# Patient Record
Sex: FEMALE | Race: WHITE | Employment: OTHER | URBAN - METROPOLITAN AREA
[De-identification: names, ages, dates, MRNs, and addresses within clinical notes are randomized per-mention and may not be internally consistent; named-entity substitution may affect disease eponyms.]

---

## 2018-12-28 ENCOUNTER — APPOINTMENT (OUTPATIENT)
Dept: ULTRASOUND IMAGING | Age: 78
End: 2018-12-28
Attending: EMERGENCY MEDICINE
Payer: MEDICARE

## 2018-12-28 ENCOUNTER — APPOINTMENT (OUTPATIENT)
Dept: CT IMAGING | Age: 78
End: 2018-12-28
Attending: EMERGENCY MEDICINE
Payer: MEDICARE

## 2018-12-28 ENCOUNTER — HOSPITAL ENCOUNTER (EMERGENCY)
Age: 78
Discharge: HOME | End: 2018-12-28
Attending: EMERGENCY MEDICINE
Payer: MEDICARE

## 2018-12-28 VITALS
HEART RATE: 46 BPM | TEMPERATURE: 97.6 F | SYSTOLIC BLOOD PRESSURE: 121 MMHG | HEIGHT: 66 IN | DIASTOLIC BLOOD PRESSURE: 66 MMHG | WEIGHT: 220 LBS | OXYGEN SATURATION: 98 % | RESPIRATION RATE: 16 BRPM | BODY MASS INDEX: 35.36 KG/M2

## 2018-12-28 DIAGNOSIS — R10.11 RUQ PAIN: ICD-10-CM

## 2018-12-28 DIAGNOSIS — N39.0 ACUTE UTI: Primary | ICD-10-CM

## 2018-12-28 LAB
ALBUMIN SERPL-MCNC: 3.9 G/DL (ref 3.2–4.6)
ALBUMIN/GLOB SERPL: 1.1 {RATIO} (ref 1.2–3.5)
ALP SERPL-CCNC: 52 U/L (ref 50–136)
ALT SERPL-CCNC: 35 U/L (ref 12–65)
ANION GAP SERPL CALC-SCNC: 8 MMOL/L (ref 7–16)
AST SERPL-CCNC: 18 U/L (ref 15–37)
ATRIAL RATE: 45 BPM
BACTERIA URNS QL MICRO: NORMAL /HPF
BASOPHILS # BLD: 0.1 K/UL (ref 0–0.2)
BASOPHILS NFR BLD: 1 % (ref 0–2)
BILIRUB SERPL-MCNC: 0.8 MG/DL (ref 0.2–1.1)
BUN SERPL-MCNC: 8 MG/DL (ref 8–23)
CALCIUM SERPL-MCNC: 9.2 MG/DL (ref 8.3–10.4)
CALCULATED P AXIS, ECG09: 62 DEGREES
CALCULATED R AXIS, ECG10: 7 DEGREES
CALCULATED T AXIS, ECG11: 31 DEGREES
CASTS URNS QL MICRO: NORMAL /LPF
CHLORIDE SERPL-SCNC: 104 MMOL/L (ref 98–107)
CO2 SERPL-SCNC: 29 MMOL/L (ref 21–32)
CREAT SERPL-MCNC: 0.81 MG/DL (ref 0.6–1)
DIAGNOSIS, 93000: NORMAL
DIFFERENTIAL METHOD BLD: NORMAL
EOSINOPHIL # BLD: 0.2 K/UL (ref 0–0.8)
EOSINOPHIL NFR BLD: 3 % (ref 0.5–7.8)
EPI CELLS #/AREA URNS HPF: NORMAL /HPF
ERYTHROCYTE [DISTWIDTH] IN BLOOD BY AUTOMATED COUNT: 12.5 % (ref 11.9–14.6)
GLOBULIN SER CALC-MCNC: 3.7 G/DL (ref 2.3–3.5)
GLUCOSE SERPL-MCNC: 135 MG/DL (ref 65–100)
HCT VFR BLD AUTO: 44.3 % (ref 35.8–46.3)
HGB BLD-MCNC: 15.1 G/DL (ref 11.7–15.4)
IMM GRANULOCYTES # BLD: 0 K/UL (ref 0–0.5)
IMM GRANULOCYTES NFR BLD AUTO: 1 % (ref 0–5)
LIPASE SERPL-CCNC: 199 U/L (ref 73–393)
LYMPHOCYTES # BLD: 2.3 K/UL (ref 0.5–4.6)
LYMPHOCYTES NFR BLD: 40 % (ref 13–44)
MCH RBC QN AUTO: 29.5 PG (ref 26.1–32.9)
MCHC RBC AUTO-ENTMCNC: 34.1 G/DL (ref 31.4–35)
MCV RBC AUTO: 86.7 FL (ref 79.6–97.8)
MONOCYTES # BLD: 0.6 K/UL (ref 0.1–1.3)
MONOCYTES NFR BLD: 10 % (ref 4–12)
NEUTS SEG # BLD: 2.7 K/UL (ref 1.7–8.2)
NEUTS SEG NFR BLD: 47 % (ref 43–78)
NRBC # BLD: 0 K/UL (ref 0–0.2)
P-R INTERVAL, ECG05: 194 MS
PLATELET # BLD AUTO: 199 K/UL (ref 150–450)
PMV BLD AUTO: 10.6 FL (ref 9.4–12.3)
POTASSIUM SERPL-SCNC: 3.6 MMOL/L (ref 3.5–5.1)
PROT SERPL-MCNC: 7.6 G/DL (ref 6.3–8.2)
Q-T INTERVAL, ECG07: 496 MS
QRS DURATION, ECG06: 144 MS
QTC CALCULATION (BEZET), ECG08: 429 MS
RBC # BLD AUTO: 5.11 M/UL (ref 4.05–5.2)
RBC #/AREA URNS HPF: NORMAL /HPF
SODIUM SERPL-SCNC: 141 MMOL/L (ref 136–145)
TROPONIN I SERPL-MCNC: <0.02 NG/ML (ref 0.02–0.05)
VENTRICULAR RATE, ECG03: 45 BPM
WBC # BLD AUTO: 5.8 K/UL (ref 4.3–11.1)
WBC URNS QL MICRO: NORMAL /HPF

## 2018-12-28 PROCEDURE — 76705 ECHO EXAM OF ABDOMEN: CPT

## 2018-12-28 PROCEDURE — 74011000258 HC RX REV CODE- 258: Performed by: EMERGENCY MEDICINE

## 2018-12-28 PROCEDURE — 81015 MICROSCOPIC EXAM OF URINE: CPT

## 2018-12-28 PROCEDURE — 96365 THER/PROPH/DIAG IV INF INIT: CPT | Performed by: EMERGENCY MEDICINE

## 2018-12-28 PROCEDURE — 87086 URINE CULTURE/COLONY COUNT: CPT

## 2018-12-28 PROCEDURE — 84484 ASSAY OF TROPONIN QUANT: CPT

## 2018-12-28 PROCEDURE — 74177 CT ABD & PELVIS W/CONTRAST: CPT

## 2018-12-28 PROCEDURE — 80053 COMPREHEN METABOLIC PANEL: CPT

## 2018-12-28 PROCEDURE — 96375 TX/PRO/DX INJ NEW DRUG ADDON: CPT | Performed by: EMERGENCY MEDICINE

## 2018-12-28 PROCEDURE — 93005 ELECTROCARDIOGRAM TRACING: CPT | Performed by: EMERGENCY MEDICINE

## 2018-12-28 PROCEDURE — 83690 ASSAY OF LIPASE: CPT

## 2018-12-28 PROCEDURE — 74011250636 HC RX REV CODE- 250/636: Performed by: EMERGENCY MEDICINE

## 2018-12-28 PROCEDURE — 85025 COMPLETE CBC W/AUTO DIFF WBC: CPT

## 2018-12-28 PROCEDURE — 74011636320 HC RX REV CODE- 636/320: Performed by: EMERGENCY MEDICINE

## 2018-12-28 PROCEDURE — 81003 URINALYSIS AUTO W/O SCOPE: CPT | Performed by: EMERGENCY MEDICINE

## 2018-12-28 PROCEDURE — 99284 EMERGENCY DEPT VISIT MOD MDM: CPT | Performed by: EMERGENCY MEDICINE

## 2018-12-28 RX ORDER — CEPHALEXIN 500 MG/1
500 CAPSULE ORAL 4 TIMES DAILY
Qty: 28 CAP | Refills: 0 | Status: SHIPPED | OUTPATIENT
Start: 2018-12-28 | End: 2019-01-04

## 2018-12-28 RX ORDER — ONDANSETRON 2 MG/ML
4 INJECTION INTRAMUSCULAR; INTRAVENOUS
Status: COMPLETED | OUTPATIENT
Start: 2018-12-28 | End: 2018-12-28

## 2018-12-28 RX ORDER — SODIUM CHLORIDE 0.9 % (FLUSH) 0.9 %
10 SYRINGE (ML) INJECTION
Status: COMPLETED | OUTPATIENT
Start: 2018-12-28 | End: 2018-12-28

## 2018-12-28 RX ORDER — ONDANSETRON 4 MG/1
4 TABLET, ORALLY DISINTEGRATING ORAL
Qty: 8 TAB | Refills: 0 | Status: SHIPPED | OUTPATIENT
Start: 2018-12-28 | End: 2019-01-01

## 2018-12-28 RX ADMIN — SODIUM CHLORIDE 100 ML: 900 INJECTION, SOLUTION INTRAVENOUS at 12:39

## 2018-12-28 RX ADMIN — CEFTRIAXONE SODIUM 1 G: 1 INJECTION, POWDER, FOR SOLUTION INTRAMUSCULAR; INTRAVENOUS at 10:02

## 2018-12-28 RX ADMIN — ONDANSETRON 4 MG: 2 INJECTION INTRAMUSCULAR; INTRAVENOUS at 07:39

## 2018-12-28 RX ADMIN — IOPAMIDOL 100 ML: 755 INJECTION, SOLUTION INTRAVENOUS at 12:39

## 2018-12-28 RX ADMIN — Medication 10 ML: at 12:39

## 2018-12-28 NOTE — ED PROVIDER NOTES
31-year-old female presents with complaint of intermittent right upper quadrant/epigastric pain associated with eating over the course the past 10 days. States she has associated nausea with abdominal pain. Denies chest pain, shortness of breath, vomiting, fever, chills, urinary symptoms, diarrhea, constipation, melena, hematochezia, flank pain. Reports history of previous ALEJANDRO. Denies history of previous issues with gallstones. The history is provided by the patient. No  was used. Abdominal Pain This is a new problem. The problem occurs daily. The problem has not changed since onset. The pain is associated with eating. The pain is located in the RUQ. The quality of the pain is sharp and pressure-like. The pain is at a severity of 3/10. The pain is mild. Associated symptoms include nausea. Pertinent negatives include no anorexia, no fever, no belching, no diarrhea, no flatus, no hematochezia, no melena, no vomiting, no constipation, no dysuria, no frequency, no hematuria, no arthralgias, no myalgias, no trauma, no chest pain and no back pain. The pain is worsened by eating. The pain is relieved by nothing. No past medical history on file. No past surgical history on file. No family history on file. Social History Socioeconomic History  Marital status:  Spouse name: Not on file  Number of children: Not on file  Years of education: Not on file  Highest education level: Not on file Social Needs  Financial resource strain: Not on file  Food insecurity - worry: Not on file  Food insecurity - inability: Not on file  Transportation needs - medical: Not on file  Transportation needs - non-medical: Not on file Occupational History  Not on file Tobacco Use  Smoking status: Not on file Substance and Sexual Activity  Alcohol use: Not on file  Drug use: Not on file  Sexual activity: Not on file Other Topics Concern  Not on file Social History Narrative  Not on file ALLERGIES: Augmentin [amoxicillin-pot clavulanate] and Pcn [penicillins] Review of Systems Constitutional: Negative for chills, fatigue and fever. Respiratory: Negative for cough and shortness of breath. Cardiovascular: Negative for chest pain. Gastrointestinal: Positive for abdominal pain and nausea. Negative for anorexia, blood in stool, constipation, diarrhea, flatus, hematochezia, melena and vomiting. Genitourinary: Negative for dysuria, frequency and hematuria. Musculoskeletal: Negative for arthralgias, back pain and myalgias. Skin: Negative for rash. Neurological: Negative for dizziness and weakness. Vitals:  
 12/28/18 7956 BP: 155/81 Pulse: (!) 57 Resp: 18 Temp: 97.6 °F (36.4 °C) SpO2: 96% Weight: 99.8 kg (220 lb) Height: 5' 6\" (1.676 m) Physical Exam  
Constitutional: She is oriented to person, place, and time. She appears well-developed and well-nourished. Well appearing and in NAD. HENT:  
MMM. Eyes: Conjunctivae are normal.  
Neck: No JVD present. No tracheal deviation present. Cardiovascular: Regular rhythm and normal heart sounds. Bradycardic. Pulses 2+ and equal bilaterally. Pulmonary/Chest: Effort normal and breath sounds normal.  
CTAB. Abdominal: Soft. Bowel sounds are normal. There is tenderness in the right upper quadrant. Mild RUQ TTP. Soft, ND. No rebound or guarding. No CVAT. Negative Henley's sign. Musculoskeletal: Normal range of motion. Neurological: She is alert and oriented to person, place, and time. No cranial nerve deficit. Skin: Skin is warm and dry. No rash. Nursing note and vitals reviewed. MDM Number of Diagnoses or Management Options Acute UTI: new and requires workup RUQ pain: new and requires workup Diagnosis management comments: Patient with symptoms consistent with biliary colic. Will administer Zofran at this time. CBC, CMP, lipase, UA, troponin, EKG, right upper quadrant ultrasound pending. 
------------------------------------------------------------------------------------------------------- 
UA with evidence of UTI. Patient 1 g Rocephin IV. No allergic reaction. Urine culture obtained. RUQ ultrasound negative for stones or biliary tree obstruction. Patient requests that CT abdomen and pelvis. Obtained to rule out further etiologies of her right upper quadrant/epigastric pain. CT negative for any other acute abnormalities. Will discharge home with Keflex as patient does not have an allergy to Rocephin (or cephalosporins). Vital signs stable. Patient well-appearing. Patient tolerating po. Patient given strict return precautions. Amount and/or Complexity of Data Reviewed Clinical lab tests: ordered and reviewed Tests in the radiology section of CPT®: ordered and reviewed Tests in the medicine section of CPT®: ordered and reviewed Review and summarize past medical records: yes Independent visualization of images, tracings, or specimens: yes Risk of Complications, Morbidity, and/or Mortality Presenting problems: moderate Diagnostic procedures: moderate Management options: moderate Patient Progress Patient progress: stable ED Course as of Dec 28 1309 Fri Dec 28, 2018 [address]: Negative for gallstones or evidence of biliary tree obstruction. Increased liver echogenicity, probably moderate fatty infiltration. [DF] ED Course User Index 
[DF] Hi Abebe MD  
 
 
EKG Date/Time: 12/28/2018 7:47 AM 
Performed by: Hi Abebe MD 
Authorized by: Hi Abebe MD  
 
ECG reviewed by ED Physician in the absence of a cardiologist: yes Rate:  
  ECG rate:  45 ECG rate assessment: bradycardic Rhythm:  
  Rhythm: sinus bradycardia Ectopy:  
  Ectopy: none QRS:  
  QRS axis:  Normal 
 QRS intervals:  Normal 
Conduction:  
  Conduction: abnormal   
  Abnormal conduction: complete RBBB   
ST segments: ST segments:  Normal 
T waves:  
  T waves: normal   
 
 
Results Include: 
 
Recent Results (from the past 24 hour(s)) CBC WITH AUTOMATED DIFF Collection Time: 12/28/18  7:08 AM  
Result Value Ref Range WBC 5.8 4.3 - 11.1 K/uL  
 RBC 5.11 4.05 - 5.2 M/uL  
 HGB 15.1 11.7 - 15.4 g/dL HCT 44.3 35.8 - 46.3 % MCV 86.7 79.6 - 97.8 FL  
 MCH 29.5 26.1 - 32.9 PG  
 MCHC 34.1 31.4 - 35.0 g/dL  
 RDW 12.5 11.9 - 14.6 % PLATELET 169 278 - 565 K/uL MPV 10.6 9.4 - 12.3 FL ABSOLUTE NRBC 0.00 0.0 - 0.2 K/uL  
 DF AUTOMATED NEUTROPHILS 47 43 - 78 % LYMPHOCYTES 40 13 - 44 % MONOCYTES 10 4.0 - 12.0 % EOSINOPHILS 3 0.5 - 7.8 % BASOPHILS 1 0.0 - 2.0 % IMMATURE GRANULOCYTES 1 0.0 - 5.0 %  
 ABS. NEUTROPHILS 2.7 1.7 - 8.2 K/UL  
 ABS. LYMPHOCYTES 2.3 0.5 - 4.6 K/UL  
 ABS. MONOCYTES 0.6 0.1 - 1.3 K/UL  
 ABS. EOSINOPHILS 0.2 0.0 - 0.8 K/UL  
 ABS. BASOPHILS 0.1 0.0 - 0.2 K/UL  
 ABS. IMM. GRANS. 0.0 0.0 - 0.5 K/UL METABOLIC PANEL, COMPREHENSIVE Collection Time: 12/28/18  7:08 AM  
Result Value Ref Range Sodium 141 136 - 145 mmol/L Potassium 3.6 3.5 - 5.1 mmol/L Chloride 104 98 - 107 mmol/L  
 CO2 29 21 - 32 mmol/L Anion gap 8 7 - 16 mmol/L Glucose 135 (H) 65 - 100 mg/dL BUN 8 8 - 23 MG/DL Creatinine 0.81 0.6 - 1.0 MG/DL  
 GFR est AA >60 >60 ml/min/1.73m2 GFR est non-AA >60 >60 ml/min/1.73m2 Calcium 9.2 8.3 - 10.4 MG/DL Bilirubin, total 0.8 0.2 - 1.1 MG/DL  
 ALT (SGPT) 35 12 - 65 U/L  
 AST (SGOT) 18 15 - 37 U/L Alk. phosphatase 52 50 - 136 U/L Protein, total 7.6 6.3 - 8.2 g/dL Albumin 3.9 3.2 - 4.6 g/dL Globulin 3.7 (H) 2.3 - 3.5 g/dL A-G Ratio 1.1 (L) 1.2 - 3.5 LIPASE Collection Time: 12/28/18  7:08 AM  
Result Value Ref Range Lipase 199 73 - 393 U/L  
TROPONIN I  Collection Time: 12/28/18  7:08 AM  
 Result Value Ref Range Troponin-I, Qt. <0.02 (L) 0.02 - 0.05 NG/ML  
EKG, 12 LEAD, INITIAL Collection Time: 12/28/18  7:28 AM  
Result Value Ref Range Ventricular Rate 45 BPM  
 Atrial Rate 45 BPM  
 P-R Interval 194 ms QRS Duration 144 ms Q-T Interval 496 ms QTC Calculation (Bezet) 429 ms Calculated P Axis 62 degrees Calculated R Axis 7 degrees Calculated T Axis 31 degrees Diagnosis Sinus bradycardia Right bundle branch block Abnormal ECG No previous ECGs available Confirmed by ST ELIOT GARCIAS MD (), CARA WARNER ([id number]) on 12/28/2018 12:17:19 PM 
  
URINE MICROSCOPIC Collection Time: 12/28/18  7:50 AM  
Result Value Ref Range WBC 20-50 0 /hpf  
 RBC 0-3 0 /hpf Epithelial cells 0-3 0 /hpf Bacteria TRACE 0 /hpf Casts 0-3 0 /lpf Leo Young MD; 12/28/2018 @7:47 AM Voice dictation software was used during the making of this note. This software is not perfect and grammatical and other typographical errors may be present.   This note has not been proofread for errors. 
===================================================================

## 2018-12-28 NOTE — LETTER
[facility] EMERGENCY DEPT [address] [address] 
[phone] Work/School Note Date: 12/28/2018 To Whom It May concern: 
 
Haven Yeh was seen and treated today in the emergency room by the following provider(s): 
Attending Provider: Sarah Sharpe MD.   
 
Anh Douglas may return to work on 12/28/2018.  
 
Sincerely, 
 
 
 
 
Janny Damon MD

## 2018-12-28 NOTE — ED NOTES
I have reviewed discharge instructions with the patient. The patient verbalized understanding. Patient left ED via Discharge Method: ambulatory to Home with family. Opportunity for questions and clarification provided. Patient given 2 scripts. To continue your aftercare when you leave the hospital, you may receive an automated call from our care team to check in on how you are doing. This is a free service and part of our promise to provide the best care and service to meet your aftercare needs.  If you have questions, or wish to unsubscribe from this service please call [phone]. Thank you for Choosing our [facility] Emergency Department.

## 2018-12-28 NOTE — DISCHARGE INSTRUCTIONS
Take antibiotic as prescribed. Take Zofran as prescribed. Follow-up with primary care physician on Monday. Return to ED if symptoms worsen or progress in any way. Abdominal Pain: Care Instructions  Your Care Instructions    Abdominal pain has many possible causes. Some aren't serious and get better on their own in a few days. Others need more testing and treatment. If your pain continues or gets worse, you need to be rechecked and may need more tests to find out what is wrong. You may need surgery to correct the problem. Don't ignore new symptoms, such as fever, nausea and vomiting, urination problems, pain that gets worse, and dizziness. These may be signs of a more serious problem. Your doctor may have recommended a follow-up visit in the next 8 to 12 hours. If you are not getting better, you may need more tests or treatment. The doctor has checked you carefully, but problems can develop later. If you notice any problems or new symptoms, get medical treatment right away. Follow-up care is a key part of your treatment and safety. Be sure to make and go to all appointments, and call your doctor if you are having problems. It's also a good idea to know your test results and keep a list of the medicines you take. How can you care for yourself at home? · Rest until you feel better. · To prevent dehydration, drink plenty of fluids, enough so that your urine is light yellow or clear like water. Choose water and other caffeine-free clear liquids until you feel better. If you have kidney, heart, or liver disease and have to limit fluids, talk with your doctor before you increase the amount of fluids you drink. · If your stomach is upset, eat mild foods, such as rice, dry toast or crackers, bananas, and applesauce. Try eating several small meals instead of two or three large ones.   · Wait until 48 hours after all symptoms have gone away before you have spicy foods, alcohol, and drinks that contain caffeine. · Do not eat foods that are high in fat. · Avoid anti-inflammatory medicines such as aspirin, ibuprofen (Advil, Motrin), and naproxen (Aleve). These can cause stomach upset. Talk to your doctor if you take daily aspirin for another health problem. When should you call for help? Call 911 anytime you think you may need emergency care. For example, call if:    · You passed out (lost consciousness).     · You pass maroon or very bloody stools.     · You vomit blood or what looks like coffee grounds.     · You have new, severe belly pain.    Call your doctor now or seek immediate medical care if:    · Your pain gets worse, especially if it becomes focused in one area of your belly.     · You have a new or higher fever.     · Your stools are black and look like tar, or they have streaks of blood.     · You have unexpected vaginal bleeding.     · You have symptoms of a urinary tract infection. These may include:  ? Pain when you urinate. ? Urinating more often than usual.  ? Blood in your urine.     · You are dizzy or lightheaded, or you feel like you may faint.    Watch closely for changes in your health, and be sure to contact your doctor if:    · You are not getting better after 1 day (24 hours). Where can you learn more? Go to http://brian-leonor.info/. Enter K577 in the search box to learn more about \"Abdominal Pain: Care Instructions. \"  Current as of: November 20, 2017  Content Version: 11.8  © 0061-1742 [employer]. Care instructions adapted under license by [employer] (which disclaims liability or warranty for this information). If you have questions about a medical condition or this instruction, always ask your healthcare professional. [place] [address] any warranty or liability for your use of this information.            Urinary Tract Infection in Women: Care Instructions  Your Care Instructions    A urinary tract infection, or UTI, is a general term for an infection anywhere between the kidneys and the urethra (where urine comes out). Most UTIs are bladder infections. They often cause pain or burning when you urinate. UTIs are caused by bacteria and can be cured with antibiotics. Be sure to complete your treatment so that the infection goes away. Follow-up care is a key part of your treatment and safety. Be sure to make and go to all appointments, and call your doctor if you are having problems. It's also a good idea to know your test results and keep a list of the medicines you take. How can you care for yourself at home? · Take your antibiotics as directed. Do not stop taking them just because you feel better. You need to take the full course of antibiotics. · Drink extra water and other fluids for the next day or two. This may help wash out the bacteria that are causing the infection. (If you have kidney, heart, or liver disease and have to limit fluids, talk with your doctor before you increase your fluid intake.)  · Avoid drinks that are carbonated or have caffeine. They can irritate the bladder. · Urinate often. Try to empty your bladder each time. · To relieve pain, take a hot bath or lay a heating pad set on low over your lower belly or genital area. Never go to sleep with a heating pad in place. To prevent UTIs  · Drink plenty of water each day. This helps you urinate often, which clears bacteria from your system. (If you have kidney, heart, or liver disease and have to limit fluids, talk with your doctor before you increase your fluid intake.)  · Urinate when you need to. · Urinate right after you have sex. · Change sanitary pads often. · Avoid douches, bubble baths, feminine hygiene sprays, and other feminine hygiene products that have deodorants. · After going to the bathroom, wipe from front to back. When should you call for help?   Call your doctor now or seek immediate medical care if:    · Symptoms such as fever, chills, nausea, or vomiting get worse or appear for the first time.     · You have new pain in your back just below your rib cage. This is called flank pain.     · There is new blood or pus in your urine.     · You have any problems with your antibiotic medicine.    Watch closely for changes in your health, and be sure to contact your doctor if:    · You are not getting better after taking an antibiotic for 2 days.     · Your symptoms go away but then come back. Where can you learn more? Go to http://brian-leonor.info/. Enter Y126 in the search box to learn more about \"Urinary Tract Infection in Women: Care Instructions. \"  Current as of: March 21, 2018  Content Version: 11.8  © 3388-3222 [employer], [employer]. Care instructions adapted under license by [employer] (which disclaims liability or warranty for this information). If you have questions about a medical condition or this instruction, always ask your healthcare professional. [place] [address] any warranty or liability for your use of this information.

## 2018-12-30 LAB
BACTERIA SPEC CULT: NORMAL
BACTERIA SPEC CULT: NORMAL
SERVICE CMNT-IMP: NORMAL

## 2021-01-01 ENCOUNTER — ANESTHESIA (OUTPATIENT)
Dept: ENDOSCOPY | Age: 81
DRG: 329 | End: 2021-01-01
Payer: MEDICARE

## 2021-01-01 ENCOUNTER — APPOINTMENT (OUTPATIENT)
Dept: GENERAL RADIOLOGY | Age: 81
DRG: 329 | End: 2021-01-01
Attending: SURGERY
Payer: MEDICARE

## 2021-01-01 ENCOUNTER — HOSPITAL ENCOUNTER (OUTPATIENT)
Dept: CT IMAGING | Age: 81
Discharge: HOME | End: 2021-03-12
Attending: INTERNAL MEDICINE
Payer: MEDICARE

## 2021-01-01 ENCOUNTER — TRANSCRIBE ORDER (OUTPATIENT)
Dept: SCHEDULING | Age: 81
End: 2021-01-01

## 2021-01-01 ENCOUNTER — ANESTHESIA (OUTPATIENT)
Dept: SURGERY | Age: 81
DRG: 329 | End: 2021-01-01
Payer: MEDICARE

## 2021-01-01 ENCOUNTER — APPOINTMENT (OUTPATIENT)
Dept: CT IMAGING | Age: 81
DRG: 329 | End: 2021-01-01
Attending: NURSE PRACTITIONER
Payer: MEDICARE

## 2021-01-01 ENCOUNTER — ANESTHESIA EVENT (OUTPATIENT)
Dept: SURGERY | Age: 81
DRG: 329 | End: 2021-01-01
Payer: MEDICARE

## 2021-01-01 ENCOUNTER — APPOINTMENT (OUTPATIENT)
Dept: GENERAL RADIOLOGY | Age: 81
DRG: 329 | End: 2021-01-01
Attending: NURSE PRACTITIONER
Payer: MEDICARE

## 2021-01-01 ENCOUNTER — APPOINTMENT (OUTPATIENT)
Dept: CT IMAGING | Age: 81
DRG: 329 | End: 2021-01-01
Attending: SURGERY
Payer: MEDICARE

## 2021-01-01 ENCOUNTER — APPOINTMENT (OUTPATIENT)
Dept: INTERVENTIONAL RADIOLOGY/VASCULAR | Age: 81
DRG: 329 | End: 2021-01-01
Attending: SURGERY
Payer: MEDICARE

## 2021-01-01 ENCOUNTER — HOSPITAL ENCOUNTER (OUTPATIENT)
Dept: SURGERY | Age: 81
Discharge: HOME | End: 2021-03-24
Payer: MEDICARE

## 2021-01-01 ENCOUNTER — HOSPITAL ENCOUNTER (OUTPATIENT)
Dept: LAB | Age: 81
Discharge: HOME | End: 2021-03-17

## 2021-01-01 ENCOUNTER — APPOINTMENT (OUTPATIENT)
Dept: GENERAL RADIOLOGY | Age: 81
DRG: 329 | End: 2021-01-01
Attending: INTERNAL MEDICINE
Payer: MEDICARE

## 2021-01-01 ENCOUNTER — HOSPITAL ENCOUNTER (INPATIENT)
Age: 81
LOS: 32 days | DRG: 329 | End: 2021-05-01
Attending: SURGERY | Admitting: SURGERY
Payer: MEDICARE

## 2021-01-01 ENCOUNTER — ANESTHESIA EVENT (OUTPATIENT)
Dept: ENDOSCOPY | Age: 81
DRG: 329 | End: 2021-01-01
Payer: MEDICARE

## 2021-01-01 VITALS
BODY MASS INDEX: 33.8 KG/M2 | TEMPERATURE: 98.6 F | SYSTOLIC BLOOD PRESSURE: 51 MMHG | OXYGEN SATURATION: 95 % | WEIGHT: 210.32 LBS | HEIGHT: 66 IN | HEART RATE: 146 BPM | RESPIRATION RATE: 23 BRPM | DIASTOLIC BLOOD PRESSURE: 46 MMHG

## 2021-01-01 VITALS
SYSTOLIC BLOOD PRESSURE: 146 MMHG | TEMPERATURE: 97.5 F | HEART RATE: 92 BPM | RESPIRATION RATE: 16 BRPM | BODY MASS INDEX: 30.92 KG/M2 | OXYGEN SATURATION: 99 % | WEIGHT: 192.4 LBS | HEIGHT: 66 IN | DIASTOLIC BLOOD PRESSURE: 66 MMHG

## 2021-01-01 DIAGNOSIS — C18.9 MALIGNANT NEOPLASM OF COLON, UNSPECIFIED PART OF COLON (HCC): ICD-10-CM

## 2021-01-01 DIAGNOSIS — C18.2 MALIGNANT NEOPLASM OF ASCENDING COLON (HCC): ICD-10-CM

## 2021-01-01 DIAGNOSIS — R10.33 PERIUMBILICAL PAIN: Primary | ICD-10-CM

## 2021-01-01 DIAGNOSIS — J69.0 ASPIRATION PNEUMONIA OF RIGHT UPPER LOBE, UNSPECIFIED ASPIRATION PNEUMONIA TYPE (HCC): ICD-10-CM

## 2021-01-01 DIAGNOSIS — D69.6 THROMBOCYTOPENIA (HCC): ICD-10-CM

## 2021-01-01 DIAGNOSIS — R10.13 EPIGASTRIC PAIN: ICD-10-CM

## 2021-01-01 DIAGNOSIS — E86.1 HYPOVOLEMIA: ICD-10-CM

## 2021-01-01 DIAGNOSIS — Z51.5 ENCOUNTER FOR PALLIATIVE CARE: ICD-10-CM

## 2021-01-01 DIAGNOSIS — R63.4 LOSS OF WEIGHT: ICD-10-CM

## 2021-01-01 DIAGNOSIS — R10.33 PERIUMBILICAL PAIN: ICD-10-CM

## 2021-01-01 DIAGNOSIS — R41.82 ALTERED MENTAL STATUS, UNSPECIFIED ALTERED MENTAL STATUS TYPE: ICD-10-CM

## 2021-01-01 DIAGNOSIS — R00.0 TACHYCARDIA: ICD-10-CM

## 2021-01-01 DIAGNOSIS — J96.01 ACUTE HYPOXEMIC RESPIRATORY FAILURE (HCC): ICD-10-CM

## 2021-01-01 DIAGNOSIS — C17.0 ADENOCARCINOMA OF DUODENUM (HCC): ICD-10-CM

## 2021-01-01 DIAGNOSIS — C17.0 DUODENAL CANCER (HCC): ICD-10-CM

## 2021-01-01 DIAGNOSIS — E43 SEVERE PROTEIN-CALORIE MALNUTRITION (HCC): ICD-10-CM

## 2021-01-01 DIAGNOSIS — A41.9 SEVERE SEPSIS (HCC): ICD-10-CM

## 2021-01-01 DIAGNOSIS — D50.0 ANEMIA DUE TO BLOOD LOSS: ICD-10-CM

## 2021-01-01 DIAGNOSIS — Z99.11 ENCOUNTER FOR WEANING FROM VENTILATOR (HCC): ICD-10-CM

## 2021-01-01 DIAGNOSIS — E87.6 HYPOKALEMIA: ICD-10-CM

## 2021-01-01 DIAGNOSIS — R54 FRAILTY: ICD-10-CM

## 2021-01-01 DIAGNOSIS — R65.20 SEVERE SEPSIS (HCC): ICD-10-CM

## 2021-01-01 LAB
ABO + RH BLD: NORMAL
ABO + RH BLD: NORMAL
ALBUMIN SERPL-MCNC: 1.6 G/DL (ref 3.2–4.6)
ALBUMIN SERPL-MCNC: 1.7 G/DL (ref 3.2–4.6)
ALBUMIN SERPL-MCNC: 1.8 G/DL (ref 3.2–4.6)
ALBUMIN SERPL-MCNC: 1.9 G/DL (ref 3.2–4.6)
ALBUMIN SERPL-MCNC: 2.1 G/DL (ref 3.2–4.6)
ALBUMIN SERPL-MCNC: 2.3 G/DL (ref 3.2–4.6)
ALBUMIN SERPL-MCNC: 2.3 G/DL (ref 3.2–4.6)
ALBUMIN SERPL-MCNC: 2.4 G/DL (ref 3.2–4.6)
ALBUMIN SERPL-MCNC: 2.4 G/DL (ref 3.2–4.6)
ALBUMIN SERPL-MCNC: 2.7 G/DL (ref 3.2–4.6)
ALBUMIN SERPL-MCNC: 2.8 G/DL (ref 3.2–4.6)
ALBUMIN SERPL-MCNC: 3.2 G/DL (ref 3.2–4.6)
ALBUMIN/GLOB SERPL: 0.4 {RATIO} (ref 1.2–3.5)
ALBUMIN/GLOB SERPL: 0.5 {RATIO} (ref 1.2–3.5)
ALBUMIN/GLOB SERPL: 0.6 {RATIO} (ref 1.2–3.5)
ALBUMIN/GLOB SERPL: 0.7 {RATIO} (ref 1.2–3.5)
ALBUMIN/GLOB SERPL: 0.7 {RATIO} (ref 1.2–3.5)
ALBUMIN/GLOB SERPL: 0.8 {RATIO} (ref 1.2–3.5)
ALBUMIN/GLOB SERPL: 0.9 {RATIO} (ref 1.2–3.5)
ALP SERPL-CCNC: 130 U/L (ref 50–136)
ALP SERPL-CCNC: 137 U/L (ref 50–136)
ALP SERPL-CCNC: 163 U/L (ref 50–136)
ALP SERPL-CCNC: 195 U/L (ref 50–136)
ALP SERPL-CCNC: 50 U/L (ref 50–136)
ALP SERPL-CCNC: 53 U/L (ref 50–136)
ALP SERPL-CCNC: 55 U/L (ref 50–136)
ALP SERPL-CCNC: 61 U/L (ref 50–136)
ALP SERPL-CCNC: 64 U/L (ref 50–136)
ALP SERPL-CCNC: 70 U/L (ref 50–136)
ALP SERPL-CCNC: 83 U/L (ref 50–136)
ALP SERPL-CCNC: 94 U/L (ref 50–136)
ALT SERPL-CCNC: 112 U/L (ref 12–65)
ALT SERPL-CCNC: 38 U/L (ref 12–65)
ALT SERPL-CCNC: 39 U/L (ref 12–65)
ALT SERPL-CCNC: 40 U/L (ref 12–65)
ALT SERPL-CCNC: 41 U/L (ref 12–65)
ALT SERPL-CCNC: 49 U/L (ref 12–65)
ALT SERPL-CCNC: 51 U/L (ref 12–65)
ALT SERPL-CCNC: 52 U/L (ref 12–65)
ALT SERPL-CCNC: 55 U/L (ref 12–65)
ALT SERPL-CCNC: 64 U/L (ref 12–65)
ALT SERPL-CCNC: 71 U/L (ref 12–65)
ALT SERPL-CCNC: 81 U/L (ref 12–65)
AMYLASE SERPL-CCNC: 77 U/L (ref 25–115)
ANION GAP SERPL CALC-SCNC: 10 MMOL/L (ref 7–16)
ANION GAP SERPL CALC-SCNC: 11 MMOL/L (ref 7–16)
ANION GAP SERPL CALC-SCNC: 15 MMOL/L (ref 7–16)
ANION GAP SERPL CALC-SCNC: 2 MMOL/L (ref 7–16)
ANION GAP SERPL CALC-SCNC: 3 MMOL/L (ref 7–16)
ANION GAP SERPL CALC-SCNC: 3 MMOL/L (ref 7–16)
ANION GAP SERPL CALC-SCNC: 4 MMOL/L (ref 7–16)
ANION GAP SERPL CALC-SCNC: 5 MMOL/L (ref 7–16)
ANION GAP SERPL CALC-SCNC: 6 MMOL/L (ref 7–16)
ANION GAP SERPL CALC-SCNC: 6 MMOL/L (ref 7–16)
ANION GAP SERPL CALC-SCNC: 7 MMOL/L (ref 7–16)
ANION GAP SERPL CALC-SCNC: 8 MMOL/L (ref 7–16)
APTT PPP: 20.8 SEC (ref 24.1–35.1)
ARTERIAL PATENCY WRIST A: ABNORMAL
ARTERIAL PATENCY WRIST A: POSITIVE
AST SERPL-CCNC: 17 U/L (ref 15–37)
AST SERPL-CCNC: 18 U/L (ref 15–37)
AST SERPL-CCNC: 18 U/L (ref 15–37)
AST SERPL-CCNC: 19 U/L (ref 15–37)
AST SERPL-CCNC: 20 U/L (ref 15–37)
AST SERPL-CCNC: 23 U/L (ref 15–37)
AST SERPL-CCNC: 27 U/L (ref 15–37)
AST SERPL-CCNC: 46 U/L (ref 15–37)
AST SERPL-CCNC: 51 U/L (ref 15–37)
AST SERPL-CCNC: 79 U/L (ref 15–37)
ATRIAL RATE: 106 BPM
ATRIAL RATE: 117 BPM
ATRIAL RATE: 126 BPM
BACTERIA SPEC CULT: ABNORMAL
BACTERIA SPEC CULT: ABNORMAL
BACTERIA SPEC CULT: NORMAL
BASE DEFICIT BLD-SCNC: 0.6 MMOL/L
BASE DEFICIT BLD-SCNC: 3.5 MMOL/L
BASE EXCESS BLD CALC-SCNC: 0.5 MMOL/L
BASE EXCESS BLD CALC-SCNC: 1.2 MMOL/L
BASE EXCESS BLD CALC-SCNC: 8.9 MMOL/L
BASE EXCESS BLD CALC-SCNC: 9.9 MMOL/L
BASOPHILS # BLD: 0 K/UL (ref 0–0.2)
BASOPHILS # BLD: 0.1 K/UL (ref 0–0.2)
BASOPHILS NFR BLD: 0 % (ref 0–2)
BASOPHILS NFR BLD: 1 % (ref 0–2)
BDY SITE: ABNORMAL
BILIRUB DIRECT SERPL-MCNC: 0.2 MG/DL
BILIRUB DIRECT SERPL-MCNC: 0.3 MG/DL
BILIRUB SERPL-MCNC: 0.4 MG/DL (ref 0.2–1.1)
BILIRUB SERPL-MCNC: 0.5 MG/DL (ref 0.2–1.1)
BILIRUB SERPL-MCNC: 0.6 MG/DL (ref 0.2–1.1)
BILIRUB SERPL-MCNC: 0.6 MG/DL (ref 0.2–1.1)
BILIRUB SERPL-MCNC: 0.7 MG/DL (ref 0.2–1.1)
BILIRUB SERPL-MCNC: 0.7 MG/DL (ref 0.2–1.1)
BILIRUB SERPL-MCNC: 1.1 MG/DL (ref 0.2–1.1)
BILIRUB SERPL-MCNC: 1.1 MG/DL (ref 0.2–1.1)
BILIRUB SERPL-MCNC: 1.4 MG/DL (ref 0.2–1.1)
BILIRUB SERPL-MCNC: 1.5 MG/DL (ref 0.2–1.1)
BLD PROD TYP BPU: NORMAL
BLOOD BANK CMNT PATIENT-IMP: NORMAL
BLOOD GROUP ANTIBODIES SERPL: NORMAL
BLOOD GROUP ANTIBODIES SERPL: NORMAL
BPU ID: NORMAL
BUN SERPL-MCNC: 10 MG/DL (ref 8–23)
BUN SERPL-MCNC: 10 MG/DL (ref 8–23)
BUN SERPL-MCNC: 11 MG/DL (ref 8–23)
BUN SERPL-MCNC: 12 MG/DL (ref 8–23)
BUN SERPL-MCNC: 12 MG/DL (ref 8–23)
BUN SERPL-MCNC: 13 MG/DL (ref 8–23)
BUN SERPL-MCNC: 15 MG/DL (ref 8–23)
BUN SERPL-MCNC: 15 MG/DL (ref 8–23)
BUN SERPL-MCNC: 16 MG/DL (ref 8–23)
BUN SERPL-MCNC: 20 MG/DL (ref 8–23)
BUN SERPL-MCNC: 21 MG/DL (ref 8–23)
BUN SERPL-MCNC: 23 MG/DL (ref 8–23)
BUN SERPL-MCNC: 26 MG/DL (ref 8–23)
BUN SERPL-MCNC: 28 MG/DL (ref 8–23)
BUN SERPL-MCNC: 28 MG/DL (ref 8–23)
BUN SERPL-MCNC: 29 MG/DL (ref 8–23)
BUN SERPL-MCNC: 30 MG/DL (ref 8–23)
BUN SERPL-MCNC: 30 MG/DL (ref 8–23)
BUN SERPL-MCNC: 31 MG/DL (ref 8–23)
BUN SERPL-MCNC: 32 MG/DL (ref 8–23)
BUN SERPL-MCNC: 32 MG/DL (ref 8–23)
BUN SERPL-MCNC: 33 MG/DL (ref 8–23)
BUN SERPL-MCNC: 34 MG/DL (ref 8–23)
BUN SERPL-MCNC: 35 MG/DL (ref 8–23)
BUN SERPL-MCNC: 36 MG/DL (ref 8–23)
BUN SERPL-MCNC: 42 MG/DL (ref 8–23)
BUN SERPL-MCNC: 51 MG/DL (ref 8–23)
BUN SERPL-MCNC: 54 MG/DL (ref 8–23)
BUN SERPL-MCNC: 8 MG/DL (ref 8–23)
BUN SERPL-MCNC: 9 MG/DL (ref 8–23)
CA-I BLD-MCNC: 1.01 MMOL/L (ref 1.12–1.32)
CA-I BLD-MCNC: 1.07 MMOL/L (ref 1.12–1.32)
CA-I BLD-MCNC: 1.08 MMOL/L (ref 1.12–1.32)
CA-I BLD-MCNC: 1.16 MMOL/L (ref 1.12–1.32)
CALCIUM SERPL-MCNC: 10 MG/DL (ref 8.3–10.4)
CALCIUM SERPL-MCNC: 10.1 MG/DL (ref 8.3–10.4)
CALCIUM SERPL-MCNC: 7.6 MG/DL (ref 8.3–10.4)
CALCIUM SERPL-MCNC: 7.6 MG/DL (ref 8.3–10.4)
CALCIUM SERPL-MCNC: 8 MG/DL (ref 8.3–10.4)
CALCIUM SERPL-MCNC: 8.2 MG/DL (ref 8.3–10.4)
CALCIUM SERPL-MCNC: 8.3 MG/DL (ref 8.3–10.4)
CALCIUM SERPL-MCNC: 8.4 MG/DL (ref 8.3–10.4)
CALCIUM SERPL-MCNC: 8.4 MG/DL (ref 8.3–10.4)
CALCIUM SERPL-MCNC: 8.5 MG/DL (ref 8.3–10.4)
CALCIUM SERPL-MCNC: 8.6 MG/DL (ref 8.3–10.4)
CALCIUM SERPL-MCNC: 8.7 MG/DL (ref 8.3–10.4)
CALCIUM SERPL-MCNC: 8.8 MG/DL (ref 8.3–10.4)
CALCIUM SERPL-MCNC: 8.9 MG/DL (ref 8.3–10.4)
CALCIUM SERPL-MCNC: 8.9 MG/DL (ref 8.3–10.4)
CALCIUM SERPL-MCNC: 9 MG/DL (ref 8.3–10.4)
CALCIUM SERPL-MCNC: 9.2 MG/DL (ref 8.3–10.4)
CALCIUM SERPL-MCNC: 9.2 MG/DL (ref 8.3–10.4)
CALCIUM SERPL-MCNC: 9.4 MG/DL (ref 8.3–10.4)
CALCIUM SERPL-MCNC: 9.4 MG/DL (ref 8.3–10.4)
CALCIUM SERPL-MCNC: 9.6 MG/DL (ref 8.3–10.4)
CALCULATED P AXIS, ECG09: 31 DEGREES
CALCULATED P AXIS, ECG09: 53 DEGREES
CALCULATED R AXIS, ECG10: -18 DEGREES
CALCULATED R AXIS, ECG10: -25 DEGREES
CALCULATED R AXIS, ECG10: -8 DEGREES
CALCULATED T AXIS, ECG11: -5 DEGREES
CALCULATED T AXIS, ECG11: -7 DEGREES
CALCULATED T AXIS, ECG11: 18 DEGREES
CANCER AG19-9 SERPL-ACNC: 461.9 U/ML (ref 2–37)
CEA SERPL-MCNC: 0.9 NG/ML (ref 0–3)
CHLORIDE SERPL-SCNC: 100 MMOL/L (ref 98–107)
CHLORIDE SERPL-SCNC: 103 MMOL/L (ref 98–107)
CHLORIDE SERPL-SCNC: 104 MMOL/L (ref 98–107)
CHLORIDE SERPL-SCNC: 104 MMOL/L (ref 98–107)
CHLORIDE SERPL-SCNC: 106 MMOL/L (ref 98–107)
CHLORIDE SERPL-SCNC: 107 MMOL/L (ref 98–107)
CHLORIDE SERPL-SCNC: 107 MMOL/L (ref 98–107)
CHLORIDE SERPL-SCNC: 108 MMOL/L (ref 98–107)
CHLORIDE SERPL-SCNC: 109 MMOL/L (ref 98–107)
CHLORIDE SERPL-SCNC: 110 MMOL/L (ref 98–107)
CHLORIDE SERPL-SCNC: 112 MMOL/L (ref 98–107)
CHLORIDE SERPL-SCNC: 114 MMOL/L (ref 98–107)
CHLORIDE SERPL-SCNC: 114 MMOL/L (ref 98–107)
CHLORIDE SERPL-SCNC: 115 MMOL/L (ref 98–107)
CHLORIDE SERPL-SCNC: 115 MMOL/L (ref 98–107)
CHLORIDE SERPL-SCNC: 116 MMOL/L (ref 98–107)
CHLORIDE SERPL-SCNC: 117 MMOL/L (ref 98–107)
CHLORIDE SERPL-SCNC: 118 MMOL/L (ref 98–107)
CHLORIDE SERPL-SCNC: 91 MMOL/L (ref 98–107)
CHLORIDE SERPL-SCNC: 91 MMOL/L (ref 98–107)
CHLORIDE SERPL-SCNC: 93 MMOL/L (ref 98–107)
CHLORIDE SERPL-SCNC: 94 MMOL/L (ref 98–107)
CHLORIDE SERPL-SCNC: 94 MMOL/L (ref 98–107)
CHLORIDE SERPL-SCNC: 95 MMOL/L (ref 98–107)
CHLORIDE SERPL-SCNC: 99 MMOL/L (ref 98–107)
CO2 BLD-SCNC: 18 MMOL/L (ref 13–23)
CO2 BLD-SCNC: 25 MMOL/L (ref 13–23)
CO2 BLD-SCNC: 31 MMOL/L (ref 13–23)
CO2 BLD-SCNC: 32 MMOL/L (ref 13–23)
CO2 SERPL-SCNC: 19 MMOL/L (ref 21–32)
CO2 SERPL-SCNC: 20 MMOL/L (ref 21–32)
CO2 SERPL-SCNC: 20 MMOL/L (ref 21–32)
CO2 SERPL-SCNC: 21 MMOL/L (ref 21–32)
CO2 SERPL-SCNC: 22 MMOL/L (ref 21–32)
CO2 SERPL-SCNC: 22 MMOL/L (ref 21–32)
CO2 SERPL-SCNC: 23 MMOL/L (ref 21–32)
CO2 SERPL-SCNC: 24 MMOL/L (ref 21–32)
CO2 SERPL-SCNC: 25 MMOL/L (ref 21–32)
CO2 SERPL-SCNC: 26 MMOL/L (ref 21–32)
CO2 SERPL-SCNC: 27 MMOL/L (ref 21–32)
CO2 SERPL-SCNC: 28 MMOL/L (ref 21–32)
CO2 SERPL-SCNC: 28 MMOL/L (ref 21–32)
CO2 SERPL-SCNC: 29 MMOL/L (ref 21–32)
CO2 SERPL-SCNC: 30 MMOL/L (ref 21–32)
CO2 SERPL-SCNC: 30 MMOL/L (ref 21–32)
CO2 SERPL-SCNC: 39 MMOL/L (ref 21–32)
CO2 SERPL-SCNC: 41 MMOL/L (ref 21–32)
CO2 SERPL-SCNC: 43 MMOL/L (ref 21–32)
CREAT BLD-MCNC: 0.8 MG/DL (ref 0.8–1.5)
CREAT SERPL-MCNC: 0.49 MG/DL (ref 0.6–1)
CREAT SERPL-MCNC: 0.52 MG/DL (ref 0.6–1)
CREAT SERPL-MCNC: 0.53 MG/DL (ref 0.6–1)
CREAT SERPL-MCNC: 0.54 MG/DL (ref 0.6–1)
CREAT SERPL-MCNC: 0.55 MG/DL (ref 0.6–1)
CREAT SERPL-MCNC: 0.55 MG/DL (ref 0.6–1)
CREAT SERPL-MCNC: 0.57 MG/DL (ref 0.6–1)
CREAT SERPL-MCNC: 0.57 MG/DL (ref 0.6–1)
CREAT SERPL-MCNC: 0.59 MG/DL (ref 0.6–1)
CREAT SERPL-MCNC: 0.6 MG/DL (ref 0.6–1)
CREAT SERPL-MCNC: 0.6 MG/DL (ref 0.6–1)
CREAT SERPL-MCNC: 0.62 MG/DL (ref 0.6–1)
CREAT SERPL-MCNC: 0.65 MG/DL (ref 0.6–1)
CREAT SERPL-MCNC: 0.66 MG/DL (ref 0.6–1)
CREAT SERPL-MCNC: 0.66 MG/DL (ref 0.6–1)
CREAT SERPL-MCNC: 0.69 MG/DL (ref 0.6–1)
CREAT SERPL-MCNC: 0.71 MG/DL (ref 0.6–1)
CREAT SERPL-MCNC: 0.73 MG/DL (ref 0.6–1)
CREAT SERPL-MCNC: 0.75 MG/DL (ref 0.6–1)
CREAT SERPL-MCNC: 0.77 MG/DL (ref 0.6–1)
CREAT SERPL-MCNC: 0.78 MG/DL (ref 0.6–1)
CREAT SERPL-MCNC: 0.81 MG/DL (ref 0.6–1)
CREAT SERPL-MCNC: 0.84 MG/DL (ref 0.6–1)
CREAT SERPL-MCNC: 0.84 MG/DL (ref 0.6–1)
CREAT SERPL-MCNC: 0.85 MG/DL (ref 0.6–1)
CREAT SERPL-MCNC: 0.86 MG/DL (ref 0.6–1)
CREAT SERPL-MCNC: 0.9 MG/DL (ref 0.6–1)
CREAT SERPL-MCNC: 0.91 MG/DL (ref 0.6–1)
CREAT SERPL-MCNC: 0.99 MG/DL (ref 0.6–1)
CREAT SERPL-MCNC: 1.02 MG/DL (ref 0.6–1)
CREAT SERPL-MCNC: 1.04 MG/DL (ref 0.6–1)
CREAT SERPL-MCNC: 1.11 MG/DL (ref 0.6–1)
CREAT SERPL-MCNC: 1.21 MG/DL (ref 0.6–1)
CREAT SERPL-MCNC: 1.29 MG/DL (ref 0.6–1)
CROSSMATCH RESULT,%XM: NORMAL
DIAGNOSIS, 93000: NORMAL
DIFFERENTIAL METHOD BLD: ABNORMAL
EOSINOPHIL # BLD: 0 K/UL (ref 0–0.8)
EOSINOPHIL # BLD: 0.1 K/UL (ref 0–0.8)
EOSINOPHIL # BLD: 0.3 K/UL (ref 0–0.8)
EOSINOPHIL # BLD: 0.4 K/UL (ref 0–0.8)
EOSINOPHIL # BLD: 0.4 K/UL (ref 0–0.8)
EOSINOPHIL NFR BLD: 0 % (ref 0.5–7.8)
EOSINOPHIL NFR BLD: 1 % (ref 0.5–7.8)
EOSINOPHIL NFR BLD: 2 % (ref 0.5–7.8)
EOSINOPHIL NFR BLD: 3 % (ref 0.5–7.8)
EOSINOPHIL NFR BLD: 3 % (ref 0.5–7.8)
ERYTHROCYTE [DISTWIDTH] IN BLOOD BY AUTOMATED COUNT: 17.4 % (ref 11.9–14.6)
ERYTHROCYTE [DISTWIDTH] IN BLOOD BY AUTOMATED COUNT: 17.4 % (ref 11.9–14.6)
ERYTHROCYTE [DISTWIDTH] IN BLOOD BY AUTOMATED COUNT: 17.5 % (ref 11.9–14.6)
ERYTHROCYTE [DISTWIDTH] IN BLOOD BY AUTOMATED COUNT: 17.7 % (ref 11.9–14.6)
ERYTHROCYTE [DISTWIDTH] IN BLOOD BY AUTOMATED COUNT: 17.7 % (ref 11.9–14.6)
ERYTHROCYTE [DISTWIDTH] IN BLOOD BY AUTOMATED COUNT: 17.8 % (ref 11.9–14.6)
ERYTHROCYTE [DISTWIDTH] IN BLOOD BY AUTOMATED COUNT: 17.9 % (ref 11.9–14.6)
ERYTHROCYTE [DISTWIDTH] IN BLOOD BY AUTOMATED COUNT: 18.1 % (ref 11.9–14.6)
ERYTHROCYTE [DISTWIDTH] IN BLOOD BY AUTOMATED COUNT: 18.2 % (ref 11.9–14.6)
ERYTHROCYTE [DISTWIDTH] IN BLOOD BY AUTOMATED COUNT: 18.2 % (ref 11.9–14.6)
ERYTHROCYTE [DISTWIDTH] IN BLOOD BY AUTOMATED COUNT: 18.5 % (ref 11.9–14.6)
ERYTHROCYTE [DISTWIDTH] IN BLOOD BY AUTOMATED COUNT: 18.5 % (ref 11.9–14.6)
ERYTHROCYTE [DISTWIDTH] IN BLOOD BY AUTOMATED COUNT: 18.6 % (ref 11.9–14.6)
ERYTHROCYTE [DISTWIDTH] IN BLOOD BY AUTOMATED COUNT: 19.2 % (ref 11.9–14.6)
ERYTHROCYTE [DISTWIDTH] IN BLOOD BY AUTOMATED COUNT: 19.2 % (ref 11.9–14.6)
ERYTHROCYTE [DISTWIDTH] IN BLOOD BY AUTOMATED COUNT: 20.1 % (ref 11.9–14.6)
ERYTHROCYTE [DISTWIDTH] IN BLOOD BY AUTOMATED COUNT: 20.1 % (ref 11.9–14.6)
ERYTHROCYTE [DISTWIDTH] IN BLOOD BY AUTOMATED COUNT: 20.3 % (ref 11.9–14.6)
ERYTHROCYTE [DISTWIDTH] IN BLOOD BY AUTOMATED COUNT: 20.5 % (ref 11.9–14.6)
ERYTHROCYTE [DISTWIDTH] IN BLOOD BY AUTOMATED COUNT: 21.2 % (ref 11.9–14.6)
FIBRINOGEN PPP-MCNC: 609 MG/DL (ref 190–501)
FIO2 ON VENT: 100 %
GAS FLOW.O2 O2 DELIVERY SYS: ABNORMAL L/MIN
GLOBULIN SER CALC-MCNC: 2.7 G/DL (ref 2.3–3.5)
GLOBULIN SER CALC-MCNC: 2.9 G/DL (ref 2.3–3.5)
GLOBULIN SER CALC-MCNC: 2.9 G/DL (ref 2.3–3.5)
GLOBULIN SER CALC-MCNC: 3 G/DL (ref 2.3–3.5)
GLOBULIN SER CALC-MCNC: 3.4 G/DL (ref 2.3–3.5)
GLOBULIN SER CALC-MCNC: 3.5 G/DL (ref 2.3–3.5)
GLOBULIN SER CALC-MCNC: 3.6 G/DL (ref 2.3–3.5)
GLOBULIN SER CALC-MCNC: 3.6 G/DL (ref 2.3–3.5)
GLOBULIN SER CALC-MCNC: 3.7 G/DL (ref 2.3–3.5)
GLOBULIN SER CALC-MCNC: 3.7 G/DL (ref 2.3–3.5)
GLOBULIN SER CALC-MCNC: 3.8 G/DL (ref 2.3–3.5)
GLOBULIN SER CALC-MCNC: 4.2 G/DL (ref 2.3–3.5)
GLUCOSE BLD STRIP.AUTO-MCNC: 122 MG/DL (ref 65–100)
GLUCOSE BLD STRIP.AUTO-MCNC: 125 MG/DL (ref 65–100)
GLUCOSE BLD STRIP.AUTO-MCNC: 126 MG/DL (ref 65–100)
GLUCOSE BLD STRIP.AUTO-MCNC: 127 MG/DL (ref 65–100)
GLUCOSE BLD STRIP.AUTO-MCNC: 129 MG/DL (ref 65–100)
GLUCOSE BLD STRIP.AUTO-MCNC: 131 MG/DL (ref 65–100)
GLUCOSE BLD STRIP.AUTO-MCNC: 139 MG/DL (ref 65–100)
GLUCOSE BLD STRIP.AUTO-MCNC: 139 MG/DL (ref 65–100)
GLUCOSE BLD STRIP.AUTO-MCNC: 140 MG/DL (ref 65–100)
GLUCOSE BLD STRIP.AUTO-MCNC: 143 MG/DL (ref 65–100)
GLUCOSE BLD STRIP.AUTO-MCNC: 145 MG/DL (ref 65–100)
GLUCOSE BLD STRIP.AUTO-MCNC: 155 MG/DL (ref 65–100)
GLUCOSE BLD STRIP.AUTO-MCNC: 155 MG/DL (ref 65–100)
GLUCOSE BLD STRIP.AUTO-MCNC: 156 MG/DL (ref 65–100)
GLUCOSE BLD STRIP.AUTO-MCNC: 157 MG/DL (ref 65–100)
GLUCOSE BLD STRIP.AUTO-MCNC: 157 MG/DL (ref 65–100)
GLUCOSE BLD STRIP.AUTO-MCNC: 159 MG/DL (ref 65–100)
GLUCOSE BLD STRIP.AUTO-MCNC: 159 MG/DL (ref 65–100)
GLUCOSE BLD STRIP.AUTO-MCNC: 163 MG/DL (ref 65–100)
GLUCOSE BLD STRIP.AUTO-MCNC: 163 MG/DL (ref 65–100)
GLUCOSE BLD STRIP.AUTO-MCNC: 165 MG/DL (ref 65–100)
GLUCOSE BLD STRIP.AUTO-MCNC: 165 MG/DL (ref 65–100)
GLUCOSE BLD STRIP.AUTO-MCNC: 167 MG/DL (ref 65–100)
GLUCOSE BLD STRIP.AUTO-MCNC: 172 MG/DL (ref 65–100)
GLUCOSE BLD STRIP.AUTO-MCNC: 172 MG/DL (ref 65–100)
GLUCOSE BLD STRIP.AUTO-MCNC: 173 MG/DL (ref 65–100)
GLUCOSE BLD STRIP.AUTO-MCNC: 174 MG/DL (ref 65–100)
GLUCOSE BLD STRIP.AUTO-MCNC: 175 MG/DL (ref 65–100)
GLUCOSE BLD STRIP.AUTO-MCNC: 175 MG/DL (ref 65–100)
GLUCOSE BLD STRIP.AUTO-MCNC: 178 MG/DL (ref 65–100)
GLUCOSE BLD STRIP.AUTO-MCNC: 186 MG/DL (ref 65–100)
GLUCOSE BLD STRIP.AUTO-MCNC: 189 MG/DL (ref 65–100)
GLUCOSE BLD STRIP.AUTO-MCNC: 190 MG/DL (ref 65–100)
GLUCOSE BLD STRIP.AUTO-MCNC: 192 MG/DL (ref 65–100)
GLUCOSE BLD STRIP.AUTO-MCNC: 194 MG/DL (ref 65–100)
GLUCOSE BLD STRIP.AUTO-MCNC: 195 MG/DL (ref 65–100)
GLUCOSE BLD STRIP.AUTO-MCNC: 201 MG/DL (ref 65–100)
GLUCOSE BLD STRIP.AUTO-MCNC: 204 MG/DL (ref 65–100)
GLUCOSE BLD STRIP.AUTO-MCNC: 209 MG/DL (ref 65–100)
GLUCOSE BLD STRIP.AUTO-MCNC: 211 MG/DL (ref 65–100)
GLUCOSE BLD STRIP.AUTO-MCNC: 213 MG/DL (ref 65–100)
GLUCOSE BLD STRIP.AUTO-MCNC: 214 MG/DL (ref 65–100)
GLUCOSE BLD STRIP.AUTO-MCNC: 217 MG/DL (ref 65–100)
GLUCOSE BLD STRIP.AUTO-MCNC: 218 MG/DL (ref 65–100)
GLUCOSE BLD STRIP.AUTO-MCNC: 221 MG/DL (ref 65–100)
GLUCOSE BLD STRIP.AUTO-MCNC: 224 MG/DL (ref 65–100)
GLUCOSE BLD STRIP.AUTO-MCNC: 224 MG/DL (ref 65–100)
GLUCOSE BLD STRIP.AUTO-MCNC: 229 MG/DL (ref 65–100)
GLUCOSE BLD STRIP.AUTO-MCNC: 237 MG/DL (ref 65–100)
GLUCOSE BLD STRIP.AUTO-MCNC: 240 MG/DL (ref 65–100)
GLUCOSE BLD STRIP.AUTO-MCNC: 253 MG/DL (ref 65–100)
GLUCOSE BLD STRIP.AUTO-MCNC: 258 MG/DL (ref 65–100)
GLUCOSE BLD STRIP.AUTO-MCNC: 328 MG/DL (ref 65–100)
GLUCOSE BLD STRIP.AUTO-MCNC: 337 MG/DL (ref 65–100)
GLUCOSE SERPL-MCNC: 102 MG/DL (ref 65–100)
GLUCOSE SERPL-MCNC: 110 MG/DL (ref 65–100)
GLUCOSE SERPL-MCNC: 114 MG/DL (ref 65–100)
GLUCOSE SERPL-MCNC: 117 MG/DL (ref 65–100)
GLUCOSE SERPL-MCNC: 119 MG/DL (ref 65–100)
GLUCOSE SERPL-MCNC: 119 MG/DL (ref 65–100)
GLUCOSE SERPL-MCNC: 122 MG/DL (ref 65–100)
GLUCOSE SERPL-MCNC: 131 MG/DL (ref 65–100)
GLUCOSE SERPL-MCNC: 138 MG/DL (ref 65–100)
GLUCOSE SERPL-MCNC: 139 MG/DL (ref 65–100)
GLUCOSE SERPL-MCNC: 141 MG/DL (ref 65–100)
GLUCOSE SERPL-MCNC: 142 MG/DL (ref 65–100)
GLUCOSE SERPL-MCNC: 145 MG/DL (ref 65–100)
GLUCOSE SERPL-MCNC: 148 MG/DL (ref 65–100)
GLUCOSE SERPL-MCNC: 151 MG/DL (ref 65–100)
GLUCOSE SERPL-MCNC: 152 MG/DL (ref 65–100)
GLUCOSE SERPL-MCNC: 153 MG/DL (ref 65–100)
GLUCOSE SERPL-MCNC: 157 MG/DL (ref 65–100)
GLUCOSE SERPL-MCNC: 161 MG/DL (ref 65–100)
GLUCOSE SERPL-MCNC: 162 MG/DL (ref 65–100)
GLUCOSE SERPL-MCNC: 170 MG/DL (ref 65–100)
GLUCOSE SERPL-MCNC: 172 MG/DL (ref 65–100)
GLUCOSE SERPL-MCNC: 173 MG/DL (ref 65–100)
GLUCOSE SERPL-MCNC: 184 MG/DL (ref 65–100)
GLUCOSE SERPL-MCNC: 189 MG/DL (ref 65–100)
GLUCOSE SERPL-MCNC: 193 MG/DL (ref 65–100)
GLUCOSE SERPL-MCNC: 202 MG/DL (ref 65–100)
GLUCOSE SERPL-MCNC: 205 MG/DL (ref 65–100)
GLUCOSE SERPL-MCNC: 221 MG/DL (ref 65–100)
GLUCOSE SERPL-MCNC: 229 MG/DL (ref 65–100)
GLUCOSE SERPL-MCNC: 250 MG/DL (ref 65–100)
GLUCOSE SERPL-MCNC: 277 MG/DL (ref 65–100)
GLUCOSE SERPL-MCNC: 86 MG/DL (ref 65–100)
GLUCOSE SERPL-MCNC: 96 MG/DL (ref 65–100)
GRAM STN SPEC: ABNORMAL
HCO3 BLD-SCNC: 18 MMOL/L (ref 22–26)
HCO3 BLD-SCNC: 24.5 MMOL/L (ref 22–26)
HCO3 BLD-SCNC: 24.9 MMOL/L (ref 22–26)
HCO3 BLD-SCNC: 25.1 MMOL/L (ref 22–26)
HCO3 BLD-SCNC: 31.5 MMOL/L (ref 22–26)
HCO3 BLD-SCNC: 32.7 MMOL/L (ref 22–26)
HCT VFR BLD AUTO: 23.1 % (ref 35.8–46.3)
HCT VFR BLD AUTO: 26.6 % (ref 35.8–46.3)
HCT VFR BLD AUTO: 29 % (ref 35.8–46.3)
HCT VFR BLD AUTO: 30.3 % (ref 35.8–46.3)
HCT VFR BLD AUTO: 32.2 % (ref 35.8–46.3)
HCT VFR BLD AUTO: 32.4 % (ref 35.8–46.3)
HCT VFR BLD AUTO: 32.7 % (ref 35.8–46.3)
HCT VFR BLD AUTO: 33 % (ref 35.8–46.3)
HCT VFR BLD AUTO: 33.1 % (ref 35.8–46.3)
HCT VFR BLD AUTO: 33.3 % (ref 35.8–46.3)
HCT VFR BLD AUTO: 33.6 % (ref 35.8–46.3)
HCT VFR BLD AUTO: 34.1 % (ref 35.8–46.3)
HCT VFR BLD AUTO: 35.4 % (ref 35.8–46.3)
HCT VFR BLD AUTO: 35.5 % (ref 35.8–46.3)
HCT VFR BLD AUTO: 36.3 % (ref 35.8–46.3)
HCT VFR BLD AUTO: 36.5 % (ref 35.8–46.3)
HCT VFR BLD AUTO: 37.1 % (ref 35.8–46.3)
HCT VFR BLD AUTO: 37.2 % (ref 35.8–46.3)
HCT VFR BLD AUTO: 37.8 % (ref 35.8–46.3)
HCT VFR BLD AUTO: 37.9 % (ref 35.8–46.3)
HCT VFR BLD AUTO: 38.4 % (ref 35.8–46.3)
HCT VFR BLD AUTO: 39 % (ref 35.8–46.3)
HEPARIN INDUCED PLT,XHIPA: NEGATIVE
HGB BLD-MCNC: 10 G/DL (ref 11.7–15.4)
HGB BLD-MCNC: 10.1 G/DL (ref 11.7–15.4)
HGB BLD-MCNC: 10.5 G/DL (ref 11.7–15.4)
HGB BLD-MCNC: 10.6 G/DL (ref 11.7–15.4)
HGB BLD-MCNC: 10.7 G/DL (ref 11.7–15.4)
HGB BLD-MCNC: 10.8 G/DL (ref 11.7–15.4)
HGB BLD-MCNC: 10.8 G/DL (ref 11.7–15.4)
HGB BLD-MCNC: 10.9 G/DL (ref 11.7–15.4)
HGB BLD-MCNC: 10.9 G/DL (ref 11.7–15.4)
HGB BLD-MCNC: 11.1 G/DL (ref 11.7–15.4)
HGB BLD-MCNC: 11.3 G/DL (ref 11.7–15.4)
HGB BLD-MCNC: 11.4 G/DL (ref 11.7–15.4)
HGB BLD-MCNC: 11.4 G/DL (ref 11.7–15.4)
HGB BLD-MCNC: 11.6 G/DL (ref 11.7–15.4)
HGB BLD-MCNC: 11.7 G/DL (ref 11.7–15.4)
HGB BLD-MCNC: 11.8 G/DL (ref 11.7–15.4)
HGB BLD-MCNC: 11.8 G/DL (ref 11.7–15.4)
HGB BLD-MCNC: 12.1 G/DL (ref 11.7–15.4)
HGB BLD-MCNC: 12.3 G/DL (ref 11.7–15.4)
HGB BLD-MCNC: 7.3 G/DL (ref 11.7–15.4)
HGB BLD-MCNC: 8.6 G/DL (ref 11.7–15.4)
HGB BLD-MCNC: 9 G/DL (ref 11.7–15.4)
HISTORY CHECKED?,CKHIST: NORMAL
HISTORY CHECKED?,CKHIST: NORMAL
HIT INTERPRETATION,XINTPR: NEGATIVE
HIT PROFILE,XHITT: NORMAL
IMM GRANULOCYTES # BLD AUTO: 0.1 K/UL (ref 0–0.5)
IMM GRANULOCYTES # BLD AUTO: 0.3 K/UL (ref 0–0.5)
IMM GRANULOCYTES # BLD AUTO: 0.4 K/UL (ref 0–0.5)
IMM GRANULOCYTES # BLD AUTO: 0.5 K/UL (ref 0–0.5)
IMM GRANULOCYTES # BLD AUTO: 0.6 K/UL (ref 0–0.5)
IMM GRANULOCYTES # BLD AUTO: 0.8 K/UL (ref 0–0.5)
IMM GRANULOCYTES # BLD AUTO: 0.9 K/UL (ref 0–0.5)
IMM GRANULOCYTES # BLD AUTO: 1 K/UL (ref 0–0.5)
IMM GRANULOCYTES # BLD AUTO: 1 K/UL (ref 0–0.5)
IMM GRANULOCYTES # BLD AUTO: 1.1 K/UL (ref 0–0.5)
IMM GRANULOCYTES NFR BLD AUTO: 1 % (ref 0–5)
IMM GRANULOCYTES NFR BLD AUTO: 3 % (ref 0–5)
IMM GRANULOCYTES NFR BLD AUTO: 4 % (ref 0–5)
IMM GRANULOCYTES NFR BLD AUTO: 5 % (ref 0–5)
INR PPP: 0.9
INSPIRATION.DURATION SETTING TIME VENT: 1.11 SEC
INSPIRATION.DURATION SETTING TIME VENT: 1.11 SEC
IPAP/PIP, IPAPIP: 22
LACTATE SERPL-SCNC: 2.6 MMOL/L (ref 0.4–2)
LIPASE SERPL-CCNC: 571 U/L (ref 73–393)
LYMPHOCYTES # BLD: 0.8 K/UL (ref 0.5–4.6)
LYMPHOCYTES # BLD: 0.8 K/UL (ref 0.5–4.6)
LYMPHOCYTES # BLD: 1.1 K/UL (ref 0.5–4.6)
LYMPHOCYTES # BLD: 1.2 K/UL (ref 0.5–4.6)
LYMPHOCYTES # BLD: 1.3 K/UL (ref 0.5–4.6)
LYMPHOCYTES # BLD: 1.4 K/UL (ref 0.5–4.6)
LYMPHOCYTES # BLD: 1.4 K/UL (ref 0.5–4.6)
LYMPHOCYTES # BLD: 1.5 K/UL (ref 0.5–4.6)
LYMPHOCYTES # BLD: 1.6 K/UL (ref 0.5–4.6)
LYMPHOCYTES # BLD: 1.7 K/UL (ref 0.5–4.6)
LYMPHOCYTES # BLD: 1.8 K/UL (ref 0.5–4.6)
LYMPHOCYTES # BLD: 1.9 K/UL (ref 0.5–4.6)
LYMPHOCYTES # BLD: 2.3 K/UL (ref 0.5–4.6)
LYMPHOCYTES # BLD: 2.4 K/UL (ref 0.5–4.6)
LYMPHOCYTES NFR BLD: 11 % (ref 13–44)
LYMPHOCYTES NFR BLD: 12 % (ref 13–44)
LYMPHOCYTES NFR BLD: 14 % (ref 13–44)
LYMPHOCYTES NFR BLD: 14 % (ref 13–44)
LYMPHOCYTES NFR BLD: 15 % (ref 13–44)
LYMPHOCYTES NFR BLD: 16 % (ref 13–44)
LYMPHOCYTES NFR BLD: 18 % (ref 13–44)
LYMPHOCYTES NFR BLD: 4 % (ref 13–44)
LYMPHOCYTES NFR BLD: 5 % (ref 13–44)
LYMPHOCYTES NFR BLD: 6 % (ref 13–44)
LYMPHOCYTES NFR BLD: 7 % (ref 13–44)
LYMPHOCYTES NFR BLD: 8 % (ref 13–44)
LYMPHOCYTES NFR BLD: 9 % (ref 13–44)
LYMPHOCYTES NFR BLD: 9 % (ref 13–44)
MAGNESIUM SERPL-MCNC: 1.6 MG/DL (ref 1.8–2.4)
MAGNESIUM SERPL-MCNC: 1.7 MG/DL (ref 1.8–2.4)
MAGNESIUM SERPL-MCNC: 2 MG/DL (ref 1.8–2.4)
MAGNESIUM SERPL-MCNC: 2.1 MG/DL (ref 1.8–2.4)
MAGNESIUM SERPL-MCNC: 2.1 MG/DL (ref 1.8–2.4)
MAGNESIUM SERPL-MCNC: 2.2 MG/DL (ref 1.8–2.4)
MAGNESIUM SERPL-MCNC: 2.3 MG/DL (ref 1.8–2.4)
MAGNESIUM SERPL-MCNC: 2.4 MG/DL (ref 1.8–2.4)
MAGNESIUM SERPL-MCNC: 2.4 MG/DL (ref 1.8–2.4)
MAGNESIUM SERPL-MCNC: 2.5 MG/DL (ref 1.8–2.4)
MAGNESIUM SERPL-MCNC: 2.5 MG/DL (ref 1.8–2.4)
MAGNESIUM SERPL-MCNC: 2.8 MG/DL (ref 1.8–2.4)
MCH RBC QN AUTO: 24.2 PG (ref 26.1–32.9)
MCH RBC QN AUTO: 24.2 PG (ref 26.1–32.9)
MCH RBC QN AUTO: 24.3 PG (ref 26.1–32.9)
MCH RBC QN AUTO: 24.5 PG (ref 26.1–32.9)
MCH RBC QN AUTO: 24.6 PG (ref 26.1–32.9)
MCH RBC QN AUTO: 24.6 PG (ref 26.1–32.9)
MCH RBC QN AUTO: 24.7 PG (ref 26.1–32.9)
MCH RBC QN AUTO: 24.7 PG (ref 26.1–32.9)
MCH RBC QN AUTO: 24.8 PG (ref 26.1–32.9)
MCH RBC QN AUTO: 24.9 PG (ref 26.1–32.9)
MCH RBC QN AUTO: 24.9 PG (ref 26.1–32.9)
MCH RBC QN AUTO: 25.2 PG (ref 26.1–32.9)
MCH RBC QN AUTO: 25.4 PG (ref 26.1–32.9)
MCH RBC QN AUTO: 25.6 PG (ref 26.1–32.9)
MCH RBC QN AUTO: 25.7 PG (ref 26.1–32.9)
MCH RBC QN AUTO: 26 PG (ref 26.1–32.9)
MCH RBC QN AUTO: 26.1 PG (ref 26.1–32.9)
MCH RBC QN AUTO: 26.2 PG (ref 26.1–32.9)
MCHC RBC AUTO-ENTMCNC: 30.6 G/DL (ref 31.4–35)
MCHC RBC AUTO-ENTMCNC: 30.6 G/DL (ref 31.4–35)
MCHC RBC AUTO-ENTMCNC: 30.7 G/DL (ref 31.4–35)
MCHC RBC AUTO-ENTMCNC: 31 G/DL (ref 31.4–35)
MCHC RBC AUTO-ENTMCNC: 31.2 G/DL (ref 31.4–35)
MCHC RBC AUTO-ENTMCNC: 31.3 G/DL (ref 31.4–35)
MCHC RBC AUTO-ENTMCNC: 31.4 G/DL (ref 31.4–35)
MCHC RBC AUTO-ENTMCNC: 31.5 G/DL (ref 31.4–35)
MCHC RBC AUTO-ENTMCNC: 31.5 G/DL (ref 31.4–35)
MCHC RBC AUTO-ENTMCNC: 31.6 G/DL (ref 31.4–35)
MCHC RBC AUTO-ENTMCNC: 31.8 G/DL (ref 31.4–35)
MCHC RBC AUTO-ENTMCNC: 31.9 G/DL (ref 31.4–35)
MCHC RBC AUTO-ENTMCNC: 32 G/DL (ref 31.4–35)
MCHC RBC AUTO-ENTMCNC: 32 G/DL (ref 31.4–35)
MCHC RBC AUTO-ENTMCNC: 32.3 G/DL (ref 31.4–35)
MCHC RBC AUTO-ENTMCNC: 32.4 G/DL (ref 31.4–35)
MCHC RBC AUTO-ENTMCNC: 32.4 G/DL (ref 31.4–35)
MCHC RBC AUTO-ENTMCNC: 32.5 G/DL (ref 31.4–35)
MCHC RBC AUTO-ENTMCNC: 32.6 G/DL (ref 31.4–35)
MCHC RBC AUTO-ENTMCNC: 32.7 G/DL (ref 31.4–35)
MCHC RBC AUTO-ENTMCNC: 32.7 G/DL (ref 31.4–35)
MCHC RBC AUTO-ENTMCNC: 33 G/DL (ref 31.4–35)
MCV RBC AUTO: 75.5 FL (ref 79.6–97.8)
MCV RBC AUTO: 76 FL (ref 79.6–97.8)
MCV RBC AUTO: 77.1 FL (ref 79.6–97.8)
MCV RBC AUTO: 77.5 FL (ref 79.6–97.8)
MCV RBC AUTO: 77.5 FL (ref 79.6–97.8)
MCV RBC AUTO: 77.8 FL (ref 79.6–97.8)
MCV RBC AUTO: 77.9 FL (ref 79.6–97.8)
MCV RBC AUTO: 78 FL (ref 79.6–97.8)
MCV RBC AUTO: 78.3 FL (ref 79.6–97.8)
MCV RBC AUTO: 78.7 FL (ref 79.6–97.8)
MCV RBC AUTO: 79 FL (ref 79.6–97.8)
MCV RBC AUTO: 79.1 FL (ref 79.6–97.8)
MCV RBC AUTO: 79.3 FL (ref 79.6–97.8)
MCV RBC AUTO: 79.5 FL (ref 79.6–97.8)
MCV RBC AUTO: 79.7 FL (ref 79.6–97.8)
MCV RBC AUTO: 79.9 FL (ref 79.6–97.8)
MCV RBC AUTO: 80 FL (ref 79.6–97.8)
MCV RBC AUTO: 80 FL (ref 79.6–97.8)
MCV RBC AUTO: 80.1 FL (ref 79.6–97.8)
MCV RBC AUTO: 80.9 FL (ref 79.6–97.8)
MCV RBC AUTO: 81.2 FL (ref 79.6–97.8)
MCV RBC AUTO: 83.5 FL (ref 79.6–97.8)
MM INDURATION POC: 0 MM (ref 0–5)
MM INDURATION POC: 0 MM (ref 0–5)
MONOCYTES # BLD: 0.6 K/UL (ref 0.1–1.3)
MONOCYTES # BLD: 0.9 K/UL (ref 0.1–1.3)
MONOCYTES # BLD: 1 K/UL (ref 0.1–1.3)
MONOCYTES # BLD: 1.1 K/UL (ref 0.1–1.3)
MONOCYTES # BLD: 1.2 K/UL (ref 0.1–1.3)
MONOCYTES # BLD: 1.9 K/UL (ref 0.1–1.3)
MONOCYTES NFR BLD: 3 % (ref 4–12)
MONOCYTES NFR BLD: 4 % (ref 4–12)
MONOCYTES NFR BLD: 5 % (ref 4–12)
MONOCYTES NFR BLD: 6 % (ref 4–12)
MONOCYTES NFR BLD: 7 % (ref 4–12)
MONOCYTES NFR BLD: 7 % (ref 4–12)
MONOCYTES NFR BLD: 8 % (ref 4–12)
MONOCYTES NFR BLD: 8 % (ref 4–12)
MONOCYTES NFR BLD: 9 % (ref 4–12)
NEUTS SEG # BLD: 10.3 K/UL (ref 1.7–8.2)
NEUTS SEG # BLD: 10.5 K/UL (ref 1.7–8.2)
NEUTS SEG # BLD: 10.6 K/UL (ref 1.7–8.2)
NEUTS SEG # BLD: 11.1 K/UL (ref 1.7–8.2)
NEUTS SEG # BLD: 11.3 K/UL (ref 1.7–8.2)
NEUTS SEG # BLD: 11.7 K/UL (ref 1.7–8.2)
NEUTS SEG # BLD: 13.1 K/UL (ref 1.7–8.2)
NEUTS SEG # BLD: 16.2 K/UL (ref 1.7–8.2)
NEUTS SEG # BLD: 16.2 K/UL (ref 1.7–8.2)
NEUTS SEG # BLD: 17.4 K/UL (ref 1.7–8.2)
NEUTS SEG # BLD: 18.8 K/UL (ref 1.7–8.2)
NEUTS SEG # BLD: 21.1 K/UL (ref 1.7–8.2)
NEUTS SEG # BLD: 22.5 K/UL (ref 1.7–8.2)
NEUTS SEG # BLD: 7.2 K/UL (ref 1.7–8.2)
NEUTS SEG # BLD: 7.5 K/UL (ref 1.7–8.2)
NEUTS SEG # BLD: 9.7 K/UL (ref 1.7–8.2)
NEUTS SEG NFR BLD: 69 % (ref 43–78)
NEUTS SEG NFR BLD: 73 % (ref 43–78)
NEUTS SEG NFR BLD: 73 % (ref 43–78)
NEUTS SEG NFR BLD: 75 % (ref 43–78)
NEUTS SEG NFR BLD: 76 % (ref 43–78)
NEUTS SEG NFR BLD: 77 % (ref 43–78)
NEUTS SEG NFR BLD: 79 % (ref 43–78)
NEUTS SEG NFR BLD: 81 % (ref 43–78)
NEUTS SEG NFR BLD: 81 % (ref 43–78)
NEUTS SEG NFR BLD: 82 % (ref 43–78)
NEUTS SEG NFR BLD: 82 % (ref 43–78)
NEUTS SEG NFR BLD: 85 % (ref 43–78)
NEUTS SEG NFR BLD: 86 % (ref 43–78)
NEUTS SEG NFR BLD: 87 % (ref 43–78)
NEUTS SEG NFR BLD: 87 % (ref 43–78)
NEUTS SEG NFR BLD: 90 % (ref 43–78)
NRBC # BLD: 0 K/UL (ref 0–0.2)
NRBC # BLD: 0.02 K/UL (ref 0–0.2)
O2/TOTAL GAS SETTING VFR VENT: 36 %
O2/TOTAL GAS SETTING VFR VENT: 40 %
OPTICAL DENSITY READ,XHITAO: 0.06 ABS
P-R INTERVAL, ECG05: 150 MS
P-R INTERVAL, ECG05: 162 MS
PAW @ MEAN EXP FLOW ON VENT: 12 CMH2O
PAW @ MEAN EXP FLOW ON VENT: 12 CMH2O
PCO2 BLD: 22.9 MMHG (ref 35–45)
PCO2 BLD: 35 MMHG (ref 35–45)
PCO2 BLD: 36 MMHG (ref 35–45)
PCO2 BLD: 36.2 MMHG (ref 35–45)
PCO2 BLD: 36.3 MMHG (ref 35–45)
PCO2 BLD: 44.1 MMHG (ref 35–45)
PEEP RESPIRATORY: 8 CMH2O
PEEP RESPIRATORY: 8 CM[H2O]
PH BLD: 7.36 [PH] (ref 7.35–7.45)
PH BLD: 7.44 [PH] (ref 7.35–7.45)
PH BLD: 7.45 [PH] (ref 7.35–7.45)
PH BLD: 7.5 [PH] (ref 7.35–7.45)
PH BLD: 7.56 [PH] (ref 7.35–7.45)
PH BLD: 7.56 [PH] (ref 7.35–7.45)
PHOSPHATE SERPL-MCNC: 1.8 MG/DL (ref 2.3–3.7)
PHOSPHATE SERPL-MCNC: 2 MG/DL (ref 2.3–3.7)
PHOSPHATE SERPL-MCNC: 2.3 MG/DL (ref 2.3–3.7)
PHOSPHATE SERPL-MCNC: 2.4 MG/DL (ref 2.3–3.7)
PHOSPHATE SERPL-MCNC: 2.5 MG/DL (ref 2.3–3.7)
PHOSPHATE SERPL-MCNC: 2.5 MG/DL (ref 2.3–3.7)
PHOSPHATE SERPL-MCNC: 2.7 MG/DL (ref 2.3–3.7)
PHOSPHATE SERPL-MCNC: 2.8 MG/DL (ref 2.3–3.7)
PHOSPHATE SERPL-MCNC: 2.9 MG/DL (ref 2.3–3.7)
PHOSPHATE SERPL-MCNC: 3 MG/DL (ref 2.3–3.7)
PHOSPHATE SERPL-MCNC: 3.1 MG/DL (ref 2.3–3.7)
PHOSPHATE SERPL-MCNC: 3.1 MG/DL (ref 2.3–3.7)
PHOSPHATE SERPL-MCNC: 3.4 MG/DL (ref 2.3–3.7)
PHOSPHATE SERPL-MCNC: 3.5 MG/DL (ref 2.3–3.7)
PHOSPHATE SERPL-MCNC: 3.7 MG/DL (ref 2.3–3.7)
PHOSPHATE SERPL-MCNC: 4 MG/DL (ref 2.3–3.7)
PHOSPHATE SERPL-MCNC: 4 MG/DL (ref 2.3–3.7)
PHOSPHATE SERPL-MCNC: 4.8 MG/DL (ref 2.3–3.7)
PIP ISTAT,IPIP: 20
PLATELET # BLD AUTO: 141 K/UL (ref 150–450)
PLATELET # BLD AUTO: 151 K/UL (ref 150–450)
PLATELET # BLD AUTO: 175 K/UL (ref 150–450)
PLATELET # BLD AUTO: 182 K/UL (ref 150–450)
PLATELET # BLD AUTO: 215 K/UL (ref 150–450)
PLATELET # BLD AUTO: 219 K/UL (ref 150–450)
PLATELET # BLD AUTO: 227 K/UL (ref 150–450)
PLATELET # BLD AUTO: 232 K/UL (ref 150–450)
PLATELET # BLD AUTO: 240 K/UL (ref 150–450)
PLATELET # BLD AUTO: 255 K/UL (ref 150–450)
PLATELET # BLD AUTO: 256 K/UL (ref 150–450)
PLATELET # BLD AUTO: 265 K/UL (ref 150–450)
PLATELET # BLD AUTO: 278 K/UL (ref 150–450)
PLATELET # BLD AUTO: 282 K/UL (ref 150–450)
PLATELET # BLD AUTO: 284 K/UL (ref 150–450)
PLATELET # BLD AUTO: 288 K/UL (ref 150–450)
PLATELET # BLD AUTO: 304 K/UL (ref 150–450)
PLATELET # BLD AUTO: 309 K/UL (ref 150–450)
PLATELET # BLD AUTO: 311 K/UL (ref 150–450)
PLATELET # BLD AUTO: 334 K/UL (ref 150–450)
PLATELET # BLD AUTO: 335 K/UL (ref 150–450)
PLATELET # BLD AUTO: 489 K/UL (ref 150–450)
PLATELET COMMENTS,PCOM: ADEQUATE
PMV BLD AUTO: 10 FL (ref 9.4–12.3)
PMV BLD AUTO: 10.1 FL (ref 9.4–12.3)
PMV BLD AUTO: 10.3 FL (ref 9.4–12.3)
PMV BLD AUTO: 10.3 FL (ref 9.4–12.3)
PMV BLD AUTO: 10.4 FL (ref 9.4–12.3)
PMV BLD AUTO: 10.5 FL (ref 9.4–12.3)
PMV BLD AUTO: 10.6 FL (ref 9.4–12.3)
PMV BLD AUTO: 10.8 FL (ref 9.4–12.3)
PMV BLD AUTO: 10.9 FL (ref 9.4–12.3)
PMV BLD AUTO: 10.9 FL (ref 9.4–12.3)
PMV BLD AUTO: 11 FL (ref 9.4–12.3)
PMV BLD AUTO: 11.1 FL (ref 9.4–12.3)
PMV BLD AUTO: 11.1 FL (ref 9.4–12.3)
PMV BLD AUTO: 11.2 FL (ref 9.4–12.3)
PMV BLD AUTO: 11.2 FL (ref 9.4–12.3)
PMV BLD AUTO: 11.3 FL (ref 9.4–12.3)
PMV BLD AUTO: 11.7 FL (ref 9.4–12.3)
PMV BLD AUTO: 9.7 FL (ref 9.4–12.3)
PMV BLD AUTO: 9.8 FL (ref 9.4–12.3)
PMV BLD AUTO: 9.8 FL (ref 9.4–12.3)
PO2 BLD: 137 MMHG (ref 75–100)
PO2 BLD: 150 MMHG (ref 75–100)
PO2 BLD: 174 MMHG (ref 75–100)
PO2 BLD: 205 MMHG (ref 75–100)
PO2 BLD: 484 MMHG (ref 75–100)
PO2 BLD: 67 MMHG (ref 75–100)
POTASSIUM BLD-SCNC: 3.3 MMOL/L (ref 3.5–5.1)
POTASSIUM BLD-SCNC: 3.4 MMOL/L (ref 3.5–5.1)
POTASSIUM BLD-SCNC: 3.6 MMOL/L (ref 3.5–5.1)
POTASSIUM BLD-SCNC: 3.7 MMOL/L (ref 3.5–5.1)
POTASSIUM SERPL-SCNC: 3.1 MMOL/L (ref 3.5–5.1)
POTASSIUM SERPL-SCNC: 3.3 MMOL/L (ref 3.5–5.1)
POTASSIUM SERPL-SCNC: 3.4 MMOL/L (ref 3.5–5.1)
POTASSIUM SERPL-SCNC: 3.5 MMOL/L (ref 3.5–5.1)
POTASSIUM SERPL-SCNC: 3.5 MMOL/L (ref 3.5–5.1)
POTASSIUM SERPL-SCNC: 3.6 MMOL/L (ref 3.5–5.1)
POTASSIUM SERPL-SCNC: 3.7 MMOL/L (ref 3.5–5.1)
POTASSIUM SERPL-SCNC: 3.8 MMOL/L (ref 3.5–5.1)
POTASSIUM SERPL-SCNC: 3.9 MMOL/L (ref 3.5–5.1)
POTASSIUM SERPL-SCNC: 4 MMOL/L (ref 3.5–5.1)
POTASSIUM SERPL-SCNC: 4.1 MMOL/L (ref 3.5–5.1)
POTASSIUM SERPL-SCNC: 4.2 MMOL/L (ref 3.5–5.1)
POTASSIUM SERPL-SCNC: 4.3 MMOL/L (ref 3.5–5.1)
POTASSIUM SERPL-SCNC: 4.4 MMOL/L (ref 3.5–5.1)
POTASSIUM SERPL-SCNC: 4.4 MMOL/L (ref 3.5–5.1)
POTASSIUM SERPL-SCNC: 4.6 MMOL/L (ref 3.5–5.1)
PPD POC: NEGATIVE NEGATIVE
PPD POC: NEGATIVE NEGATIVE
PROCALCITONIN SERPL-MCNC: 0.28 NG/ML
PROT SERPL-MCNC: 5 G/DL (ref 6.3–8.2)
PROT SERPL-MCNC: 5 G/DL (ref 6.3–8.2)
PROT SERPL-MCNC: 5.2 G/DL (ref 6.3–8.2)
PROT SERPL-MCNC: 5.2 G/DL (ref 6.3–8.2)
PROT SERPL-MCNC: 5.3 G/DL (ref 6.3–8.2)
PROT SERPL-MCNC: 5.3 G/DL (ref 6.3–8.2)
PROT SERPL-MCNC: 5.4 G/DL (ref 6.3–8.2)
PROT SERPL-MCNC: 5.6 G/DL (ref 6.3–8.2)
PROT SERPL-MCNC: 5.9 G/DL (ref 6.3–8.2)
PROT SERPL-MCNC: 6.4 G/DL (ref 6.3–8.2)
PROT SERPL-MCNC: 6.9 G/DL (ref 6.3–8.2)
PROT SERPL-MCNC: 7 G/DL (ref 6.3–8.2)
PROTHROMBIN TIME: 12.7 SEC (ref 12.5–14.7)
Q-T INTERVAL, ECG07: 316 MS
Q-T INTERVAL, ECG07: 340 MS
Q-T INTERVAL, ECG07: 346 MS
QRS DURATION, ECG06: 110 MS
QRS DURATION, ECG06: 120 MS
QRS DURATION, ECG06: 132 MS
QTC CALCULATION (BEZET), ECG08: 457 MS
QTC CALCULATION (BEZET), ECG08: 459 MS
QTC CALCULATION (BEZET), ECG08: 553 MS
RBC # BLD AUTO: 2.9 M/UL (ref 4.05–5.2)
RBC # BLD AUTO: 3.29 M/UL (ref 4.05–5.2)
RBC # BLD AUTO: 3.63 M/UL (ref 4.05–5.2)
RBC # BLD AUTO: 3.81 M/UL (ref 4.05–5.2)
RBC # BLD AUTO: 4.05 M/UL (ref 4.05–5.2)
RBC # BLD AUTO: 4.11 M/UL (ref 4.05–5.2)
RBC # BLD AUTO: 4.13 M/UL (ref 4.05–5.2)
RBC # BLD AUTO: 4.16 M/UL (ref 4.05–5.2)
RBC # BLD AUTO: 4.22 M/UL (ref 4.05–5.2)
RBC # BLD AUTO: 4.23 M/UL (ref 4.05–5.2)
RBC # BLD AUTO: 4.42 M/UL (ref 4.05–5.2)
RBC # BLD AUTO: 4.42 M/UL (ref 4.05–5.2)
RBC # BLD AUTO: 4.55 M/UL (ref 4.05–5.2)
RBC # BLD AUTO: 4.57 M/UL (ref 4.05–5.2)
RBC # BLD AUTO: 4.58 M/UL (ref 4.05–5.2)
RBC # BLD AUTO: 4.6 M/UL (ref 4.05–5.2)
RBC # BLD AUTO: 4.64 M/UL (ref 4.05–5.2)
RBC # BLD AUTO: 4.69 M/UL (ref 4.05–5.2)
RBC # BLD AUTO: 4.8 M/UL (ref 4.05–5.2)
RBC # BLD AUTO: 4.81 M/UL (ref 4.05–5.2)
RBC # BLD AUTO: 4.85 M/UL (ref 4.05–5.2)
RBC # BLD AUTO: 4.93 M/UL (ref 4.05–5.2)
RBC MORPH BLD: ABNORMAL
RBC MORPH BLD: ABNORMAL
RESPIRATORY RATE: 18 (ref 5–40)
SAO2 % BLD: 100 %
SAO2 % BLD: 95 %
SAO2 % BLD: 99.2 % (ref 95–98)
SAO2 % BLD: 99.2 % (ref 95–98)
SERVICE CMNT-IMP: ABNORMAL
SERVICE CMNT-IMP: NORMAL
SODIUM BLD-SCNC: 137 MMOL/L (ref 136–145)
SODIUM BLD-SCNC: 139 MMOL/L (ref 136–145)
SODIUM BLD-SCNC: 139 MMOL/L (ref 136–145)
SODIUM BLD-SCNC: 145 MMOL/L (ref 136–145)
SODIUM SERPL-SCNC: 136 MMOL/L (ref 136–145)
SODIUM SERPL-SCNC: 137 MMOL/L (ref 136–145)
SODIUM SERPL-SCNC: 138 MMOL/L (ref 136–145)
SODIUM SERPL-SCNC: 139 MMOL/L (ref 136–145)
SODIUM SERPL-SCNC: 140 MMOL/L (ref 136–145)
SODIUM SERPL-SCNC: 141 MMOL/L (ref 136–145)
SODIUM SERPL-SCNC: 141 MMOL/L (ref 136–145)
SODIUM SERPL-SCNC: 142 MMOL/L (ref 136–145)
SODIUM SERPL-SCNC: 143 MMOL/L (ref 136–145)
SODIUM SERPL-SCNC: 145 MMOL/L (ref 136–145)
SODIUM SERPL-SCNC: 145 MMOL/L (ref 136–145)
SODIUM SERPL-SCNC: 146 MMOL/L (ref 136–145)
SODIUM SERPL-SCNC: 147 MMOL/L (ref 136–145)
SODIUM SERPL-SCNC: 149 MMOL/L (ref 136–145)
SPECIMEN EXP DATE BLD: NORMAL
SPECIMEN EXP DATE BLD: NORMAL
SPECIMEN SITE: ABNORMAL
SPECIMEN TYPE: ABNORMAL
SPECIMEN TYPE: ABNORMAL
STATUS OF UNIT,%ST: NORMAL
TOTAL RESP. RATE, ITRR: 18
TOTAL RESP. RATE, ITRR: 18
TRIGL SERPL-MCNC: 93 MG/DL (ref 35–150)
UNIT DIVISION, %UDIV: 0
VENTILATION MODE VENT: ABNORMAL
VENTILATION MODE VENT: ABNORMAL
VENTRICULAR RATE, ECG03: 106 BPM
VENTRICULAR RATE, ECG03: 126 BPM
VENTRICULAR RATE, ECG03: 159 BPM
VT SETTING VENT: 450 ML
VT SETTING VENT: 450 ML
WBC # BLD AUTO: 10.3 K/UL (ref 4.3–11.1)
WBC # BLD AUTO: 10.4 K/UL (ref 4.3–11.1)
WBC # BLD AUTO: 10.6 K/UL (ref 4.3–11.1)
WBC # BLD AUTO: 12.9 K/UL (ref 4.3–11.1)
WBC # BLD AUTO: 13 K/UL (ref 4.3–11.1)
WBC # BLD AUTO: 13.6 K/UL (ref 4.3–11.1)
WBC # BLD AUTO: 13.7 K/UL (ref 4.3–11.1)
WBC # BLD AUTO: 14 K/UL (ref 4.3–11.1)
WBC # BLD AUTO: 14.3 K/UL (ref 4.3–11.1)
WBC # BLD AUTO: 14.5 K/UL (ref 4.3–11.1)
WBC # BLD AUTO: 15.4 K/UL (ref 4.3–11.1)
WBC # BLD AUTO: 16 K/UL (ref 4.3–11.1)
WBC # BLD AUTO: 16.2 K/UL (ref 4.3–11.1)
WBC # BLD AUTO: 19 K/UL (ref 4.3–11.1)
WBC # BLD AUTO: 19.3 K/UL (ref 4.3–11.1)
WBC # BLD AUTO: 20.1 K/UL (ref 4.3–11.1)
WBC # BLD AUTO: 21.5 K/UL (ref 4.3–11.1)
WBC # BLD AUTO: 24.8 K/UL (ref 4.3–11.1)
WBC # BLD AUTO: 25.9 K/UL (ref 4.3–11.1)
WBC # BLD AUTO: 6.3 K/UL (ref 4.3–11.1)
WBC # BLD AUTO: 8.2 K/UL (ref 4.3–11.1)
WBC # BLD AUTO: 8.8 K/UL (ref 4.3–11.1)
WBC MORPH BLD: ABNORMAL

## 2021-01-01 PROCEDURE — 80048 BASIC METABOLIC PNL TOTAL CA: CPT

## 2021-01-01 PROCEDURE — 74011250636 HC RX REV CODE- 250/636: Performed by: NURSE PRACTITIONER

## 2021-01-01 PROCEDURE — 74011250636 HC RX REV CODE- 250/636: Performed by: INTERNAL MEDICINE

## 2021-01-01 PROCEDURE — 76010000877 HC OR TIME 2.5 TO 3HR INTENSV - TIER 2: Performed by: SURGERY

## 2021-01-01 PROCEDURE — P9047 ALBUMIN (HUMAN), 25%, 50ML: HCPCS | Performed by: INTERNAL MEDICINE

## 2021-01-01 PROCEDURE — 74011000250 HC RX REV CODE- 250: Performed by: INTERNAL MEDICINE

## 2021-01-01 PROCEDURE — 77030034698 HC LIGASURE MRYLND OPN SEAL DIV COVD -F: Performed by: SURGERY

## 2021-01-01 PROCEDURE — 74011000250 HC RX REV CODE- 250: Performed by: SURGERY

## 2021-01-01 PROCEDURE — 74011000250 HC RX REV CODE- 250: Performed by: NURSE ANESTHETIST, CERTIFIED REGISTERED

## 2021-01-01 PROCEDURE — 36415 COLL VENOUS BLD VENIPUNCTURE: CPT

## 2021-01-01 PROCEDURE — 65620000000 HC RM CCU GENERAL

## 2021-01-01 PROCEDURE — 74011250637 HC RX REV CODE- 250/637: Performed by: PHYSICIAN ASSISTANT

## 2021-01-01 PROCEDURE — 44204 LAPARO PARTIAL COLECTOMY: CPT | Performed by: SURGERY

## 2021-01-01 PROCEDURE — 2709999900 HC NON-CHARGEABLE SUPPLY

## 2021-01-01 PROCEDURE — 99222 1ST HOSP IP/OBS MODERATE 55: CPT | Performed by: INTERNAL MEDICINE

## 2021-01-01 PROCEDURE — 0DHA0UZ INSERTION OF FEEDING DEVICE INTO JEJUNUM, OPEN APPROACH: ICD-10-PCS | Performed by: SURGERY

## 2021-01-01 PROCEDURE — 77030035489 HC REDUCR CANN ENDOWR INTU -C: Performed by: SURGERY

## 2021-01-01 PROCEDURE — 74011250637 HC RX REV CODE- 250/637: Performed by: NURSE PRACTITIONER

## 2021-01-01 PROCEDURE — 74011250636 HC RX REV CODE- 250/636: Performed by: SURGERY

## 2021-01-01 PROCEDURE — 85025 COMPLETE CBC W/AUTO DIFF WBC: CPT

## 2021-01-01 PROCEDURE — 85027 COMPLETE CBC AUTOMATED: CPT

## 2021-01-01 PROCEDURE — 83735 ASSAY OF MAGNESIUM: CPT

## 2021-01-01 PROCEDURE — 77030040831 HC BAG URINE DRNG MDII -A: Performed by: SURGERY

## 2021-01-01 PROCEDURE — 82150 ASSAY OF AMYLASE: CPT

## 2021-01-01 PROCEDURE — 82962 GLUCOSE BLOOD TEST: CPT

## 2021-01-01 PROCEDURE — 84100 ASSAY OF PHOSPHORUS: CPT

## 2021-01-01 PROCEDURE — 74011000258 HC RX REV CODE- 258: Performed by: SURGERY

## 2021-01-01 PROCEDURE — 94003 VENT MGMT INPAT SUBQ DAY: CPT

## 2021-01-01 PROCEDURE — 80053 COMPREHEN METABOLIC PANEL: CPT

## 2021-01-01 PROCEDURE — 82378 CARCINOEMBRYONIC ANTIGEN: CPT

## 2021-01-01 PROCEDURE — 74011250636 HC RX REV CODE- 250/636: Performed by: PHYSICIAN ASSISTANT

## 2021-01-01 PROCEDURE — 74011000636 HC RX REV CODE- 636: Performed by: SURGERY

## 2021-01-01 PROCEDURE — 74011000258 HC RX REV CODE- 258: Performed by: INTERNAL MEDICINE

## 2021-01-01 PROCEDURE — 49204 PR EXCISION/DESTRUCTION OPEN ABDOMINAL TUMORS 5.1-10.0 CM: CPT | Performed by: NURSE PRACTITIONER

## 2021-01-01 PROCEDURE — 51798 US URINE CAPACITY MEASURE: CPT

## 2021-01-01 PROCEDURE — C1894 INTRO/SHEATH, NON-LASER: HCPCS

## 2021-01-01 PROCEDURE — 77030037088 HC TUBE ENDOTRACH ORAL NSL COVD-A: Performed by: NURSE ANESTHETIST, CERTIFIED REGISTERED

## 2021-01-01 PROCEDURE — 74011250637 HC RX REV CODE- 250/637: Performed by: SURGERY

## 2021-01-01 PROCEDURE — 77030018798 HC PMP KT ENTRL FED COVD -A

## 2021-01-01 PROCEDURE — 2709999900 HC NON-CHARGEABLE SUPPLY: Performed by: SURGERY

## 2021-01-01 PROCEDURE — 97530 THERAPEUTIC ACTIVITIES: CPT | Performed by: PHYSICAL THERAPIST

## 2021-01-01 PROCEDURE — 94664 DEMO&/EVAL PT USE INHALER: CPT

## 2021-01-01 PROCEDURE — 36592 COLLECT BLOOD FROM PICC: CPT

## 2021-01-01 PROCEDURE — 97164 PT RE-EVAL EST PLAN CARE: CPT

## 2021-01-01 PROCEDURE — 48150 PARTIAL REMOVAL OF PANCREAS: CPT | Performed by: NURSE PRACTITIONER

## 2021-01-01 PROCEDURE — 74011250637 HC RX REV CODE- 250/637: Performed by: ANESTHESIOLOGY

## 2021-01-01 PROCEDURE — 88305 TISSUE EXAM BY PATHOLOGIST: CPT

## 2021-01-01 PROCEDURE — 94761 N-INVAS EAR/PLS OXIMETRY MLT: CPT

## 2021-01-01 PROCEDURE — 77030035277 HC OBTRTR BLDELSS DISP INTU -B: Performed by: SURGERY

## 2021-01-01 PROCEDURE — 77030010512 HC APPL CLP LIG J&J -C: Performed by: SURGERY

## 2021-01-01 PROCEDURE — 65270000029 HC RM PRIVATE

## 2021-01-01 PROCEDURE — P9045 ALBUMIN (HUMAN), 5%, 250 ML: HCPCS | Performed by: REGISTERED NURSE

## 2021-01-01 PROCEDURE — 82248 BILIRUBIN DIRECT: CPT

## 2021-01-01 PROCEDURE — 77030021593 HC FCPS BIOP ENDOSC BSC -A: Performed by: INTERNAL MEDICINE

## 2021-01-01 PROCEDURE — 99024 POSTOP FOLLOW-UP VISIT: CPT | Performed by: NURSE PRACTITIONER

## 2021-01-01 PROCEDURE — 77030002916 HC SUT ETHLN J&J -A

## 2021-01-01 PROCEDURE — 99232 SBSQ HOSP IP/OBS MODERATE 35: CPT | Performed by: INTERNAL MEDICINE

## 2021-01-01 PROCEDURE — 36600 WITHDRAWAL OF ARTERIAL BLOOD: CPT

## 2021-01-01 PROCEDURE — 77030009979 HC RELD STPLR TCR J&J -C: Performed by: SURGERY

## 2021-01-01 PROCEDURE — 74011000258 HC RX REV CODE- 258: Performed by: NURSE PRACTITIONER

## 2021-01-01 PROCEDURE — 77030019605: Performed by: SURGERY

## 2021-01-01 PROCEDURE — 77010033678 HC OXYGEN DAILY

## 2021-01-01 PROCEDURE — 77030008771 HC TU NG SALEM SUMP -A

## 2021-01-01 PROCEDURE — 77030013794 HC KT TRNSDUC BLD EDWD -B: Performed by: ANESTHESIOLOGY

## 2021-01-01 PROCEDURE — 71045 X-RAY EXAM CHEST 1 VIEW: CPT

## 2021-01-01 PROCEDURE — S2900 ROBOTIC SURGICAL SYSTEM: HCPCS | Performed by: SURGERY

## 2021-01-01 PROCEDURE — 94002 VENT MGMT INPAT INIT DAY: CPT

## 2021-01-01 PROCEDURE — 87086 URINE CULTURE/COLONY COUNT: CPT

## 2021-01-01 PROCEDURE — 88307 TISSUE EXAM BY PATHOLOGIST: CPT

## 2021-01-01 PROCEDURE — 74177 CT ABD & PELVIS W/CONTRAST: CPT

## 2021-01-01 PROCEDURE — 74011250636 HC RX REV CODE- 250/636: Performed by: ANESTHESIOLOGY

## 2021-01-01 PROCEDURE — 74011000250 HC RX REV CODE- 250: Performed by: NURSE PRACTITIONER

## 2021-01-01 PROCEDURE — 77030040361 HC SLV COMPR DVT MDII -B: Performed by: SURGERY

## 2021-01-01 PROCEDURE — 77030002966 HC SUT PDS J&J -A: Performed by: SURGERY

## 2021-01-01 PROCEDURE — 94760 N-INVAS EAR/PLS OXIMETRY 1: CPT

## 2021-01-01 PROCEDURE — 65660000000 HC RM CCU STEPDOWN

## 2021-01-01 PROCEDURE — 77030040830 HC CATH URETH FOL MDII -A: Performed by: SURGERY

## 2021-01-01 PROCEDURE — 92526 ORAL FUNCTION THERAPY: CPT

## 2021-01-01 PROCEDURE — 99223 1ST HOSP IP/OBS HIGH 75: CPT | Performed by: SURGERY

## 2021-01-01 PROCEDURE — 0FBG0ZZ EXCISION OF PANCREAS, OPEN APPROACH: ICD-10-PCS | Performed by: SURGERY

## 2021-01-01 PROCEDURE — 0DTF0ZZ RESECTION OF RIGHT LARGE INTESTINE, OPEN APPROACH: ICD-10-PCS | Performed by: SURGERY

## 2021-01-01 PROCEDURE — 2709999900 HC NON-CHARGEABLE SUPPLY: Performed by: INTERNAL MEDICINE

## 2021-01-01 PROCEDURE — 74250 X-RAY XM SM INT 1CNTRST STD: CPT

## 2021-01-01 PROCEDURE — 94640 AIRWAY INHALATION TREATMENT: CPT

## 2021-01-01 PROCEDURE — 74011636637 HC RX REV CODE- 636/637: Performed by: SURGERY

## 2021-01-01 PROCEDURE — 86901 BLOOD TYPING SEROLOGIC RH(D): CPT

## 2021-01-01 PROCEDURE — 82565 ASSAY OF CREATININE: CPT

## 2021-01-01 PROCEDURE — 77030009850 HC PCH ENDOSC SPEC COOK -B: Performed by: SURGERY

## 2021-01-01 PROCEDURE — C9113 INJ PANTOPRAZOLE SODIUM, VIA: HCPCS | Performed by: NURSE PRACTITIONER

## 2021-01-01 PROCEDURE — C9113 INJ PANTOPRAZOLE SODIUM, VIA: HCPCS | Performed by: SURGERY

## 2021-01-01 PROCEDURE — 77030009985 HC RELD STPLR XR J&J -B: Performed by: SURGERY

## 2021-01-01 PROCEDURE — 86580 TB INTRADERMAL TEST: CPT | Performed by: SURGERY

## 2021-01-01 PROCEDURE — 99233 SBSQ HOSP IP/OBS HIGH 50: CPT | Performed by: INTERNAL MEDICINE

## 2021-01-01 PROCEDURE — 74011636637 HC RX REV CODE- 636/637: Performed by: NURSE PRACTITIONER

## 2021-01-01 PROCEDURE — 0DBV0ZZ EXCISION OF MESENTERY, OPEN APPROACH: ICD-10-PCS | Performed by: SURGERY

## 2021-01-01 PROCEDURE — 77030040922 HC BLNKT HYPOTHRM STRY -A: Performed by: NURSE ANESTHETIST, CERTIFIED REGISTERED

## 2021-01-01 PROCEDURE — 4A133J1 MONITORING OF ARTERIAL PULSE, PERIPHERAL, PERCUTANEOUS APPROACH: ICD-10-PCS | Performed by: ANESTHESIOLOGY

## 2021-01-01 PROCEDURE — 97530 THERAPEUTIC ACTIVITIES: CPT

## 2021-01-01 PROCEDURE — 77030039425 HC BLD LARYNG TRULITE DISP TELE -A: Performed by: ANESTHESIOLOGY

## 2021-01-01 PROCEDURE — 74018 RADEX ABDOMEN 1 VIEW: CPT

## 2021-01-01 PROCEDURE — 49204 PR EXCISION/DESTRUCTION OPEN ABDOMINAL TUMORS 5.1-10.0 CM: CPT | Performed by: SURGERY

## 2021-01-01 PROCEDURE — 0DB98ZZ EXCISION OF DUODENUM, VIA NATURAL OR ARTIFICIAL OPENING ENDOSCOPIC: ICD-10-PCS | Performed by: INTERNAL MEDICINE

## 2021-01-01 PROCEDURE — 86022 PLATELET ANTIBODIES: CPT

## 2021-01-01 PROCEDURE — 02H633Z INSERTION OF INFUSION DEVICE INTO RIGHT ATRIUM, PERCUTANEOUS APPROACH: ICD-10-PCS | Performed by: PHYSICIAN ASSISTANT

## 2021-01-01 PROCEDURE — 77030011264 HC ELECTRD BLD EXT COVD -A: Performed by: SURGERY

## 2021-01-01 PROCEDURE — 77030019905 HC CATH URETH INTMIT MDII -A

## 2021-01-01 PROCEDURE — 97110 THERAPEUTIC EXERCISES: CPT

## 2021-01-01 PROCEDURE — 76060000043 HC ANESTHESIA 6 TO 6.5 HR: Performed by: SURGERY

## 2021-01-01 PROCEDURE — 82803 BLOOD GASES ANY COMBINATION: CPT

## 2021-01-01 PROCEDURE — 93005 ELECTROCARDIOGRAM TRACING: CPT | Performed by: INTERNAL MEDICINE

## 2021-01-01 PROCEDURE — 97161 PT EVAL LOW COMPLEX 20 MIN: CPT

## 2021-01-01 PROCEDURE — 77030040830 HC CATH URETH FOL MDII -A

## 2021-01-01 PROCEDURE — 77030039283 HC SHR HARM INSRT DISP DAVNC INTU -F: Performed by: SURGERY

## 2021-01-01 PROCEDURE — 92610 EVALUATE SWALLOWING FUNCTION: CPT

## 2021-01-01 PROCEDURE — 74011000250 HC RX REV CODE- 250: Performed by: ANESTHESIOLOGY

## 2021-01-01 PROCEDURE — 84295 ASSAY OF SERUM SODIUM: CPT

## 2021-01-01 PROCEDURE — 77030020703 HC SEAL CANN DISP INTU -B: Performed by: SURGERY

## 2021-01-01 PROCEDURE — 77030037088 HC TUBE ENDOTRACH ORAL NSL COVD-A: Performed by: ANESTHESIOLOGY

## 2021-01-01 PROCEDURE — 77030008723 HC TU FEED NG COVD -A: Performed by: SURGERY

## 2021-01-01 PROCEDURE — 4A133B1 MONITORING OF ARTERIAL PRESSURE, PERIPHERAL, PERCUTANEOUS APPROACH: ICD-10-PCS | Performed by: ANESTHESIOLOGY

## 2021-01-01 PROCEDURE — 77030019908 HC STETH ESOPH SIMS -A: Performed by: ANESTHESIOLOGY

## 2021-01-01 PROCEDURE — 74011250636 HC RX REV CODE- 250/636

## 2021-01-01 PROCEDURE — P9045 ALBUMIN (HUMAN), 5%, 250 ML: HCPCS | Performed by: SURGERY

## 2021-01-01 PROCEDURE — 74011250636 HC RX REV CODE- 250/636: Performed by: NURSE ANESTHETIST, CERTIFIED REGISTERED

## 2021-01-01 PROCEDURE — 77030027138 HC INCENT SPIROMETER -A

## 2021-01-01 PROCEDURE — 86301 IMMUNOASSAY TUMOR CA 19-9: CPT

## 2021-01-01 PROCEDURE — 49905 OMENTAL FLAP INTRA-ABDOM: CPT | Performed by: SURGERY

## 2021-01-01 PROCEDURE — 77030038552 HC DRN WND MDII -A: Performed by: SURGERY

## 2021-01-01 PROCEDURE — 76210000016 HC OR PH I REC 1 TO 1.5 HR: Performed by: SURGERY

## 2021-01-01 PROCEDURE — 77030011283 HC ELECTRD NDL COVD -A: Performed by: SURGERY

## 2021-01-01 PROCEDURE — 97116 GAIT TRAINING THERAPY: CPT

## 2021-01-01 PROCEDURE — 83690 ASSAY OF LIPASE: CPT

## 2021-01-01 PROCEDURE — 87077 CULTURE AEROBIC IDENTIFY: CPT

## 2021-01-01 PROCEDURE — 88312 SPECIAL STAINS GROUP 1: CPT

## 2021-01-01 PROCEDURE — 74011250636 HC RX REV CODE- 250/636: Performed by: FAMILY MEDICINE

## 2021-01-01 PROCEDURE — 77030016151 HC PROTCTR LNS DFOG COVD -B: Performed by: SURGERY

## 2021-01-01 PROCEDURE — 74011000636 HC RX REV CODE- 636: Performed by: INTERNAL MEDICINE

## 2021-01-01 PROCEDURE — 77030020230: Performed by: ANESTHESIOLOGY

## 2021-01-01 PROCEDURE — 77030031139 HC SUT VCRL2 J&J -A: Performed by: SURGERY

## 2021-01-01 PROCEDURE — P9016 RBC LEUKOCYTES REDUCED: HCPCS

## 2021-01-01 PROCEDURE — 76060000033 HC ANESTHESIA 1 TO 1.5 HR: Performed by: INTERNAL MEDICINE

## 2021-01-01 PROCEDURE — 77030013794 HC KT TRNSDUC BLD EDWD -B

## 2021-01-01 PROCEDURE — 77030002996 HC SUT SLK J&J -A: Performed by: SURGERY

## 2021-01-01 PROCEDURE — 97535 SELF CARE MNGMENT TRAINING: CPT

## 2021-01-01 PROCEDURE — 77030013292 HC BOWL MX PRSM J&J -A: Performed by: ANESTHESIOLOGY

## 2021-01-01 PROCEDURE — 48150 PARTIAL REMOVAL OF PANCREAS: CPT | Performed by: SURGERY

## 2021-01-01 PROCEDURE — 87040 BLOOD CULTURE FOR BACTERIA: CPT

## 2021-01-01 PROCEDURE — 97112 NEUROMUSCULAR REEDUCATION: CPT

## 2021-01-01 PROCEDURE — 76937 US GUIDE VASCULAR ACCESS: CPT

## 2021-01-01 PROCEDURE — 77030008715 HC TU FEED GASTMY COVD -A: Performed by: SURGERY

## 2021-01-01 PROCEDURE — 77030008518 HC TBNG INSUF ENDO STRY -B: Performed by: SURGERY

## 2021-01-01 PROCEDURE — 76040000026: Performed by: INTERNAL MEDICINE

## 2021-01-01 PROCEDURE — 77030035278 HC STPLR SEAL ENDOWR INTU -B: Performed by: SURGERY

## 2021-01-01 PROCEDURE — 87186 SC STD MICRODIL/AGAR DIL: CPT

## 2021-01-01 PROCEDURE — 85730 THROMBOPLASTIN TIME PARTIAL: CPT

## 2021-01-01 PROCEDURE — 44015 INSERT NEEDLE CATH BOWEL: CPT | Performed by: SURGERY

## 2021-01-01 PROCEDURE — 77030019908 HC STETH ESOPH SIMS -A: Performed by: NURSE ANESTHETIST, CERTIFIED REGISTERED

## 2021-01-01 PROCEDURE — 0FT40ZZ RESECTION OF GALLBLADDER, OPEN APPROACH: ICD-10-PCS | Performed by: SURGERY

## 2021-01-01 PROCEDURE — 74019 RADEX ABDOMEN 2 VIEWS: CPT

## 2021-01-01 PROCEDURE — 97168 OT RE-EVAL EST PLAN CARE: CPT

## 2021-01-01 PROCEDURE — 77030002967 HC SUT PDS J&J -B: Performed by: SURGERY

## 2021-01-01 PROCEDURE — 77030005401 HC CATH RAD ARRO -A: Performed by: ANESTHESIOLOGY

## 2021-01-01 PROCEDURE — 77030011229 HC DIL VESL COON COOK -A

## 2021-01-01 PROCEDURE — 77030041524 HC SEALNT FIBRN VITASEAL J&J -C: Performed by: SURGERY

## 2021-01-01 PROCEDURE — C1751 CATH, INF, PER/CENT/MIDLINE: HCPCS

## 2021-01-01 PROCEDURE — 93005 ELECTROCARDIOGRAM TRACING: CPT | Performed by: NURSE PRACTITIONER

## 2021-01-01 PROCEDURE — 77030036731 HC STPLR ENDOSC J&J -F: Performed by: SURGERY

## 2021-01-01 PROCEDURE — 77030008606 HC TRCR ENDOSC KII AMR -B: Performed by: SURGERY

## 2021-01-01 PROCEDURE — 83605 ASSAY OF LACTIC ACID: CPT

## 2021-01-01 PROCEDURE — 77030020407 HC IV BLD WRMR ST 3M -A: Performed by: ANESTHESIOLOGY

## 2021-01-01 PROCEDURE — P9045 ALBUMIN (HUMAN), 5%, 250 ML: HCPCS | Performed by: INTERNAL MEDICINE

## 2021-01-01 PROCEDURE — 80076 HEPATIC FUNCTION PANEL: CPT

## 2021-01-01 PROCEDURE — 74011250636 HC RX REV CODE- 250/636: Performed by: REGISTERED NURSE

## 2021-01-01 PROCEDURE — 77030002986 HC SUT PROL J&J -A: Performed by: SURGERY

## 2021-01-01 PROCEDURE — 77030011808 HC STPLR ENDOSCOPIC J&J -D: Performed by: SURGERY

## 2021-01-01 PROCEDURE — 71260 CT THORAX DX C+: CPT

## 2021-01-01 PROCEDURE — 36430 TRANSFUSION BLD/BLD COMPNT: CPT

## 2021-01-01 PROCEDURE — 86923 COMPATIBILITY TEST ELECTRIC: CPT

## 2021-01-01 PROCEDURE — 88304 TISSUE EXAM BY PATHOLOGIST: CPT

## 2021-01-01 PROCEDURE — 84145 PROCALCITONIN (PCT): CPT

## 2021-01-01 PROCEDURE — 77030008756 HC TU IRR SUC STRY -B: Performed by: SURGERY

## 2021-01-01 PROCEDURE — 77030008462 HC STPLR SKN PROX J&J -A: Performed by: SURGERY

## 2021-01-01 PROCEDURE — 76010000179 HC OR TIME 6 TO 6.5 HR INTENSV-TIER 1: Performed by: SURGERY

## 2021-01-01 PROCEDURE — 85610 PROTHROMBIN TIME: CPT

## 2021-01-01 PROCEDURE — 03HY32Z INSERTION OF MONITORING DEVICE INTO UPPER ARTERY, PERCUTANEOUS APPROACH: ICD-10-PCS | Performed by: ANESTHESIOLOGY

## 2021-01-01 PROCEDURE — 76060000036 HC ANESTHESIA 2.5 TO 3 HR: Performed by: SURGERY

## 2021-01-01 PROCEDURE — 77030039425 HC BLD LARYNG TRULITE DISP TELE -A: Performed by: NURSE ANESTHETIST, CERTIFIED REGISTERED

## 2021-01-01 PROCEDURE — 74011000302 HC RX REV CODE- 302: Performed by: SURGERY

## 2021-01-01 PROCEDURE — 77030040922 HC BLNKT HYPOTHRM STRY -A: Performed by: ANESTHESIOLOGY

## 2021-01-01 PROCEDURE — 70450 CT HEAD/BRAIN W/O DYE: CPT

## 2021-01-01 PROCEDURE — 99291 CRITICAL CARE FIRST HOUR: CPT | Performed by: INTERNAL MEDICINE

## 2021-01-01 PROCEDURE — 77030032523 HC RELD STPL PK ENDORS INTU -C: Performed by: SURGERY

## 2021-01-01 PROCEDURE — 77030006565 HC BG DRN BILE BARD -A: Performed by: SURGERY

## 2021-01-01 PROCEDURE — 77030040393 HC DRSG OPTIFOAM GENT MDII -B

## 2021-01-01 PROCEDURE — 88309 TISSUE EXAM BY PATHOLOGIST: CPT

## 2021-01-01 PROCEDURE — 77030008031

## 2021-01-01 PROCEDURE — 74011250637 HC RX REV CODE- 250/637: Performed by: FAMILY MEDICINE

## 2021-01-01 PROCEDURE — 85384 FIBRINOGEN ACTIVITY: CPT

## 2021-01-01 PROCEDURE — 84478 ASSAY OF TRIGLYCERIDES: CPT

## 2021-01-01 PROCEDURE — 87070 CULTURE OTHR SPECIMN AEROBIC: CPT

## 2021-01-01 PROCEDURE — 97165 OT EVAL LOW COMPLEX 30 MIN: CPT

## 2021-01-01 RX ORDER — POTASSIUM CHLORIDE 14.9 MG/ML
20 INJECTION INTRAVENOUS ONCE
Status: COMPLETED | OUTPATIENT
Start: 2021-01-01 | End: 2021-01-01

## 2021-01-01 RX ORDER — PROPOFOL 10 MG/ML
INJECTION, EMULSION INTRAVENOUS AS NEEDED
Status: DISCONTINUED | OUTPATIENT
Start: 2021-01-01 | End: 2021-01-01 | Stop reason: HOSPADM

## 2021-01-01 RX ORDER — CIPROFLOXACIN 500 MG/1
500 TABLET ORAL EVERY 12 HOURS
Status: DISCONTINUED | OUTPATIENT
Start: 2021-01-01 | End: 2021-01-01

## 2021-01-01 RX ORDER — PROPOFOL 10 MG/ML
0-50 VIAL (ML) INTRAVENOUS
Status: DISCONTINUED | OUTPATIENT
Start: 2021-01-01 | End: 2021-01-01

## 2021-01-01 RX ORDER — METOPROLOL TARTRATE 5 MG/5ML
2.5 INJECTION INTRAVENOUS EVERY 6 HOURS
Status: DISCONTINUED | OUTPATIENT
Start: 2021-01-01 | End: 2021-01-01

## 2021-01-01 RX ORDER — SODIUM CHLORIDE 0.9 % (FLUSH) 0.9 %
10 SYRINGE (ML) INJECTION
Status: COMPLETED | OUTPATIENT
Start: 2021-01-01 | End: 2021-01-01

## 2021-01-01 RX ORDER — POTASSIUM CHLORIDE AND SODIUM CHLORIDE 450; 150 MG/100ML; MG/100ML
INJECTION, SOLUTION INTRAVENOUS CONTINUOUS
Status: CANCELLED | OUTPATIENT
Start: 2021-01-01

## 2021-01-01 RX ORDER — DOCUSATE SODIUM 50 MG/5ML
100 LIQUID ORAL 2 TIMES DAILY
Status: DISCONTINUED | OUTPATIENT
Start: 2021-01-01 | End: 2021-01-01

## 2021-01-01 RX ORDER — ONDANSETRON 2 MG/ML
4 INJECTION INTRAMUSCULAR; INTRAVENOUS
Status: DISCONTINUED | OUTPATIENT
Start: 2021-01-01 | End: 2021-05-02 | Stop reason: HOSPADM

## 2021-01-01 RX ORDER — ALBUMIN HUMAN 50 G/1000ML
25 SOLUTION INTRAVENOUS EVERY 6 HOURS
Status: DISCONTINUED | OUTPATIENT
Start: 2021-01-01 | End: 2021-05-02 | Stop reason: HOSPADM

## 2021-01-01 RX ORDER — SUCCINYLCHOLINE CHLORIDE 20 MG/ML
INJECTION INTRAMUSCULAR; INTRAVENOUS AS NEEDED
Status: DISCONTINUED | OUTPATIENT
Start: 2021-01-01 | End: 2021-01-01 | Stop reason: HOSPADM

## 2021-01-01 RX ORDER — DEXTROSE MONOHYDRATE AND SODIUM CHLORIDE 5; .45 G/100ML; G/100ML
1000 INJECTION, SOLUTION INTRAVENOUS ONCE
Status: COMPLETED | OUTPATIENT
Start: 2021-01-01 | End: 2021-01-01

## 2021-01-01 RX ORDER — METOCLOPRAMIDE HYDROCHLORIDE 5 MG/ML
20 INJECTION INTRAMUSCULAR; INTRAVENOUS
Status: DISCONTINUED | OUTPATIENT
Start: 2021-01-01 | End: 2021-01-01

## 2021-01-01 RX ORDER — FLUMAZENIL 0.1 MG/ML
0.2 INJECTION INTRAVENOUS AS NEEDED
Status: DISCONTINUED | OUTPATIENT
Start: 2021-01-01 | End: 2021-01-01 | Stop reason: HOSPADM

## 2021-01-01 RX ORDER — ACETAMINOPHEN 325 MG/1
650 TABLET ORAL
Status: DISCONTINUED | OUTPATIENT
Start: 2021-01-01 | End: 2021-05-02 | Stop reason: HOSPADM

## 2021-01-01 RX ORDER — NALOXONE HYDROCHLORIDE 0.4 MG/ML
0.1 INJECTION, SOLUTION INTRAMUSCULAR; INTRAVENOUS; SUBCUTANEOUS
Status: DISCONTINUED | OUTPATIENT
Start: 2021-01-01 | End: 2021-01-01 | Stop reason: HOSPADM

## 2021-01-01 RX ORDER — POTASSIUM CHLORIDE AND SODIUM CHLORIDE 450; 150 MG/100ML; MG/100ML
INJECTION, SOLUTION INTRAVENOUS CONTINUOUS
Status: DISCONTINUED | OUTPATIENT
Start: 2021-01-01 | End: 2021-01-01

## 2021-01-01 RX ORDER — FENTANYL CITRATE 50 UG/ML
INJECTION, SOLUTION INTRAMUSCULAR; INTRAVENOUS AS NEEDED
Status: DISCONTINUED | OUTPATIENT
Start: 2021-01-01 | End: 2021-01-01 | Stop reason: HOSPADM

## 2021-01-01 RX ORDER — SODIUM CHLORIDE, SODIUM LACTATE, POTASSIUM CHLORIDE, CALCIUM CHLORIDE 600; 310; 30; 20 MG/100ML; MG/100ML; MG/100ML; MG/100ML
INJECTION, SOLUTION INTRAVENOUS
Status: DISCONTINUED | OUTPATIENT
Start: 2021-01-01 | End: 2021-01-01 | Stop reason: HOSPADM

## 2021-01-01 RX ORDER — NALOXONE HYDROCHLORIDE 0.4 MG/ML
0.1 INJECTION, SOLUTION INTRAMUSCULAR; INTRAVENOUS; SUBCUTANEOUS
Status: DISCONTINUED | OUTPATIENT
Start: 2021-01-01 | End: 2021-01-01

## 2021-01-01 RX ORDER — CEFAZOLIN SODIUM/WATER 2 G/20 ML
2 SYRINGE (ML) INTRAVENOUS ONCE
Status: COMPLETED | OUTPATIENT
Start: 2021-01-01 | End: 2021-01-01

## 2021-01-01 RX ORDER — DIPHENHYDRAMINE HYDROCHLORIDE 50 MG/ML
12.5 INJECTION, SOLUTION INTRAMUSCULAR; INTRAVENOUS
Status: DISCONTINUED | OUTPATIENT
Start: 2021-01-01 | End: 2021-01-01

## 2021-01-01 RX ORDER — ADHESIVE BANDAGE
30 BANDAGE TOPICAL DAILY
Status: DISCONTINUED | OUTPATIENT
Start: 2021-01-01 | End: 2021-01-01

## 2021-01-01 RX ORDER — GLYCOPYRROLATE 0.2 MG/ML
0.2 INJECTION INTRAMUSCULAR; INTRAVENOUS
Status: DISCONTINUED | OUTPATIENT
Start: 2021-01-01 | End: 2021-05-02 | Stop reason: HOSPADM

## 2021-01-01 RX ORDER — KETAMINE HYDROCHLORIDE 50 MG/ML
INJECTION, SOLUTION INTRAMUSCULAR; INTRAVENOUS AS NEEDED
Status: DISCONTINUED | OUTPATIENT
Start: 2021-01-01 | End: 2021-01-01 | Stop reason: HOSPADM

## 2021-01-01 RX ORDER — MAGNESIUM SULFATE HEPTAHYDRATE 40 MG/ML
2 INJECTION, SOLUTION INTRAVENOUS ONCE
Status: COMPLETED | OUTPATIENT
Start: 2021-01-01 | End: 2021-01-01

## 2021-01-01 RX ORDER — GABAPENTIN 100 MG/1
200 CAPSULE ORAL 3 TIMES DAILY
Status: DISCONTINUED | OUTPATIENT
Start: 2021-01-01 | End: 2021-01-01

## 2021-01-01 RX ORDER — LORAZEPAM 2 MG/ML
1 INJECTION INTRAMUSCULAR
Status: DISCONTINUED | OUTPATIENT
Start: 2021-01-01 | End: 2021-05-02 | Stop reason: HOSPADM

## 2021-01-01 RX ORDER — HYDROMORPHONE HYDROCHLORIDE 1 MG/ML
1 INJECTION, SOLUTION INTRAMUSCULAR; INTRAVENOUS; SUBCUTANEOUS
Status: DISCONTINUED | OUTPATIENT
Start: 2021-01-01 | End: 2021-01-01

## 2021-01-01 RX ORDER — ONDANSETRON 2 MG/ML
8 INJECTION INTRAMUSCULAR; INTRAVENOUS
Status: DISCONTINUED | OUTPATIENT
Start: 2021-01-01 | End: 2021-01-01

## 2021-01-01 RX ORDER — SODIUM CHLORIDE 9 MG/ML
75 INJECTION, SOLUTION INTRAVENOUS CONTINUOUS
Status: DISCONTINUED | OUTPATIENT
Start: 2021-01-01 | End: 2021-01-01

## 2021-01-01 RX ORDER — ONDANSETRON 2 MG/ML
8 INJECTION INTRAMUSCULAR; INTRAVENOUS EVERY 4 HOURS
Status: DISCONTINUED | OUTPATIENT
Start: 2021-01-01 | End: 2021-01-01

## 2021-01-01 RX ORDER — HYDROMORPHONE HYDROCHLORIDE 1 MG/ML
.5-1 INJECTION, SOLUTION INTRAMUSCULAR; INTRAVENOUS; SUBCUTANEOUS
Status: DISCONTINUED | OUTPATIENT
Start: 2021-01-01 | End: 2021-01-01

## 2021-01-01 RX ORDER — ROCURONIUM BROMIDE 10 MG/ML
INJECTION, SOLUTION INTRAVENOUS AS NEEDED
Status: DISCONTINUED | OUTPATIENT
Start: 2021-01-01 | End: 2021-01-01 | Stop reason: HOSPADM

## 2021-01-01 RX ORDER — SCOLOPAMINE TRANSDERMAL SYSTEM 1 MG/1
1 PATCH, EXTENDED RELEASE TRANSDERMAL
Status: DISCONTINUED | OUTPATIENT
Start: 2021-01-01 | End: 2021-01-01

## 2021-01-01 RX ORDER — LORAZEPAM 2 MG/ML
0.5 INJECTION INTRAMUSCULAR
Status: DISPENSED | OUTPATIENT
Start: 2021-01-01 | End: 2021-01-01

## 2021-01-01 RX ORDER — HYDROMORPHONE HCL IN WATER/PF 1 MG/ML
SYRINGE (ML) INJECTION CONTINUOUS
Status: DISCONTINUED | OUTPATIENT
Start: 2021-01-01 | End: 2021-01-01

## 2021-01-01 RX ORDER — DEXTROSE MONOHYDRATE AND SODIUM CHLORIDE 5; .45 G/100ML; G/100ML
50 INJECTION, SOLUTION INTRAVENOUS CONTINUOUS
Status: DISCONTINUED | OUTPATIENT
Start: 2021-01-01 | End: 2021-01-01

## 2021-01-01 RX ORDER — SODIUM CHLORIDE FOR INHALATION 3 %
4 VIAL, NEBULIZER (ML) INHALATION 2 TIMES DAILY
Status: DISCONTINUED | OUTPATIENT
Start: 2021-01-01 | End: 2021-01-01

## 2021-01-01 RX ORDER — METOCLOPRAMIDE HYDROCHLORIDE 5 MG/ML
10 INJECTION INTRAMUSCULAR; INTRAVENOUS EVERY 6 HOURS
Status: DISCONTINUED | OUTPATIENT
Start: 2021-01-01 | End: 2021-01-01

## 2021-01-01 RX ORDER — SODIUM CHLORIDE, SODIUM LACTATE, POTASSIUM CHLORIDE, CALCIUM CHLORIDE 600; 310; 30; 20 MG/100ML; MG/100ML; MG/100ML; MG/100ML
1000 INJECTION, SOLUTION INTRAVENOUS CONTINUOUS
Status: DISCONTINUED | OUTPATIENT
Start: 2021-01-01 | End: 2021-01-01 | Stop reason: HOSPADM

## 2021-01-01 RX ORDER — MORPHINE SULFATE 2 MG/ML
2 INJECTION, SOLUTION INTRAMUSCULAR; INTRAVENOUS
Status: DISCONTINUED | OUTPATIENT
Start: 2021-01-01 | End: 2021-05-02 | Stop reason: HOSPADM

## 2021-01-01 RX ORDER — ACETAMINOPHEN 500 MG
1000 TABLET ORAL ONCE
Status: COMPLETED | OUTPATIENT
Start: 2021-01-01 | End: 2021-01-01

## 2021-01-01 RX ORDER — HYDROMORPHONE HYDROCHLORIDE 1 MG/ML
.3-.5 INJECTION, SOLUTION INTRAMUSCULAR; INTRAVENOUS; SUBCUTANEOUS
Status: DISCONTINUED | OUTPATIENT
Start: 2021-01-01 | End: 2021-05-02 | Stop reason: HOSPADM

## 2021-01-01 RX ORDER — SODIUM CHLORIDE FOR INHALATION 3 %
4 VIAL, NEBULIZER (ML) INHALATION
Status: DISCONTINUED | OUTPATIENT
Start: 2021-01-01 | End: 2021-01-01

## 2021-01-01 RX ORDER — GUAIFENESIN 100 MG/5ML
400 SOLUTION ORAL
Status: DISCONTINUED | OUTPATIENT
Start: 2021-01-01 | End: 2021-01-01

## 2021-01-01 RX ORDER — METRONIDAZOLE 500 MG/100ML
500 INJECTION, SOLUTION INTRAVENOUS EVERY 12 HOURS
Status: COMPLETED | OUTPATIENT
Start: 2021-01-01 | End: 2021-01-01

## 2021-01-01 RX ORDER — LIDOCAINE HYDROCHLORIDE ANHYDROUS AND DEXTROSE MONOHYDRATE .4; 5 G/100ML; G/100ML
INJECTION, SOLUTION INTRAVENOUS
Status: DISCONTINUED | OUTPATIENT
Start: 2021-01-01 | End: 2021-01-01 | Stop reason: HOSPADM

## 2021-01-01 RX ORDER — ONDANSETRON 2 MG/ML
4 INJECTION INTRAMUSCULAR; INTRAVENOUS EVERY 4 HOURS
Status: DISCONTINUED | OUTPATIENT
Start: 2021-01-01 | End: 2021-01-01

## 2021-01-01 RX ORDER — SODIUM CHLORIDE, SODIUM LACTATE, POTASSIUM CHLORIDE, CALCIUM CHLORIDE 600; 310; 30; 20 MG/100ML; MG/100ML; MG/100ML; MG/100ML
50 INJECTION, SOLUTION INTRAVENOUS CONTINUOUS
Status: DISCONTINUED | OUTPATIENT
Start: 2021-01-01 | End: 2021-01-01

## 2021-01-01 RX ORDER — DIPHENHYDRAMINE HYDROCHLORIDE 50 MG/ML
25 INJECTION, SOLUTION INTRAMUSCULAR; INTRAVENOUS
Status: DISCONTINUED | OUTPATIENT
Start: 2021-01-01 | End: 2021-01-01

## 2021-01-01 RX ORDER — METRONIDAZOLE 500 MG/100ML
500 INJECTION, SOLUTION INTRAVENOUS ONCE
Status: COMPLETED | OUTPATIENT
Start: 2021-01-01 | End: 2021-01-01

## 2021-01-01 RX ORDER — LORAZEPAM 1 MG/1
1 TABLET ORAL 2 TIMES DAILY
Status: DISCONTINUED | OUTPATIENT
Start: 2021-01-01 | End: 2021-01-01

## 2021-01-01 RX ORDER — GABAPENTIN 100 MG/1
200 CAPSULE ORAL 2 TIMES DAILY
Status: DISCONTINUED | OUTPATIENT
Start: 2021-01-01 | End: 2021-01-01

## 2021-01-01 RX ORDER — ENOXAPARIN SODIUM 100 MG/ML
40 INJECTION SUBCUTANEOUS EVERY 24 HOURS
Status: DISCONTINUED | OUTPATIENT
Start: 2021-01-01 | End: 2021-01-01

## 2021-01-01 RX ORDER — METRONIDAZOLE 500 MG/100ML
500 INJECTION, SOLUTION INTRAVENOUS EVERY 12 HOURS
Status: DISCONTINUED | OUTPATIENT
Start: 2021-01-01 | End: 2021-01-01

## 2021-01-01 RX ORDER — OXYCODONE HYDROCHLORIDE 5 MG/1
10 TABLET ORAL
Status: DISCONTINUED | OUTPATIENT
Start: 2021-01-01 | End: 2021-01-01

## 2021-01-01 RX ORDER — VANCOMYCIN 2 GRAM/500 ML IN 0.9 % SODIUM CHLORIDE INTRAVENOUS
2000 ONCE
Status: DISCONTINUED | OUTPATIENT
Start: 2021-01-01 | End: 2021-01-01

## 2021-01-01 RX ORDER — LIDOCAINE HYDROCHLORIDE 10 MG/ML
0.1 INJECTION INFILTRATION; PERINEURAL AS NEEDED
Status: DISCONTINUED | OUTPATIENT
Start: 2021-01-01 | End: 2021-01-01 | Stop reason: HOSPADM

## 2021-01-01 RX ORDER — HYDROMORPHONE HYDROCHLORIDE 1 MG/ML
0.5 INJECTION, SOLUTION INTRAMUSCULAR; INTRAVENOUS; SUBCUTANEOUS
Status: DISCONTINUED | OUTPATIENT
Start: 2021-01-01 | End: 2021-01-01 | Stop reason: HOSPADM

## 2021-01-01 RX ORDER — POTASSIUM CHLORIDE 14.9 MG/ML
20 INJECTION INTRAVENOUS
Status: COMPLETED | OUTPATIENT
Start: 2021-01-01 | End: 2021-01-01

## 2021-01-01 RX ORDER — METOPROLOL TARTRATE 5 MG/5ML
2.5 INJECTION INTRAVENOUS
Status: DISCONTINUED | OUTPATIENT
Start: 2021-01-01 | End: 2021-01-01

## 2021-01-01 RX ORDER — SODIUM CHLORIDE 0.9 % (FLUSH) 0.9 %
5-40 SYRINGE (ML) INJECTION EVERY 8 HOURS
Status: DISCONTINUED | OUTPATIENT
Start: 2021-01-01 | End: 2021-01-01 | Stop reason: HOSPADM

## 2021-01-01 RX ORDER — LIDOCAINE HYDROCHLORIDE 20 MG/ML
INJECTION, SOLUTION EPIDURAL; INFILTRATION; INTRACAUDAL; PERINEURAL AS NEEDED
Status: DISCONTINUED | OUTPATIENT
Start: 2021-01-01 | End: 2021-01-01 | Stop reason: HOSPADM

## 2021-01-01 RX ORDER — MIDAZOLAM HYDROCHLORIDE 1 MG/ML
INJECTION, SOLUTION INTRAMUSCULAR; INTRAVENOUS AS NEEDED
Status: DISCONTINUED | OUTPATIENT
Start: 2021-01-01 | End: 2021-01-01 | Stop reason: HOSPADM

## 2021-01-01 RX ORDER — SODIUM CHLORIDE 9 MG/ML
100 INJECTION, SOLUTION INTRAVENOUS CONTINUOUS
Status: DISCONTINUED | OUTPATIENT
Start: 2021-01-01 | End: 2021-01-01

## 2021-01-01 RX ORDER — FACIAL-BODY WIPES
10 EACH TOPICAL DAILY
Status: DISCONTINUED | OUTPATIENT
Start: 2021-01-01 | End: 2021-01-01

## 2021-01-01 RX ORDER — ONDANSETRON 2 MG/ML
4 INJECTION INTRAMUSCULAR; INTRAVENOUS
Status: DISCONTINUED | OUTPATIENT
Start: 2021-01-01 | End: 2021-01-01

## 2021-01-01 RX ORDER — OXYCODONE AND ACETAMINOPHEN 7.5; 325 MG/1; MG/1
1 TABLET ORAL
Status: DISCONTINUED | OUTPATIENT
Start: 2021-01-01 | End: 2021-01-01

## 2021-01-01 RX ORDER — ENALAPRILAT 1.25 MG/ML
0.62 INJECTION INTRAVENOUS
Status: DISCONTINUED | OUTPATIENT
Start: 2021-01-01 | End: 2021-01-01

## 2021-01-01 RX ORDER — OXYCODONE HYDROCHLORIDE 5 MG/1
5 TABLET ORAL
Status: DISCONTINUED | OUTPATIENT
Start: 2021-01-01 | End: 2021-01-01

## 2021-01-01 RX ORDER — OXYCODONE HYDROCHLORIDE 5 MG/1
10 TABLET ORAL
Status: DISCONTINUED | OUTPATIENT
Start: 2021-01-01 | End: 2021-01-01 | Stop reason: HOSPADM

## 2021-01-01 RX ORDER — SODIUM CHLORIDE, SODIUM LACTATE, POTASSIUM CHLORIDE, CALCIUM CHLORIDE 600; 310; 30; 20 MG/100ML; MG/100ML; MG/100ML; MG/100ML
75 INJECTION, SOLUTION INTRAVENOUS CONTINUOUS
Status: DISCONTINUED | OUTPATIENT
Start: 2021-01-01 | End: 2021-01-01 | Stop reason: HOSPADM

## 2021-01-01 RX ORDER — OXYCODONE HYDROCHLORIDE 5 MG/1
5 TABLET ORAL
Status: DISCONTINUED | OUTPATIENT
Start: 2021-01-01 | End: 2021-01-01 | Stop reason: HOSPADM

## 2021-01-01 RX ORDER — SODIUM CHLORIDE 0.9 % (FLUSH) 0.9 %
5-40 SYRINGE (ML) INJECTION AS NEEDED
Status: DISCONTINUED | OUTPATIENT
Start: 2021-01-01 | End: 2021-01-01 | Stop reason: HOSPADM

## 2021-01-01 RX ORDER — ALBUMIN HUMAN 50 G/1000ML
SOLUTION INTRAVENOUS AS NEEDED
Status: DISCONTINUED | OUTPATIENT
Start: 2021-01-01 | End: 2021-01-01 | Stop reason: HOSPADM

## 2021-01-01 RX ORDER — SODIUM CHLORIDE 9 MG/ML
250 INJECTION, SOLUTION INTRAVENOUS AS NEEDED
Status: DISCONTINUED | OUTPATIENT
Start: 2021-01-01 | End: 2021-01-01

## 2021-01-01 RX ORDER — FLUCONAZOLE 2 MG/ML
200 INJECTION, SOLUTION INTRAVENOUS
Status: DISCONTINUED | OUTPATIENT
Start: 2021-01-01 | End: 2021-01-01

## 2021-01-01 RX ORDER — SODIUM CHLORIDE 0.9 % (FLUSH) 0.9 %
10 SYRINGE (ML) INJECTION
Status: ACTIVE | OUTPATIENT
Start: 2021-01-01 | End: 2021-01-01

## 2021-01-01 RX ORDER — LIDOCAINE HYDROCHLORIDE AND EPINEPHRINE 20; 10 MG/ML; UG/ML
20-200 INJECTION, SOLUTION INFILTRATION; PERINEURAL ONCE
Status: ACTIVE | OUTPATIENT
Start: 2021-01-01 | End: 2021-01-01

## 2021-01-01 RX ORDER — DIPHENHYDRAMINE HYDROCHLORIDE 50 MG/ML
12.5 INJECTION, SOLUTION INTRAMUSCULAR; INTRAVENOUS
Status: DISCONTINUED | OUTPATIENT
Start: 2021-01-01 | End: 2021-01-01 | Stop reason: HOSPADM

## 2021-01-01 RX ORDER — FACIAL-BODY WIPES
10 EACH TOPICAL DAILY PRN
Status: DISCONTINUED | OUTPATIENT
Start: 2021-01-01 | End: 2021-01-01

## 2021-01-01 RX ORDER — ACETAMINOPHEN 500 MG
1000 TABLET ORAL ONCE
Status: DISCONTINUED | OUTPATIENT
Start: 2021-01-01 | End: 2021-01-01 | Stop reason: HOSPADM

## 2021-01-01 RX ORDER — DEXAMETHASONE SODIUM PHOSPHATE 4 MG/ML
INJECTION, SOLUTION INTRA-ARTICULAR; INTRALESIONAL; INTRAMUSCULAR; INTRAVENOUS; SOFT TISSUE AS NEEDED
Status: DISCONTINUED | OUTPATIENT
Start: 2021-01-01 | End: 2021-01-01 | Stop reason: HOSPADM

## 2021-01-01 RX ORDER — CIPROFLOXACIN 2 MG/ML
400 INJECTION, SOLUTION INTRAVENOUS EVERY 12 HOURS
Status: COMPLETED | OUTPATIENT
Start: 2021-01-01 | End: 2021-01-01

## 2021-01-01 RX ORDER — ALBUMIN HUMAN 50 G/1000ML
50 SOLUTION INTRAVENOUS ONCE
Status: COMPLETED | OUTPATIENT
Start: 2021-01-01 | End: 2021-01-01

## 2021-01-01 RX ORDER — LIDOCAINE HYDROCHLORIDE ANHYDROUS AND DEXTROSE MONOHYDRATE .8; 5 G/100ML; G/100ML
INJECTION, SOLUTION INTRAVENOUS
Status: DISCONTINUED | OUTPATIENT
Start: 2021-01-01 | End: 2021-01-01 | Stop reason: HOSPADM

## 2021-01-01 RX ORDER — SODIUM CHLORIDE 9 MG/ML
INJECTION, SOLUTION INTRAVENOUS
Status: DISCONTINUED | OUTPATIENT
Start: 2021-01-01 | End: 2021-01-01 | Stop reason: HOSPADM

## 2021-01-01 RX ORDER — BUPIVACAINE HYDROCHLORIDE 5 MG/ML
INJECTION, SOLUTION EPIDURAL; INTRACAUDAL AS NEEDED
Status: DISCONTINUED | OUTPATIENT
Start: 2021-01-01 | End: 2021-01-01 | Stop reason: HOSPADM

## 2021-01-01 RX ORDER — VECURONIUM BROMIDE FOR INJECTION 1 MG/ML
INJECTION, POWDER, LYOPHILIZED, FOR SOLUTION INTRAVENOUS AS NEEDED
Status: DISCONTINUED | OUTPATIENT
Start: 2021-01-01 | End: 2021-01-01 | Stop reason: HOSPADM

## 2021-01-01 RX ORDER — GLYCOPYRROLATE 0.2 MG/ML
INJECTION INTRAMUSCULAR; INTRAVENOUS AS NEEDED
Status: DISCONTINUED | OUTPATIENT
Start: 2021-01-01 | End: 2021-01-01 | Stop reason: HOSPADM

## 2021-01-01 RX ORDER — GABAPENTIN 100 MG/1
100 CAPSULE ORAL 3 TIMES DAILY
Status: DISCONTINUED | OUTPATIENT
Start: 2021-01-01 | End: 2021-01-01

## 2021-01-01 RX ORDER — GUAIFENESIN 600 MG/1
600 TABLET, EXTENDED RELEASE ORAL EVERY 12 HOURS
Status: DISCONTINUED | OUTPATIENT
Start: 2021-01-01 | End: 2021-01-01

## 2021-01-01 RX ORDER — PROPOFOL 10 MG/ML
INJECTION, EMULSION INTRAVENOUS
Status: COMPLETED
Start: 2021-01-01 | End: 2021-01-01

## 2021-01-01 RX ORDER — FENTANYL CITRATE-0.9 % NACL/PF 25 MCG/ML
0-200 PLASTIC BAG, INJECTION (ML) INJECTION
Status: DISCONTINUED | OUTPATIENT
Start: 2021-01-01 | End: 2021-01-01

## 2021-01-01 RX ORDER — NEOSTIGMINE METHYLSULFATE 1 MG/ML
INJECTION, SOLUTION INTRAVENOUS AS NEEDED
Status: DISCONTINUED | OUTPATIENT
Start: 2021-01-01 | End: 2021-01-01 | Stop reason: HOSPADM

## 2021-01-01 RX ORDER — METOCLOPRAMIDE HYDROCHLORIDE 5 MG/ML
20 INJECTION INTRAMUSCULAR; INTRAVENOUS EVERY 6 HOURS
Status: DISCONTINUED | OUTPATIENT
Start: 2021-01-01 | End: 2021-01-01

## 2021-01-01 RX ORDER — FACIAL-BODY WIPES
10 EACH TOPICAL DAILY PRN
Status: DISCONTINUED | OUTPATIENT
Start: 2021-01-01 | End: 2021-05-02 | Stop reason: HOSPADM

## 2021-01-01 RX ORDER — GUAIFENESIN 100 MG/5ML
400 SOLUTION ORAL 3 TIMES DAILY
Status: DISCONTINUED | OUTPATIENT
Start: 2021-01-01 | End: 2021-05-02 | Stop reason: HOSPADM

## 2021-01-01 RX ORDER — FLUMAZENIL 0.1 MG/ML
0.2 INJECTION INTRAVENOUS AS NEEDED
Status: DISCONTINUED | OUTPATIENT
Start: 2021-01-01 | End: 2021-01-01

## 2021-01-01 RX ORDER — POTASSIUM CHLORIDE AND SODIUM CHLORIDE 450; 150 MG/100ML; MG/100ML
INJECTION, SOLUTION INTRAVENOUS CONTINUOUS
Status: DISPENSED | OUTPATIENT
Start: 2021-01-01 | End: 2021-01-01

## 2021-01-01 RX ORDER — ALBUMIN HUMAN 50 G/1000ML
12.5 SOLUTION INTRAVENOUS ONCE
Status: COMPLETED | OUTPATIENT
Start: 2021-01-01 | End: 2021-01-01

## 2021-01-01 RX ORDER — ALBUMIN HUMAN 250 G/1000ML
25 SOLUTION INTRAVENOUS EVERY 8 HOURS
Status: COMPLETED | OUTPATIENT
Start: 2021-01-01 | End: 2021-01-01

## 2021-01-01 RX ORDER — EPHEDRINE SULFATE/0.9% NACL/PF 50 MG/5 ML
SYRINGE (ML) INTRAVENOUS AS NEEDED
Status: DISCONTINUED | OUTPATIENT
Start: 2021-01-01 | End: 2021-01-01 | Stop reason: HOSPADM

## 2021-01-01 RX ORDER — ONDANSETRON 2 MG/ML
INJECTION INTRAMUSCULAR; INTRAVENOUS AS NEEDED
Status: DISCONTINUED | OUTPATIENT
Start: 2021-01-01 | End: 2021-01-01 | Stop reason: HOSPADM

## 2021-01-01 RX ORDER — ALBUTEROL SULFATE 0.83 MG/ML
2.5 SOLUTION RESPIRATORY (INHALATION)
Status: DISCONTINUED | OUTPATIENT
Start: 2021-01-01 | End: 2021-01-01

## 2021-01-01 RX ORDER — MAGNESIUM CITRATE
296 SOLUTION, ORAL ORAL
Status: COMPLETED | OUTPATIENT
Start: 2021-01-01 | End: 2021-01-01

## 2021-01-01 RX ORDER — FLUCONAZOLE 2 MG/ML
200 INJECTION, SOLUTION INTRAVENOUS EVERY 24 HOURS
Status: DISCONTINUED | OUTPATIENT
Start: 2021-01-01 | End: 2021-01-01

## 2021-01-01 RX ORDER — HYDROCODONE BITARTRATE AND ACETAMINOPHEN 5; 325 MG/1; MG/1
TABLET ORAL AS NEEDED
COMMUNITY

## 2021-01-01 RX ORDER — ACETAMINOPHEN 650 MG/1
650 SUPPOSITORY RECTAL
Status: DISCONTINUED | OUTPATIENT
Start: 2021-01-01 | End: 2021-05-02 | Stop reason: HOSPADM

## 2021-01-01 RX ORDER — IPRATROPIUM BROMIDE AND ALBUTEROL SULFATE 2.5; .5 MG/3ML; MG/3ML
3 SOLUTION RESPIRATORY (INHALATION)
Status: DISCONTINUED | OUTPATIENT
Start: 2021-01-01 | End: 2021-01-01 | Stop reason: ALTCHOICE

## 2021-01-01 RX ORDER — METOCLOPRAMIDE HYDROCHLORIDE 5 MG/ML
5 INJECTION INTRAMUSCULAR; INTRAVENOUS EVERY 6 HOURS
Status: DISCONTINUED | OUTPATIENT
Start: 2021-01-01 | End: 2021-01-01

## 2021-01-01 RX ORDER — SODIUM CHLORIDE 9 MG/ML
50 INJECTION, SOLUTION INTRAVENOUS CONTINUOUS
Status: DISCONTINUED | OUTPATIENT
Start: 2021-01-01 | End: 2021-01-01

## 2021-01-01 RX ORDER — LORAZEPAM 1 MG/1
1 TABLET ORAL
Status: DISCONTINUED | OUTPATIENT
Start: 2021-01-01 | End: 2021-01-01

## 2021-01-01 RX ADMIN — SODIUM CHLORIDE AND POTASSIUM CHLORIDE: 4.5; 1.49 INJECTION, SOLUTION INTRAVENOUS at 01:35

## 2021-01-01 RX ADMIN — CALCIUM GLUCONATE: 98 INJECTION, SOLUTION INTRAVENOUS at 17:21

## 2021-01-01 RX ADMIN — SODIUM PHOSPHATE, MONOBASIC, MONOHYDRATE: 276; 142 INJECTION, SOLUTION INTRAVENOUS at 04:32

## 2021-01-01 RX ADMIN — CIPROFLOXACIN 500 MG: 500 TABLET, FILM COATED ORAL at 09:39

## 2021-01-01 RX ADMIN — LORAZEPAM 1 MG: 1 TABLET ORAL at 07:53

## 2021-01-01 RX ADMIN — KETAMINE HYDROCHLORIDE 20 MG: 50 INJECTION INTRAMUSCULAR; INTRAVENOUS at 16:33

## 2021-01-01 RX ADMIN — ONDANSETRON 4 MG: 2 INJECTION INTRAMUSCULAR; INTRAVENOUS at 01:31

## 2021-01-01 RX ADMIN — ONDANSETRON 8 MG: 2 INJECTION INTRAMUSCULAR; INTRAVENOUS at 03:44

## 2021-01-01 RX ADMIN — HYDROMORPHONE HYDROCHLORIDE 0.5 MG: 1 INJECTION, SOLUTION INTRAMUSCULAR; INTRAVENOUS; SUBCUTANEOUS at 19:36

## 2021-01-01 RX ADMIN — ONDANSETRON 8 MG: 2 INJECTION INTRAMUSCULAR; INTRAVENOUS at 17:02

## 2021-01-01 RX ADMIN — METRONIDAZOLE 500 MG: 500 INJECTION, SOLUTION INTRAVENOUS at 13:00

## 2021-01-01 RX ADMIN — PHENYLEPHRINE HYDROCHLORIDE 60 MCG: 10 INJECTION INTRAVENOUS at 15:29

## 2021-01-01 RX ADMIN — METOCLOPRAMIDE HYDROCHLORIDE 20 MG: 5 INJECTION INTRAMUSCULAR; INTRAVENOUS at 23:31

## 2021-01-01 RX ADMIN — METRONIDAZOLE 500 MG: 500 INJECTION, SOLUTION INTRAVENOUS at 01:22

## 2021-01-01 RX ADMIN — CALCIUM GLUCONATE: 98 INJECTION, SOLUTION INTRAVENOUS at 18:00

## 2021-01-01 RX ADMIN — I.V. FAT EMULSION 250 ML: 20 EMULSION INTRAVENOUS at 17:50

## 2021-01-01 RX ADMIN — HYDROMORPHONE HYDROCHLORIDE 1 MG: 1 INJECTION, SOLUTION INTRAMUSCULAR; INTRAVENOUS; SUBCUTANEOUS at 09:27

## 2021-01-01 RX ADMIN — ONDANSETRON 8 MG: 2 INJECTION INTRAMUSCULAR; INTRAVENOUS at 17:54

## 2021-01-01 RX ADMIN — ONDANSETRON 4 MG: 2 INJECTION INTRAMUSCULAR; INTRAVENOUS at 11:30

## 2021-01-01 RX ADMIN — PHENYLEPHRINE HYDROCHLORIDE 120 MCG: 10 INJECTION INTRAVENOUS at 13:52

## 2021-01-01 RX ADMIN — HUMAN INSULIN 3 UNITS: 100 INJECTION, SOLUTION SUBCUTANEOUS at 07:27

## 2021-01-01 RX ADMIN — CALCIUM GLUCONATE: 98 INJECTION, SOLUTION INTRAVENOUS at 17:07

## 2021-01-01 RX ADMIN — GABAPENTIN 100 MG: 100 CAPSULE ORAL at 17:20

## 2021-01-01 RX ADMIN — METOCLOPRAMIDE HYDROCHLORIDE 5 MG: 5 INJECTION INTRAMUSCULAR; INTRAVENOUS at 05:47

## 2021-01-01 RX ADMIN — PANTOPRAZOLE SODIUM 40 MG: 40 INJECTION, POWDER, FOR SOLUTION INTRAVENOUS at 08:42

## 2021-01-01 RX ADMIN — ONDANSETRON 4 MG: 2 INJECTION INTRAMUSCULAR; INTRAVENOUS at 17:07

## 2021-01-01 RX ADMIN — ONDANSETRON 4 MG: 2 INJECTION INTRAMUSCULAR; INTRAVENOUS at 05:49

## 2021-01-01 RX ADMIN — ONDANSETRON 4 MG: 2 INJECTION INTRAMUSCULAR; INTRAVENOUS at 15:26

## 2021-01-01 RX ADMIN — ONDANSETRON 4 MG: 2 INJECTION INTRAMUSCULAR; INTRAVENOUS at 11:25

## 2021-01-01 RX ADMIN — HUMAN INSULIN 6 UNITS: 100 INJECTION, SOLUTION SUBCUTANEOUS at 12:00

## 2021-01-01 RX ADMIN — SODIUM CHLORIDE AND POTASSIUM CHLORIDE: 4.5; 1.49 INJECTION, SOLUTION INTRAVENOUS at 09:02

## 2021-01-01 RX ADMIN — LORAZEPAM 1 MG: 1 TABLET ORAL at 08:40

## 2021-01-01 RX ADMIN — THIAMINE HYDROCHLORIDE: 100 INJECTION, SOLUTION INTRAMUSCULAR; INTRAVENOUS at 17:39

## 2021-01-01 RX ADMIN — PANTOPRAZOLE SODIUM 40 MG: 40 INJECTION, POWDER, FOR SOLUTION INTRAVENOUS at 21:00

## 2021-01-01 RX ADMIN — POTASSIUM CHLORIDE 20 MEQ: 14.9 INJECTION, SOLUTION INTRAVENOUS at 14:07

## 2021-01-01 RX ADMIN — ONDANSETRON 4 MG: 2 INJECTION INTRAMUSCULAR; INTRAVENOUS at 10:57

## 2021-01-01 RX ADMIN — I.V. FAT EMULSION 250 ML: 20 EMULSION INTRAVENOUS at 17:23

## 2021-01-01 RX ADMIN — PROMETHAZINE HYDROCHLORIDE 12.5 MG: 25 INJECTION INTRAMUSCULAR; INTRAVENOUS at 00:30

## 2021-01-01 RX ADMIN — HYDROMORPHONE HYDROCHLORIDE 0.5 MG: 1 INJECTION, SOLUTION INTRAMUSCULAR; INTRAVENOUS; SUBCUTANEOUS at 21:26

## 2021-01-01 RX ADMIN — METOCLOPRAMIDE HYDROCHLORIDE 10 MG: 5 INJECTION INTRAMUSCULAR; INTRAVENOUS at 05:55

## 2021-01-01 RX ADMIN — PHENYLEPHRINE HYDROCHLORIDE 120 MCG: 10 INJECTION INTRAVENOUS at 13:37

## 2021-01-01 RX ADMIN — Medication 10 ML: at 13:36

## 2021-01-01 RX ADMIN — LORAZEPAM 1 MG: 1 TABLET ORAL at 18:23

## 2021-01-01 RX ADMIN — METOCLOPRAMIDE HYDROCHLORIDE 20 MG: 5 INJECTION INTRAMUSCULAR; INTRAVENOUS at 10:47

## 2021-01-01 RX ADMIN — ONDANSETRON 8 MG: 2 INJECTION INTRAMUSCULAR; INTRAVENOUS at 11:43

## 2021-01-01 RX ADMIN — Medication 1 SPRAY: at 12:49

## 2021-01-01 RX ADMIN — BISACODYL 10 MG: 10 SUPPOSITORY RECTAL at 22:25

## 2021-01-01 RX ADMIN — SODIUM CHLORIDE, SODIUM LACTATE, POTASSIUM CHLORIDE, AND CALCIUM CHLORIDE 500 ML: 600; 310; 30; 20 INJECTION, SOLUTION INTRAVENOUS at 23:04

## 2021-01-01 RX ADMIN — SODIUM CHLORIDE AND POTASSIUM CHLORIDE: 4.5; 1.49 INJECTION, SOLUTION INTRAVENOUS at 14:49

## 2021-01-01 RX ADMIN — SODIUM CHLORIDE AND POTASSIUM CHLORIDE: 4.5; 1.49 INJECTION, SOLUTION INTRAVENOUS at 19:48

## 2021-01-01 RX ADMIN — PANTOPRAZOLE SODIUM 40 MG: 40 INJECTION, POWDER, FOR SOLUTION INTRAVENOUS at 08:20

## 2021-01-01 RX ADMIN — ONDANSETRON 4 MG: 2 INJECTION INTRAMUSCULAR; INTRAVENOUS at 15:44

## 2021-01-01 RX ADMIN — SODIUM CHLORIDE AND POTASSIUM CHLORIDE: 4.5; 1.49 INJECTION, SOLUTION INTRAVENOUS at 23:06

## 2021-01-01 RX ADMIN — KETAMINE HYDROCHLORIDE 20 MG: 50 INJECTION INTRAMUSCULAR; INTRAVENOUS at 14:51

## 2021-01-01 RX ADMIN — PANTOPRAZOLE SODIUM 40 MG: 40 INJECTION, POWDER, FOR SOLUTION INTRAVENOUS at 20:08

## 2021-01-01 RX ADMIN — FAMOTIDINE 20 MG: 10 INJECTION INTRAVENOUS at 09:14

## 2021-01-01 RX ADMIN — HYDROMORPHONE HYDROCHLORIDE 1 MG: 1 INJECTION, SOLUTION INTRAMUSCULAR; INTRAVENOUS; SUBCUTANEOUS at 05:23

## 2021-01-01 RX ADMIN — ONDANSETRON 8 MG: 2 INJECTION INTRAMUSCULAR; INTRAVENOUS at 20:34

## 2021-01-01 RX ADMIN — HUMAN INSULIN 3 UNITS: 100 INJECTION, SOLUTION SUBCUTANEOUS at 15:33

## 2021-01-01 RX ADMIN — ONDANSETRON 8 MG: 2 INJECTION INTRAMUSCULAR; INTRAVENOUS at 22:15

## 2021-01-01 RX ADMIN — ENOXAPARIN SODIUM 40 MG: 40 INJECTION SUBCUTANEOUS at 08:24

## 2021-01-01 RX ADMIN — I.V. FAT EMULSION 250 ML: 20 EMULSION INTRAVENOUS at 17:37

## 2021-01-01 RX ADMIN — ONDANSETRON 8 MG: 2 INJECTION INTRAMUSCULAR; INTRAVENOUS at 16:15

## 2021-01-01 RX ADMIN — ONDANSETRON 4 MG: 2 INJECTION INTRAMUSCULAR; INTRAVENOUS at 08:32

## 2021-01-01 RX ADMIN — ONDANSETRON 4 MG: 2 INJECTION INTRAMUSCULAR; INTRAVENOUS at 05:53

## 2021-01-01 RX ADMIN — SODIUM CHLORIDE AND POTASSIUM CHLORIDE: 4.5; 1.49 INJECTION, SOLUTION INTRAVENOUS at 14:38

## 2021-01-01 RX ADMIN — PHENYLEPHRINE HYDROCHLORIDE 10 MCG/MIN: 10 INJECTION INTRAVENOUS at 13:51

## 2021-01-01 RX ADMIN — SODIUM CHLORIDE, SODIUM LACTATE, POTASSIUM CHLORIDE, AND CALCIUM CHLORIDE 250 ML: 600; 310; 30; 20 INJECTION, SOLUTION INTRAVENOUS at 12:42

## 2021-01-01 RX ADMIN — PROPOFOL 100 MG: 10 INJECTION, EMULSION INTRAVENOUS at 13:33

## 2021-01-01 RX ADMIN — BISACODYL 10 MG: 10 SUPPOSITORY RECTAL at 08:39

## 2021-01-01 RX ADMIN — PANTOPRAZOLE SODIUM 40 MG: 40 INJECTION, POWDER, FOR SOLUTION INTRAVENOUS at 08:16

## 2021-01-01 RX ADMIN — SODIUM CHLORIDE AND POTASSIUM CHLORIDE: 4.5; 1.49 INJECTION, SOLUTION INTRAVENOUS at 00:28

## 2021-01-01 RX ADMIN — VECURONIUM BROMIDE 2 MG: 1 INJECTION, POWDER, LYOPHILIZED, FOR SOLUTION INTRAVENOUS at 16:29

## 2021-01-01 RX ADMIN — KETAMINE HYDROCHLORIDE 40 MG: 50 INJECTION INTRAMUSCULAR; INTRAVENOUS at 13:41

## 2021-01-01 RX ADMIN — METOCLOPRAMIDE HYDROCHLORIDE 10 MG: 5 INJECTION INTRAMUSCULAR; INTRAVENOUS at 23:46

## 2021-01-01 RX ADMIN — I.V. FAT EMULSION 250 ML: 20 EMULSION INTRAVENOUS at 17:55

## 2021-01-01 RX ADMIN — SODIUM CHLORIDE AND POTASSIUM CHLORIDE: 4.5; 1.49 INJECTION, SOLUTION INTRAVENOUS at 01:28

## 2021-01-01 RX ADMIN — LORAZEPAM 1 MG: 1 TABLET ORAL at 10:25

## 2021-01-01 RX ADMIN — FAMOTIDINE 20 MG: 10 INJECTION INTRAVENOUS at 21:54

## 2021-01-01 RX ADMIN — ONDANSETRON 4 MG: 2 INJECTION INTRAMUSCULAR; INTRAVENOUS at 21:47

## 2021-01-01 RX ADMIN — ONDANSETRON 4 MG: 2 INJECTION INTRAMUSCULAR; INTRAVENOUS at 05:02

## 2021-01-01 RX ADMIN — SODIUM CHLORIDE AND POTASSIUM CHLORIDE: 4.5; 1.49 INJECTION, SOLUTION INTRAVENOUS at 06:58

## 2021-01-01 RX ADMIN — FAMOTIDINE 20 MG: 10 INJECTION INTRAVENOUS at 08:49

## 2021-01-01 RX ADMIN — METOCLOPRAMIDE HYDROCHLORIDE 20 MG: 5 INJECTION INTRAMUSCULAR; INTRAVENOUS at 06:19

## 2021-01-01 RX ADMIN — HUMAN INSULIN 3 UNITS: 100 INJECTION, SOLUTION SUBCUTANEOUS at 23:04

## 2021-01-01 RX ADMIN — PROMETHAZINE HYDROCHLORIDE 12.5 MG: 25 INJECTION INTRAMUSCULAR; INTRAVENOUS at 23:50

## 2021-01-01 RX ADMIN — I.V. FAT EMULSION 250 ML: 20 EMULSION INTRAVENOUS at 18:40

## 2021-01-01 RX ADMIN — KETAMINE HYDROCHLORIDE 20 MG: 50 INJECTION INTRAMUSCULAR; INTRAVENOUS at 17:55

## 2021-01-01 RX ADMIN — ONDANSETRON 4 MG: 2 INJECTION INTRAMUSCULAR; INTRAVENOUS at 17:00

## 2021-01-01 RX ADMIN — BISACODYL 10 MG: 10 SUPPOSITORY RECTAL at 07:53

## 2021-01-01 RX ADMIN — SODIUM CHLORIDE AND POTASSIUM CHLORIDE: 4.5; 1.49 INJECTION, SOLUTION INTRAVENOUS at 01:32

## 2021-01-01 RX ADMIN — ROCURONIUM BROMIDE 5 MG: 10 INJECTION, SOLUTION INTRAVENOUS at 14:00

## 2021-01-01 RX ADMIN — SODIUM CHLORIDE AND POTASSIUM CHLORIDE: 4.5; 1.49 INJECTION, SOLUTION INTRAVENOUS at 09:28

## 2021-01-01 RX ADMIN — LORAZEPAM 1 MG: 2 INJECTION INTRAMUSCULAR; INTRAVENOUS at 00:28

## 2021-01-01 RX ADMIN — SODIUM CHLORIDE 500 ML: 900 INJECTION, SOLUTION INTRAVENOUS at 03:37

## 2021-01-01 RX ADMIN — ONDANSETRON 8 MG: 2 INJECTION INTRAMUSCULAR; INTRAVENOUS at 00:06

## 2021-01-01 RX ADMIN — ONDANSETRON 8 MG: 2 INJECTION INTRAMUSCULAR; INTRAVENOUS at 06:10

## 2021-01-01 RX ADMIN — HYDROMORPHONE HYDROCHLORIDE 0.5 MG: 1 INJECTION, SOLUTION INTRAMUSCULAR; INTRAVENOUS; SUBCUTANEOUS at 18:30

## 2021-01-01 RX ADMIN — METOCLOPRAMIDE HYDROCHLORIDE 20 MG: 5 INJECTION INTRAMUSCULAR; INTRAVENOUS at 17:43

## 2021-01-01 RX ADMIN — ONDANSETRON 8 MG: 2 INJECTION INTRAMUSCULAR; INTRAVENOUS at 12:53

## 2021-01-01 RX ADMIN — DEXTROSE MONOHYDRATE AND SODIUM CHLORIDE 1000 ML/HR: 5; .45 INJECTION, SOLUTION INTRAVENOUS at 09:00

## 2021-01-01 RX ADMIN — SODIUM CHLORIDE SOLN NEBU 3% 4 ML: 3 NEBU SOLN at 20:35

## 2021-01-01 RX ADMIN — SODIUM CHLORIDE AND POTASSIUM CHLORIDE: 4.5; 1.49 INJECTION, SOLUTION INTRAVENOUS at 17:47

## 2021-01-01 RX ADMIN — HYDROMORPHONE HYDROCHLORIDE 0.5 MG: 1 INJECTION, SOLUTION INTRAMUSCULAR; INTRAVENOUS; SUBCUTANEOUS at 22:32

## 2021-01-01 RX ADMIN — ENOXAPARIN SODIUM 40 MG: 40 INJECTION SUBCUTANEOUS at 08:52

## 2021-01-01 RX ADMIN — CALCIUM GLUCONATE: 98 INJECTION, SOLUTION INTRAVENOUS at 17:14

## 2021-01-01 RX ADMIN — I.V. FAT EMULSION 250 ML: 20 EMULSION INTRAVENOUS at 17:21

## 2021-01-01 RX ADMIN — ONDANSETRON 4 MG: 2 INJECTION INTRAMUSCULAR; INTRAVENOUS at 17:43

## 2021-01-01 RX ADMIN — GUAIFENESIN 400 MG: 200 SOLUTION ORAL at 15:54

## 2021-01-01 RX ADMIN — HYDROMORPHONE HYDROCHLORIDE 1 MG: 1 INJECTION, SOLUTION INTRAMUSCULAR; INTRAVENOUS; SUBCUTANEOUS at 22:16

## 2021-01-01 RX ADMIN — SODIUM CHLORIDE, SODIUM LACTATE, POTASSIUM CHLORIDE, AND CALCIUM CHLORIDE 50 ML/HR: 600; 310; 30; 20 INJECTION, SOLUTION INTRAVENOUS at 23:06

## 2021-01-01 RX ADMIN — POTASSIUM CHLORIDE 20 MEQ: 200 INJECTION, SOLUTION INTRAVENOUS at 11:34

## 2021-01-01 RX ADMIN — ENALAPRILAT 0.62 MG: 1.25 INJECTION INTRAVENOUS at 19:48

## 2021-01-01 RX ADMIN — ONDANSETRON 8 MG: 2 INJECTION INTRAMUSCULAR; INTRAVENOUS at 16:46

## 2021-01-01 RX ADMIN — ONDANSETRON 4 MG: 2 INJECTION INTRAMUSCULAR; INTRAVENOUS at 22:00

## 2021-01-01 RX ADMIN — GUAIFENESIN 400 MG: 200 SOLUTION ORAL at 15:44

## 2021-01-01 RX ADMIN — PIPERACILLIN SODIUM AND TAZOBACTAM SODIUM 3.38 G: 3; .375 INJECTION, POWDER, LYOPHILIZED, FOR SOLUTION INTRAVENOUS at 07:37

## 2021-01-01 RX ADMIN — HYDROMORPHONE HYDROCHLORIDE 0.5 MG: 1 INJECTION, SOLUTION INTRAMUSCULAR; INTRAVENOUS; SUBCUTANEOUS at 13:25

## 2021-01-01 RX ADMIN — PHENYLEPHRINE HYDROCHLORIDE 120 MCG: 10 INJECTION INTRAVENOUS at 14:35

## 2021-01-01 RX ADMIN — HUMAN INSULIN 3 UNITS: 100 INJECTION, SOLUTION SUBCUTANEOUS at 00:23

## 2021-01-01 RX ADMIN — CIPROFLOXACIN 500 MG: 500 TABLET, FILM COATED ORAL at 21:56

## 2021-01-01 RX ADMIN — PIPERACILLIN AND TAZOBACTAM 3.38 G: 3; .375 INJECTION, POWDER, LYOPHILIZED, FOR SOLUTION INTRAVENOUS at 10:37

## 2021-01-01 RX ADMIN — PROPOFOL 150 MG: 10 INJECTION, EMULSION INTRAVENOUS at 14:00

## 2021-01-01 RX ADMIN — SODIUM CHLORIDE AND POTASSIUM CHLORIDE: 4.5; 1.49 INJECTION, SOLUTION INTRAVENOUS at 08:26

## 2021-01-01 RX ADMIN — ONDANSETRON 8 MG: 2 INJECTION INTRAMUSCULAR; INTRAVENOUS at 08:17

## 2021-01-01 RX ADMIN — FAMOTIDINE 20 MG: 10 INJECTION INTRAVENOUS at 08:10

## 2021-01-01 RX ADMIN — PIPERACILLIN SODIUM AND TAZOBACTAM SODIUM 3.38 G: 3; .375 INJECTION, POWDER, LYOPHILIZED, FOR SOLUTION INTRAVENOUS at 07:05

## 2021-01-01 RX ADMIN — METOCLOPRAMIDE HYDROCHLORIDE 20 MG: 5 INJECTION INTRAMUSCULAR; INTRAVENOUS at 05:20

## 2021-01-01 RX ADMIN — SUCCINYLCHOLINE CHLORIDE 60 MG: 20 INJECTION, SOLUTION INTRAMUSCULAR; INTRAVENOUS at 13:33

## 2021-01-01 RX ADMIN — ONDANSETRON 8 MG: 2 INJECTION INTRAMUSCULAR; INTRAVENOUS at 11:39

## 2021-01-01 RX ADMIN — DIATRIZOATE MEGLUMINE AND DIATRIZOATE SODIUM 120 ML: 660; 100 LIQUID ORAL; RECTAL at 13:38

## 2021-01-01 RX ADMIN — ENOXAPARIN SODIUM 40 MG: 40 INJECTION SUBCUTANEOUS at 12:57

## 2021-01-01 RX ADMIN — FENTANYL CITRATE 50 MCG: 50 INJECTION INTRAMUSCULAR; INTRAVENOUS at 17:23

## 2021-01-01 RX ADMIN — METRONIDAZOLE 500 MG: 500 INJECTION, SOLUTION INTRAVENOUS at 17:56

## 2021-01-01 RX ADMIN — SODIUM CHLORIDE AND POTASSIUM CHLORIDE: 4.5; 1.49 INJECTION, SOLUTION INTRAVENOUS at 22:25

## 2021-01-01 RX ADMIN — ENOXAPARIN SODIUM 40 MG: 40 INJECTION SUBCUTANEOUS at 08:01

## 2021-01-01 RX ADMIN — DIPHENHYDRAMINE HYDROCHLORIDE 25 MG: 50 INJECTION, SOLUTION INTRAMUSCULAR; INTRAVENOUS at 01:55

## 2021-01-01 RX ADMIN — DIATRIZOATE MEGLUMINE AND DIATRIZOATE SODIUM 15 ML: 660; 100 LIQUID ORAL; RECTAL at 11:20

## 2021-01-01 RX ADMIN — LORAZEPAM 1 MG: 1 TABLET ORAL at 18:35

## 2021-01-01 RX ADMIN — IPRATROPIUM BROMIDE AND ALBUTEROL SULFATE 3 ML: .5; 3 SOLUTION RESPIRATORY (INHALATION) at 20:35

## 2021-01-01 RX ADMIN — CALCIUM GLUCONATE: 98 INJECTION, SOLUTION INTRAVENOUS at 17:55

## 2021-01-01 RX ADMIN — HYDROMORPHONE HYDROCHLORIDE 1 MG: 1 INJECTION, SOLUTION INTRAMUSCULAR; INTRAVENOUS; SUBCUTANEOUS at 00:14

## 2021-01-01 RX ADMIN — HUMAN INSULIN 6 UNITS: 100 INJECTION, SOLUTION SUBCUTANEOUS at 00:50

## 2021-01-01 RX ADMIN — HUMAN INSULIN 3 UNITS: 100 INJECTION, SOLUTION SUBCUTANEOUS at 15:21

## 2021-01-01 RX ADMIN — FAMOTIDINE 20 MG: 10 INJECTION INTRAVENOUS at 21:22

## 2021-01-01 RX ADMIN — METOCLOPRAMIDE HYDROCHLORIDE 5 MG: 5 INJECTION INTRAMUSCULAR; INTRAVENOUS at 11:59

## 2021-01-01 RX ADMIN — Medication 4 MG: at 17:36

## 2021-01-01 RX ADMIN — ONDANSETRON 8 MG: 2 INJECTION INTRAMUSCULAR; INTRAVENOUS at 10:11

## 2021-01-01 RX ADMIN — Medication 10 MG: at 15:47

## 2021-01-01 RX ADMIN — ENOXAPARIN SODIUM 40 MG: 40 INJECTION SUBCUTANEOUS at 10:57

## 2021-01-01 RX ADMIN — FAMOTIDINE 20 MG: 10 INJECTION INTRAVENOUS at 08:39

## 2021-01-01 RX ADMIN — ONDANSETRON 8 MG: 2 INJECTION INTRAMUSCULAR; INTRAVENOUS at 04:18

## 2021-01-01 RX ADMIN — HYDROMORPHONE HYDROCHLORIDE 0.5 MG: 1 INJECTION, SOLUTION INTRAMUSCULAR; INTRAVENOUS; SUBCUTANEOUS at 09:32

## 2021-01-01 RX ADMIN — ONDANSETRON 4 MG: 2 INJECTION INTRAMUSCULAR; INTRAVENOUS at 16:07

## 2021-01-01 RX ADMIN — KETAMINE HYDROCHLORIDE 40 MG: 50 INJECTION INTRAMUSCULAR; INTRAVENOUS at 15:23

## 2021-01-01 RX ADMIN — HUMAN INSULIN 3 UNITS: 100 INJECTION, SOLUTION SUBCUTANEOUS at 04:04

## 2021-01-01 RX ADMIN — BISACODYL 10 MG: 10 SUPPOSITORY RECTAL at 09:00

## 2021-01-01 RX ADMIN — SODIUM CHLORIDE AND POTASSIUM CHLORIDE: 4.5; 1.49 INJECTION, SOLUTION INTRAVENOUS at 01:48

## 2021-01-01 RX ADMIN — HYDROMORPHONE HYDROCHLORIDE 1 MG: 1 INJECTION, SOLUTION INTRAMUSCULAR; INTRAVENOUS; SUBCUTANEOUS at 04:19

## 2021-01-01 RX ADMIN — PIPERACILLIN AND TAZOBACTAM 3.38 G: 3; .375 INJECTION, POWDER, LYOPHILIZED, FOR SOLUTION INTRAVENOUS at 17:53

## 2021-01-01 RX ADMIN — PANTOPRAZOLE SODIUM 40 MG: 40 INJECTION, POWDER, FOR SOLUTION INTRAVENOUS at 08:27

## 2021-01-01 RX ADMIN — SODIUM CHLORIDE AND POTASSIUM CHLORIDE: 4.5; 1.49 INJECTION, SOLUTION INTRAVENOUS at 02:19

## 2021-01-01 RX ADMIN — ALBUMIN (HUMAN) 50 G: 12.5 INJECTION, SOLUTION INTRAVENOUS at 09:50

## 2021-01-01 RX ADMIN — ENOXAPARIN SODIUM 40 MG: 40 INJECTION SUBCUTANEOUS at 12:32

## 2021-01-01 RX ADMIN — GUAIFENESIN 400 MG: 200 SOLUTION ORAL at 16:14

## 2021-01-01 RX ADMIN — PANTOPRAZOLE SODIUM 40 MG: 40 INJECTION, POWDER, FOR SOLUTION INTRAVENOUS at 20:21

## 2021-01-01 RX ADMIN — ENOXAPARIN SODIUM 40 MG: 40 INJECTION SUBCUTANEOUS at 14:52

## 2021-01-01 RX ADMIN — PHENYLEPHRINE HYDROCHLORIDE 120 MCG: 10 INJECTION INTRAVENOUS at 14:39

## 2021-01-01 RX ADMIN — ENOXAPARIN SODIUM 40 MG: 40 INJECTION SUBCUTANEOUS at 11:16

## 2021-01-01 RX ADMIN — SODIUM CHLORIDE AND POTASSIUM CHLORIDE: 4.5; 1.49 INJECTION, SOLUTION INTRAVENOUS at 17:29

## 2021-01-01 RX ADMIN — ONDANSETRON 4 MG: 2 INJECTION INTRAMUSCULAR; INTRAVENOUS at 22:16

## 2021-01-01 RX ADMIN — BISACODYL 10 MG: 10 SUPPOSITORY RECTAL at 09:41

## 2021-01-01 RX ADMIN — ENOXAPARIN SODIUM 40 MG: 40 INJECTION SUBCUTANEOUS at 11:20

## 2021-01-01 RX ADMIN — METOCLOPRAMIDE HYDROCHLORIDE 10 MG: 5 INJECTION INTRAMUSCULAR; INTRAVENOUS at 23:03

## 2021-01-01 RX ADMIN — METOCLOPRAMIDE HYDROCHLORIDE 10 MG: 5 INJECTION INTRAMUSCULAR; INTRAVENOUS at 17:20

## 2021-01-01 RX ADMIN — LORAZEPAM 1 MG: 1 TABLET ORAL at 17:14

## 2021-01-01 RX ADMIN — MAGNESIUM HYDROXIDE 30 ML: 2400 SUSPENSION ORAL at 09:39

## 2021-01-01 RX ADMIN — METRONIDAZOLE 500 MG: 500 INJECTION, SOLUTION INTRAVENOUS at 12:32

## 2021-01-01 RX ADMIN — ONDANSETRON 4 MG: 2 INJECTION INTRAMUSCULAR; INTRAVENOUS at 16:53

## 2021-01-01 RX ADMIN — SODIUM CHLORIDE AND POTASSIUM CHLORIDE: 4.5; 1.49 INJECTION, SOLUTION INTRAVENOUS at 16:39

## 2021-01-01 RX ADMIN — FAMOTIDINE 20 MG: 10 INJECTION INTRAVENOUS at 08:06

## 2021-01-01 RX ADMIN — METOCLOPRAMIDE HYDROCHLORIDE 10 MG: 5 INJECTION INTRAMUSCULAR; INTRAVENOUS at 18:23

## 2021-01-01 RX ADMIN — METOPROLOL TARTRATE 2.5 MG: 5 INJECTION INTRAVENOUS at 12:56

## 2021-01-01 RX ADMIN — SODIUM CHLORIDE 1000 ML: 900 INJECTION, SOLUTION INTRAVENOUS at 14:53

## 2021-01-01 RX ADMIN — ONDANSETRON 4 MG: 2 INJECTION INTRAMUSCULAR; INTRAVENOUS at 21:53

## 2021-01-01 RX ADMIN — ONDANSETRON 8 MG: 2 INJECTION INTRAMUSCULAR; INTRAVENOUS at 08:28

## 2021-01-01 RX ADMIN — ENOXAPARIN SODIUM 40 MG: 40 INJECTION SUBCUTANEOUS at 11:27

## 2021-01-01 RX ADMIN — PANTOPRAZOLE SODIUM 40 MG: 40 INJECTION, POWDER, FOR SOLUTION INTRAVENOUS at 22:12

## 2021-01-01 RX ADMIN — METOCLOPRAMIDE HYDROCHLORIDE 10 MG: 5 INJECTION INTRAMUSCULAR; INTRAVENOUS at 12:18

## 2021-01-01 RX ADMIN — GUAIFENESIN 400 MG: 200 SOLUTION ORAL at 08:16

## 2021-01-01 RX ADMIN — MAGNESIUM HYDROXIDE 30 ML: 2400 SUSPENSION ORAL at 08:40

## 2021-01-01 RX ADMIN — KETAMINE HYDROCHLORIDE 20 MG: 50 INJECTION INTRAMUSCULAR; INTRAVENOUS at 17:00

## 2021-01-01 RX ADMIN — HUMAN INSULIN 6 UNITS: 100 INJECTION, SOLUTION SUBCUTANEOUS at 18:33

## 2021-01-01 RX ADMIN — ONDANSETRON 4 MG: 2 INJECTION INTRAMUSCULAR; INTRAVENOUS at 11:29

## 2021-01-01 RX ADMIN — METOCLOPRAMIDE HYDROCHLORIDE 20 MG: 5 INJECTION INTRAMUSCULAR; INTRAVENOUS at 17:44

## 2021-01-01 RX ADMIN — PANTOPRAZOLE SODIUM 40 MG: 40 INJECTION, POWDER, FOR SOLUTION INTRAVENOUS at 22:00

## 2021-01-01 RX ADMIN — SODIUM CHLORIDE AND POTASSIUM CHLORIDE: 4.5; 1.49 INJECTION, SOLUTION INTRAVENOUS at 08:29

## 2021-01-01 RX ADMIN — PROMETHAZINE HYDROCHLORIDE 6.25 MG: 25 INJECTION INTRAMUSCULAR; INTRAVENOUS at 18:20

## 2021-01-01 RX ADMIN — ONDANSETRON 4 MG: 2 INJECTION INTRAMUSCULAR; INTRAVENOUS at 05:23

## 2021-01-01 RX ADMIN — CALCIUM GLUCONATE: 98 INJECTION, SOLUTION INTRAVENOUS at 17:25

## 2021-01-01 RX ADMIN — ONDANSETRON 4 MG: 2 INJECTION INTRAMUSCULAR; INTRAVENOUS at 05:01

## 2021-01-01 RX ADMIN — METOPROLOL TARTRATE 2.5 MG: 5 INJECTION INTRAVENOUS at 05:01

## 2021-01-01 RX ADMIN — PANTOPRAZOLE SODIUM 40 MG: 40 INJECTION, POWDER, FOR SOLUTION INTRAVENOUS at 21:59

## 2021-01-01 RX ADMIN — FAMOTIDINE 20 MG: 10 INJECTION INTRAVENOUS at 08:47

## 2021-01-01 RX ADMIN — GLYCOPYRROLATE 0.2 MG: 0.2 INJECTION, SOLUTION INTRAMUSCULAR; INTRAVENOUS at 22:31

## 2021-01-01 RX ADMIN — SODIUM CHLORIDE SOLN NEBU 3% 4 ML: 3 NEBU SOLN at 09:00

## 2021-01-01 RX ADMIN — HUMAN INSULIN 3 UNITS: 100 INJECTION, SOLUTION SUBCUTANEOUS at 07:50

## 2021-01-01 RX ADMIN — ONDANSETRON 8 MG: 2 INJECTION INTRAMUSCULAR; INTRAVENOUS at 21:54

## 2021-01-01 RX ADMIN — GABAPENTIN 100 MG: 100 CAPSULE ORAL at 16:14

## 2021-01-01 RX ADMIN — HUMAN INSULIN 6 UNITS: 100 INJECTION, SOLUTION SUBCUTANEOUS at 08:53

## 2021-01-01 RX ADMIN — HYDROMORPHONE HYDROCHLORIDE 1 MG: 1 INJECTION, SOLUTION INTRAMUSCULAR; INTRAVENOUS; SUBCUTANEOUS at 21:17

## 2021-01-01 RX ADMIN — SODIUM CHLORIDE 50 ML/HR: 900 INJECTION, SOLUTION INTRAVENOUS at 10:37

## 2021-01-01 RX ADMIN — IOPAMIDOL 100 ML: 755 INJECTION, SOLUTION INTRAVENOUS at 13:36

## 2021-01-01 RX ADMIN — HYDROMORPHONE HYDROCHLORIDE 0.5 MG: 1 INJECTION, SOLUTION INTRAMUSCULAR; INTRAVENOUS; SUBCUTANEOUS at 11:54

## 2021-01-01 RX ADMIN — HUMAN INSULIN 3 UNITS: 100 INJECTION, SOLUTION SUBCUTANEOUS at 20:14

## 2021-01-01 RX ADMIN — METOCLOPRAMIDE HYDROCHLORIDE 10 MG: 5 INJECTION INTRAMUSCULAR; INTRAVENOUS at 16:58

## 2021-01-01 RX ADMIN — METOCLOPRAMIDE HYDROCHLORIDE 5 MG: 5 INJECTION INTRAMUSCULAR; INTRAVENOUS at 06:51

## 2021-01-01 RX ADMIN — LIDOCAINE HYDROCHLORIDE 20 MG: 20 INJECTION, SOLUTION EPIDURAL; INFILTRATION; INTRACAUDAL; PERINEURAL at 13:33

## 2021-01-01 RX ADMIN — THIAMINE HYDROCHLORIDE: 100 INJECTION, SOLUTION INTRAMUSCULAR; INTRAVENOUS at 18:40

## 2021-01-01 RX ADMIN — FENTANYL CITRATE 50 MCG: 50 INJECTION INTRAMUSCULAR; INTRAVENOUS at 15:53

## 2021-01-01 RX ADMIN — PANTOPRAZOLE SODIUM 40 MG: 40 INJECTION, POWDER, FOR SOLUTION INTRAVENOUS at 20:16

## 2021-01-01 RX ADMIN — LORAZEPAM 1 MG: 1 TABLET ORAL at 08:39

## 2021-01-01 RX ADMIN — GABAPENTIN 100 MG: 100 CAPSULE ORAL at 22:00

## 2021-01-01 RX ADMIN — MAGNESIUM SULFATE HEPTAHYDRATE 2 G: 40 INJECTION, SOLUTION INTRAVENOUS at 04:36

## 2021-01-01 RX ADMIN — I.V. FAT EMULSION 250 ML: 20 EMULSION INTRAVENOUS at 22:12

## 2021-01-01 RX ADMIN — PANTOPRAZOLE SODIUM 40 MG: 40 INJECTION, POWDER, FOR SOLUTION INTRAVENOUS at 09:41

## 2021-01-01 RX ADMIN — TUBERCULIN PURIFIED PROTEIN DERIVATIVE 5 UNITS: 5 INJECTION, SOLUTION INTRADERMAL at 11:07

## 2021-01-01 RX ADMIN — ONDANSETRON 8 MG: 2 INJECTION INTRAMUSCULAR; INTRAVENOUS at 00:10

## 2021-01-01 RX ADMIN — GUAIFENESIN 400 MG: 200 SOLUTION ORAL at 15:47

## 2021-01-01 RX ADMIN — LORAZEPAM 1 MG: 1 TABLET ORAL at 10:14

## 2021-01-01 RX ADMIN — ONDANSETRON 4 MG: 2 INJECTION INTRAMUSCULAR; INTRAVENOUS at 11:07

## 2021-01-01 RX ADMIN — SODIUM CHLORIDE SOLN NEBU 3% 4 ML: 3 NEBU SOLN at 20:39

## 2021-01-01 RX ADMIN — ROCURONIUM BROMIDE 10 MG: 10 INJECTION, SOLUTION INTRAVENOUS at 15:24

## 2021-01-01 RX ADMIN — HYDROMORPHONE HYDROCHLORIDE 0.5 MG: 1 INJECTION, SOLUTION INTRAMUSCULAR; INTRAVENOUS; SUBCUTANEOUS at 10:51

## 2021-01-01 RX ADMIN — ONDANSETRON 8 MG: 2 INJECTION INTRAMUSCULAR; INTRAVENOUS at 17:47

## 2021-01-01 RX ADMIN — METOCLOPRAMIDE HYDROCHLORIDE 5 MG: 5 INJECTION INTRAMUSCULAR; INTRAVENOUS at 17:02

## 2021-01-01 RX ADMIN — ONDANSETRON 4 MG: 2 INJECTION INTRAMUSCULAR; INTRAVENOUS at 15:54

## 2021-01-01 RX ADMIN — PANTOPRAZOLE SODIUM 40 MG: 40 INJECTION, POWDER, FOR SOLUTION INTRAVENOUS at 08:24

## 2021-01-01 RX ADMIN — IPRATROPIUM BROMIDE AND ALBUTEROL SULFATE 3 ML: .5; 3 SOLUTION RESPIRATORY (INHALATION) at 13:35

## 2021-01-01 RX ADMIN — PROPOFOL 50 MG: 10 INJECTION, EMULSION INTRAVENOUS at 15:24

## 2021-01-01 RX ADMIN — ENOXAPARIN SODIUM 40 MG: 40 INJECTION SUBCUTANEOUS at 09:41

## 2021-01-01 RX ADMIN — SODIUM CHLORIDE 75 ML/HR: 900 INJECTION, SOLUTION INTRAVENOUS at 13:46

## 2021-01-01 RX ADMIN — ALBUMIN (HUMAN) 25 G: 0.25 INJECTION, SOLUTION INTRAVENOUS at 05:26

## 2021-01-01 RX ADMIN — SODIUM CHLORIDE AND POTASSIUM CHLORIDE: 4.5; 1.49 INJECTION, SOLUTION INTRAVENOUS at 00:37

## 2021-01-01 RX ADMIN — ONDANSETRON 4 MG: 2 INJECTION INTRAMUSCULAR; INTRAVENOUS at 22:42

## 2021-01-01 RX ADMIN — PANTOPRAZOLE SODIUM 40 MG: 40 INJECTION, POWDER, FOR SOLUTION INTRAVENOUS at 12:56

## 2021-01-01 RX ADMIN — METOCLOPRAMIDE HYDROCHLORIDE 20 MG: 5 INJECTION INTRAMUSCULAR; INTRAVENOUS at 17:00

## 2021-01-01 RX ADMIN — FAMOTIDINE 20 MG: 10 INJECTION INTRAVENOUS at 08:37

## 2021-01-01 RX ADMIN — LORAZEPAM 1 MG: 1 TABLET ORAL at 17:54

## 2021-01-01 RX ADMIN — ENOXAPARIN SODIUM 40 MG: 40 INJECTION SUBCUTANEOUS at 09:16

## 2021-01-01 RX ADMIN — GUAIFENESIN 400 MG: 200 SOLUTION ORAL at 14:00

## 2021-01-01 RX ADMIN — HYDROMORPHONE HYDROCHLORIDE 1 MG: 1 INJECTION, SOLUTION INTRAMUSCULAR; INTRAVENOUS; SUBCUTANEOUS at 01:56

## 2021-01-01 RX ADMIN — ONDANSETRON 8 MG: 2 INJECTION INTRAMUSCULAR; INTRAVENOUS at 16:23

## 2021-01-01 RX ADMIN — PIPERACILLIN SODIUM AND TAZOBACTAM SODIUM 3.38 G: 3; .375 INJECTION, POWDER, LYOPHILIZED, FOR SOLUTION INTRAVENOUS at 22:16

## 2021-01-01 RX ADMIN — HYDROMORPHONE HYDROCHLORIDE 0.5 MG: 1 INJECTION, SOLUTION INTRAMUSCULAR; INTRAVENOUS; SUBCUTANEOUS at 20:00

## 2021-01-01 RX ADMIN — SODIUM CHLORIDE AND POTASSIUM CHLORIDE: 4.5; 1.49 INJECTION, SOLUTION INTRAVENOUS at 18:16

## 2021-01-01 RX ADMIN — FLUCONAZOLE IN SODIUM CHLORIDE 200 MG: 2 INJECTION, SOLUTION INTRAVENOUS at 09:42

## 2021-01-01 RX ADMIN — CIPROFLOXACIN 400 MG: 2 INJECTION, SOLUTION INTRAVENOUS at 10:54

## 2021-01-01 RX ADMIN — METOCLOPRAMIDE HYDROCHLORIDE 20 MG: 5 INJECTION INTRAMUSCULAR; INTRAVENOUS at 07:25

## 2021-01-01 RX ADMIN — METOCLOPRAMIDE HYDROCHLORIDE 20 MG: 5 INJECTION INTRAMUSCULAR; INTRAVENOUS at 18:24

## 2021-01-01 RX ADMIN — MAGNESIUM CITRATE 296 ML: 1.75 LIQUID ORAL at 11:23

## 2021-01-01 RX ADMIN — HUMAN INSULIN 6 UNITS: 100 INJECTION, SOLUTION SUBCUTANEOUS at 12:27

## 2021-01-01 RX ADMIN — ONDANSETRON 8 MG: 2 INJECTION INTRAMUSCULAR; INTRAVENOUS at 01:00

## 2021-01-01 RX ADMIN — SODIUM CHLORIDE AND POTASSIUM CHLORIDE: 4.5; 1.49 INJECTION, SOLUTION INTRAVENOUS at 22:50

## 2021-01-01 RX ADMIN — HYDROMORPHONE HYDROCHLORIDE 1 MG: 1 INJECTION, SOLUTION INTRAMUSCULAR; INTRAVENOUS; SUBCUTANEOUS at 19:04

## 2021-01-01 RX ADMIN — FAMOTIDINE 20 MG: 10 INJECTION INTRAVENOUS at 20:52

## 2021-01-01 RX ADMIN — POTASSIUM CHLORIDE 20 MEQ: 14.9 INJECTION, SOLUTION INTRAVENOUS at 12:09

## 2021-01-01 RX ADMIN — METRONIDAZOLE 500 MG: 500 INJECTION, SOLUTION INTRAVENOUS at 17:02

## 2021-01-01 RX ADMIN — FAMOTIDINE 20 MG: 10 INJECTION INTRAVENOUS at 07:51

## 2021-01-01 RX ADMIN — PANTOPRAZOLE SODIUM 40 MG: 40 INJECTION, POWDER, FOR SOLUTION INTRAVENOUS at 10:11

## 2021-01-01 RX ADMIN — GUAIFENESIN 400 MG: 200 SOLUTION ORAL at 08:19

## 2021-01-01 RX ADMIN — SODIUM CHLORIDE, SODIUM LACTATE, POTASSIUM CHLORIDE, AND CALCIUM CHLORIDE 75 ML/HR: 600; 310; 30; 20 INJECTION, SOLUTION INTRAVENOUS at 11:45

## 2021-01-01 RX ADMIN — BISACODYL 10 MG: 10 SUPPOSITORY RECTAL at 08:40

## 2021-01-01 RX ADMIN — METOCLOPRAMIDE HYDROCHLORIDE 5 MG: 5 INJECTION INTRAMUSCULAR; INTRAVENOUS at 17:38

## 2021-01-01 RX ADMIN — METOCLOPRAMIDE HYDROCHLORIDE 10 MG: 5 INJECTION INTRAMUSCULAR; INTRAVENOUS at 11:24

## 2021-01-01 RX ADMIN — ONDANSETRON 4 MG: 2 INJECTION INTRAMUSCULAR; INTRAVENOUS at 15:24

## 2021-01-01 RX ADMIN — SODIUM CHLORIDE, SODIUM LACTATE, POTASSIUM CHLORIDE, AND CALCIUM CHLORIDE 50 ML/HR: 600; 310; 30; 20 INJECTION, SOLUTION INTRAVENOUS at 23:05

## 2021-01-01 RX ADMIN — SODIUM CHLORIDE AND POTASSIUM CHLORIDE: 4.5; 1.49 INJECTION, SOLUTION INTRAVENOUS at 11:57

## 2021-01-01 RX ADMIN — HUMAN INSULIN 3 UNITS: 100 INJECTION, SOLUTION SUBCUTANEOUS at 17:34

## 2021-01-01 RX ADMIN — HUMAN INSULIN 6 UNITS: 100 INJECTION, SOLUTION SUBCUTANEOUS at 12:26

## 2021-01-01 RX ADMIN — I.V. FAT EMULSION 250 ML: 20 EMULSION INTRAVENOUS at 17:25

## 2021-01-01 RX ADMIN — METOCLOPRAMIDE HYDROCHLORIDE 10 MG: 5 INJECTION INTRAMUSCULAR; INTRAVENOUS at 17:54

## 2021-01-01 RX ADMIN — HYDROMORPHONE HYDROCHLORIDE 0.5 MG: 1 INJECTION, SOLUTION INTRAMUSCULAR; INTRAVENOUS; SUBCUTANEOUS at 15:26

## 2021-01-01 RX ADMIN — LORAZEPAM 1 MG: 2 INJECTION INTRAMUSCULAR; INTRAVENOUS at 18:51

## 2021-01-01 RX ADMIN — ONDANSETRON 8 MG: 2 INJECTION INTRAMUSCULAR; INTRAVENOUS at 20:46

## 2021-01-01 RX ADMIN — MAGNESIUM HYDROXIDE 30 ML: 2400 SUSPENSION ORAL at 08:27

## 2021-01-01 RX ADMIN — HYDROMORPHONE HYDROCHLORIDE 1 MG: 1 INJECTION, SOLUTION INTRAMUSCULAR; INTRAVENOUS; SUBCUTANEOUS at 14:28

## 2021-01-01 RX ADMIN — PIPERACILLIN AND TAZOBACTAM 3.38 G: 3; .375 INJECTION, POWDER, LYOPHILIZED, FOR SOLUTION INTRAVENOUS at 15:17

## 2021-01-01 RX ADMIN — SODIUM CHLORIDE AND POTASSIUM CHLORIDE: 4.5; 1.49 INJECTION, SOLUTION INTRAVENOUS at 20:27

## 2021-01-01 RX ADMIN — SODIUM CHLORIDE SOLN NEBU 3% 4 ML: 3 NEBU SOLN at 07:25

## 2021-01-01 RX ADMIN — CALCIUM GLUCONATE: 94 INJECTION, SOLUTION INTRAVENOUS at 17:23

## 2021-01-01 RX ADMIN — PROMETHAZINE HYDROCHLORIDE 25 MG: 25 INJECTION INTRAMUSCULAR; INTRAVENOUS at 14:49

## 2021-01-01 RX ADMIN — METRONIDAZOLE 500 MG: 500 INJECTION, SOLUTION INTRAVENOUS at 16:03

## 2021-01-01 RX ADMIN — PANTOPRAZOLE SODIUM 40 MG: 40 INJECTION, POWDER, FOR SOLUTION INTRAVENOUS at 08:23

## 2021-01-01 RX ADMIN — ONDANSETRON 8 MG: 2 INJECTION INTRAMUSCULAR; INTRAVENOUS at 23:37

## 2021-01-01 RX ADMIN — SODIUM CHLORIDE 50 ML/HR: 900 INJECTION, SOLUTION INTRAVENOUS at 01:35

## 2021-01-01 RX ADMIN — ONDANSETRON 4 MG: 2 INJECTION INTRAMUSCULAR; INTRAVENOUS at 15:32

## 2021-01-01 RX ADMIN — HUMAN INSULIN 3 UNITS: 100 INJECTION, SOLUTION SUBCUTANEOUS at 11:14

## 2021-01-01 RX ADMIN — LORAZEPAM 1 MG: 2 INJECTION INTRAMUSCULAR; INTRAVENOUS at 20:21

## 2021-01-01 RX ADMIN — ROCURONIUM BROMIDE 20 MG: 10 INJECTION, SOLUTION INTRAVENOUS at 15:55

## 2021-01-01 RX ADMIN — METOCLOPRAMIDE HYDROCHLORIDE 10 MG: 5 INJECTION INTRAMUSCULAR; INTRAVENOUS at 00:06

## 2021-01-01 RX ADMIN — ROCURONIUM BROMIDE 40 MG: 10 INJECTION, SOLUTION INTRAVENOUS at 13:45

## 2021-01-01 RX ADMIN — FLUCONAZOLE IN SODIUM CHLORIDE 200 MG: 2 INJECTION, SOLUTION INTRAVENOUS at 10:38

## 2021-01-01 RX ADMIN — CIPROFLOXACIN 500 MG: 500 TABLET, FILM COATED ORAL at 22:12

## 2021-01-01 RX ADMIN — GUAIFENESIN 400 MG: 200 SOLUTION ORAL at 22:00

## 2021-01-01 RX ADMIN — GUAIFENESIN 400 MG: 200 SOLUTION ORAL at 22:58

## 2021-01-01 RX ADMIN — ALBUMIN (HUMAN) 25 G: 0.25 INJECTION, SOLUTION INTRAVENOUS at 21:04

## 2021-01-01 RX ADMIN — PANTOPRAZOLE SODIUM 40 MG: 40 INJECTION, POWDER, FOR SOLUTION INTRAVENOUS at 20:46

## 2021-01-01 RX ADMIN — PIPERACILLIN SODIUM AND TAZOBACTAM SODIUM 3.38 G: 3; .375 INJECTION, POWDER, LYOPHILIZED, FOR SOLUTION INTRAVENOUS at 11:55

## 2021-01-01 RX ADMIN — Medication 10 ML: at 14:50

## 2021-01-01 RX ADMIN — METOCLOPRAMIDE HYDROCHLORIDE 20 MG: 5 INJECTION INTRAMUSCULAR; INTRAVENOUS at 06:09

## 2021-01-01 RX ADMIN — HYDROMORPHONE HYDROCHLORIDE 0.5 MG: 1 INJECTION, SOLUTION INTRAMUSCULAR; INTRAVENOUS; SUBCUTANEOUS at 20:13

## 2021-01-01 RX ADMIN — FAMOTIDINE 20 MG: 10 INJECTION INTRAVENOUS at 22:20

## 2021-01-01 RX ADMIN — LORAZEPAM 0.5 MG: 2 INJECTION INTRAMUSCULAR; INTRAVENOUS at 01:26

## 2021-01-01 RX ADMIN — SODIUM CHLORIDE 250 ML: 900 INJECTION, SOLUTION INTRAVENOUS at 10:46

## 2021-01-01 RX ADMIN — ONDANSETRON 8 MG: 2 INJECTION INTRAMUSCULAR; INTRAVENOUS at 08:53

## 2021-01-01 RX ADMIN — METOCLOPRAMIDE HYDROCHLORIDE 10 MG: 5 INJECTION INTRAMUSCULAR; INTRAVENOUS at 12:53

## 2021-01-01 RX ADMIN — FENTANYL CITRATE 50 MCG/HR: 0.05 INJECTION, SOLUTION INTRAMUSCULAR; INTRAVENOUS at 20:22

## 2021-01-01 RX ADMIN — ALBUTEROL SULFATE 2.5 MG: 2.5 SOLUTION RESPIRATORY (INHALATION) at 19:43

## 2021-01-01 RX ADMIN — CIPROFLOXACIN 400 MG: 2 INJECTION, SOLUTION INTRAVENOUS at 22:59

## 2021-01-01 RX ADMIN — BISACODYL 10 MG: 10 SUPPOSITORY RECTAL at 09:39

## 2021-01-01 RX ADMIN — ONDANSETRON 8 MG: 2 INJECTION INTRAMUSCULAR; INTRAVENOUS at 19:58

## 2021-01-01 RX ADMIN — DIATRIZOATE MEGLUMINE AND DIATRIZOATE SODIUM 15 ML: 660; 100 LIQUID ORAL; RECTAL at 11:17

## 2021-01-01 RX ADMIN — FAMOTIDINE 20 MG: 10 INJECTION INTRAVENOUS at 21:24

## 2021-01-01 RX ADMIN — ONDANSETRON 8 MG: 2 INJECTION INTRAMUSCULAR; INTRAVENOUS at 03:19

## 2021-01-01 RX ADMIN — METRONIDAZOLE 500 MG: 500 INJECTION, SOLUTION INTRAVENOUS at 03:37

## 2021-01-01 RX ADMIN — HYDROMORPHONE HYDROCHLORIDE 0.5 MG: 1 INJECTION, SOLUTION INTRAMUSCULAR; INTRAVENOUS; SUBCUTANEOUS at 17:55

## 2021-01-01 RX ADMIN — METOCLOPRAMIDE HYDROCHLORIDE 5 MG: 5 INJECTION INTRAMUSCULAR; INTRAVENOUS at 05:31

## 2021-01-01 RX ADMIN — SODIUM CHLORIDE 100 ML: 900 INJECTION, SOLUTION INTRAVENOUS at 14:50

## 2021-01-01 RX ADMIN — METOPROLOL TARTRATE 2.5 MG: 5 INJECTION INTRAVENOUS at 23:04

## 2021-01-01 RX ADMIN — GUAIFENESIN 400 MG: 200 SOLUTION ORAL at 21:07

## 2021-01-01 RX ADMIN — DEXTROSE MONOHYDRATE AND SODIUM CHLORIDE 1000 ML/HR: 5; .45 INJECTION, SOLUTION INTRAVENOUS at 08:51

## 2021-01-01 RX ADMIN — I.V. FAT EMULSION 250 ML: 20 EMULSION INTRAVENOUS at 17:01

## 2021-01-01 RX ADMIN — LIDOCAINE HYDROCHLORIDE ANHYDROUS AND DEXTROSE MONOHYDRATE 1 MG/KG/HR: .4; 5 INJECTION, SOLUTION INTRAVENOUS at 15:30

## 2021-01-01 RX ADMIN — ASCORBIC ACID, VITAMIN A PALMITATE, CHOLECALCIFEROL, THIAMINE HYDROCHLORIDE, RIBOFLAVIN-5 PHOSPHATE SODIUM, PYRIDOXINE HYDROCHLORIDE, NIACINAMIDE, DEXPANTHENOL, ALPHA-TOCOPHEROL ACETATE, VITAMIN K1, FOLIC ACID, BIOTIN, CYANOCOBALAMIN: 200; 3300; 200; 6; 3.6; 6; 40; 15; 10; 150; 600; 60; 5 INJECTION, SOLUTION INTRAVENOUS at 17:01

## 2021-01-01 RX ADMIN — METOCLOPRAMIDE HYDROCHLORIDE 20 MG: 5 INJECTION INTRAMUSCULAR; INTRAVENOUS at 17:19

## 2021-01-01 RX ADMIN — FAMOTIDINE 20 MG: 10 INJECTION INTRAVENOUS at 08:20

## 2021-01-01 RX ADMIN — FAMOTIDINE 20 MG: 10 INJECTION INTRAVENOUS at 08:33

## 2021-01-01 RX ADMIN — PROPOFOL 100 MG: 10 INJECTION, EMULSION INTRAVENOUS at 14:39

## 2021-01-01 RX ADMIN — HUMAN INSULIN 6 UNITS: 100 INJECTION, SOLUTION SUBCUTANEOUS at 04:45

## 2021-01-01 RX ADMIN — METOCLOPRAMIDE HYDROCHLORIDE 10 MG: 5 INJECTION INTRAMUSCULAR; INTRAVENOUS at 23:26

## 2021-01-01 RX ADMIN — METRONIDAZOLE 500 MG: 500 INJECTION, SOLUTION INTRAVENOUS at 13:02

## 2021-01-01 RX ADMIN — I.V. FAT EMULSION 250 ML: 20 EMULSION INTRAVENOUS at 17:17

## 2021-01-01 RX ADMIN — LIDOCAINE HYDROCHLORIDE 60 MG: 20 INJECTION, SOLUTION EPIDURAL; INFILTRATION; INTRACAUDAL; PERINEURAL at 15:22

## 2021-01-01 RX ADMIN — SODIUM CHLORIDE, SODIUM LACTATE, POTASSIUM CHLORIDE, AND CALCIUM CHLORIDE 50 ML/HR: 600; 310; 30; 20 INJECTION, SOLUTION INTRAVENOUS at 16:52

## 2021-01-01 RX ADMIN — I.V. FAT EMULSION 250 ML: 20 EMULSION INTRAVENOUS at 17:43

## 2021-01-01 RX ADMIN — SODIUM CHLORIDE SOLN NEBU 3% 4 ML: 3 NEBU SOLN at 10:05

## 2021-01-01 RX ADMIN — ENOXAPARIN SODIUM 40 MG: 40 INJECTION SUBCUTANEOUS at 08:20

## 2021-01-01 RX ADMIN — CIPROFLOXACIN 500 MG: 500 TABLET, FILM COATED ORAL at 08:22

## 2021-01-01 RX ADMIN — PANTOPRAZOLE SODIUM 40 MG: 40 INJECTION, POWDER, FOR SOLUTION INTRAVENOUS at 08:52

## 2021-01-01 RX ADMIN — SODIUM CHLORIDE AND POTASSIUM CHLORIDE: 4.5; 1.49 INJECTION, SOLUTION INTRAVENOUS at 03:41

## 2021-01-01 RX ADMIN — PIPERACILLIN AND TAZOBACTAM 3.38 G: 3; .375 INJECTION, POWDER, LYOPHILIZED, FOR SOLUTION INTRAVENOUS at 02:36

## 2021-01-01 RX ADMIN — ONDANSETRON 8 MG: 2 INJECTION INTRAMUSCULAR; INTRAVENOUS at 23:15

## 2021-01-01 RX ADMIN — BISACODYL 10 MG: 10 SUPPOSITORY RECTAL at 07:51

## 2021-01-01 RX ADMIN — GUAIFENESIN 400 MG: 200 SOLUTION ORAL at 09:38

## 2021-01-01 RX ADMIN — HUMAN INSULIN 3 UNITS: 100 INJECTION, SOLUTION SUBCUTANEOUS at 07:38

## 2021-01-01 RX ADMIN — SODIUM CHLORIDE 100 ML/HR: 900 INJECTION, SOLUTION INTRAVENOUS at 06:29

## 2021-01-01 RX ADMIN — HUMAN INSULIN 9 UNITS: 100 INJECTION, SOLUTION SUBCUTANEOUS at 21:08

## 2021-01-01 RX ADMIN — PIPERACILLIN SODIUM AND TAZOBACTAM SODIUM 3.38 G: 3; .375 INJECTION, POWDER, LYOPHILIZED, FOR SOLUTION INTRAVENOUS at 05:26

## 2021-01-01 RX ADMIN — ONDANSETRON 8 MG: 2 INJECTION INTRAMUSCULAR; INTRAVENOUS at 03:37

## 2021-01-01 RX ADMIN — SODIUM CHLORIDE, SODIUM LACTATE, POTASSIUM CHLORIDE, AND CALCIUM CHLORIDE 75 ML/HR: 600; 310; 30; 20 INJECTION, SOLUTION INTRAVENOUS at 10:58

## 2021-01-01 RX ADMIN — FAMOTIDINE 20 MG: 10 INJECTION INTRAVENOUS at 23:01

## 2021-01-01 RX ADMIN — PANTOPRAZOLE SODIUM 40 MG: 40 INJECTION, POWDER, FOR SOLUTION INTRAVENOUS at 20:15

## 2021-01-01 RX ADMIN — I.V. FAT EMULSION 250 ML: 20 EMULSION INTRAVENOUS at 17:16

## 2021-01-01 RX ADMIN — PHENYLEPHRINE HYDROCHLORIDE 60 MCG: 10 INJECTION INTRAVENOUS at 16:13

## 2021-01-01 RX ADMIN — ONDANSETRON 8 MG: 2 INJECTION INTRAMUSCULAR; INTRAVENOUS at 12:00

## 2021-01-01 RX ADMIN — SODIUM CHLORIDE, SODIUM LACTATE, POTASSIUM CHLORIDE, AND CALCIUM CHLORIDE: 600; 310; 30; 20 INJECTION, SOLUTION INTRAVENOUS at 13:20

## 2021-01-01 RX ADMIN — LORAZEPAM 0.5 MG: 2 INJECTION INTRAMUSCULAR; INTRAVENOUS at 21:31

## 2021-01-01 RX ADMIN — SODIUM CHLORIDE AND POTASSIUM CHLORIDE: 4.5; 1.49 INJECTION, SOLUTION INTRAVENOUS at 18:34

## 2021-01-01 RX ADMIN — SODIUM CHLORIDE 100 ML: 900 INJECTION, SOLUTION INTRAVENOUS at 13:36

## 2021-01-01 RX ADMIN — HUMAN INSULIN 3 UNITS: 100 INJECTION, SOLUTION SUBCUTANEOUS at 00:37

## 2021-01-01 RX ADMIN — SODIUM CHLORIDE AND POTASSIUM CHLORIDE: 4.5; 1.49 INJECTION, SOLUTION INTRAVENOUS at 02:44

## 2021-01-01 RX ADMIN — SODIUM CHLORIDE 250 ML: 900 INJECTION, SOLUTION INTRAVENOUS at 10:45

## 2021-01-01 RX ADMIN — ONDANSETRON 8 MG: 2 INJECTION INTRAMUSCULAR; INTRAVENOUS at 12:32

## 2021-01-01 RX ADMIN — ONDANSETRON 8 MG: 2 INJECTION INTRAMUSCULAR; INTRAVENOUS at 02:31

## 2021-01-01 RX ADMIN — PROMETHAZINE HYDROCHLORIDE 12.5 MG: 25 INJECTION INTRAMUSCULAR; INTRAVENOUS at 16:53

## 2021-01-01 RX ADMIN — HUMAN INSULIN 3 UNITS: 100 INJECTION, SOLUTION SUBCUTANEOUS at 11:22

## 2021-01-01 RX ADMIN — HUMAN INSULIN 3 UNITS: 100 INJECTION, SOLUTION SUBCUTANEOUS at 03:02

## 2021-01-01 RX ADMIN — BISACODYL 10 MG: 10 SUPPOSITORY RECTAL at 11:44

## 2021-01-01 RX ADMIN — PHENYLEPHRINE HYDROCHLORIDE 60 MCG: 10 INJECTION INTRAVENOUS at 14:52

## 2021-01-01 RX ADMIN — POTASSIUM CHLORIDE 20 MEQ: 14.9 INJECTION, SOLUTION INTRAVENOUS at 22:49

## 2021-01-01 RX ADMIN — ONDANSETRON 8 MG: 2 INJECTION INTRAMUSCULAR; INTRAVENOUS at 08:19

## 2021-01-01 RX ADMIN — ASCORBIC ACID, VITAMIN A PALMITATE, CHOLECALCIFEROL, THIAMINE HYDROCHLORIDE, RIBOFLAVIN-5 PHOSPHATE SODIUM, PYRIDOXINE HYDROCHLORIDE, NIACINAMIDE, DEXPANTHENOL, ALPHA-TOCOPHEROL ACETATE, VITAMIN K1, FOLIC ACID, BIOTIN, CYANOCOBALAMIN: 200; 3300; 200; 6; 3.6; 6; 40; 15; 10; 150; 600; 60; 5 INJECTION, SOLUTION INTRAVENOUS at 17:43

## 2021-01-01 RX ADMIN — METOCLOPRAMIDE HYDROCHLORIDE 20 MG: 5 INJECTION INTRAMUSCULAR; INTRAVENOUS at 11:02

## 2021-01-01 RX ADMIN — HYDROMORPHONE HYDROCHLORIDE 1 MG: 1 INJECTION, SOLUTION INTRAMUSCULAR; INTRAVENOUS; SUBCUTANEOUS at 18:49

## 2021-01-01 RX ADMIN — PROMETHAZINE HYDROCHLORIDE 12.5 MG: 25 INJECTION INTRAMUSCULAR; INTRAVENOUS at 12:26

## 2021-01-01 RX ADMIN — METOCLOPRAMIDE HYDROCHLORIDE 10 MG: 5 INJECTION INTRAMUSCULAR; INTRAVENOUS at 17:07

## 2021-01-01 RX ADMIN — METOCLOPRAMIDE HYDROCHLORIDE 20 MG: 5 INJECTION INTRAMUSCULAR; INTRAVENOUS at 11:18

## 2021-01-01 RX ADMIN — METOPROLOL TARTRATE 2.5 MG: 5 INJECTION INTRAVENOUS at 00:37

## 2021-01-01 RX ADMIN — HYDROMORPHONE HYDROCHLORIDE 1 MG: 1 INJECTION, SOLUTION INTRAMUSCULAR; INTRAVENOUS; SUBCUTANEOUS at 07:20

## 2021-01-01 RX ADMIN — ROCURONIUM BROMIDE 10 MG: 10 INJECTION, SOLUTION INTRAVENOUS at 16:29

## 2021-01-01 RX ADMIN — I.V. FAT EMULSION 250 ML: 20 EMULSION INTRAVENOUS at 17:44

## 2021-01-01 RX ADMIN — ALBUMIN HUMAN 250 ML: 0.05 INJECTION, SOLUTION INTRAVENOUS at 15:29

## 2021-01-01 RX ADMIN — HYDROMORPHONE HYDROCHLORIDE 1 MG: 1 INJECTION, SOLUTION INTRAMUSCULAR; INTRAVENOUS; SUBCUTANEOUS at 01:43

## 2021-01-01 RX ADMIN — DOCUSATE SODIUM 100 MG: 50 LIQUID ORAL at 09:38

## 2021-01-01 RX ADMIN — METOPROLOL TARTRATE 2.5 MG: 5 INJECTION INTRAVENOUS at 20:15

## 2021-01-01 RX ADMIN — ONDANSETRON 4 MG: 2 INJECTION INTRAMUSCULAR; INTRAVENOUS at 15:47

## 2021-01-01 RX ADMIN — MAGNESIUM HYDROXIDE 30 ML: 2400 SUSPENSION ORAL at 08:41

## 2021-01-01 RX ADMIN — GLYCOPYRROLATE 0.6 MG: 0.2 INJECTION, SOLUTION INTRAMUSCULAR; INTRAVENOUS at 17:36

## 2021-01-01 RX ADMIN — ONDANSETRON 4 MG: 2 INJECTION INTRAMUSCULAR; INTRAVENOUS at 21:04

## 2021-01-01 RX ADMIN — PIPERACILLIN AND TAZOBACTAM 3.38 G: 3; .375 INJECTION, POWDER, LYOPHILIZED, FOR SOLUTION INTRAVENOUS at 09:42

## 2021-01-01 RX ADMIN — Medication 10 ML: at 09:00

## 2021-01-01 RX ADMIN — METOCLOPRAMIDE HYDROCHLORIDE 20 MG: 5 INJECTION INTRAMUSCULAR; INTRAVENOUS at 12:32

## 2021-01-01 RX ADMIN — HYDROMORPHONE HYDROCHLORIDE 0.5 MG: 1 INJECTION, SOLUTION INTRAMUSCULAR; INTRAVENOUS; SUBCUTANEOUS at 16:36

## 2021-01-01 RX ADMIN — METOCLOPRAMIDE HYDROCHLORIDE 10 MG: 5 INJECTION INTRAMUSCULAR; INTRAVENOUS at 14:52

## 2021-01-01 RX ADMIN — PANTOPRAZOLE SODIUM 40 MG: 40 INJECTION, POWDER, FOR SOLUTION INTRAVENOUS at 21:44

## 2021-01-01 RX ADMIN — SODIUM CHLORIDE, SODIUM LACTATE, POTASSIUM CHLORIDE, AND CALCIUM CHLORIDE: 600; 310; 30; 20 INJECTION, SOLUTION INTRAVENOUS at 15:30

## 2021-01-01 RX ADMIN — MAGNESIUM HYDROXIDE 30 ML: 2400 SUSPENSION ORAL at 07:53

## 2021-01-01 RX ADMIN — ENOXAPARIN SODIUM 40 MG: 40 INJECTION SUBCUTANEOUS at 11:31

## 2021-01-01 RX ADMIN — SODIUM CHLORIDE AND POTASSIUM CHLORIDE: 4.5; 1.49 INJECTION, SOLUTION INTRAVENOUS at 13:43

## 2021-01-01 RX ADMIN — HUMAN INSULIN 3 UNITS: 100 INJECTION, SOLUTION SUBCUTANEOUS at 20:25

## 2021-01-01 RX ADMIN — SODIUM CHLORIDE AND POTASSIUM CHLORIDE: 4.5; 1.49 INJECTION, SOLUTION INTRAVENOUS at 21:33

## 2021-01-01 RX ADMIN — Medication: at 11:18

## 2021-01-01 RX ADMIN — ONDANSETRON 4 MG: 2 INJECTION INTRAMUSCULAR; INTRAVENOUS at 22:48

## 2021-01-01 RX ADMIN — HUMAN INSULIN 6 UNITS: 100 INJECTION, SOLUTION SUBCUTANEOUS at 20:52

## 2021-01-01 RX ADMIN — ENOXAPARIN SODIUM 40 MG: 40 INJECTION SUBCUTANEOUS at 12:00

## 2021-01-01 RX ADMIN — OXYCODONE HYDROCHLORIDE AND ACETAMINOPHEN 1 TABLET: 7.5; 325 TABLET ORAL at 18:23

## 2021-01-01 RX ADMIN — METOCLOPRAMIDE HYDROCHLORIDE 10 MG: 5 INJECTION INTRAMUSCULAR; INTRAVENOUS at 05:18

## 2021-01-01 RX ADMIN — ONDANSETRON 4 MG: 2 INJECTION INTRAMUSCULAR; INTRAVENOUS at 22:20

## 2021-01-01 RX ADMIN — ONDANSETRON 4 MG: 2 INJECTION INTRAMUSCULAR; INTRAVENOUS at 05:24

## 2021-01-01 RX ADMIN — SODIUM CHLORIDE AND POTASSIUM CHLORIDE: 4.5; 1.49 INJECTION, SOLUTION INTRAVENOUS at 18:29

## 2021-01-01 RX ADMIN — FAMOTIDINE 20 MG: 10 INJECTION INTRAVENOUS at 09:00

## 2021-01-01 RX ADMIN — SODIUM CHLORIDE, SODIUM LACTATE, POTASSIUM CHLORIDE, AND CALCIUM CHLORIDE 500 ML: 600; 310; 30; 20 INJECTION, SOLUTION INTRAVENOUS at 18:19

## 2021-01-01 RX ADMIN — FAMOTIDINE 20 MG: 10 INJECTION INTRAVENOUS at 22:15

## 2021-01-01 RX ADMIN — SODIUM CHLORIDE AND POTASSIUM CHLORIDE: 4.5; 1.49 INJECTION, SOLUTION INTRAVENOUS at 10:59

## 2021-01-01 RX ADMIN — HYDROMORPHONE HYDROCHLORIDE 1 MG: 1 INJECTION, SOLUTION INTRAMUSCULAR; INTRAVENOUS; SUBCUTANEOUS at 13:18

## 2021-01-01 RX ADMIN — ALBUMIN HUMAN 250 ML: 0.05 INJECTION, SOLUTION INTRAVENOUS at 15:52

## 2021-01-01 RX ADMIN — IOPAMIDOL 100 ML: 755 INJECTION, SOLUTION INTRAVENOUS at 14:50

## 2021-01-01 RX ADMIN — SODIUM CHLORIDE SOLN NEBU 3% 4 ML: 3 NEBU SOLN at 09:07

## 2021-01-01 RX ADMIN — METOCLOPRAMIDE HYDROCHLORIDE 5 MG: 5 INJECTION INTRAMUSCULAR; INTRAVENOUS at 23:14

## 2021-01-01 RX ADMIN — SODIUM CHLORIDE 100 ML: 900 INJECTION, SOLUTION INTRAVENOUS at 09:00

## 2021-01-01 RX ADMIN — ALBUMIN (HUMAN) 25 G: 0.25 INJECTION, SOLUTION INTRAVENOUS at 05:48

## 2021-01-01 RX ADMIN — HYDROMORPHONE HYDROCHLORIDE 0.5 MG: 1 INJECTION, SOLUTION INTRAMUSCULAR; INTRAVENOUS; SUBCUTANEOUS at 20:15

## 2021-01-01 RX ADMIN — LORAZEPAM 1 MG: 1 TABLET ORAL at 17:02

## 2021-01-01 RX ADMIN — ACETAMINOPHEN 650 MG: 325 TABLET, FILM COATED ORAL at 06:28

## 2021-01-01 RX ADMIN — ONDANSETRON 4 MG: 2 INJECTION INTRAMUSCULAR; INTRAVENOUS at 11:16

## 2021-01-01 RX ADMIN — ONDANSETRON 4 MG: 2 INJECTION INTRAMUSCULAR; INTRAVENOUS at 06:19

## 2021-01-01 RX ADMIN — ONDANSETRON 8 MG: 2 INJECTION INTRAMUSCULAR; INTRAVENOUS at 08:34

## 2021-01-01 RX ADMIN — ONDANSETRON 4 MG: 2 INJECTION INTRAMUSCULAR; INTRAVENOUS at 21:55

## 2021-01-01 RX ADMIN — PIPERACILLIN SODIUM AND TAZOBACTAM SODIUM 3.38 G: 3; .375 INJECTION, POWDER, LYOPHILIZED, FOR SOLUTION INTRAVENOUS at 15:26

## 2021-01-01 RX ADMIN — CIPROFLOXACIN 500 MG: 500 TABLET, FILM COATED ORAL at 20:34

## 2021-01-01 RX ADMIN — KETAMINE HYDROCHLORIDE 20 MG: 50 INJECTION INTRAMUSCULAR; INTRAVENOUS at 15:53

## 2021-01-01 RX ADMIN — GUAIFENESIN 400 MG: 200 SOLUTION ORAL at 17:20

## 2021-01-01 RX ADMIN — SODIUM CHLORIDE AND POTASSIUM CHLORIDE: 4.5; 1.49 INJECTION, SOLUTION INTRAVENOUS at 17:45

## 2021-01-01 RX ADMIN — HUMAN INSULIN 6 UNITS: 100 INJECTION, SOLUTION SUBCUTANEOUS at 23:29

## 2021-01-01 RX ADMIN — PHENYLEPHRINE HYDROCHLORIDE 100 MCG: 10 INJECTION INTRAVENOUS at 16:56

## 2021-01-01 RX ADMIN — FLUCONAZOLE IN SODIUM CHLORIDE 200 MG: 2 INJECTION, SOLUTION INTRAVENOUS at 11:42

## 2021-01-01 RX ADMIN — I.V. FAT EMULSION 250 ML: 20 EMULSION INTRAVENOUS at 17:07

## 2021-01-01 RX ADMIN — ENALAPRILAT 0.62 MG: 1.25 INJECTION INTRAVENOUS at 04:16

## 2021-01-01 RX ADMIN — FAMOTIDINE 20 MG: 10 INJECTION INTRAVENOUS at 08:24

## 2021-01-01 RX ADMIN — GUAIFENESIN 400 MG: 200 SOLUTION ORAL at 21:44

## 2021-01-01 RX ADMIN — ALBUTEROL SULFATE 2.5 MG: 2.5 SOLUTION RESPIRATORY (INHALATION) at 02:16

## 2021-01-01 RX ADMIN — METOPROLOL TARTRATE 2.5 MG: 5 INJECTION INTRAVENOUS at 18:18

## 2021-01-01 RX ADMIN — CALCIUM GLUCONATE: 94 INJECTION, SOLUTION INTRAVENOUS at 20:07

## 2021-01-01 RX ADMIN — ONDANSETRON 4 MG: 2 INJECTION INTRAMUSCULAR; INTRAVENOUS at 21:01

## 2021-01-01 RX ADMIN — PIPERACILLIN SODIUM AND TAZOBACTAM SODIUM 3.38 G: 3; .375 INJECTION, POWDER, LYOPHILIZED, FOR SOLUTION INTRAVENOUS at 07:50

## 2021-01-01 RX ADMIN — SODIUM CHLORIDE AND POTASSIUM CHLORIDE: 4.5; 1.49 INJECTION, SOLUTION INTRAVENOUS at 07:48

## 2021-01-01 RX ADMIN — ROCURONIUM BROMIDE 10 MG: 10 INJECTION, SOLUTION INTRAVENOUS at 16:33

## 2021-01-01 RX ADMIN — PANTOPRAZOLE SODIUM 40 MG: 40 INJECTION, POWDER, FOR SOLUTION INTRAVENOUS at 20:53

## 2021-01-01 RX ADMIN — I.V. FAT EMULSION 250 ML: 20 EMULSION INTRAVENOUS at 17:46

## 2021-01-01 RX ADMIN — HYDROMORPHONE HYDROCHLORIDE 0.5 MG: 1 INJECTION, SOLUTION INTRAMUSCULAR; INTRAVENOUS; SUBCUTANEOUS at 18:25

## 2021-01-01 RX ADMIN — PROPOFOL 35 MCG/KG/MIN: 10 INJECTION, EMULSION INTRAVENOUS at 01:32

## 2021-01-01 RX ADMIN — SODIUM CHLORIDE 100 ML: 900 INJECTION, SOLUTION INTRAVENOUS at 07:52

## 2021-01-01 RX ADMIN — BISACODYL 10 MG: 10 SUPPOSITORY RECTAL at 13:36

## 2021-01-01 RX ADMIN — SODIUM CHLORIDE AND POTASSIUM CHLORIDE: 4.5; 1.49 INJECTION, SOLUTION INTRAVENOUS at 07:12

## 2021-01-01 RX ADMIN — I.V. FAT EMULSION 250 ML: 20 EMULSION INTRAVENOUS at 18:23

## 2021-01-01 RX ADMIN — ENOXAPARIN SODIUM 40 MG: 40 INJECTION SUBCUTANEOUS at 11:56

## 2021-01-01 RX ADMIN — METOCLOPRAMIDE HYDROCHLORIDE 20 MG: 5 INJECTION INTRAMUSCULAR; INTRAVENOUS at 11:25

## 2021-01-01 RX ADMIN — SODIUM CHLORIDE AND POTASSIUM CHLORIDE: 4.5; 1.49 INJECTION, SOLUTION INTRAVENOUS at 04:37

## 2021-01-01 RX ADMIN — METOPROLOL TARTRATE 2.5 MG: 5 INJECTION INTRAVENOUS at 05:53

## 2021-01-01 RX ADMIN — CALCIUM GLUCONATE: 98 INJECTION, SOLUTION INTRAVENOUS at 18:21

## 2021-01-01 RX ADMIN — ONDANSETRON 4 MG: 2 INJECTION INTRAMUSCULAR; INTRAVENOUS at 21:44

## 2021-01-01 RX ADMIN — CALCIUM GLUCONATE: 98 INJECTION, SOLUTION INTRAVENOUS at 17:12

## 2021-01-01 RX ADMIN — ENOXAPARIN SODIUM 40 MG: 40 INJECTION SUBCUTANEOUS at 11:43

## 2021-01-01 RX ADMIN — METOCLOPRAMIDE HYDROCHLORIDE 10 MG: 5 INJECTION INTRAMUSCULAR; INTRAVENOUS at 11:31

## 2021-01-01 RX ADMIN — ROCURONIUM BROMIDE 40 MG: 10 INJECTION, SOLUTION INTRAVENOUS at 15:23

## 2021-01-01 RX ADMIN — ONDANSETRON 4 MG: 2 INJECTION INTRAMUSCULAR; INTRAVENOUS at 17:20

## 2021-01-01 RX ADMIN — ONDANSETRON 4 MG: 2 INJECTION INTRAMUSCULAR; INTRAVENOUS at 08:48

## 2021-01-01 RX ADMIN — HYDROMORPHONE HYDROCHLORIDE 1 MG: 1 INJECTION, SOLUTION INTRAMUSCULAR; INTRAVENOUS; SUBCUTANEOUS at 05:53

## 2021-01-01 RX ADMIN — LORAZEPAM 1 MG: 1 TABLET ORAL at 17:44

## 2021-01-01 RX ADMIN — I.V. FAT EMULSION 250 ML: 20 EMULSION INTRAVENOUS at 17:27

## 2021-01-01 RX ADMIN — METOCLOPRAMIDE HYDROCHLORIDE 10 MG: 5 INJECTION INTRAMUSCULAR; INTRAVENOUS at 12:57

## 2021-01-01 RX ADMIN — PIPERACILLIN SODIUM AND TAZOBACTAM SODIUM 3.38 G: 3; .375 INJECTION, POWDER, LYOPHILIZED, FOR SOLUTION INTRAVENOUS at 14:39

## 2021-01-01 RX ADMIN — HYDROMORPHONE HYDROCHLORIDE 1 MG: 1 INJECTION, SOLUTION INTRAMUSCULAR; INTRAVENOUS; SUBCUTANEOUS at 10:21

## 2021-01-01 RX ADMIN — PANTOPRAZOLE SODIUM 40 MG: 40 INJECTION, POWDER, FOR SOLUTION INTRAVENOUS at 08:54

## 2021-01-01 RX ADMIN — ACETAMINOPHEN 1000 MG: 325 SUSPENSION ORAL at 17:27

## 2021-01-01 RX ADMIN — METOCLOPRAMIDE HYDROCHLORIDE 20 MG: 5 INJECTION INTRAMUSCULAR; INTRAVENOUS at 06:12

## 2021-01-01 RX ADMIN — ACETAMINOPHEN 1000 MG: 500 TABLET, FILM COATED ORAL at 13:10

## 2021-01-01 RX ADMIN — METOCLOPRAMIDE HYDROCHLORIDE 5 MG: 5 INJECTION INTRAMUSCULAR; INTRAVENOUS at 18:00

## 2021-01-01 RX ADMIN — SODIUM CHLORIDE AND POTASSIUM CHLORIDE: 4.5; 1.49 INJECTION, SOLUTION INTRAVENOUS at 15:12

## 2021-01-01 RX ADMIN — SODIUM CHLORIDE AND POTASSIUM CHLORIDE: 4.5; 1.49 INJECTION, SOLUTION INTRAVENOUS at 22:52

## 2021-01-01 RX ADMIN — LORAZEPAM 1 MG: 1 TABLET ORAL at 22:09

## 2021-01-01 RX ADMIN — PANTOPRAZOLE SODIUM 40 MG: 40 INJECTION, POWDER, FOR SOLUTION INTRAVENOUS at 09:16

## 2021-01-01 RX ADMIN — PHENYLEPHRINE HYDROCHLORIDE 120 MCG: 10 INJECTION INTRAVENOUS at 15:16

## 2021-01-01 RX ADMIN — SODIUM CHLORIDE 500 ML: 900 INJECTION, SOLUTION INTRAVENOUS at 14:38

## 2021-01-01 RX ADMIN — SODIUM CHLORIDE 1000 ML: 900 INJECTION, SOLUTION INTRAVENOUS at 06:34

## 2021-01-01 RX ADMIN — HUMAN INSULIN 6 UNITS: 100 INJECTION, SOLUTION SUBCUTANEOUS at 08:20

## 2021-01-01 RX ADMIN — ASCORBIC ACID, VITAMIN A PALMITATE, CHOLECALCIFEROL, THIAMINE HYDROCHLORIDE, RIBOFLAVIN-5 PHOSPHATE SODIUM, PYRIDOXINE HYDROCHLORIDE, NIACINAMIDE, DEXPANTHENOL, ALPHA-TOCOPHEROL ACETATE, VITAMIN K1, FOLIC ACID, BIOTIN, CYANOCOBALAMIN: 200; 3300; 200; 6; 3.6; 6; 40; 15; 10; 150; 600; 60; 5 INJECTION, SOLUTION INTRAVENOUS at 17:47

## 2021-01-01 RX ADMIN — ONDANSETRON 4 MG: 2 INJECTION INTRAMUSCULAR; INTRAVENOUS at 16:14

## 2021-01-01 RX ADMIN — I.V. FAT EMULSION 250 ML: 20 EMULSION INTRAVENOUS at 18:03

## 2021-01-01 RX ADMIN — I.V. FAT EMULSION 250 ML: 20 EMULSION INTRAVENOUS at 17:47

## 2021-01-01 RX ADMIN — ONDANSETRON 8 MG: 2 INJECTION INTRAMUSCULAR; INTRAVENOUS at 20:30

## 2021-01-01 RX ADMIN — PIPERACILLIN SODIUM AND TAZOBACTAM SODIUM 3.38 G: 3; .375 INJECTION, POWDER, LYOPHILIZED, FOR SOLUTION INTRAVENOUS at 22:13

## 2021-01-01 RX ADMIN — FAMOTIDINE 20 MG: 10 INJECTION INTRAVENOUS at 08:28

## 2021-01-01 RX ADMIN — SODIUM CHLORIDE 1000 ML: 900 INJECTION, SOLUTION INTRAVENOUS at 12:05

## 2021-01-01 RX ADMIN — ENOXAPARIN SODIUM 40 MG: 40 INJECTION SUBCUTANEOUS at 11:23

## 2021-01-01 RX ADMIN — ENOXAPARIN SODIUM 40 MG: 40 INJECTION SUBCUTANEOUS at 11:24

## 2021-01-01 RX ADMIN — FENTANYL CITRATE 50 MCG: 50 INJECTION INTRAMUSCULAR; INTRAVENOUS at 15:22

## 2021-01-01 RX ADMIN — PANTOPRAZOLE SODIUM 40 MG: 40 INJECTION, POWDER, FOR SOLUTION INTRAVENOUS at 08:02

## 2021-01-01 RX ADMIN — GUAIFENESIN 400 MG: 200 SOLUTION ORAL at 16:55

## 2021-01-01 RX ADMIN — BISACODYL 10 MG: 10 SUPPOSITORY RECTAL at 08:50

## 2021-01-01 RX ADMIN — THIAMINE HYDROCHLORIDE: 100 INJECTION, SOLUTION INTRAMUSCULAR; INTRAVENOUS at 17:41

## 2021-01-01 RX ADMIN — BISACODYL 10 MG: 10 SUPPOSITORY RECTAL at 08:31

## 2021-01-01 RX ADMIN — FAMOTIDINE 20 MG: 10 INJECTION INTRAVENOUS at 20:30

## 2021-01-01 RX ADMIN — LIDOCAINE HYDROCHLORIDE 100 MG: 20 INJECTION, SOLUTION EPIDURAL; INFILTRATION; INTRACAUDAL; PERINEURAL at 14:00

## 2021-01-01 RX ADMIN — I.V. FAT EMULSION 250 ML: 20 EMULSION INTRAVENOUS at 17:14

## 2021-01-01 RX ADMIN — PHENYLEPHRINE HYDROCHLORIDE 100 MCG: 10 INJECTION INTRAVENOUS at 16:12

## 2021-01-01 RX ADMIN — FAMOTIDINE 20 MG: 10 INJECTION INTRAVENOUS at 21:32

## 2021-01-01 RX ADMIN — METOCLOPRAMIDE HYDROCHLORIDE 20 MG: 5 INJECTION INTRAMUSCULAR; INTRAVENOUS at 17:14

## 2021-01-01 RX ADMIN — DIATRIZOATE MEGLUMINE AND DIATRIZOATE SODIUM 15 ML: 660; 100 LIQUID ORAL; RECTAL at 09:00

## 2021-01-01 RX ADMIN — VECURONIUM BROMIDE 2 MG: 1 INJECTION, POWDER, LYOPHILIZED, FOR SOLUTION INTRAVENOUS at 17:17

## 2021-01-01 RX ADMIN — HYDROMORPHONE HYDROCHLORIDE 1 MG: 1 INJECTION, SOLUTION INTRAMUSCULAR; INTRAVENOUS; SUBCUTANEOUS at 05:24

## 2021-01-01 RX ADMIN — FAMOTIDINE 20 MG: 10 INJECTION INTRAVENOUS at 07:53

## 2021-01-01 RX ADMIN — METOPROLOL TARTRATE 2.5 MG: 5 INJECTION INTRAVENOUS at 06:31

## 2021-01-01 RX ADMIN — SODIUM CHLORIDE SOLN NEBU 3% 4 ML: 3 NEBU SOLN at 07:53

## 2021-01-01 RX ADMIN — METOPROLOL TARTRATE 2.5 MG: 5 INJECTION INTRAVENOUS at 00:29

## 2021-01-01 RX ADMIN — I.V. FAT EMULSION 250 ML: 20 EMULSION INTRAVENOUS at 17:10

## 2021-01-01 RX ADMIN — ONDANSETRON 4 MG: 2 INJECTION INTRAMUSCULAR; INTRAVENOUS at 22:13

## 2021-01-01 RX ADMIN — FAMOTIDINE 20 MG: 10 INJECTION INTRAVENOUS at 09:24

## 2021-01-01 RX ADMIN — CEFTRIAXONE SODIUM 2 G: 2 INJECTION, POWDER, FOR SOLUTION INTRAMUSCULAR; INTRAVENOUS at 11:28

## 2021-01-01 RX ADMIN — PROMETHAZINE HYDROCHLORIDE 6.25 MG: 25 INJECTION INTRAMUSCULAR; INTRAVENOUS at 18:35

## 2021-01-01 RX ADMIN — HYDROMORPHONE HYDROCHLORIDE 1 MG: 1 INJECTION, SOLUTION INTRAMUSCULAR; INTRAVENOUS; SUBCUTANEOUS at 03:48

## 2021-01-01 RX ADMIN — ONDANSETRON 4 MG: 2 INJECTION INTRAMUSCULAR; INTRAVENOUS at 06:12

## 2021-01-01 RX ADMIN — IOPAMIDOL 80 ML: 755 INJECTION, SOLUTION INTRAVENOUS at 07:52

## 2021-01-01 RX ADMIN — CALCIUM GLUCONATE: 98 INJECTION, SOLUTION INTRAVENOUS at 17:28

## 2021-01-01 RX ADMIN — IOPAMIDOL 100 ML: 755 INJECTION, SOLUTION INTRAVENOUS at 09:00

## 2021-01-01 RX ADMIN — LIDOCAINE HYDROCHLORIDE 1 MG/KG/HR: 8 INJECTION, SOLUTION INTRAVENOUS at 13:43

## 2021-01-01 RX ADMIN — CALCIUM GLUCONATE: 98 INJECTION, SOLUTION INTRAVENOUS at 17:17

## 2021-01-01 RX ADMIN — SODIUM CHLORIDE AND POTASSIUM CHLORIDE: 4.5; 1.49 INJECTION, SOLUTION INTRAVENOUS at 18:46

## 2021-01-01 RX ADMIN — METRONIDAZOLE 500 MG: 500 INJECTION, SOLUTION INTRAVENOUS at 03:45

## 2021-01-01 RX ADMIN — ONDANSETRON 8 MG: 2 INJECTION INTRAMUSCULAR; INTRAVENOUS at 21:23

## 2021-01-01 RX ADMIN — METOPROLOL TARTRATE 2.5 MG: 5 INJECTION INTRAVENOUS at 17:28

## 2021-01-01 RX ADMIN — METOCLOPRAMIDE HYDROCHLORIDE 20 MG: 5 INJECTION INTRAMUSCULAR; INTRAVENOUS at 23:56

## 2021-01-01 RX ADMIN — MAGNESIUM HYDROXIDE 30 ML: 2400 SUSPENSION ORAL at 08:24

## 2021-01-01 RX ADMIN — PHENYLEPHRINE HYDROCHLORIDE 60 MCG: 10 INJECTION INTRAVENOUS at 17:26

## 2021-01-01 RX ADMIN — GUAIFENESIN 400 MG: 200 SOLUTION ORAL at 22:13

## 2021-01-01 RX ADMIN — CIPROFLOXACIN 500 MG: 500 TABLET, FILM COATED ORAL at 08:28

## 2021-01-01 RX ADMIN — DEXAMETHASONE SODIUM PHOSPHATE 10 MG: 4 INJECTION, SOLUTION INTRAMUSCULAR; INTRAVENOUS at 15:32

## 2021-01-01 RX ADMIN — ONDANSETRON 4 MG: 2 INJECTION INTRAMUSCULAR; INTRAVENOUS at 11:18

## 2021-01-01 RX ADMIN — FAMOTIDINE 20 MG: 10 INJECTION INTRAVENOUS at 21:12

## 2021-01-01 RX ADMIN — HUMAN INSULIN 6 UNITS: 100 INJECTION, SOLUTION SUBCUTANEOUS at 05:54

## 2021-01-01 RX ADMIN — SODIUM CHLORIDE, SODIUM LACTATE, POTASSIUM CHLORIDE, AND CALCIUM CHLORIDE 500 ML: 600; 310; 30; 20 INJECTION, SOLUTION INTRAVENOUS at 09:03

## 2021-01-01 RX ADMIN — GUAIFENESIN 400 MG: 200 SOLUTION ORAL at 21:17

## 2021-01-01 RX ADMIN — MIDAZOLAM 2 MG: 1 INJECTION INTRAMUSCULAR; INTRAVENOUS at 13:51

## 2021-01-01 RX ADMIN — CIPROFLOXACIN 500 MG: 500 TABLET, FILM COATED ORAL at 21:01

## 2021-01-01 RX ADMIN — ONDANSETRON 8 MG: 2 INJECTION INTRAMUSCULAR; INTRAVENOUS at 20:59

## 2021-01-01 RX ADMIN — HUMAN INSULIN 6 UNITS: 100 INJECTION, SOLUTION SUBCUTANEOUS at 19:02

## 2021-01-01 RX ADMIN — MAGNESIUM HYDROXIDE 30 ML: 2400 SUSPENSION ORAL at 11:44

## 2021-01-01 RX ADMIN — PANTOPRAZOLE SODIUM 40 MG: 40 INJECTION, POWDER, FOR SOLUTION INTRAVENOUS at 09:38

## 2021-01-01 RX ADMIN — METOCLOPRAMIDE HYDROCHLORIDE 20 MG: 5 INJECTION INTRAMUSCULAR; INTRAVENOUS at 17:16

## 2021-01-01 RX ADMIN — GABAPENTIN 200 MG: 100 CAPSULE ORAL at 17:27

## 2021-01-01 RX ADMIN — SODIUM CHLORIDE, SODIUM LACTATE, POTASSIUM CHLORIDE, AND CALCIUM CHLORIDE 50 ML/HR: 600; 310; 30; 20 INJECTION, SOLUTION INTRAVENOUS at 20:09

## 2021-01-01 RX ADMIN — FAMOTIDINE 20 MG: 10 INJECTION INTRAVENOUS at 22:48

## 2021-01-01 RX ADMIN — ENOXAPARIN SODIUM 40 MG: 40 INJECTION SUBCUTANEOUS at 11:18

## 2021-01-01 RX ADMIN — CEFTRIAXONE SODIUM 2 G: 2 INJECTION, POWDER, FOR SOLUTION INTRAMUSCULAR; INTRAVENOUS at 11:21

## 2021-01-01 RX ADMIN — GLYCOPYRROLATE 0.2 MG: 0.2 INJECTION, SOLUTION INTRAMUSCULAR; INTRAVENOUS at 15:21

## 2021-01-01 RX ADMIN — HUMAN INSULIN 6 UNITS: 100 INJECTION, SOLUTION SUBCUTANEOUS at 08:00

## 2021-01-01 RX ADMIN — PANTOPRAZOLE SODIUM 40 MG: 40 INJECTION, POWDER, FOR SOLUTION INTRAVENOUS at 20:34

## 2021-01-01 RX ADMIN — ONDANSETRON 4 MG: 2 INJECTION INTRAMUSCULAR; INTRAVENOUS at 03:50

## 2021-01-01 RX ADMIN — SODIUM PHOSPHATE, MONOBASIC, MONOHYDRATE: 276; 142 INJECTION, SOLUTION INTRAVENOUS at 05:02

## 2021-01-01 RX ADMIN — METOCLOPRAMIDE HYDROCHLORIDE 20 MG: 5 INJECTION INTRAMUSCULAR; INTRAVENOUS at 06:20

## 2021-01-01 RX ADMIN — PANTOPRAZOLE SODIUM 40 MG: 40 INJECTION, POWDER, FOR SOLUTION INTRAVENOUS at 20:36

## 2021-01-01 RX ADMIN — HUMAN INSULIN 3 UNITS: 100 INJECTION, SOLUTION SUBCUTANEOUS at 00:29

## 2021-01-01 RX ADMIN — CEFAZOLIN 2 G: 1 INJECTION, POWDER, FOR SOLUTION INTRAVENOUS at 15:32

## 2021-01-01 RX ADMIN — SODIUM CHLORIDE 75 ML/HR: 900 INJECTION, SOLUTION INTRAVENOUS at 22:52

## 2021-01-01 RX ADMIN — PIPERACILLIN SODIUM AND TAZOBACTAM SODIUM 3.38 G: 3; .375 INJECTION, POWDER, LYOPHILIZED, FOR SOLUTION INTRAVENOUS at 15:54

## 2021-01-01 RX ADMIN — ONDANSETRON 8 MG: 2 INJECTION INTRAMUSCULAR; INTRAVENOUS at 22:13

## 2021-01-01 RX ADMIN — PROPOFOL 25 MCG/KG/MIN: 10 INJECTION, EMULSION INTRAVENOUS at 20:10

## 2021-01-01 RX ADMIN — METOCLOPRAMIDE HYDROCHLORIDE 5 MG: 5 INJECTION INTRAMUSCULAR; INTRAVENOUS at 11:39

## 2021-01-01 RX ADMIN — SODIUM CHLORIDE AND POTASSIUM CHLORIDE: 4.5; 1.49 INJECTION, SOLUTION INTRAVENOUS at 06:41

## 2021-01-01 RX ADMIN — PIPERACILLIN AND TAZOBACTAM 3.38 G: 3; .375 INJECTION, POWDER, LYOPHILIZED, FOR SOLUTION INTRAVENOUS at 01:37

## 2021-01-01 RX ADMIN — ENOXAPARIN SODIUM 40 MG: 40 INJECTION SUBCUTANEOUS at 08:16

## 2021-01-01 RX ADMIN — LORAZEPAM 1 MG: 1 TABLET ORAL at 08:28

## 2021-01-01 RX ADMIN — GUAIFENESIN 400 MG: 200 SOLUTION ORAL at 08:02

## 2021-01-01 RX ADMIN — SODIUM CHLORIDE, SODIUM LACTATE, POTASSIUM CHLORIDE, AND CALCIUM CHLORIDE: 600; 310; 30; 20 INJECTION, SOLUTION INTRAVENOUS at 15:11

## 2021-01-01 RX ADMIN — ONDANSETRON 4 MG: 2 INJECTION INTRAMUSCULAR; INTRAVENOUS at 11:14

## 2021-01-01 RX ADMIN — OXYCODONE HYDROCHLORIDE AND ACETAMINOPHEN 1 TABLET: 7.5; 325 TABLET ORAL at 08:48

## 2021-01-01 RX ADMIN — METOCLOPRAMIDE HYDROCHLORIDE 10 MG: 5 INJECTION INTRAMUSCULAR; INTRAVENOUS at 06:01

## 2021-01-01 RX ADMIN — VECURONIUM BROMIDE 2 MG: 1 INJECTION, POWDER, LYOPHILIZED, FOR SOLUTION INTRAVENOUS at 18:03

## 2021-01-01 RX ADMIN — PROPOFOL 150 MG: 10 INJECTION, EMULSION INTRAVENOUS at 15:22

## 2021-01-01 RX ADMIN — FAMOTIDINE 20 MG: 10 INJECTION INTRAVENOUS at 21:20

## 2021-01-01 RX ADMIN — HUMAN INSULIN 6 UNITS: 100 INJECTION, SOLUTION SUBCUTANEOUS at 18:55

## 2021-01-01 RX ADMIN — FAMOTIDINE 20 MG: 10 INJECTION INTRAVENOUS at 22:18

## 2021-01-01 RX ADMIN — HUMAN INSULIN 6 UNITS: 100 INJECTION, SOLUTION SUBCUTANEOUS at 01:37

## 2021-01-01 RX ADMIN — METRONIDAZOLE 500 MG: 500 INJECTION, SOLUTION INTRAVENOUS at 00:52

## 2021-01-01 RX ADMIN — GABAPENTIN 100 MG: 100 CAPSULE ORAL at 09:41

## 2021-01-01 RX ADMIN — ONDANSETRON 8 MG: 2 INJECTION INTRAMUSCULAR; INTRAVENOUS at 03:32

## 2021-01-01 RX ADMIN — FENTANYL CITRATE 50 MCG: 50 INJECTION INTRAMUSCULAR; INTRAVENOUS at 13:33

## 2021-01-01 RX ADMIN — ONDANSETRON 4 MG: 2 INJECTION INTRAMUSCULAR; INTRAVENOUS at 06:05

## 2021-01-01 RX ADMIN — METOCLOPRAMIDE HYDROCHLORIDE 20 MG: 5 INJECTION INTRAMUSCULAR; INTRAVENOUS at 00:08

## 2021-01-01 RX ADMIN — SODIUM CHLORIDE, SODIUM LACTATE, POTASSIUM CHLORIDE, AND CALCIUM CHLORIDE 500 ML: 600; 310; 30; 20 INJECTION, SOLUTION INTRAVENOUS at 05:27

## 2021-01-01 RX ADMIN — PROMETHAZINE HYDROCHLORIDE 12.5 MG: 25 INJECTION INTRAMUSCULAR; INTRAVENOUS at 18:24

## 2021-01-01 RX ADMIN — SUCCINYLCHOLINE CHLORIDE 120 MG: 20 INJECTION, SOLUTION INTRAMUSCULAR; INTRAVENOUS at 14:00

## 2021-01-01 RX ADMIN — SODIUM CHLORIDE: 900 INJECTION, SOLUTION INTRAVENOUS at 13:21

## 2021-01-01 RX ADMIN — METOCLOPRAMIDE HYDROCHLORIDE 20 MG: 5 INJECTION INTRAMUSCULAR; INTRAVENOUS at 11:30

## 2021-01-01 RX ADMIN — ENOXAPARIN SODIUM 40 MG: 40 INJECTION SUBCUTANEOUS at 12:52

## 2021-01-01 RX ADMIN — CIPROFLOXACIN 500 MG: 500 TABLET, FILM COATED ORAL at 16:13

## 2021-01-01 RX ADMIN — ONDANSETRON 8 MG: 2 INJECTION INTRAMUSCULAR; INTRAVENOUS at 01:10

## 2021-01-01 RX ADMIN — SODIUM CHLORIDE, SODIUM LACTATE, POTASSIUM CHLORIDE, AND CALCIUM CHLORIDE: 600; 310; 30; 20 INJECTION, SOLUTION INTRAVENOUS at 18:30

## 2021-01-01 RX ADMIN — CALCIUM GLUCONATE: 98 INJECTION, SOLUTION INTRAVENOUS at 17:44

## 2021-01-01 RX ADMIN — ONDANSETRON 4 MG: 2 INJECTION INTRAMUSCULAR; INTRAVENOUS at 20:31

## 2021-01-01 RX ADMIN — HYDROMORPHONE HYDROCHLORIDE 0.5 MG: 1 INJECTION, SOLUTION INTRAMUSCULAR; INTRAVENOUS; SUBCUTANEOUS at 07:13

## 2021-01-01 RX ADMIN — HUMAN INSULIN 3 UNITS: 100 INJECTION, SOLUTION SUBCUTANEOUS at 11:29

## 2021-01-01 RX ADMIN — MAGNESIUM SULFATE HEPTAHYDRATE 2 G: 40 INJECTION, SOLUTION INTRAVENOUS at 22:48

## 2021-01-01 RX ADMIN — GABAPENTIN 100 MG: 100 CAPSULE ORAL at 08:20

## 2021-01-01 RX ADMIN — SODIUM CHLORIDE, SODIUM LACTATE, POTASSIUM CHLORIDE, AND CALCIUM CHLORIDE 1000 ML: 600; 310; 30; 20 INJECTION, SOLUTION INTRAVENOUS at 13:22

## 2021-01-01 RX ADMIN — HUMAN INSULIN 3 UNITS: 100 INJECTION, SOLUTION SUBCUTANEOUS at 23:17

## 2021-01-01 RX ADMIN — METRONIDAZOLE 500 MG: 500 INJECTION, SOLUTION INTRAVENOUS at 03:32

## 2021-01-01 RX ADMIN — ONDANSETRON 4 MG: 2 INJECTION INTRAMUSCULAR; INTRAVENOUS at 21:17

## 2021-01-01 RX ADMIN — METOCLOPRAMIDE HYDROCHLORIDE 20 MG: 5 INJECTION INTRAMUSCULAR; INTRAVENOUS at 17:45

## 2021-01-01 RX ADMIN — ONDANSETRON 8 MG: 2 INJECTION INTRAMUSCULAR; INTRAVENOUS at 16:52

## 2021-01-01 RX ADMIN — SODIUM CHLORIDE AND POTASSIUM CHLORIDE: 4.5; 1.49 INJECTION, SOLUTION INTRAVENOUS at 03:22

## 2021-01-01 RX ADMIN — ONDANSETRON 4 MG: 2 INJECTION INTRAMUSCULAR; INTRAVENOUS at 12:57

## 2021-01-01 RX ADMIN — FAMOTIDINE 20 MG: 10 INJECTION INTRAVENOUS at 21:30

## 2021-01-01 RX ADMIN — SODIUM CHLORIDE AND POTASSIUM CHLORIDE: 4.5; 1.49 INJECTION, SOLUTION INTRAVENOUS at 12:56

## 2021-01-01 RX ADMIN — LORAZEPAM 1 MG: 1 TABLET ORAL at 09:39

## 2021-01-01 RX ADMIN — HYDROMORPHONE HYDROCHLORIDE 1 MG: 1 INJECTION, SOLUTION INTRAMUSCULAR; INTRAVENOUS; SUBCUTANEOUS at 15:54

## 2021-01-01 RX ADMIN — LORAZEPAM 1 MG: 2 INJECTION INTRAMUSCULAR; INTRAVENOUS at 17:11

## 2021-01-01 RX ADMIN — PIPERACILLIN AND TAZOBACTAM 3.38 G: 3; .375 INJECTION, POWDER, LYOPHILIZED, FOR SOLUTION INTRAVENOUS at 18:17

## 2021-01-01 RX ADMIN — HUMAN INSULIN 3 UNITS: 100 INJECTION, SOLUTION SUBCUTANEOUS at 03:39

## 2021-01-01 RX ADMIN — ONDANSETRON 4 MG: 2 INJECTION INTRAMUSCULAR; INTRAVENOUS at 23:03

## 2021-01-01 RX ADMIN — GUAIFENESIN 400 MG: 200 SOLUTION ORAL at 22:16

## 2021-01-01 RX ADMIN — SODIUM CHLORIDE AND POTASSIUM CHLORIDE: 4.5; 1.49 INJECTION, SOLUTION INTRAVENOUS at 11:49

## 2021-01-01 RX ADMIN — Medication 10 ML: at 07:52

## 2021-01-01 RX ADMIN — SODIUM CHLORIDE AND POTASSIUM CHLORIDE: 4.5; 1.49 INJECTION, SOLUTION INTRAVENOUS at 02:17

## 2021-01-01 RX ADMIN — ONDANSETRON 8 MG: 2 INJECTION INTRAMUSCULAR; INTRAVENOUS at 21:31

## 2021-01-01 RX ADMIN — HUMAN INSULIN 3 UNITS: 100 INJECTION, SOLUTION SUBCUTANEOUS at 06:00

## 2021-01-01 RX ADMIN — GUAIFENESIN 400 MG: 200 SOLUTION ORAL at 17:22

## 2021-01-01 RX ADMIN — POTASSIUM CHLORIDE 20 MEQ: 200 INJECTION, SOLUTION INTRAVENOUS at 09:05

## 2021-01-01 RX ADMIN — IPRATROPIUM BROMIDE AND ALBUTEROL SULFATE 3 ML: .5; 3 SOLUTION RESPIRATORY (INHALATION) at 15:44

## 2021-01-01 RX ADMIN — LORAZEPAM 1 MG: 1 TABLET ORAL at 08:21

## 2021-01-01 RX ADMIN — ONDANSETRON 8 MG: 2 INJECTION INTRAMUSCULAR; INTRAVENOUS at 09:39

## 2021-01-01 RX ADMIN — METOCLOPRAMIDE HYDROCHLORIDE 5 MG: 5 INJECTION INTRAMUSCULAR; INTRAVENOUS at 00:20

## 2021-01-01 RX ADMIN — METOPROLOL TARTRATE 2.5 MG: 5 INJECTION INTRAVENOUS at 02:45

## 2021-01-01 RX ADMIN — HUMAN INSULIN 6 UNITS: 100 INJECTION, SOLUTION SUBCUTANEOUS at 06:17

## 2021-01-01 RX ADMIN — GUAIFENESIN 400 MG: 200 SOLUTION ORAL at 08:24

## 2021-01-01 RX ADMIN — METRONIDAZOLE 500 MG: 500 INJECTION, SOLUTION INTRAVENOUS at 15:23

## 2021-01-01 RX ADMIN — ALBUMIN (HUMAN) 12.5 G: 12.5 INJECTION, SOLUTION INTRAVENOUS at 12:54

## 2021-01-01 RX ADMIN — METOCLOPRAMIDE HYDROCHLORIDE 5 MG: 5 INJECTION INTRAMUSCULAR; INTRAVENOUS at 00:10

## 2021-01-01 RX ADMIN — METOCLOPRAMIDE HYDROCHLORIDE 10 MG: 5 INJECTION INTRAMUSCULAR; INTRAVENOUS at 05:53

## 2021-01-01 RX ADMIN — METOCLOPRAMIDE HYDROCHLORIDE 5 MG: 5 INJECTION INTRAMUSCULAR; INTRAVENOUS at 11:15

## 2021-01-01 RX ADMIN — METRONIDAZOLE 500 MG: 500 INJECTION, SOLUTION INTRAVENOUS at 03:50

## 2021-01-01 RX ADMIN — ROCURONIUM BROMIDE 10 MG: 10 INJECTION, SOLUTION INTRAVENOUS at 14:57

## 2021-01-01 RX ADMIN — ONDANSETRON 4 MG: 2 INJECTION INTRAMUSCULAR; INTRAVENOUS at 22:18

## 2021-01-01 RX ADMIN — SODIUM CHLORIDE, SODIUM LACTATE, POTASSIUM CHLORIDE, AND CALCIUM CHLORIDE 250 ML: 600; 310; 30; 20 INJECTION, SOLUTION INTRAVENOUS at 10:30

## 2021-01-01 RX ADMIN — ALBUMIN (HUMAN) 25 G: 0.25 INJECTION, SOLUTION INTRAVENOUS at 13:10

## 2021-01-01 RX ADMIN — SODIUM CHLORIDE SOLN NEBU 3% 4 ML: 3 NEBU SOLN at 19:43

## 2021-01-01 RX ADMIN — ONDANSETRON 4 MG: 2 INJECTION INTRAMUSCULAR; INTRAVENOUS at 05:17

## 2021-01-01 RX ADMIN — HUMAN INSULIN 3 UNITS: 100 INJECTION, SOLUTION SUBCUTANEOUS at 11:15

## 2021-01-01 RX ADMIN — FAMOTIDINE 20 MG: 10 INJECTION INTRAVENOUS at 08:27

## 2021-01-01 RX ADMIN — PANTOPRAZOLE SODIUM 40 MG: 40 INJECTION, POWDER, FOR SOLUTION INTRAVENOUS at 11:43

## 2021-01-01 RX ADMIN — ONDANSETRON 8 MG: 2 INJECTION INTRAMUSCULAR; INTRAVENOUS at 06:42

## 2021-01-01 RX ADMIN — CALCIUM GLUCONATE: 98 INJECTION, SOLUTION INTRAVENOUS at 17:51

## 2021-01-01 RX ADMIN — GUAIFENESIN 400 MG: 200 SOLUTION ORAL at 09:41

## 2021-01-01 RX ADMIN — ONDANSETRON 4 MG: 2 INJECTION INTRAMUSCULAR; INTRAVENOUS at 05:42

## 2021-01-01 RX ADMIN — PHENYLEPHRINE HYDROCHLORIDE 120 MCG: 10 INJECTION INTRAVENOUS at 13:40

## 2021-01-01 RX ADMIN — SODIUM CHLORIDE SOLN NEBU 3% 4 ML: 3 NEBU SOLN at 21:00

## 2021-01-01 RX ADMIN — ONDANSETRON 8 MG: 2 INJECTION INTRAMUSCULAR; INTRAVENOUS at 14:37

## 2021-01-01 RX ADMIN — PHENYLEPHRINE HYDROCHLORIDE 120 MCG: 10 INJECTION INTRAVENOUS at 14:16

## 2021-01-01 RX ADMIN — FAMOTIDINE 20 MG: 10 INJECTION INTRAVENOUS at 08:17

## 2021-01-01 RX ADMIN — ONDANSETRON 4 MG: 2 INJECTION INTRAMUSCULAR; INTRAVENOUS at 12:08

## 2021-01-01 RX ADMIN — CALCIUM GLUCONATE: 98 INJECTION, SOLUTION INTRAVENOUS at 17:48

## 2021-01-01 RX ADMIN — ONDANSETRON 4 MG: 2 INJECTION INTRAMUSCULAR; INTRAVENOUS at 05:25

## 2021-01-01 RX ADMIN — ONDANSETRON 4 MG: 2 INJECTION INTRAMUSCULAR; INTRAVENOUS at 04:09

## 2021-01-01 RX ADMIN — ONDANSETRON 8 MG: 2 INJECTION INTRAMUSCULAR; INTRAVENOUS at 02:01

## 2021-01-01 RX ADMIN — PANTOPRAZOLE SODIUM 40 MG: 40 INJECTION, POWDER, FOR SOLUTION INTRAVENOUS at 21:57

## 2021-01-01 RX ADMIN — ONDANSETRON 4 MG: 2 INJECTION INTRAMUSCULAR; INTRAVENOUS at 10:28

## 2021-01-01 RX ADMIN — HYDROMORPHONE HYDROCHLORIDE 1 MG: 1 INJECTION, SOLUTION INTRAMUSCULAR; INTRAVENOUS; SUBCUTANEOUS at 15:44

## 2021-01-01 RX ADMIN — FENTANYL CITRATE 50 MCG: 50 INJECTION INTRAMUSCULAR; INTRAVENOUS at 16:24

## 2021-03-23 PROBLEM — C18.2 MALIGNANT NEOPLASM OF ASCENDING COLON (HCC): Status: ACTIVE | Noted: 2021-01-01

## 2021-03-24 NOTE — PERIOP NOTES
All labs reviewed. Pt with hx of Type 2 diabetes. Glucose 153 on labs obtained during PAT assessment. Dr Basia Stephens notified. Glucose ordered DOS.

## 2021-03-24 NOTE — PERIOP NOTES
PLEASE CONTINUE TAKING ALL PRESCRIPTION MEDICATIONS UP TO THE DAY OF SURGERY UNLESS OTHERWISE DIRECTED BELOW. DISCONTINUE all vitamins and supplements 7 days prior to surgery. DISCONTINUE Non-Steriodal Anti-Inflammatory (NSAIDS) such as Advil and Aleve 5 days prior to surgery. Home Medications to take 
the day of surgery Omeprazole (Prilosec) Home Medications  
to Hold No vitamins or supplements No Advil, Ibuprofen, Excedrin, or Motrin. Comments Hibiclens shower the night before surgery and the morning of surgery. Visitor policy of 1 visitor per patient discussed. Please do not bring home medications with you on the day of surgery unless otherwise directed by your nurse. If you are instructed to bring home medications, please give them to your nurse as they will be administered by the nursing staff. If you have any questions, please call [facility] [phone] or [facility] [phone]. A copy of this note was provided to the patient for reference.

## 2021-03-24 NOTE — PERIOP NOTES
Patient verified name and  Order for consent found in EHR and matches case posting; patient verified. Type 3 surgery, Walk in assessment complete. Labs per surgeon: none; 
Labs per anesthesia protocol: CBC, BMP, and T/S; results pending. EKG: NA 
 
COVID test completed om 3.24.21 at [address] [place] [address] approved surgical skin cleanser and instructions given per hospital policy. Patient provided with and instructed on educational handouts including Guide to Surgery, Pain Management, Hand Hygiene, Blood Transfusion Education, and [employer] [facility] Brochure. Patient answered medical/surgical history questions at their best of ability. All prior to admission medications documented in [facility]. Original medication prescription bottle were not visualized during patient appointment. Patient instructed to hold all vitamins 7 days prior to surgery and NSAIDS 5 days prior to surgery, patient verbalized understanding. Patient teach back successful and patient demonstrates knowledge of instructions.

## 2021-03-30 PROBLEM — C18.9 COLON CANCER (HCC): Status: ACTIVE | Noted: 2021-01-01

## 2021-03-30 NOTE — ANESTHESIA POSTPROCEDURE EVALUATION
Procedure(s): RIGHT ROBOTIC ASSISTED RIGHT COLECTOMY. general 
 
Anesthesia Post Evaluation Multimodal analgesia: multimodal analgesia used between 6 hours prior to anesthesia start to PACU discharge Patient location during evaluation: bedside Patient participation: complete - patient participated Level of consciousness: awake and alert Pain management: adequate Airway patency: patent Anesthetic complications: no 
Cardiovascular status: hemodynamically stable Respiratory status: spontaneous ventilation Hydration status: euvolemic Comments: Patient stable and may discharge at this time. Post anesthesia nausea and vomiting:  controlled Final Post Anesthesia Temperature Assessment:  Normothermia (36.0-37.5 degrees C) INITIAL Post-op Vital signs:  
Vitals Value Taken Time /60 03/30/21 1837 Temp 36.7 °C (98.1 °F) 03/30/21 1752 Pulse 72 03/30/21 1840 Resp 16 03/30/21 1832 SpO2 98 % 03/30/21 1840 Vitals shown include unvalidated device data.

## 2021-03-30 NOTE — ANESTHESIA PREPROCEDURE EVALUATION
Relevant Problems No relevant active problems Anesthetic History No history of anesthetic complications Review of Systems / Medical History Patient summary reviewed and pertinent labs reviewed Pulmonary Within defined limits Neuro/Psych Within defined limits Cardiovascular Exercise tolerance: >4 METS 
  
GI/Hepatic/Renal 
  
GERD: well controlled Endo/Other Obesity and cancer (Breast/Colon) Other Findings Physical Exam 
 
Airway Mallampati: II 
TM Distance: > 6 cm Neck ROM: normal range of motion Mouth opening: Normal 
 
 Cardiovascular Rhythm: regular Rate: normal 
 
 
 
 Dental 
No notable dental hx Pulmonary Breath sounds clear to auscultation Abdominal 
 
 
 
 Other Findings Anesthetic Plan ASA: 2 Anesthesia type: general 
 
 
 
 
 
Anesthetic plan and risks discussed with: Patient and Family

## 2021-03-31 NOTE — PROGRESS NOTES
Hourly rounds completed All needs met. Pt c/o nausea. Interventions per MAR. Placed IS at the bedside. Educated pt on how to use it, the frequency, as well of the benefits.  Will give report to the oncoming day shift nurse

## 2021-03-31 NOTE — PROGRESS NOTES
TRANSFER - OUT REPORT:    Verbal report given to Bianca Ghotra RN on Haven Simpson  being transferred to  for routine post - op       Report consisted of patients Situation, Background, Assessment and   Recommendations(SBAR). Information from the following report(s) SBAR, OR Summary, Procedure Summary, Intake/Output, MAR and Cardiac Rhythm NSR was reviewed with the receiving nurse. Lines:   Peripheral IV 03/30/21 Right Wrist (Active)   Site Assessment Clean, dry, & intact 03/30/21 1846   Phlebitis Assessment 0 03/30/21 1846   Infiltration Assessment 0 03/30/21 1846   Dressing Status Clean, dry, & intact 03/30/21 1846   Dressing Type Transparent;Tape 03/30/21 1846   Hub Color/Line Status Patent 03/30/21 1846       Peripheral IV 03/30/21 Left Hand (Active)   Site Assessment Clean, dry, & intact 03/30/21 1846   Phlebitis Assessment 0 03/30/21 1846   Infiltration Assessment 0 03/30/21 1846   Dressing Status Clean, dry, & intact 03/30/21 1846   Dressing Type Tape;Transparent 03/30/21 1846   Hub Color/Line Status Patent 03/30/21 1846        Opportunity for questions and clarification was provided. Patient transported with:   O2 @ 2 liters  Tech   Patient's belongings    Family updated after 4 digit security code verified.

## 2021-03-31 NOTE — PROGRESS NOTES
Up in chair    END OF SHIFT NOTE:    INTAKE/OUTPUT  03/30 0701 - 03/31 0700  In: 2140 [I.V.:2140]  Out: 420 [Urine:400]  Voiding: NO  Catheter: YES  Drain:              Flatus: Patient does not have flatus present. Stool:  0 occurrences. Characteristics:       Emesis: 0 occurrences. Characteristics:        VITAL SIGNS  Patient Vitals for the past 12 hrs:   Temp Pulse Resp BP SpO2   03/31/21 1533 98.9 °F (37.2 °C) 82 16 (!) 143/61 94 %   03/31/21 1215 -- 88 18 128/68 94 %   03/31/21 1034 99.5 °F (37.5 °C) 91 19 (!) 131/58 92 %   03/31/21 0653 98.9 °F (37.2 °C) (!) 103 19 (!) 152/89 93 %       Pain Assessment  Pain Intensity 1: 7 (03/31/21 0849)  Pain Location 1: Abdomen  Pain Intervention(s) 1: Medication (see MAR), Repositioned, Rest, Therapeutic touch  Patient Stated Pain Goal: 5    Ambulating  Pt has gotten up into chair with assist.  Unsteady. Shift report given to oncoming nurse at the bedside.     Evia Frankel, RN

## 2021-03-31 NOTE — PROGRESS NOTES
TRANSFER - IN REPORT:    Verbal report received from [facility] (name) on Silvia Harrell  being received from 6th floor (unit) for routine progression of care      Report consisted of patients Situation, Background, Assessment and   Recommendations(SBAR). Information from the following report(s) SBAR, Kardex, Intake/Output, MAR and Recent Results was reviewed with the receiving nurse. Opportunity for questions and clarification was provided. Assessment completed upon patients arrival to unit and care assumed.

## 2021-03-31 NOTE — PROGRESS NOTES
03/30/21 9296   Dual Skin Pressure Injury Assessment   Dual Skin Pressure Injury Assessment WDL   Second Care Provider (Based on Facility Policy) Aga Rubio RN    Skin Integumentary   Skin Integumentary (WDL) X   Skin Color Appropriate for ethnicity   Skin Condition/Temp Dry; Warm   Skin Integrity Incision (comment)  (5 sites on abdomen)   Varicosities Present

## 2021-03-31 NOTE — PROGRESS NOTES
PLAN:  DIET CLEAR LIQUID  IVFs  SCD/IS/Pepcid/Lovenox for prophylactic measures  OOB and ambulate  Pain/nausea control  Monitor labs  Keep arroyo for now  Transfer to 2nd surgical    ASSESSMENT:  Admit Date: 3/30/2021   1 Day Post-Op  Procedure(s):  RIGHT ROBOTIC ASSISTED RIGHT COLECTOMY    Active Problems:    Colon cancer ([facility] [address] (3/30/2021)         SUBJECTIVE:  3/31/21 1 Day Post-Op awake in bed,c/o nausea since waking up in PACU--likely r/t anesthesia. Pain controlled. AF, hypertensive. WBC 19.3. No flatus or BM yet. Intake/Output Summary (Last 24 hours) at 3/31/2021 1038  Last data filed at 3/31/2021 0602  Gross per 24 hour   Intake 2140 ml   Output 420 ml   Net 1720 ml     OBJECTIVE:  Constitutional: Alert oriented cooperative patient in no acute distress; appears stated age   Visit Vitals  BP (!) 152/89 (BP 1 Location: Left upper arm, BP Patient Position: Lying right side)   Pulse 88   Temp 98.9 °F (37.2 °C)   Resp 19   Ht 5' 6\" (1.676 m)   Wt 192 lb 7.4 oz (87.3 kg)   SpO2 93%   BMI 31.06 kg/m²     Eyes:Sclera are clear. ENMT: no external lesions gross hearing normal; no obvious neck masses, no ear or lip lesions  CV: RRR. Normal perfusion  Resp: No JVD. Breathing is  non-labored; no audible wheezing. GI: soft and non-distended, dressing c/d/i     Musculoskeletal: unremarkable with normal function. No embolic signs or cyanosis.    Neuro:  Oriented; moves all 4; no focal deficits  Psychiatric: normal affect and mood, no memory impairment      Patient Vitals for the past 24 hrs:   BP Temp Pulse Resp SpO2 Height Weight   03/31/21 0653 (!) 152/89 98.9 °F (37.2 °C) -- 19 93 % -- --   03/31/21 0457 (!) 146/66 98 °F (36.7 °C) 88 18 93 % -- --   03/31/21 0021 (!) 147/76 97.7 °F (36.5 °C) 92 18 99 % -- --   03/30/21 2218 -- -- -- -- -- -- 192 lb 7.4 oz (87.3 kg)   03/30/21 2156 (!) 145/71 97.7 °F (36.5 °C) 74 16 100 % -- --   03/30/21 2123 -- -- 84 16 100 % -- --   03/30/21 2116 138/66 -- 80 16 99 % -- -- 03/30/21 2101 138/65 -- 80 16 99 % -- --   03/30/21 2053 136/63 -- 80 15 99 % -- --   03/30/21 2040 133/62 -- 74 16 98 % -- --   03/30/21 2030 130/63 -- 84 17 99 % -- --   03/30/21 2020 127/62 -- 75 16 99 % -- --   03/30/21 2010 120/60 -- 82 17 99 % -- --   03/30/21 2005 (!) 122/55 -- 70 16 99 % -- --   03/30/21 2002 (!) 124/58 -- 72 16 98 % -- --   03/30/21 1956 (!) 129/55 -- 66 16 99 % -- --   03/30/21 1951 (!) 118/58 -- 73 16 98 % -- --   03/30/21 1946 (!) 122/58 -- 77 16 97 % -- --   03/30/21 1941 (!) 130/59 -- 78 16 98 % -- --   03/30/21 1937 (!) 146/66 -- 86 16 98 % -- --   03/30/21 1931 (!) 123/59 -- 74 16 98 % -- --   03/30/21 1926 (!) 121/57 -- 72 16 97 % -- --   03/30/21 1921 (!) 121/59 -- 71 16 98 % -- --   03/30/21 1916 (!) 120/57 -- 70 16 97 % -- --   03/30/21 1911 (!) 120/57 -- 70 17 97 % -- --   03/30/21 1906 121/61 -- 74 16 97 % -- --   03/30/21 1901 (!) 120/56 -- 71 16 97 % -- --   03/30/21 1856 (!) 122/57 -- 72 17 98 % -- --   03/30/21 1851 122/60 -- 71 16 98 % -- --   03/30/21 1846 127/64 98.4 °F (36.9 °C) 75 16 97 % -- --   03/30/21 1841 126/60 -- 70 16 98 % -- --   03/30/21 1837 127/60 -- 73 15 97 % -- --   03/30/21 1832 136/68 -- 77 16 91 % -- --   03/30/21 1827 (!) 151/57 -- 88 16 97 % -- --   03/30/21 1816 (!) 140/64 -- 76 15 97 % -- --   03/30/21 1811 137/66 -- 79 16 96 % -- --   03/30/21 1806 136/65 -- 79 15 96 % -- --   03/30/21 1801 132/62 -- 80 16 95 % -- --   03/30/21 1756 136/63 -- 77 16 96 % -- --   03/30/21 1752 (!) 148/66 98.1 °F (36.7 °C) 77 15 97 % -- --   03/30/21 1751 (!) 148/66 -- 77 -- 98 % -- --   03/30/21 1132 (!) 168/70 98.7 °F (37.1 °C) 95 16 100 % 5' 6\" (1.676 m) --     Labs:    Recent Labs     03/31/21  0444 03/30/21 1955   WBC 19.3*  --    HGB 11.4*  --      --    NA  --  140   K  --  3.8   CL  --  108*   CO2  --  22   BUN  --  10   CREA  --  0.77   GLU  --  193*       Signed:  Jorge Arellano NP

## 2021-03-31 NOTE — PROGRESS NOTES
TRANSFER - OUT REPORT:    Verbal report given to [facility]name[facility] on Haven Clark Seat  being transferred to 2nd floor(unit) for routine progression of care       Report consisted of patients Situation, Background, Assessment and   Recommendations(SBAR). Information from the following report(s) SBAR was reviewed with the receiving nurse. Lines:   Peripheral IV 03/30/21 Right Wrist (Active)   Site Assessment Clean, dry, & intact 03/31/21 0329   Phlebitis Assessment 0 03/31/21 0329   Infiltration Assessment 0 03/31/21 0329   Dressing Status Clean, dry, & intact 03/31/21 0329   Dressing Type Tape;Transparent 03/31/21 0329   Hub Color/Line Status Infusing;Patent 03/31/21 0329   Alcohol Cap Used No 03/31/21 0329        Opportunity for questions and clarification was provided.       Patient transported with:  IV

## 2021-03-31 NOTE — PROGRESS NOTES
Care Management Interventions  PCP Verified by CM: Yes(Dr. Car Heading)  Mode of Transport at Discharge: Self  Transition of Care Consult (CM Consult): Discharge Planning  Discharge Durable Medical Equipment: No  Physical Therapy Consult: No  Occupational Therapy Consult: No  Speech Therapy Consult: No  Current Support Network: Own Home, Lives Alone, Family Lives Nearby  Confirm Follow Up Transport: Self  [place] Resource Information Provided?: No  Discharge Location  Discharge Placement: Home    CM met with patient in room. Patient alert and orient and verified all demographic information to be correct. Patient stated she lives alone in a one story home that has one step to enter the residence. Patient stated she does not use any DME at this time. Patient stated she is completely independent with all ADL's at baseline and continues to drive daily. Patient stated she is able to afford her needed medications and obtains them from [address] [place], [place] [address]. Patient stated she has never used [facility] or been to [facility] and would like to return home at discharge. CM will continue to follow patient during hospitalization for discharge planning and needs. PT/OT have not been consulted at this time. Please consult or notify CM of new needs.

## 2021-03-31 NOTE — PROGRESS NOTES
Spiritual Care visit. Initial Visit, Pre Surgery Consult. Visit and prayer before patient goes to surgery.     Visit by Fernando Lamar M.Ed., Th.B. ,Staff

## 2021-03-31 NOTE — PROGRESS NOTES
.. TRANSFER - IN REPORT:    Verbal report received from [address] on Damien Mcgovern  being received from PACU for routine progression of care      Report consisted of patients Situation, Background, Assessment and   Recommendations(SBAR). Information from the following report(s) SBAR, Kardex, OR Summary, and MAR was reviewed with the receiving nurse. Opportunity for questions and clarification was provided. Assessment completed upon patients arrival to unit and care assumed.

## 2021-04-01 NOTE — PROGRESS NOTES
END OF SHIFT NOTE:    INTAKE/OUTPUT  03/31 0701 - 04/01 0700  In: 2086 [P.O.:700; I.V.:1386]  Out: 1850 [Urine:1850]  Voiding: YES  Catheter: NO      Flatus: Patient does not have flatus present. VITAL SIGNS  Patient Vitals for the past 12 hrs:   Temp Pulse Resp BP SpO2   04/01/21 0356 98.6 °F (37 °C) 79 16 125/74 95 %   03/31/21 2248 98.3 °F (36.8 °C) 81 16 128/71 94 %   03/31/21 2142 -- -- -- -- 95 %   03/31/21 2002 98.8 °F (37.1 °C) 81 16 (!) 147/77 93 %       Pain Assessment  Pain Intensity 1: 2 (04/01/21 0100)  Pain Location 1: Abdomen  Pain Intervention(s) 1: Medication (see MAR)  Patient Stated Pain Goal: 0    Ambulating  No    Shift report given to oncoming nurse at the bedside.     Ashlie Palma RN

## 2021-04-01 NOTE — OP NOTES
[address] [place] [address] 
OPERATIVE REPORT Name:  Mariel Groves 
MR#:  [id number] :  1940 ACCOUNT #:  [de-identified] DATE OF SERVICE:  2021 PREOPERATIVE DIAGNOSIS:  Right colon mass. POSTOPERATIVE DIAGNOSIS:  Right colon mass. PROCEDURE PERFORMED:  Robotic-assisted right hemicolectomy. SURGEON:  Rajendra Castaneda MD 
 
ASSISTANT:  Alexandru Carrion, certified first assist 
 
ANESTHESIA:  general. 
 
COMPLICATIONS:  None. SPECIMENS REMOVED:  Right colon. IMPLANTS:  none. ESTIMATED BLOOD LOSS:  20 mL. CONDITION AT COMPLETION:  Stable. INDICATIONS:  This patient is an 80-year-old white female who was found to have a right-sided colon mass worrisome for colon cancer by endoscopy. The risks, benefits and alternatives to surgical operations were discussed in detail with the patient prior to surgery. The patient wished to proceed and gave appropriate consent. PROCEDURE:  The patient was taken to the operating room where she underwent general endotracheal anesthetic. The abdomen was prepped and draped in sterile fashion. IV antibiotics were administered at the time of anesthesia. A time-out was performed identifying the patient, procedure and location. Toro catheter was placed for bladder decompression. The abdomen was prepped and draped in routine sterile fashion. A small incision was made lateral to the umbilicus in the left abdomen and a 5-mm OptiVu trocar technique was used to enter the peritoneal cavity without difficulty. The abdomen was insufflated and the camera was introduced. Inspection showed evidence of a tattooed lesion in the right colon just distal to the cecum as described by colonoscopy report. A 5-mm port was placed in the right lateral abdomen, lower abdominal midline and left upper quadrant. Instruments were inserted and removed under direct vision at all times.   The da [place]a robotic system was brought to the table where the ports were docked in the usual fashion. Instruments were carefully inserted and dissection was begun by mobilizing the colon along the white line of [place]t from the level of the cecum to the hepatic flexure. The hepatic flexure was then partially taken down to allow adequate mobilization for resection with margins. The terminal ileum was identified. A window was created beneath the ileum in the mesentery with Harmonic scalpel. The linear stapling device was then used to divide the ileum at this point. Distally, the colon was divided approximately 10 cm past the tattooed lesion. The mesentery between these two points was carefully taken as close to the vessel origin as possible with Harmonic scalpel device. Once completed, the specimen was placed in the right upper quadrant until the completion of the procedure. The two stapled ends of bowel were then aligned along the antimesenteric border with interrupted 3-0 silk suture. An enterotomy was created at each staple line and one limb of the 45-mm stapler was inserted into each lumen. Stapler was closed, fired creating a 4 to 5-mm side-to-side anastomosis. The staple line was inspected and was hemostatic and complete. Common enterotomy was then closed using a running 2-0 full-thickness silk suture and then imbricated with a second layer of 2-0 silk suture. Suction  was used to irrigate all dissection planes and hemostasis was assured. A [employer] EndoCatch bag was inserted through the port and the specimen was placed within it. The specimen was carefully removed and inspected the back table. This was consistent with adenocarcinoma and adequate margins were obtained. This was sent to Pathology for further review. The incision at the port sites were all closed with interrupted 0 Vicryl sutures at the fascial layer. Skin and subcu were irrigated and closed with skin staples. 30 mL of Marcaine was injected at the incision sites for pain control.   The patient tolerated the procedure well, was awakened, extubated and taken to recovery in stable condition at its conclusion. Eren Sue MD 
 
 
[id number] 
D:  04/01/2021 15:52 
T:  04/01/2021 16:52 
JOB #:  [id number]

## 2021-04-01 NOTE — PROGRESS NOTES
PLAN:  DIET CLEAR LIQUID  IVFs  SCD/IS/Pepcid/Lovenox for prophylactic measures  OOB and ambulate  Pain/nausea control  Monitor labs  ANT arroyo    ASSESSMENT:  Admit Date: 3/30/2021   2 Day Post-Op  Procedure(s):  RIGHT ROBOTIC ASSISTED RIGHT COLECTOMY    Active Problems:    Colon cancer ([facility] [address] (3/30/2021)         SUBJECTIVE:  3/31/21 1 Day Post-Op awake in bed,c/o nausea since waking up in PACU--likely r/t anesthesia. Pain controlled. AF, hypertensive. WBC 19.3. No flatus or BM yet. 4/1/21 2 Day Post-Op awake in bed. No more n/v.  Pain controlled. AF, VSS. WBC 16.2. No flatus or BM yet. Intake/Output Summary (Last 24 hours) at 4/1/2021 0845  Last data filed at 4/1/2021 0999  Gross per 24 hour   Intake 2066 ml   Output 1850 ml   Net 216 ml     OBJECTIVE:  Constitutional: Alert oriented cooperative patient in no acute distress; appears stated age   Visit Vitals  /73   Pulse 78   Temp 98.4 °F (36.9 °C)   Resp 17   Ht 5' 6\" (1.676 m)   Wt 192 lb 7.4 oz (87.3 kg)   SpO2 94%   BMI 31.06 kg/m²     Eyes:Sclera are clear. ENMT: no external lesions gross hearing normal; no obvious neck masses, no ear or lip lesions  CV: RRR. Normal perfusion  Resp: No JVD. Breathing is  non-labored; no audible wheezing. GI: soft and non-distended, dressing removed, incisions c/d/i with staples   Musculoskeletal: unremarkable with normal function. No embolic signs or cyanosis.    Neuro:  Oriented; moves all 4; no focal deficits  Psychiatric: normal affect and mood, no memory impairment      Patient Vitals for the past 24 hrs:   BP Temp Pulse Resp SpO2   04/01/21 0743 135/73 98.4 °F (36.9 °C) 78 17 94 %   04/01/21 0356 125/74 98.6 °F (37 °C) 79 16 95 %   03/31/21 2248 128/71 98.3 °F (36.8 °C) 81 16 94 %   03/31/21 2142 -- -- -- -- 95 %   03/31/21 2002 (!) 147/77 98.8 °F (37.1 °C) 81 16 93 %   03/31/21 1533 (!) 143/61 98.9 °F (37.2 °C) 82 16 94 %   03/31/21 1215 128/68 -- 88 18 94 %   03/31/21 1034 (!) 131/58 99.5 °F (37.5 °C) 91 19 92 %     Labs:    Recent Labs     04/01/21  0403   WBC 16.2*   HGB 10.1*         K 3.9   *   CO2 27   BUN 10   CREA 0.55*   *       Signed:  Harshad Rodriguez, NP

## 2021-04-01 NOTE — PROGRESS NOTES
Assessment completed. VS stable. Pt states \"I don't  Feel good today. \"  Facial expressions look like pt does not feel well. Cannot pinpoint anything in particular but states \"this is just not a good day\"  Pepcid to be given. Does not want pain or nausea medication.   Instructed pt to call with any needs she may have

## 2021-04-01 NOTE — PROGRESS NOTES
Assisted up to chair. Pt c/o nausea. No emesis. Zofran 4mg IV given. Toro catheter removed. Pt up and ambulated around room. Assisted back to recliner with call light within reach.   Instructed to call with needs

## 2021-04-01 NOTE — PROGRESS NOTES
Problem: Falls - Risk of  Goal: *Absence of Falls  Description: Document Elif Olson Fall Risk and appropriate interventions in the flowsheet.   Outcome: Progressing Towards Goal  Note: Fall Risk Interventions:            Medication Interventions: Patient to call before getting OOB, Teach patient to arise slowly    Elimination Interventions: Call light in reach, Patient to call for help with toileting needs              Problem: Infection - Risk of, Urinary Catheter-Associated Urinary Tract Infection  Goal: *Absence of infection signs and symptoms  Outcome: Progressing Towards Goal     Problem: Surgical Pathway Post-Op Day 1  Goal: Activity/Safety  Outcome: Progressing Towards Goal  Goal: Nutrition/Diet  Outcome: Progressing Towards Goal  Goal: *Optimal pain control at patient's stated goal  Outcome: Progressing Towards Goal  Goal: *Adequate urinary output (equal to or greater than 30 milliliters/hour)  Outcome: Progressing Towards Goal  Goal: *Tolerating diet  Outcome: Progressing Towards Goal

## 2021-04-01 NOTE — PROGRESS NOTES
Pt in bed resting. Attempted to take catheter out.   Pt requested to \"wait a little while until I feel a little better\"  Continues to say she does not feel well

## 2021-04-02 NOTE — PROGRESS NOTES
END OF SHIFT NOTE:    INTAKE/OUTPUT  04/01 0701 - 04/02 0700  In: 2503 [P.O.:290; I.V.:2213]  Out: 1650 [Urine:1150]  PO intake 0 this shift. Voiding: YES  Catheter: NO      Flatus: Patient does not have flatus present. Stool:  0 occurrences. Emesis: 1 occurrences. Characteristics:   Emesis Assessment  Appearance: Green  Emesis Amount: Medium(400 ml)    VITAL SIGNS  Patient Vitals for the past 12 hrs:   Temp Pulse Resp BP SpO2   04/02/21 0339 99.3 °F (37.4 °C) 92 18 138/72 96 %   04/01/21 2300 99.7 °F (37.6 °C) (!) 103 18 (!) 145/75 96 %   04/01/21 2020 -- (!) 118 -- (!) 149/89 --       Pain Assessment  Pain Intensity 1: 0 (04/02/21 0705)  Pain Location 1: Abdomen  Pain Intervention(s) 1: Declines  Patient Stated Pain Goal: 0    Ambulating  Yes, to bedside commode. Shift report given to oncoming nurse at the bedside.     Braeden Preston RN

## 2021-04-02 NOTE — PROGRESS NOTES
[facility]  Admission Date: 3/30/2021   POD: 3 Days Post-Op            Daily Progress Note: 4/2/2021  Subjective:   Feeling better this morning with nausea almost completely resolved   No further emesis, some burping   No flatus or BM yet  Objective:   AVSS  Physical Exam:          GEN: well developed and in no acute distress  HEENT:  PERRL, EOMI, no alar flaring or epistaxis, oral mucosa moist without cyanosis,   NECK:  no JVD, no retractions, no thyromegaly or masses  LUNGS:  clear  HEART:  RRR    ABDOMEN:  soft with minimal tenderness at the incisions.   No distention present  Bowel sounds are present but slow and quite   Incisions are clean and dry and intact    EXTREMITIES:  warm with no cyanosis,  SKIN:  no jaundice or ecchymosis,   NEURO:  alert and oriented, grossly non-focal      LAB  Recent Labs     04/02/21  0503 04/01/21  0403 03/31/21  0444 03/30/21 1955   WBC 13.7* 16.2* 19.3*  --    HGB 11.6* 10.1* 11.4*  --    HCT 37.9 32.2* 36.5  --     215 240  --     141  --  140   K 3.7 3.9  --  3.8    110*  --  108*   CO2 29 27  --  22   BUN 9 10  --  10   CREA 0.73 0.55*  --  0.77   * 119*  --  193*       Assessment:     Hospital Problems  Date Reviewed: 3/23/2021          Codes Class Noted POA    Colon cancer [facility]) ICD-10-CM: C18.9  ICD-9-CM: 153.9  3/30/2021 Unknown            Plan:     POD3 robotic right colectomy for colon cancer  Awaiting return of bowel function   Pt may have sips of clears today   oob and ambulate with assistance     Maribell Slater MD

## 2021-04-02 NOTE — PROGRESS NOTES
Pt just finished ambulating hallways. She has passed flatus several times this AM. She remains nauseous but no vomiting this AM.  Her incision has staples intact, no drainage. She has + bowel sounds. Try clears without broth or carbonation.      KANE Pires

## 2021-04-02 NOTE — PROGRESS NOTES
Chart screened by  for discharge planning. Patient has been ambulating in the halls. Patient diet is being advanced. Physicians are currently waiting on bowel function to return. CM will continue to follow patient during hospitalization for discharge planning and needs. CM will place consult for PT/OT to evaluate for home needs. Please consult or notify  if any new issues arise.

## 2021-04-02 NOTE — PROGRESS NOTES
Spoke with patient's niece, Perlita Verdugo, on the phone to give an update on patient's status. Family \"so glad to hear\" patient is feeling better and asleep.

## 2021-04-02 NOTE — PROGRESS NOTES
Problem: Falls - Risk of  Goal: *Absence of Falls  Description: Document Elif Olson Fall Risk and appropriate interventions in the flowsheet. Outcome: Progressing Towards Goal  Note: Fall Risk Interventions:            Medication Interventions: Patient to call before getting OOB, Teach patient to arise slowly    Elimination Interventions: Call light in reach, Patient to call for help with toileting needs              Problem: Infection - Risk of, Urinary Catheter-Associated Urinary Tract Infection  Goal: *Absence of infection signs and symptoms  Outcome: Progressing Towards Goal     Problem: Pressure Injury - Risk of  Goal: *Prevention of pressure injury  Description: Document Carlos Scale and appropriate interventions in the flowsheet. Outcome: Progressing Towards Goal  Note: Pressure Injury Interventions:             Activity Interventions: Increase time out of bed, Pressure redistribution bed/mattress(bed type)    Mobility Interventions: Pressure redistribution bed/mattress (bed type)    Nutrition Interventions: Document food/fluid/supplement intake

## 2021-04-02 NOTE — ROUTINE PROCESS
END OF SHIFT NOTE:    INTAKE/OUTPUT  04/01 0701 - 04/02 0700  In: 2503 [P.O.:290; I.V.:2213]  Out: 1650 [Urine:1150]  Voiding: YES  Catheter: NO  Drain:              Flatus: Patient does have flatus present. Stool:  0 occurrences. Characteristics:  Stool Assessment  Stool Appearance: (have not observed)    Emesis: 2 occurrences. Characteristics:   Emesis Assessment  Appearance: Green  Emesis Amount: Medium(400 ml)    VITAL SIGNS  Patient Vitals for the past 12 hrs:   Temp Pulse Resp BP SpO2   04/02/21 1518 98.6 °F (37 °C) (!) 110 18 (!) 158/79 96 %   04/02/21 1209 98.4 °F (36.9 °C) 94 18 (!) 146/75 99 %   04/02/21 0713 98.5 °F (36.9 °C) 91 18 135/74 96 %       Pain Assessment  Pain Intensity 1: 0 (04/02/21 0934)  Pain Location 1: Abdomen  Pain Intervention(s) 1: Declines  Patient Stated Pain Goal: 0    Ambulating  Yes, walked in hallway, room this shift. Shift report given to oncoming nurse at the bedside.     Raghu Whitt RN

## 2021-04-02 NOTE — PROGRESS NOTES
Problem: Falls - Risk of  Goal: *Absence of Falls  Description: Document Tiffanie Coe Fall Risk and appropriate interventions in the flowsheet.   Outcome: Progressing Towards Goal  Note: Fall Risk Interventions:            Medication Interventions: Patient to call before getting OOB, Teach patient to arise slowly    Elimination Interventions: Call light in reach

## 2021-04-02 NOTE — ROUTINE PROCESS
NAUSEA:  Pt has been \"spitting up\" bile, not vomiting. .. while c/o nausea. Phenergan given (per family request rather than Zofran). Pt  became restless, \" coming out of her skin\"  Per family after dose. Pt has primarily  stuck to ice chips and a few sips of clears since AM rounds and change to \"sips of clears\" order. Jackeline notified . No dditional orders at this time. @ 1810 ADDENDUM: Elo Warner NP called for follow up on pt. Pt surrounded by family a the bedside. No c.o at present. Pt back at baseline.

## 2021-04-03 NOTE — ROUTINE PROCESS
END OF SHIFT NOTE:    INTAKE/OUTPUT  04/02 0701 - 04/03 0700  In: 5382 [I.V.:2845]  Out: 400 [Urine:400]  Voiding: YES  Catheter: NO  Drain:              Flatus: Patient does not have flatus present. Stool:  3 occurrences. Characteristics:  Stool Assessment  Stool Appearance: (have not observed)    Emesis: 3 occurrences. Characteristics:   Emesis Assessment  Appearance: Green  Emesis Amount: Medium(400 ml)    VITAL SIGNS  Patient Vitals for the past 12 hrs:   Temp Pulse Resp BP SpO2   04/03/21 1512 98.3 °F (36.8 °C) 93 18 128/69 96 %   04/03/21 1120 98.6 °F (37 °C) 95 18 (!) 145/84 98 %   04/03/21 0715 98 °F (36.7 °C) 89 18 135/76 97 %       Pain Assessment  Pain Intensity 1: 0 (04/03/21 1534)  Pain Location 1: Abdomen  Pain Intervention(s) 1: Declines  Patient Stated Pain Goal: 0    Ambulating  Yes, pt walked in hallway with PT/OT 7x. Tolerated well. Nausea continues intermittently. Shift report given to oncoming nurse at the bedside.     Anneliese Kilpatrick RN

## 2021-04-03 NOTE — PROGRESS NOTES
General Surgery Progress Note    4/3/2021    Admit Date: 3/30/2021    Subjective:     Surgery On Call (for Dr. Nan Zee)  The patient reports passing flatus, but has also complained of near constant nausea with one episode of vomiting at 7AM this morning. She does not have active BS'S when I auscultated this morning. Objective:     Visit Vitals  /76   Pulse 89   Temp 98 °F (36.7 °C)   Resp 18   Ht 5' 6\" (1.676 m)   Wt 192 lb 7.4 oz (87.3 kg)   SpO2 97%   BMI 31.06 kg/m²         Intake/Output Summary (Last 24 hours) at 4/3/2021 1023  Last data filed at 4/3/2021 0540  Gross per 24 hour   Intake 2845 ml   Output 400 ml   Net 2445 ml        EXAM:  ABD mild incisional tenderness, wounds intact. No signs of infection. She does not have active BS'S when I auscultated this morning. Data Review  No results found for this or any previous visit (from the past 24 hour(s)). Hospital Problems  Date Reviewed: 3/23/2021          Codes Class Noted POA    * (Principal) Colon cancer [facility]) ICD-10-CM: C18.9  ICD-9-CM: 153.9  3/30/2021 Unknown          1. Acute abdominal series this morning. 2. Continue IVF's  3. Anti-emetics  4. Pain control  5. OOB/IS/Lovenox  6. Await return of bowel function.   Payton Chisholm MD.

## 2021-04-03 NOTE — PROGRESS NOTES
ACUTE OT GOALS:  (Developed with and agreed upon by patient and/or caregiver.)  1. Haven Gary will participate at least 10 minutes of functional moblity for ADL with 3 or less rest breaks within 1 visit. Met. OCCUPATIONAL THERAPY ASSESSMENT: Initial Assessment and Discharge OT Treatment Day # 1    Haven Gary is a [de-identified] y.o. female   PRIMARY DIAGNOSIS: Colon cancer ([facility]  Malignant neoplasm of colon, unspecified part of colon ([facility] [C18.9]  Colon cancer ([facility] [C18.9]  Procedure(s) (LRB):  RIGHT ROBOTIC ASSISTED RIGHT COLECTOMY (Right)  4 Days Post-Op  Reason for Referral:    ICD-10: Treatment Diagnosis: Generalized Muscle Weakness (M62.81)  [place] / Product Type: Medicare /   ASSESSMENT:     REHAB RECOMMENDATIONS:   Recommendation to date pending progress:  Setting:   No further skilled therapy   Equipment:    To Be Determined     PRIOR LEVEL OF FUNCTION:  (Prior to Hospitalization)  INITIAL/CURRENT LEVEL OF FUNCTION:  (Based on today's evaluation)   Bathing:   Independent  Dressing:   Independent  Feeding/Grooming:   Independent  Toileting:   Independent  Functional Mobility:   Independent Bathing:   Modified Independent  Dressing:   Modified Independent  Feeding/Grooming:   Independent  Toileting:   Modified Independent  Functional Mobility:   Modified Independent     ASSESSMENT:  Ms. Dustin Villa is s/p the above procedure and is functioning close to her baseline in terms of ADL and functional mobility for ADL. No further needs for skilled OT are needed at this time. SUBJECTIVE:   Ms. Dustin Villa states, \"that's my nephew. \"    SOCIAL HISTORY/LIVING ENVIRONMENT:   Home Environment: Private residence  One/Two Story Residence: One story  Living Alone: No  Support Systems: Child(lissa), Family member(s)    OBJECTIVE:     PAIN: VITAL SIGNS: LINES/DRAINS:   Pre Treatment: Pain Screen  Pain Scale 1: Numeric (0 - 10)  Pain Intensity 1: (no rating given)  Post Treatment: 0   IV  O2 Device: Room air     GROSS EVALUATION:   Within Functional Limits Abnormal/ Functional Abnormal/ Non-Functional (see comments) Not Tested Comments:   AROM [] [] [] []    PROM [] [] [] []    Strength [] [] [] []    Balance [] [x] [] []    Posture [] [] [] []    Sensation [] [] [] []    Coordination [] [] [] []    Tone [] [] [] []    Edema [] [] [] []    Activity Tolerance [] [x] [] []     [] [] [] []      COGNITION/  PERCEPTION: Intact Impaired   (see comments) Comments:   Orientation [x] []    Vision [] []    Hearing [] []    Judgment/ Insight [] []    Attention [] []    Memory [] []    Command Following [x] []    Emotional Regulation [] []     [] []      ACTIVITIES OF DAILY LIVING: I Mod I S SBA CGA Min Mod Max Total NT Comments   BASIC ADLs:              Bathing/ Showering [] [] [] [] [] [] [] [] [] []    Toileting [] [] [] [] [] [] [] [] [] []    Dressing [] [] [] [] [] [] [] [] [] []    Feeding [] [] [] [] [] [] [] [] [] []    Grooming [] [] [] [] [] [] [] [] [] []    Personal Device Care [] [] [] [] [] [] [] [] [] []    Functional Mobility [] [x] [] [] [] [] [] [] [] []    I=Independent, Mod I=Modified Independent, S=Supervision, SBA=Standby Assistance, CGA=Contact Guard Assistance,   Min=Minimal Assistance, Mod=Moderate Assistance, Max=Maximal Assistance, Total=Total Assistance, NT=Not Tested    MOBILITY: I Mod I S SBA CGA Min Mod Max Total  NT x2 Comments:   Supine to sit [] [] [] [] [] [] [] [] [] [] []    Sit to supine [] [] [] [] [] [] [] [] [] [] []    Sit to stand [x] [] [] [] [] [] [] [] [] [] []    Bed to chair [] [] [] [] [] [] [] [] [] [] []    I=Independent, Mod I=Modified Independent, S=Supervision, SBA=Standby Assistance, CGA=Contact Guard Assistance,   Min=Minimal Assistance, Mod=Moderate Assistance, Max=Maximal Assistance, Total=Total Assistance, NT=Not Tested    [address] [facility] [address] [address] AM-PAC 6 Clicks   Daily Activity Inpatient Short Form        How much help from another person does the patient currently need. .. Total A Lot A Little None   1. Putting on and taking off regular lower body clothing? [] 1   [] 2   [] 3   [x] 4   2. Bathing (including washing, rinsing, drying)? [] 1   [] 2   [] 3   [x] 4   3. Toileting, which includes using toilet, bedpan or urinal?   [] 1   [] 2   [] 3   [x] 4   4. Putting on and taking off regular upper body clothing? [] 1   [] 2   [] 3   [x] 4   5. Taking care of personal grooming such as brushing teeth? [] 1   [] 2   [] 3   [x] 4   6. Eating meals? [] 1   [] 2   [] 3   [x] 4   © 2007, Trustees of [facility] [employer], under license to [employer]. All rights reserved     Score:  Initial: 24 Most Recent: X (Date: -- )   Interpretation of Tool:  Represents activities that are increasingly more difficult (i.e. Bed mobility, Transfers, Gait). PLAN:   FREQUENCY/DURATION: OT Plan of Care: (today only) for duration of hospital stay or until stated goals are met, whichever comes first.    PROBLEM LIST:   (Skilled intervention is medically necessary to address:)  1. Decreased Activity Tolerance   INTERVENTIONS PLANNED:   (Benefits and precautions of occupational therapy have been discussed with the patient.)  1. Therapeutic Activity  2. Education     TREATMENT:     EVALUATION: Low Complexity : (Untimed Charge)    TREATMENT:   ( $$ Therapeutic Activity: 8-22 mins   )  Therapeutic Activity (10 Minutes): Therapeutic activity included functional mobility for ADL to improve functional Mobility and Activity tolerance.     TREATMENT GRID:  N/A    AFTER TREATMENT POSITION/PRECAUTIONS:  Chair, Needs within reach, Visitors at bedside and daughter-in-law at bedside/RN in [facility]    INTERDISCIPLINARY COLLABORATION:  RN/PCT and PT/PTA    TOTAL TREATMENT DURATION:  OT Patient Time In/Time Out  Time In: 0944  Time Out: [address] JUJU Quiroz, OTR/L

## 2021-04-03 NOTE — PROGRESS NOTES
Surgery Addendum  Acute abdominal series showed:    CLINICAL HISTORY:  Nausea and mild incisional tenderness.     COMPARISON:  CT of March 12, 2021.     FINDINGS:  Supine and erect images demonstrate no free intraperitoneal air. There is a moderate amount of stool scattered in the colon with no dilated   small bowel loops. No abnormal soft tissue mass or calcific density is noted. Multiple surgical staples project over the pelvis.     IMPRESSION  NO ACUTE ABDOMINAL ABNORMALITY IDENTIFIED    Plan: Continue present management.   [place], MD.

## 2021-04-03 NOTE — PROGRESS NOTES
Problem: Falls - Risk of  Goal: *Absence of Falls  Description: Document Khurram Kramer Fall Risk and appropriate interventions in the flowsheet.   Outcome: Progressing Towards Goal  Note: Fall Risk Interventions:            Medication Interventions: Patient to call before getting OOB, Teach patient to arise slowly    Elimination Interventions: Call light in reach

## 2021-04-03 NOTE — PROGRESS NOTES
Pt blood pressure was 176/83 and hr 102 at 2245 due to pt vomiting and feeling sick. At 2345 checked pt bp 142/78 hr 91. Pt stated she was felling better.

## 2021-04-03 NOTE — PROGRESS NOTES
END OF SHIFT NOTE:    INTAKE/OUTPUT  04/02 0701 - 04/03 0700  In: 8274 [I.V.:1782]  Out: 400 [Urine:400]  Voiding: YES  Catheter: NO  Drain:              Flatus: Patient does have flatus present. Stool:  0 occurrences. Characteristics:      Emesis: 4 occurrences. Characteristics:   Emesis Assessment  Appearance: Green  Emesis Amount: Medium(400 ml)    VITAL SIGNS  Patient Vitals for the past 12 hrs:   Temp Pulse Resp BP SpO2   04/03/21 0337 98.4 °F (36.9 °C) 85 18 121/76 95 %   04/02/21 2354 -- 91 -- (!) 142/78 --   04/02/21 2245 98.9 °F (37.2 °C) (!) 102 18 (!) 176/83 97 %   04/02/21 1936 98.9 °F (37.2 °C) 83 18 (!) 149/78 98 %       Pain Assessment  Pain Intensity 1: 2 (04/02/21 2210)  Pain Location 1: Abdomen  Pain Intervention(s) 1: Declines  Patient Stated Pain Goal: 0    Ambulating  Yes    Shift report given to oncoming nurse at the bedside.     Mitzy Morales RN

## 2021-04-03 NOTE — PROGRESS NOTES
ACUTE PHYSICAL THERAPY GOALS:  (Developed with and agreed upon by patient and/or caregiver.)  IND    PHYSICAL THERAPY ASSESSMENT: Initial Assessment, Discharge and AM PT Treatment Day # 1      Haven Yang is a [de-identified] y.o. female   PRIMARY DIAGNOSIS: Colon cancer ([facility]  Malignant neoplasm of colon, unspecified part of colon ([facility] [C18.9]  Colon cancer ([facility] [C18.9]  Procedure(s) (LRB):  RIGHT ROBOTIC ASSISTED RIGHT COLECTOMY (Right)  4 Days Post-Op  Reason for Referral:    ICD-10: Treatment Diagnosis: Generalized Muscle Weakness (M62.81)  Difficulty in walking, Not elsewhere classified (R26.2)  [place] / Product Type: Medicare /     ASSESSMENT:     REHAB RECOMMENDATIONS:   Recommendation to date pending progress:  Setting:   No further skilled therapy   Equipment:    None     PRIOR LEVEL OF FUNCTION:  (Prior to Hospitalization) INITIAL/CURRENT LEVEL OF FUNCTION:  (Most Recently Demonstrated)   Bed Mobility:   Independent  Sit to Stand:   Independent  Transfers:   Independent  Gait/Mobility:   Independent Bed Mobility:   Independent  Sit to Stand:   Independent  Transfers:   Independent  Gait/Mobility:   Independent     ASSESSMENT:  Ms. Renée Bennett was supine at arrival c/o feeling nauseas when rolling around in bed, dtr present. She was able to get to EOB IND, stand and ambulate SUP. She presented no balance challenges including standing turning, looking around. She does have a slight scissor walk, but able to control. She returned to chair and reported feeling better when up in terms of nausea. Pt stated that she was up roaming halls IND yesterday and again early this AM. Pt is IND and will dc from PT service at this time. anticipate home dc when medically cleared. SUBJECTIVE:   Ms. Renée Bennett states, \"I feel sick. \"    SOCIAL HISTORY/LIVING ENVIRONMENT: lives alone in large house, no AD used, IND with all ADLs, drives, retired.    Home Environment: Private residence  One/Two Story Residence: One story  Living Alone: No  Support Systems: Child(lissa), Family member(s)  OBJECTIVE:     PAIN: VITAL SIGNS: LINES/DRAINS:   Pre Treatment: Pain Screen  Pain Scale 1: Numeric (0 - 10)  Pain Intensity 1: 0  Post Treatment: 0   IV  O2 Device: Room air     GROSS EVALUATION:   Within Functional Limits Abnormal/ Functional Abnormal/ Non-Functional (see comments) Not Tested Comments:   AROM [x] [] [] []    PROM [] [] [] []    Strength [x] [] [] []    Balance [x] [] [] []    Posture [x] [] [] []    Sensation [x] [] [] []    Coordination [x] [] [] []    Tone [] [] [] []    Edema [] [] [] []    Activity Tolerance [x] [] [] []     [] [] [] []      COGNITION/  PERCEPTION: Intact Impaired   (see comments) Comments:   Orientation [x] []    Vision [x] []    Hearing [x] []    Command Following [x] []    Safety Awareness [x] []     [] []      MOBILITY: I Mod I S SBA CGA Min Mod Max Total  NT x2 Comments:   Bed Mobility    Rolling [x] [] [] [] [] [] [] [] [] [] []    Supine to Sit [] [] [] [] [] [] [] [] [] [] []    Scooting [x] [] [] [] [] [] [] [] [] [] []    Sit to Supine [] [] [] [] [] [] [] [] [] [] []    Transfers    Sit to Stand [x] [] [] [] [] [] [] [] [] [] []    Bed to Chair [x] [] [] [] [] [] [] [] [] [] []    Stand to Sit [x] [] [] [] [] [] [] [] [] [] []    I=Independent, Mod I=Modified Independent, S=Supervision, SBA=Standby Assistance, CGA=Contact Guard Assistance,   Min=Minimal Assistance, Mod=Moderate Assistance, Max=Maximal Assistance, Total=Total Assistance, NT=Not Tested  GAIT: I Mod I S SBA CGA Min Mod Max Total  NT x2 Comments:   Level of Assistance [] [] [x] [] [] [] [] [] [] [] []    Distance 200    DME None    Gait Quality Scissor, good    Weightbearing Status N/A     I=Independent, Mod I=Modified Independent, S=Supervision, SBA=Standby Assistance, CGA=Contact Guard Assistance,   Min=Minimal Assistance, Mod=Moderate Assistance, Max=Maximal Assistance, Total=Total Assistance, NT=Not Tested    [address] [place] [address] AM-PAC [address] [facility] Inpatient Short Form       How much difficulty does the patient currently have. .. Unable A Lot A Little None   1. Turning over in bed (including adjusting bedclothes, sheets and blankets)? [] 1   [] 2   [] 3   [x] 4   2. Sitting down on and standing up from a chair with arms ( e.g., wheelchair, bedside commode, etc.)   [] 1   [] 2   [] 3   [x] 4   3. Moving from lying on back to sitting on the side of the bed? [] 1   [] 2   [] 3   [x] 4   How much help from another person does the patient currently need. .. Total A Lot A Little None   4. Moving to and from a bed to a chair (including a wheelchair)? [] 1   [] 2   [] 3   [x] 4   5. Need to walk in hospital room? [] 1   [] 2   [] 3   [x] 4   6. Climbing 3-5 steps with a railing? [] 1   [] 2   [] 3   [x] 4   © , Trustees of [address] [place] [id number], under license to [employer]. All rights reserved     Score:  Initial: 24 Most Recent: X (Date: -- )    Interpretation of Tool:  Represents activities that are increasingly more difficult (i.e. Bed mobility, Transfers, Gait). PLAN:   FREQUENCY/DURATION:   for duration of hospital stay or until stated goals are met, whichever comes first.    PROBLEM LIST:   (Skilled intervention is medically necessary to address:)  1. Decreased ADL/Functional Activities  2. Decreased Activity Tolerance  3. Decreased AROM/PROM  4. Decreased Balance  5. Decreased Coordination  6. Decreased Gait Ability  7. Decreased Strength  8. Decreased Transfer Abilities  9. Increased Pain   INTERVENTIONS PLANNED:   (Benefits and precautions of physical therapy have been discussed with the patient.)  1. Therapeutic Activity  2. Therapeutic Exercise/HEP  3. Neuromuscular Re-education  4. Gait Training  5.  Education     TREATMENT:     EVALUATION: Low Complexity : (Untimed Charge)    TREATMENT:   ($$ Gait Trainin-22 mins    )  Gait Training (10 Minutes): Gait training for 200 feet utilizing None. Patient required Verbal cueing to improve Activity Pacing, Assistive Device Utilization, Dynamic Standing Balance and Gait Mechanics.      TREATMENT GRID:  N/A    AFTER TREATMENT POSITION/PRECAUTIONS:  Chair, Needs within reach, RN notified and Visitors at bedside    INTERDISCIPLINARY COLLABORATION:  RN/PCT and PT/PTA    TOTAL TREATMENT DURATION:  PT Patient Time In/Time Out  Time In: 0915  Time Out: [id number] [facility]

## 2021-04-04 NOTE — PROGRESS NOTES
General Surgery Progress Note    4/4/2021    Admit Date: 3/30/2021    Subjective:     Surgery On Call (for Dr. Sorin Rojas)  The acute abdominal series yesterday was negative showing:    CLINICAL HISTORY:  Nausea and mild incisional tenderness.     COMPARISON:  CT of March 12, 2021.     FINDINGS:  Supine and erect images demonstrate no free intraperitoneal air. There is a moderate amount of stool scattered in the colon with no dilated   small bowel loops. No abnormal soft tissue mass or calcific density is noted. Multiple surgical staples project over the pelvis.     IMPRESSION  NO ACUTE ABDOMINAL ABNORMALITY IDENTIFIED    The patient continues to have nausea and vomiting. She is passing \"a little bit of gas. \" No BM yet. Objective:     Visit Vitals  BP (!) 164/78   Pulse 86   Temp 98.7 °F (37.1 °C)   Resp 18   Ht 5' 6\" (1.676 m)   Wt 192 lb 7.4 oz (87.3 kg)   SpO2 97%   BMI 31.06 kg/m²         Intake/Output Summary (Last 24 hours) at 4/4/2021 1026  Last data filed at 4/4/2021 0755  Gross per 24 hour   Intake 2797 ml   Output 50 ml   Net 2747 ml        EXAM:  ABD soft, mild distention, active BS'S. Wounds intact without signs of infection.         Data Review    Recent Results (from the past 24 hour(s))   CBC W/O DIFF    Collection Time: 04/04/21  4:40 AM   Result Value Ref Range    WBC 8.8 4.3 - 11.1 K/uL    RBC 4.58 4.05 - 5.2 M/uL    HGB 11.1 (L) 11.7 - 15.4 g/dL    HCT 35.5 (L) 35.8 - 46.3 %    MCV 77.5 (L) 79.6 - 97.8 FL    MCH 24.2 (L) 26.1 - 32.9 PG    MCHC 31.3 (L) 31.4 - 35.0 g/dL    RDW 17.4 (H) 11.9 - 14.6 %    PLATELET 892 802 - 009 K/uL    MPV 10.0 9.4 - 12.3 FL    ABSOLUTE NRBC 0.00 0.0 - 0.2 K/uL   METABOLIC PANEL, BASIC    Collection Time: 04/04/21  4:40 AM   Result Value Ref Range    Sodium 140 136 - 145 mmol/L    Potassium 3.6 3.5 - 5.1 mmol/L    Chloride 106 98 - 107 mmol/L    CO2 26 21 - 32 mmol/L    Anion gap 8 7 - 16 mmol/L    Glucose 86 65 - 100 mg/dL    BUN 11 8 - 23 MG/DL    Creatinine 0.52 (L) 0.6 - 1.0 MG/DL    GFR est AA >60 >60 ml/min/1.73m2    GFR est non-AA >60 >60 ml/min/1.73m2    Calcium 8.9 8.3 - 10.4 MG/DL        Hospital Problems  Date Reviewed: 3/23/2021          Codes Class Noted POA    * (Principal) [facility] ICD-10-CM: C18.9  ICD-9-CM: 153.9  3/30/2021 Unknown          1. Will start trial of Reglan  2. OOB/IS/Lovenox  3. Pepcid  4. Await return of bowel function.   Shadia Palmer MD.

## 2021-04-04 NOTE — PROGRESS NOTES
CM still following patient's chart. Per PT/OT, no further therapy needed. CM will continue to follow to assist with any needs that may arise.

## 2021-04-04 NOTE — PROGRESS NOTES
END OF SHIFT NOTE:    INTAKE/OUTPUT  04/03 0701 - 04/04 0700  In: 0476 [P. O.:60; I.V.:1522]  Out: 50 [Urine:50]  Voiding: YES  Catheter: NO  Drain:              Flatus: Patient does not have flatus present. Stool:  0 occurrences. Characteristics:      Emesis: 2 occurrences. Characteristics:   Emesis Assessment  Appearance: Green  Emesis Amount: Medium(400 ml)    VITAL SIGNS  Patient Vitals for the past 12 hrs:   Temp Pulse Resp BP SpO2   04/04/21 0406 -- 99 -- (!) 169/81 --   04/04/21 0312 99 °F (37.2 °C) 90 18 (!) 164/79 97 %   04/03/21 2342 97.6 °F (36.4 °C) 79 21 (!) 141/81 98 %   04/03/21 1930 98.1 °F (36.7 °C) 85 19 (!) 155/70 97 %       Pain Assessment  Pain Intensity 1: 0 (04/03/21 1930)  Pain Location 1: Abdomen  Pain Intervention(s) 1: Declines  Patient Stated Pain Goal: 0    Ambulating  Yes    Shift report given to oncoming nurse at the bedside.     Albin Sanchez RN

## 2021-04-04 NOTE — PROGRESS NOTES
END OF SHIFT NOTE:    INTAKE/OUTPUT  04/03 0701 - 04/04 0700  In: 2797 [P.O.:60; I.V.:2737]  Out: 50 [Urine:50]  Voiding: YES  Catheter: NO  Drain:              Flatus: Patient does have flatus present. Stool:  0 occurrences. Characteristics:  Stool Assessment  Stool Appearance: (have not observed)    Emesis: 4 occurrences. Characteristics:   Emesis Assessment  Appearance: Green  Emesis Amount: Medium(400 ml)    VITAL SIGNS  Patient Vitals for the past 12 hrs:   Temp Pulse Resp BP SpO2   04/04/21 1540 99.3 °F (37.4 °C) 97 19 136/79 97 %   04/04/21 1100 99.7 °F (37.6 °C) 77 18 (!) 155/69 97 %   04/04/21 0755 98.7 °F (37.1 °C) 86 18 (!) 164/78 97 %       Pain Assessment  Pain Intensity 1: 0 (04/04/21 0827)  Pain Location 1: Abdomen  Pain Intervention(s) 1: Declines  Patient Stated Pain Goal: 0    Ambulating  Yes, ambulated in hallway multiple times. Shift report given to oncoming nurse at the bedside.     Jaylen Burris RN

## 2021-04-05 NOTE — PROGRESS NOTES
Comprehensive Nutrition Assessment    Type and Reason for Visit: Initial, NPO/clear liquid Day 5    Nutrition Recommendations/Plan:    Continue with Current diet (sips of clears no carbonation/broth)   Advance diet as medically able   Add Ensure clear with all meals, able to start sips     Malnutrition Assessment:  Malnutrition Status: At risk for malnutrition (specify)(poor intake for about 1 month, unsure wt loss)    Nutrition Assessment:   Nutrition History: Patient reports 4 weeks poor intake and extreme nausea/vomiting. She reports the 2 weeks prior to admission only consuming boost and ensure enlive. Nutrition Background: Patient presented with mass on colon. 3/30 right colectomy. Daily Update:  Patient was able to report some details but did not answer all questions. She reports she still has nausea and unsure she can keep any down, but at the same time feels hungry and wants to start to eat again. She reports she had been drinking only boost and ensure prior to admission and willing to have again once able. Unable to provide any details on weight history or possibly weight loss at this time. Discussed with RN, that MD, Karen Briggs wants sips of clear with no carbonation or broth to start.     Nutrition Related Findings:   Diet: 3/30 Clear, 4/2 NPO, 4/5 sips of clears (no carbonation/broth) Wound Type: Surgical incision    Current Nutrition Therapies:  No diet orders on file    Current Intake:   Average Meal Intake: NPO Average Supplement Intake: None ordered      Anthropometric Measures:  Height: 5' 6\" (167.6 cm)  Current Body Wt: 87.3 kg (192 lb 7.4 oz), Weight source: Stated  BMI: 31.1, Obese class 1 (BMI 30.0-34.9)     Ideal Body Weight (lbs) (Calculated): 130 lbs (59 kg), 148 %  Usual Body Wt:  , Percent weight change:            Edema: No data recorded   Estimated Daily Nutrient Needs:  Energy (kcal/day): 4189-8991 (Kcal/kg(20-25), Weight Used: Current(87.3 kg))  Protein (g/day):  (20% kcals) Weight Used: ( )  Fluid (ml/day):   (1 ml/kcal)    Nutrition Diagnosis:   · Inadequate oral intake related to altered GI structure as evidenced by NPO or clear liquid status due to medical condition    Nutrition Interventions:   Food and/or Nutrient Delivery: Start oral nutrition supplement, Continue current diet     Coordination of Nutrition Care: Continue to monitor while inpatient  Plan of Care discussed with RNIvone    Goals: Active Goal: Advance diet to full liquids or solid food within 3 days. Nutrition Monitoring and Evaluation:      Food/Nutrient Intake Outcomes: Diet advancement/tolerance  Physical Signs/Symptoms Outcomes: Nausea/vomiting    Discharge Planning:     Too soon to determine    Electronically signed by Nathen Yoon MS, RD, LD on 4/5/2021 at 10:48 AM.  Contact: [phone]

## 2021-04-05 NOTE — PROGRESS NOTES
END OF SHIFT NOTE:    INTAKE/OUTPUT  04/04 0701 - 04/05 0700  In: 2882 [I.V.:2882]  Out: 1000 [Urine:1000]  Voiding: YES  Catheter: NO  Drain:              Flatus: Patient does have flatus present. Stool:  0 occurrences. Characteristics:      Emesis: 1 occurrences. Characteristics:   Emesis Assessment  Appearance: Green  Emesis Amount: Medium(400 ml)    VITAL SIGNS  Patient Vitals for the past 12 hrs:   Temp Pulse Resp BP SpO2   04/05/21 0235 99 °F (37.2 °C) 90 19 (!) 156/79 95 %   04/04/21 2240 99.3 °F (37.4 °C) 95 18 (!) 143/72 95 %   04/04/21 1902 99 °F (37.2 °C) 91 19 (!) 149/73 96 %       Pain Assessment  Pain Intensity 1: 1 (04/04/21 1902)  Pain Location 1: Abdomen  Pain Intervention(s) 1: Declines  Patient Stated Pain Goal: 0    Ambulating  Yes    Shift report given to oncoming nurse at the bedside.     Francine Obrien RN

## 2021-04-05 NOTE — PROGRESS NOTES
Pt in bed and reports she has vomited x 2 a small amount. Has taken 4 sips of apple juice. Pt reports when she does drink, it feels like it stops in her upper stomach and then she begins to have a little pain. No pain at this moment.  Declines zofran at this time

## 2021-04-05 NOTE — PROGRESS NOTES
Chart screened by  for discharge planning. Patient to return home at discharge. PT stated no further skilled needs. Patient still having n/v and awaiting bowel function. CM will continue to follow patient during hospitalization for discharge planning and needs. No needs identified at this time. Please consul or notify  if any new issues arise.

## 2021-04-05 NOTE — PROGRESS NOTES
EOS   Pt has been having n/v most of the day. Vomited about 3 times with total of 150-200cc each time. Medicated per order. Voiding. No BM.  + burping. No gas. Taking only a few sips of apple juice and a few ice chips. No c/o pain. Family at [facility]. Contact HERACLIO Hollingsworth NP.   Orders received

## 2021-04-05 NOTE — PROGRESS NOTES
POD#6 T99 VSS  WBC-10.4  Having nausea and vomiting but this was going on prior to surgery. She appears well. Wants something to eat. Incisions fine,abd soft,non distended,non tender. No flatus. Flat and upright abd films yesterday OK. Begin sips of non carbonated clears.

## 2021-04-05 NOTE — PROGRESS NOTES
Medications given. Low grade temp. Encouraged to use IS and deep breathe which she states she is doing. Sitting up on SOB.   Family at [facility]

## 2021-04-05 NOTE — PROGRESS NOTES
Pt in bed this AM.  Has been up in the recliner for a couple of hours. Has vomited about 150cc per the pt's report. Have not seen as pt disposes of it   Zofran was given at 834 this morning and pt stated that she was feeling better after she vomited and has not vomited since earlier this morning. Taking ice chips without a problem. Dr Paulino Nageotte said pt could slowly sip some clear liquids. Pt's niece brought her some unsweetened apple juice so pt will start to sip on that. Instructed to go slowly.

## 2021-04-06 NOTE — ROUTINE PROCESS
END OF SHIFT NOTE:    INTAKE/OUTPUT  04/05 0701 - 04/06 0700  In: 2769 [I.V.:2769]  Out: 2664 [Urine:320]  Voiding: YES  Catheter: NO  Drain:              Flatus: Patient does have flatus present. Stool:  6 occurrences. Characteristics:  Stool Assessment  Stool Color: Brown  Stool Appearance: Loose, Watery  Stool Amount: Small  Stool Source/Status: Rectum    Emesis: 10 occurrences. Characteristics:   Emesis Assessment  Appearance: Green  Emesis Amount: Medium(400 ml)    VITAL SIGNS  Patient Vitals for the past 12 hrs:   Temp Pulse Resp BP SpO2   04/06/21 1530 98.6 °F (37 °C) 86 18 119/66 96 %   04/06/21 1122 98.4 °F (36.9 °C) 90 17 (!) 141/63 94 %       Pain Assessment  Pain Intensity 1: 1 (04/06/21 1530)  Pain Location 1: Abdomen  Pain Intervention(s) 1: Declines  Patient Stated Pain Goal: 0    Ambulating  Yes     Shift report given to oncoming nurse at the bedside.     Elder   RN

## 2021-04-06 NOTE — CONSULTS
Comprehensive Nutrition Assessment  This assessment was completed remotely. Patient consent was obtained for remote assessment. Type and Reason for Visit: Reassess, Consult  TPN Management (Surgery)   Best Practice Alert for Malnutrition Screening Tool: Recently Lost Weight Without Trying: Yes,  , Eating Poorly Due to Decreased Appetite: Yes      Nutrition Recommendations/Plan:   Parenteral Nutrition:  Start PPN  5%DEX/ 4.25%AA at 100 ml/hr with 250 ml 20% Lipids daily over 24 hrs  To provide: 1316 kcal/d (88% of needs), 102 grams of protein/d (107% of needs), 120 grams of CHO/d and 2650 ml of total volume/d   Lytes/L:   Sodium 60 meq (60 meq NaCl), Potassium 20 meq (10 meq KCl, 10 meq KPO4), 3 meq Mg, 0 meq Calcium. Osmolality is 866mOsm and appropriate for peripheral infusion. Other additives: MTE, MVI, 100 mg thiamin  IVF:  Reduce IVF to 50 ml/hr once PPN started. Vitamin and Mineral Supplement Therapy:  Electrolyte management replacement protocol implemented. Labs:    BMP, Phos, and Mg today. Then BMP daily, Phos and Mg MWF, Triglyceride tomorrow am.  POC Glucoses/SSI if Glucose > 180 mg/dl on AM BMP.  Add daily weight     Malnutrition Assessment:  Malnutrition Status: Severe malnutrition  Context: Chronic illness  Findings of clinical characteristics of malnutrition:   Energy Intake:  7 - 75% or less est energy requirements for 1 month or longer  Weight Loss:  7.0 - Greater than 7.5% over 3 months(226# to 182# (19.5%) in < 3 months)     Body Fat Loss:  Unable to assess,     Muscle Mass Loss:  Unable to assess,    Fluid Accumulation:  Unable to assess,     Strength:  Not performed       Nutrition Assessment:   Nutrition History: 4/6: Pt reports at baseline she eats 2 small meals a day of oatmeal or grits for breakfast and then a \"normal\" meal for her 2nd meal of the day. About 8 weeks ago, she was eating a [employer] meal and had N/V which she thought was food poisioning.  However she continued to have N/V with all po intake, thus intake was <25% of usual. She was mainly consuming gatorafde, Boost or Ensure PTA. She had weight loss from 226# to 182#. She reports addtional weight loss since her pre assessment weight on 3/24/2021. Nutrition Background: H/O: breast cancer with bilateral mastectomy, uterine cancer s/p hysterectomy, DM, HTN, RLS, GERD and claustrophobia. 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 She has had postop N&V and been unable to tolerate po diet progression  Daily Update:  GI consult 4/6 with addition of MOM and Protonix. Increased physical activities also encouraged. RN reports pt with vomiting of bile colored emesis with almost all po intake this weekend. Pt has one PIV but plan is to obtain 2nd PIV  Pt did have BM's today and N/V is better but remains on only sips of clear liquids. Pt agreeable to PPN but voices that she does not want to regain weight that she has lost.  Abdominal Status (last documented): Nausea, Vomiting abdomen with Hypoactive  bowel sounds. Last BM 04/06/21. Pertinent Medications: MOM, zofran, protonix, prn phenergan, scopolamine patch  Dulcolax suppository 4/5 PM  IVF: 1/2 NS with 20 meq Kcl at 150 ml/hr  Pertinent Labs:   Lab Results   Component Value Date/Time    Sodium 140 04/06/2021 09:12 AM    Potassium 4.2 04/06/2021 09:12 AM    Chloride 100 04/06/2021 09:12 AM    CO2 25 04/06/2021 09:12 AM    Anion gap 15 04/06/2021 09:12 AM    Glucose 131 (H) 04/06/2021 09:12 AM    BUN 23 04/06/2021 09:12 AM    Creatinine 1.04 (H) 04/06/2021 09:12 AM    Calcium 10.0 04/06/2021 09:12 AM    Albumin 3.2 04/06/2021 09:12 AM    Magnesium 2.4 04/06/2021 09:12 AM    Phosphorus 4.8 (H) 04/06/2021 09:12 AM   BMP glucose range since admission 86 to 193. May need SSI added is BMP glucose >180 mg/dl with addition of PPN. Pt will be at increased risk for refeeding syndrome with start of PPN.         Nutrition Related Findings:   Diet: 3/30 Clear, 4/2 NPO, 4/5 sips of clears, Ensure clear Wound Type: Surgical incision    Current Nutrition Therapies:  DIET NUTRITIONAL SUPPLEMENTS All Meals; Ensure Clear ( )    Current Intake:   Average Meal Intake: (Sips of clear liquids is nutrtionally inadeqaute) Average Supplement Intake: (Sips of Ensure clear is nutrtionally inadequate)      Anthropometric Measures:  Height: 5' 6\" (167.6 cm)  Current Body Wt: 82.6 kg (182 lb), Weight source: Stated  BMI: 29.4, Overweight (BMI 25.0-29.9)(29.4 for stated weight of 182#)  Admission Body Weight: 182 lb(Pre assessment weight was 87.3 kg 3/24/2021 with pt report of additional weight loss down to 182# PTA)  Ideal Body Weight (lbs) (Calculated): 130 lbs (59 kg), 148 %  Usual Body Wt: 102.5 kg (226 lb), Percent weight change: -19.5          Edema: No data recorded   Estimated Daily Nutrient Needs:  Energy (kcal/day): 0545-8009 ((18-23), Weight Used: Other (specify)(stated 182#/82.7 kg))  Protein (g/day): 74-95 Weight Used: ((20% of kcal))  Fluid (ml/day):   (1 ml/kcal)    Nutrition Diagnosis:   · Inadequate oral intake related to altered GI function, inadequate protein-energy intake(continued N/V) as evidenced by NPO or clear liquid status due to medical condition    · Severe malnutrition related to inadequate protein-energy intake as evidenced by (s/s as above per malnutrition assessment)    Nutrition Interventions:   Food and/or Nutrient Delivery: Start parenteral nutrition     Coordination of Nutrition Care: Continue to monitor while inpatient  Plan of Care discussed with Nuzhat Sloan RN  Communicated with Gunner Urrutia NP concerning whether pt needs continued IVF with plan t start PPN at 100 ml/hr. Response was to continue IVF at 50 ml/hr with PPN at 100 ml/hr for volume needs. Goals:       Active Goal: PPN intake >75% of needs within 3 days and until po inake >60% of needs    Nutrition Monitoring and Evaluation:      Food/Nutrient Intake Outcomes: Diet advancement/tolerance, Parenteral nutrition intake/tolerance  Physical Signs/Symptoms Outcomes: Biochemical data, Nausea/vomiting, Weight, GI status    Discharge Planning:     Too soon to determine    Gregory Medrano, [address], [address] [place] [address], [address] [place] [address]

## 2021-04-06 NOTE — PROGRESS NOTES
END OF SHIFT NOTE:    Hourly rounds conducted. Suppository administered. Pt vomited 2x. Heart murmur noted at reassessment. Provider notified. Bolus administered. INTAKE/OUTPUT  04/05 0701 - 04/06 0700  In: 1393 [I.V.:1393]  Out: 1170 [Urine:320]  Voiding: YES  Catheter: NO  Drain:              Flatus: Patient does not have flatus present. Stool:  1 occurrences, per pt report. Characteristics:  Stool Assessment  Stool Appearance: (have not observed)    Emesis: 2 occurrences. Characteristics:   Emesis Assessment  Appearance: Green  Emesis Amount: Medium(400 ml)    VITAL SIGNS  Patient Vitals for the past 12 hrs:   Temp Pulse Resp BP SpO2   04/06/21 0016 99.4 °F (37.4 °C) (!) 106 17 104/69 94 %   04/05/21 2003 97.7 °F (36.5 °C) (!) 105 17 (!) 141/75 96 %   04/05/21 1535 99.6 °F (37.6 °C) 100 20 (!) 151/83 95 %       Pain Assessment  Pain Intensity 1: 1 (04/05/21 2014)  Pain Location 1: Abdomen  Pain Intervention(s) 1: Declines  Patient Stated Pain Goal: 0    Ambulating  Yes    Shift report given to oncoming nurse at the bedside.     Cielo Russo

## 2021-04-06 NOTE — PROGRESS NOTES
Problem: Falls - Risk of  Goal: *Absence of Falls  Description: Document [place] Fall Risk and appropriate interventions in the flowsheet.   Outcome: Progressing Towards Goal  Note: Fall Risk Interventions:  Mobility Interventions: Communicate number of staff needed for ambulation/transfer    Mentation Interventions: Adequate sleep, hydration, pain control    Medication Interventions: Patient to call before getting OOB    Elimination Interventions: Call light in reach, Toileting schedule/hourly rounds

## 2021-04-06 NOTE — PROGRESS NOTES
POD#7  AVSS  Still with nausea and vomiting though better this am!  Abd soft,non distended. Incisions good. Check albumin,lipase and amylase.   Several small BM's this am!  May need peripheral hyperal.

## 2021-04-06 NOTE — H&P (VIEW-ONLY)
[facility] Consult Note Primary GI Physician: Dr. Namita Do Referring Provider: Nadine Benjamin NP Consult Date:  4/6/2021 Admit Date:  3/30/2021 Chief Complaint:  N&V Subjective:  
 
History of Present Illness:  Patient is a [de-identified] y.o. female with PMH of including but not limited to hx of breast cancer with bilateral mastectomy, uterine cancer s/p hysterectomy, DM, HTN, RLS, GERDand claustrophobia, who is seen in consultation at the request of SUSANNE Hollingsworth NP for  N&V. She underwent right hemicolectomy by Dr. Fátima Sanders on 8/36/67 for right invasive adenocarcinoma. She has had postop N&V. She believes it has improved some overnight since beginning to have a BM. She had a suppository last night with some small loose stool. She had N&V prior to her surgery that began about 1 1/2 weeks before. She has GERD and takes omeprazole 20mg daily at home. She was also taking PRN zofran at home with relief. She is getting Pepcid 20mg every 12 hours, PRN phenergan (although this was discontinued due to delirium) and zofran. Scheduled scopolamine patch. Reglan was ordered by Surgical team and the patient and son states that when she got it yesterday she did better. It was discontinued by her nurse yesterday. Her current nurse is unsure why it was discontinued. Pharmacy states that it was discontinued by nursing, but they see no contraindication for taking it. Her current nurse was giving her scheduled zofran with improvement over the weekend. The patient denies any abdominal pain. She has had a 40pound weight loss over the last several months. Colonoscopy by Dr. Namita Do on 3/17/2021 revealed internal hemorrhoids, moderate diverticulosis, lipoma in the transverse colon, polyps in the transverse colon, large ascending colon lesion resembling a colon cancer enveloping 60% of the lumen EGD on 3/17/2021 by Dr. Namita Do revealed 2cm hiatal hernia and acute gastritis KUB 4/5/2021 
  
INDICATION:   Nausea post hemicolectomy 
  
Supine views of the abdomen were obtained. 
  
FINDINGS:  There is mild prominence of stool in the right abdomen. No 
significant bowel distention. There is no evidence of obstruction. No renal 
calculi are seen. There are scattered surgical staples. Lung bases are clear. 
  
 
PMH: 
Past Medical History:  
Diagnosis Date  Adverse effect of anesthesia   
 pt states she woke up during colonoscopy X1. No complications with any other procedures.  Claustrophobia  Colon cancer ([address]  GERD (gastroesophageal reflux disease)   
 managed with Prilosec  History of breast cancer in female   
 bilateral mastectomy and 6 months of chemo  History of diabetes mellitus   
  pt no longer required to take medications or monitor BS- weight loss/ Glucose on labs 3.24.21 results: 153  History of uterine cancer 2012  
 resulted in complete hysterectomy  RLS (restless legs syndrome)   
 managed with PRN meds PSH: 
Past Surgical History:  
Procedure Laterality Date  HX BILATERAL MASTECTOMY Bilateral   
 HX CATARACT REMOVAL Bilateral 2020  
 with lens implants  HX COLONOSCOPY    
 HX ALEJANDRO AND BSO  HX TONSILLECTOMY Allergies: Allergies Allergen Reactions  Augmentin [Amoxicillin-Pot Clavulanate] Rash and Swelling  Pcn [Penicillins] Rash and Swelling Home Medications: 
Prior to Admission medications Medication Sig Start Date End Date Taking? Authorizing Provider HYDROcodone-acetaminophen (NORCO) 5-325 mg per tablet Take  by mouth as needed for Pain. Yes Provider, Historical  
ondansetron hcl (Zofran) 4 mg tablet Take 4 mg by mouth every eight (8) hours as needed for Nausea or Vomiting. Yes Provider, Historical  
omeprazole (PRILOSEC) 40 mg capsule Take 40 mg by mouth daily. Yes Provider, Historical  
 
 
Hospital Medications: 
Current Facility-Administered Medications Medication Dose Route Frequency  ondansetron (ZOFRAN) injection 8 mg  8 mg IntraVENous Q4H PRN  
 enoxaparin (LOVENOX) injection 40 mg  40 mg SubCUTAneous Q24H  
 scopolamine (TRANSDERM-SCOP) 1 mg over 3 days 1 Patch  1 Patch TransDERmal Q72H  promethazine (PHENERGAN) with saline injection 25 mg  25 mg IntraVENous Q4H PRN  
 0.45% sodium chloride with KCl 20 mEq/L infusion   IntraVENous CONTINUOUS  
 enalaprilat (VASOTEC) injection 0.625 mg  0.625 mg IntraVENous Q6H PRN  
 HYDROmorphone (DILAUDID) injection 1 mg  1 mg IntraVENous Q3H PRN  
 oxyCODONE-acetaminophen (PERCOCET 7.5) 7.5-325 mg per tablet 1 Tab  1 Tab Oral Q4H PRN  
 famotidine (PF) (PEPCID) 20 mg in 0.9% sodium chloride 10 mL injection  20 mg IntraVENous Q12H Social History: 
Social History Tobacco Use  Smoking status: Never Smoker  Smokeless tobacco: Never Used Substance Use Topics  Alcohol use: Never Frequency: Never Family History: 
Family History Problem Relation Age of Onset  Cancer Mother  Dementia Father  No Known Problems Sister  No Known Problems Brother  Cancer Maternal Grandmother  No Known Problems Sister  Cancer Sister Pancreatic CA  Diabetes Brother  Stroke Brother  Cancer Brother Colon CA  Cancer Brother Review of Systems: A detailed 10 system ROS is obtained, with pertinent positives as listed above. All others are negative. Diet:  Clear nutritional supplements-sips only Objective:  
 
Physical Exam: 
Vitals: 
Visit Vitals /67 Pulse 92 Temp 98.5 °F (36.9 °C) Resp 16 Ht 5' 6\" (1.676 m) Wt 87.3 kg (192 lb 7.4 oz) SpO2 95% BMI 31.06 kg/m² Gen:  Pt is alert, cooperative, no acute distress ILL APPEARING; SON AT BEDSIDE Skin:  Extremities and face reveal no rashes. HEENT: Sclerae anicteric. Extra-occular muscles are intact. No oral ulcers. No abnormal pigmentation of the lips. The neck is supple. Cardiovascular: Regular rate and rhythm.  No murmurs, gallops, or rubs. 
Respiratory:  Comfortable breathing with no accessory muscle use. Clear breath sounds anteriorly with no wheezes, rales, or rhonchi. GI:  Abdomen MILD DISTENTION soft, and GENERALIZED TENDERNESS; SUTURES HEALING HYPOactive bowel sounds, BUT PRESENT. No enlargement of the liver or spleen. No masses palpable. Musculoskeletal:  No pitting edema of the lower legs. Neurological:  Gross memory appears intact. Patient is alert and oriented. Psychiatric:  Mood appears appropriate with judgement intact. Lymphatic:  No cervical or supraclavicular adenopathy. Laboratory:   
Recent Labs 04/05/21 
0450 04/04/21 
0440 WBC 10.6 8.8 HGB 11.8 11.1*  
HCT 36.3 35.5*  284 MCV 75.5* 77.5*  140  
K 3.9 3.6  106 CO2 25 26 BUN 12 11 CREA 0.59* 0.52* CA 9.2 8.9 GLU 96 86 Assessment:  
 
Principal Problem: 
  Colon cancer ([facility] [address] (3/30/2021) [de-identified] y.o. female with PMH of including but not limited to hx of breast cancer with bilateral mastectomy, uterine cancer s/p hysterectomy, DM, HTN, RLS, GERDand claustrophobia, who is seen in consultation at the request of SUSANNE Hollingsworth NP for post-op N&V. She underwent right hemicolectomy by Dr. Patricia Temple on 4/30/41 for right invasive adenocarcinoma. She has not responded to Pepcid 20mg every 12 hours, PRN phenergan (although this was discontinued due to delirium) and zofran. Scheduled scopolamine patch. She has had some improvement with previous scheduled antiemetics and metoclopramide (although unsure why this was discontinued). Suspect her current constipation is increasing her nausea. Plan:  
 
-LFTS 
-Agree with dulcolax suppository 
-Add Milk of magnesia 30cc daily 
-Protonix 40mg IV bid in place of Pepcid 
-Schedule antiemetics with Zofran every 4 hours (she did well with this previously) 
-could consider a re-trial of metoclopramide (unless we can document reason it was discontinued) Patient is seen and examined in collaboration with Dr. Quincy Diaz. Assessment and plan as per Dr. Quincy Diaz. Amanda Murrieta. Ivone Hamida, [address] [facility] Patient interviewed and examined. Agree with above. Discussed assessment and plans with patient. Will need nutritional support if unable to resume adequate po intake. Has been living on Ensure and Gatorade for weeks with reported weight loss of 40-50 lbs. Encouraged to increase Physical activities as tolerated. Beatrice To MD

## 2021-04-06 NOTE — CONSULTS
[facility] Consult Note       Primary GI Physician: Dr. Angel Espinoza    Referring Provider: Anitha Neff NP    Consult Date:  4/6/2021    Admit Date:  3/30/2021    Chief Complaint:  N&V    Subjective:     History of Present Illness:  Patient is a [de-identified] y.o. female with PMH of including but not limited to hx of breast cancer with bilateral mastectomy, uterine cancer s/p hysterectomy, DM, HTN, RLS, GERDand claustrophobia, who is seen in consultation at the request of SUSANNE Hollingsworth NP for  N&V. She underwent right hemicolectomy by Dr. Kamran Viera on 0/19/65 for right invasive adenocarcinoma. She has had postop N&V. She believes it has improved some overnight since beginning to have a BM. She had a suppository last night with some small loose stool. She had N&V prior to her surgery that began about 1 1/2 weeks before. She has GERD and takes omeprazole 20mg daily at home. She was also taking PRN zofran at home with relief. She is getting Pepcid 20mg every 12 hours, PRN phenergan (although this was discontinued due to delirium) and zofran. Scheduled scopolamine patch. Reglan was ordered by Surgical team and the patient and son states that when she got it yesterday she did better. It was discontinued by her nurse yesterday. Her current nurse is unsure why it was discontinued. Pharmacy states that it was discontinued by nursing, but they see no contraindication for taking it. Her current nurse was giving her scheduled zofran with improvement over the weekend. The patient denies any abdominal pain. She has had a 40pound weight loss over the last several months.       Colonoscopy by Dr. Angel Espinoza on 3/17/2021 revealed internal hemorrhoids, moderate diverticulosis, lipoma in the transverse colon, polyps in the transverse colon, large ascending colon lesion resembling a colon cancer enveloping 60% of the lumen  EGD on 3/17/2021 by Dr. Angel Espinoza revealed 2cm hiatal hernia and acute gastritis    KUB 4/5/2021     INDICATION:   Nausea post hemicolectomy     Supine views of the abdomen were obtained.     FINDINGS:  There is mild prominence of stool in the right abdomen. No  significant bowel distention. There is no evidence of obstruction. No renal  calculi are seen. There are scattered surgical staples. Lung bases are clear.       PMH:  Past Medical History:   Diagnosis Date    Adverse effect of anesthesia     pt states she woke up during colonoscopy X1. No complications with any other procedures.  Claustrophobia     Colon cancer ([address]     GERD (gastroesophageal reflux disease)     managed with Prilosec    History of breast cancer in female     bilateral mastectomy and 6 months of chemo    History of diabetes mellitus      pt no longer required to take medications or monitor BS- weight loss/ Glucose on labs 3.24.21 results: 153    History of uterine cancer 2012    resulted in complete hysterectomy    RLS (restless legs syndrome)     managed with PRN meds       PSH:  Past Surgical History:   Procedure Laterality Date    HX BILATERAL MASTECTOMY Bilateral     HX CATARACT REMOVAL Bilateral 2020    with lens implants    HX COLONOSCOPY      HX ALEJANDRO AND BSO      HX TONSILLECTOMY         Allergies: Allergies   Allergen Reactions    Augmentin [Amoxicillin-Pot Clavulanate] Rash and Swelling    Pcn [Penicillins] Rash and Swelling       Home Medications:  Prior to Admission medications    Medication Sig Start Date End Date Taking? Authorizing Provider   HYDROcodone-acetaminophen (NORCO) 5-325 mg per tablet Take  by mouth as needed for Pain. Yes Provider, Historical   ondansetron hcl (Zofran) 4 mg tablet Take 4 mg by mouth every eight (8) hours as needed for Nausea or Vomiting. Yes Provider, Historical   omeprazole (PRILOSEC) 40 mg capsule Take 40 mg by mouth daily.    Yes Provider, Historical       Hospital Medications:  Current Facility-Administered Medications   Medication Dose Route Frequency    ondansetron (ZOFRAN) injection 8 mg  8 mg IntraVENous Q4H PRN    enoxaparin (LOVENOX) injection 40 mg  40 mg SubCUTAneous Q24H    scopolamine (TRANSDERM-SCOP) 1 mg over 3 days 1 Patch  1 Patch TransDERmal Q72H    promethazine (PHENERGAN) with saline injection 25 mg  25 mg IntraVENous Q4H PRN    0.45% sodium chloride with KCl 20 mEq/L infusion   IntraVENous CONTINUOUS    enalaprilat (VASOTEC) injection 0.625 mg  0.625 mg IntraVENous Q6H PRN    HYDROmorphone (DILAUDID) injection 1 mg  1 mg IntraVENous Q3H PRN    oxyCODONE-acetaminophen (PERCOCET 7.5) 7.5-325 mg per tablet 1 Tab  1 Tab Oral Q4H PRN    famotidine (PF) (PEPCID) 20 mg in 0.9% sodium chloride 10 mL injection  20 mg IntraVENous Q12H       Social History:  Social History     Tobacco Use    Smoking status: Never Smoker    Smokeless tobacco: Never Used   Substance Use Topics    Alcohol use: Never     Frequency: Never       Family History:  Family History   Problem Relation Age of Onset    Cancer Mother     Dementia Father     No Known Problems Sister     No Known Problems Brother     Cancer Maternal Grandmother     No Known Problems Sister     Cancer Sister         Pancreatic CA    Diabetes Brother     Stroke Brother     Cancer Brother         Colon CA     Cancer Brother        Review of Systems:  A detailed 10 system ROS is obtained, with pertinent positives as listed above. All others are negative. Diet:  Clear nutritional supplements-sips only    Objective:     Physical Exam:  Vitals:  Visit Vitals  /67   Pulse 92   Temp 98.5 °F (36.9 °C)   Resp 16   Ht 5' 6\" (1.676 m)   Wt 87.3 kg (192 lb 7.4 oz)   SpO2 95%   BMI 31.06 kg/m²     Gen:  Pt is alert, cooperative, no acute distress ILL APPEARING; SON AT BEDSIDE  Skin:  Extremities and face reveal no rashes. HEENT: Sclerae anicteric. Extra-occular muscles are intact. No oral ulcers. No abnormal pigmentation of the lips. The neck is supple. Cardiovascular: Regular rate and rhythm.  No murmurs, gallops, or rubs.  Respiratory:  Comfortable breathing with no accessory muscle use. Clear breath sounds anteriorly with no wheezes, rales, or rhonchi. GI:  Abdomen MILD DISTENTION soft, and GENERALIZED TENDERNESS; SUTURES HEALING HYPOactive bowel sounds, BUT PRESENT. No enlargement of the liver or spleen. No masses palpable. Musculoskeletal:  No pitting edema of the lower legs. Neurological:  Gross memory appears intact. Patient is alert and oriented. Psychiatric:  Mood appears appropriate with judgement intact. Lymphatic:  No cervical or supraclavicular adenopathy. Laboratory:    Recent Labs     04/05/21  0450 04/04/21  0440   WBC 10.6 8.8   HGB 11.8 11.1*   HCT 36.3 35.5*    284   MCV 75.5* 77.5*    140   K 3.9 3.6    106   CO2 25 26   BUN 12 11   CREA 0.59* 0.52*   CA 9.2 8.9   GLU 96 86          Assessment:     Principal Problem:    Colon cancer ([facility] [address] (3/30/2021)    [de-identified] y.o. female with PMH of including but not limited to hx of breast cancer with bilateral mastectomy, uterine cancer s/p hysterectomy, DM, HTN, RLS, GERDand claustrophobia, who is seen in consultation at the request of SUSANNE Hollingsworth NP for post-op N&V. She underwent right hemicolectomy by Dr. Naima Coley on 2/59/77 for right invasive adenocarcinoma. She has not responded to Pepcid 20mg every 12 hours, PRN phenergan (although this was discontinued due to delirium) and zofran. Scheduled scopolamine patch. She has had some improvement with previous scheduled antiemetics and metoclopramide (although unsure why this was discontinued). Suspect her current constipation is increasing her nausea.      Plan:     -LFTS  -Agree with dulcolax suppository  -Add Milk of magnesia 30cc daily  -Protonix 40mg IV bid in place of Pepcid  -Schedule antiemetics with Zofran every 4 hours (she did well with this previously)  -could consider a re-trial of metoclopramide (unless we can document reason it was discontinued)    Patient is seen and examined in collaboration with Dr. Pamalee Kehr. Assessment and plan as per Dr. Pamalee Kehr. Irene Ramirez. [address]  [facility] [place]    Patient interviewed and examined. Agree with above. Discussed assessment and plans with patient. Will need nutritional support if unable to resume adequate po intake. Has been living on Ensure and Gatorade for weeks with reported weight loss of 40-50 lbs. Encouraged to increase Physical activities as tolerated. Beatrice Montelongo MD

## 2021-04-07 PROBLEM — E43 SEVERE PROTEIN-CALORIE MALNUTRITION (HCC): Status: ACTIVE | Noted: 2021-01-01

## 2021-04-07 NOTE — PROGRESS NOTES
General Surgery Progress Note    4/7/2021    Admit Date: 3/30/2021    Subjective:     Surgery POD #8  Pt continues with bilious vomiting.  +flatus and BM. Abdomen is benign. Lipase 571. Afebrile, tachy, voiding. Started PPN yesterday. Objective:     Visit Vitals  /72   Pulse 92   Temp 98.7 °F (37.1 °C)   Resp 18   Ht 5' 6\" (1.676 m)   Wt 201 lb 3.2 oz (91.3 kg)   SpO2 94%   BMI 32.47 kg/m²         Intake/Output Summary (Last 24 hours) at 4/7/2021 1010  Last data filed at 4/7/2021 0927  Gross per 24 hour   Intake 4769 ml   Output 925 ml   Net 3844 ml        EXAM:  +bilious vomit  ABD soft, mild incisional tenderness, active BS'S. Wounds are intact without signs of bleeding or infection.         Data Review    Recent Results (from the past 24 hour(s))   METABOLIC PANEL, BASIC    Collection Time: 04/07/21  4:35 AM   Result Value Ref Range    Sodium 137 136 - 145 mmol/L    Potassium 4.0 3.5 - 5.1 mmol/L    Chloride 106 98 - 107 mmol/L    CO2 26 21 - 32 mmol/L    Anion gap 5 (L) 7 - 16 mmol/L    Glucose 161 (H) 65 - 100 mg/dL    BUN 26 (H) 8 - 23 MG/DL    Creatinine 0.78 0.6 - 1.0 MG/DL    GFR est AA >60 >60 ml/min/1.73m2    GFR est non-AA >60 >60 ml/min/1.73m2    Calcium 8.7 8.3 - 10.4 MG/DL   PHOSPHORUS    Collection Time: 04/07/21  4:35 AM   Result Value Ref Range    Phosphorus 3.0 2.3 - 3.7 MG/DL   MAGNESIUM    Collection Time: 04/07/21  4:35 AM   Result Value Ref Range    Magnesium 2.3 1.8 - 2.4 mg/dL   TRIGLYCERIDE    Collection Time: 04/07/21  4:35 AM   Result Value Ref Range    Triglyceride 93 35 - 150 MG/DL        Hospital Problems  Date Reviewed: 3/23/2021          Codes Class Noted POA    Severe protein-calorie malnutrition ([facility] [address] ICD-10-CM: T67  ICD-9-CM: 716  4/7/2021 Yes        * (Principal) Colon cancer ([facility] [address] ICD-10-CM: C18.9  ICD-9-CM: 153.9  3/30/2021 Unknown              Continue PPN  Follow labs  Await GI recs; do not think bilious vomiting is related to surgery as it was present preoperatively and bowel function has returned  Place NGT to LIS  Keep IVFs for volume status with vomiting  Prophylaxis with SCDS, IS, Protonix, Lovenox.     KANE Haney

## 2021-04-07 NOTE — PROGRESS NOTES
Pt resting quietly. No vomiting since Ativan 1mg PO given. If pt continues with no vomiting, will hold off on NG. If nausea continues, NG will be placed.   Pt verbalizes understanding

## 2021-04-07 NOTE — PROGRESS NOTES
[facility] Progress Note         Admit Date:  3/30/2021    Today's Date:  4/7/2021    CC:  N&V    Subjective:     Patient: worsening vomiting again today. NG ordered by surgery, but patient refused. Unable to tolerate po intake. Now with TPN. Having BM. Medications:   Current Facility-Administered Medications   Medication Dose Route Frequency    pantoprazole (PROTONIX) 40 mg in 0.9% sodium chloride 10 mL injection  40 mg IntraVENous Q12H    ondansetron (ZOFRAN) injection 8 mg  8 mg IntraVENous Q4H    magnesium hydroxide (MILK OF MAGNESIA) 400 mg/5 mL oral suspension 30 mL  30 mL Oral DAILY    NUTRITIONAL SUPPORT ELECTROLYTE PRN ORDERS   Does Not Apply PRN    fat emulsion 20% (LIPOSYN, INTRAlipid) infusion 250 mL  250 mL IntraVENous QPM    TPN ADULT - dextrose 5% amino acid 4.25%   IntraVENous QPM    0.45% sodium chloride with KCl 20 mEq/L infusion   IntraVENous CONTINUOUS    enoxaparin (LOVENOX) injection 40 mg  40 mg SubCUTAneous Q24H    scopolamine (TRANSDERM-SCOP) 1 mg over 3 days 1 Patch  1 Patch TransDERmal Q72H    promethazine (PHENERGAN) with saline injection 25 mg  25 mg IntraVENous Q4H PRN    enalaprilat (VASOTEC) injection 0.625 mg  0.625 mg IntraVENous Q6H PRN    HYDROmorphone (DILAUDID) injection 1 mg  1 mg IntraVENous Q3H PRN    oxyCODONE-acetaminophen (PERCOCET 7.5) 7.5-325 mg per tablet 1 Tab  1 Tab Oral Q4H PRN       Review of Systems:  ROS was obtained, with pertinent positives as listed above. No chest pain or SOB. Diet:  Sips of clears    Objective:   Vitals:  Visit Vitals  /72   Pulse 92   Temp 98.7 °F (37.1 °C)   Resp 18   Ht 5' 6\" (1.676 m)   Wt 91.3 kg (201 lb 3.2 oz)   SpO2 94%   BMI 32.47 kg/m²     Intake/Output:  No intake/output data recorded. 04/05 1901 - 04/07 0700  In: 7131 [I. G.:4317]  Out: 1625 [Urine:700]  Exam:  General appearance: alert, cooperative, no distress FAMILY AT BEDSIDE; RETCHING INTERMITTENTLY   Lungs: clear to auscultation bilaterally anteriorly  Heart: regular rate and rhythm  Abdomen: soft, NONDISTENDED. Healing sutures. Mild generalized tenderness   Bowel sounds normal. No masses, no organomegaly  Extremities: extremities normal, atraumatic, no cyanosis or edema  Neuro:  alert and oriented    Data Review (Labs):    Recent Labs     04/07/21  0435 04/06/21  0912 04/05/21  0450   WBC  --   --  10.6   HGB  --   --  11.8   HCT  --   --  36.3   PLT  --   --  304   MCV  --   --  75.5*    140 140   K 4.0 4.2 3.9    100 104   CO2 26 25 25   BUN 26* 23 12   CREA 0.78 1.04* 0.59*   CA 8.7 10.0 9.2   MG 2.3 2.4  --    * 131* 96   AP  --  64  --    ALT  --  40  --    TBILI  --  0.6  --    CBIL  --  0.2  --    ALB  --  3.2  --    TP  --  7.0  --    AML  --  77  --    LPSE  --  571*  --        Assessment:     Principal Problem:    Colon cancer ([facility] [address] (3/30/2021)    Active Problems:    Severe protein[facility]calorie malnutrition ([facility] (4/7/2021)    [de-identified] y.o. female with PMH of including but not limited to hx of breast cancer with bilateral mastectomy, uterine cancer s/p hysterectomy, DM, HTN, RLS, GERDand claustrophobia, who we are following for post-op N&V. She underwent right hemicolectomy by Dr. Jair Puckett on 3/28/28 for right invasive adenocarcinoma. She has not responded to Pepcid 20mg every 12 hours, PRN phenergan (although this was discontinued due to delirium) and zofran. Scheduled scopolamine patch. She has had some improvement with previous scheduled antiemetics and metoclopramide (although unsure why this was discontinued). Suspect her current constipation as seen on KUB 4/5 is increasing her nausea. She had mild elevation lipase on 4/6/2021 571, but suspect this is secondary to persistent vomiting. Could consider repeat CT scan in the future if her vomiting is not resolving. Plan:     -will add metoclopramide 5mg every 6 hours  -Add Ativan 1mg bid  -Continue scheduled antiemetics and PPI  -Agree with NG.  Patient now stating she will \"try it. \"  -Agree with DAVID Moffett.  Ela Capone in collaboration with Dr. Putnam Favors  [facility] [place]

## 2021-04-07 NOTE — PROGRESS NOTES
Chart screened by  for discharge planning. Patient is not tolerating PO foods, patient was to have NGT but refused. Patient is now requiring TPN. CM will continue to follow patient during hospitalization for discharge planning and needs. 4/3/2021 therapy documented  No Further Skilled Needs. Please consult or notify  if any new issues arise.

## 2021-04-07 NOTE — PROGRESS NOTES
Family has been with pt all day. DIL has been coaching pt and encouraging her and pt has not vomited since about 1010 this morning. TPN and lipids infusing. Pt voiding. Napping in intervals. Up in chair for 2 hours this afternoon. Zofran and reglan given around the clock. Ativan very helpful and does help the pt rest and she has received 2 doses today.   Report given to oncoming RN

## 2021-04-07 NOTE — PROGRESS NOTES
Comprehensive Nutrition Assessment  This assessment was completed remotely   Type and Reason for Visit: Reassess  TPN Management (Surgery)    Nutrition Recommendations/Plan:   · Parenteral Nutrition:  · Start PPN  · 5%DEX/ 4.25%AA at 100 ml/hr with 250 ml 20% Lipids daily over 24 hrs  · To provide: 1316 kcal/d (88% of needs), 102 grams of protein/d (107% of needs), 120 grams of CHO/d and 2650 ml of total volume/d   · Lytes/L:   · Sodium 60 meq (60 meq NaCl), Potassium 20 meq (10 meq KCl, 10 meq KPO4), 3 meq Mg, 0 meq Calcium. Osmolality is 866mOsm and appropriate for peripheral infusion. · Other additives: MTE, MVI, 100 mg thiamin  · IVF:  · Reduce IVF to 50 ml/hr once PPN started. · Vitamin and Mineral Supplement Therapy:  · Electrolyte management replacement protocol implemented. · Labs:   · BMP daily,Phos and Mg MWF, Triglyceride tomorrow am.  POC Glucoses/SSI if Glucose > 180 mg/dl on AM BMP. · Daily weight     Malnutrition Assessment:  Malnutrition Status: Severe malnutrition  Context: Chronic illness  Findings of clinical characteristics of malnutrition:   Energy Intake:  7 - 75% or less est energy requirements for 1 month or longer  Weight Loss:  7.0 - Greater than 7.5% over 3 months((226# to 182# (19.5%), 226# to 201# (11%) in < 3 months)     Body Fat Loss:  Unable to assess,     Muscle Mass Loss:  Unable to assess,    Fluid Accumulation:  Unable to assess,     Strength:  Not performed       Nutrition Assessment:   Nutrition History: 4/6: Pt reports at baseline she eats 2 small meals a day of oatmeal or grits for breakfast and then a \"normal\" meal for her 2nd meal of the day. About 8 weeks ago, she was eating a [employer] meal and had N/V which she thought was food poisioning. However she continued to have N/V with all po intake, thus intake was <25% of usual. She was mainly consuming gatorafde, Boost or Ensure PTA. She had weight loss from 226# to 182#.  She reports addtional weight loss since her pre assessment weight on 3/24/2021. Nutrition Background: H/O: breast cancer with bilateral mastectomy, uterine cancer s/p hysterectomy, DM, HTN, RLS, GERD and claustrophobia. 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 She has had postop N&V and been unable to tolerate po diet progression  Daily Update:  PPN started yesterday d/t continued N/V. Pt has 2 PIV sites. Pt continues with bilious emesis and plan was for NGT but pt has declined. Reglan has been added. Abdominal Status (last documented): Nausea, Vomiting, Soft abdomen with Hypoactive  bowel sounds. Last BM 04/06/21. Pertinent Medications: MOM ( held 4/7), reglan ( added 4/7), zofran, protonix,scopolamine patch  Dulcolax suppository 4/5 PM  IVF: 1/2 NS with 20 meq KCl at 50 ml/hr  Pertinent Labs:   Lab Results   Component Value Date/Time    Sodium 137 04/07/2021 04:35 AM    Potassium 4.0 04/07/2021 04:35 AM    Chloride 106 04/07/2021 04:35 AM    CO2 26 04/07/2021 04:35 AM    Anion gap 5 (L) 04/07/2021 04:35 AM    Glucose 161 (H) 04/07/2021 04:35 AM    BUN 26 (H) 04/07/2021 04:35 AM    Creatinine 0.78 04/07/2021 04:35 AM    Calcium 8.7 04/07/2021 04:35 AM    Albumin 3.2 04/06/2021 09:12 AM    Magnesium 2.3 04/07/2021 04:35 AM    Phosphorus 3.0 04/07/2021 04:35 AM     Lab Results   Component Value Date/Time    Triglyceride 93 04/07/2021 04:35 AM   No need for POC glucoses/SSI at this point          Nutrition Related Findings:   Diet: 3/30 Clear, 4/2 NPO, 4/5 sips of clears, Ensure clear. 4/6: PPN started Wound Type: Surgical incision    Current Nutrition Therapies:  DIET NUTRITIONAL SUPPLEMENTS All Meals;  Ensure Clear ( )  Current Parenteral Nutrition Orders:  · Type and Formula: 5%DEX/4.25%AA   · Lipids: 250ml, Daily  · Duration: Continuous  · Rate/Volume: 100 ml/hr  · Current PN Order Provides: 1316 kcal/d (88% of needs), 102 grams of protein/d (107% of needs), 120 grams of CHO/d and 2650 ml of total volume/d      Current Intake: Average Meal Intake: (Sips of clear liquids is nutrtionally inadeqaute) Average Supplement Intake: (Sips of Ensure clear is nutrtionally inadequate)      Anthropometric Measures:  Height: 5' 6\" (167.6 cm)  Current Body Wt: 91.3 kg (201 lb 4.5 oz)(4/7), Weight source: Bed scale  BMI: 32.5, Obese class 1 (BMI 30.0-34. 9)  Admission Body Weight: 182 lb(Pre assessment weight was 87.3 kg 3/24/2021 with pt reprot of additinal weight loss down to 182# PTA)  Ideal Body Weight (lbs) (Calculated): 130 lbs (59 kg), 148 %  Usual Body Wt: 102.5 kg (226 lb), Percent weight change: -19.5          Edema: No data recorded   Estimated Daily Nutrient Needs:  Energy (kcal/day): 7650-0073 (Kcal/kg(15-20), Weight Used: Current(91.3 kg-bed scale 4/7))  Protein (g/day): 69-91 Weight Used: ((20% of kcal))  Fluid (ml/day):   (1 ml/kcal)    Nutrition Diagnosis:   · Inadequate oral intake related to altered GI function, inadequate protein-energy intake(continued N/V) as evidenced by NPO or clear liquid status due to medical condition    · Severe malnutrition related to inadequate protein-energy intake as evidenced by (s/s as above per malnutrition assessment)    Nutrition Interventions:   Food and/or Nutrient Delivery: Continue parenteral nutrition     Coordination of Nutrition Care: Continue to monitor while inpatient  Plan of Care discussed with Inez Reyna RN. Confirmed TPN and lipid infusion rate    Goals:   Previous Goal Met: Goal(s) achieved  Active Goal: Continue PPN to meet estimated needs until able to tolerate po intake >75% of needs    Nutrition Monitoring and Evaluation:      Food/Nutrient Intake Outcomes: Diet advancement/tolerance, Parenteral nutrition intake/tolerance  Physical Signs/Symptoms Outcomes: Biochemical data, Nausea/vomiting, Weight, GI status    Discharge Planning:     Too soon to determine    Feliberto Scheuermann, [address] [place] [address], [facility] [address] [place] [address], [address] [place] [address]

## 2021-04-07 NOTE — PROGRESS NOTES
Informed pt of order for NG tube. Pt states \"oh I don't want it. Can I wait a little bit/\"  I informed pt that Dr Elisha Pierre wanted her to have it because he thought it might help with the nausea and make her feel better. Family at [facility] and they are encouraging pt to \"give the tube a chance\"  Will let me know of pt's decision.

## 2021-04-07 NOTE — PROGRESS NOTES
END OF SHIFT NOTE:    Hourly rounds conducted. Pt still vomiting and complaining of nausea. INTAKE/OUTPUT  04/06 0701 - 04/07 0700  In: 1810 [I.V.:1810]  Out: 825 [Urine:400]  Voiding: YES  Catheter: NO  Drain:              Flatus: Patient does have flatus present. Stool:  1 occurrences. Characteristics:  Stool Assessment  Stool Color: Brown  Stool Appearance: Loose, Watery  Stool Amount: Small  Stool Source/Status: Rectum    Emesis: 3 occurrences, with dry heaving and spitting up. Characteristics:   Emesis Assessment  Appearance: Green  Emesis Amount: Medium(400 ml)    VITAL SIGNS  Patient Vitals for the past 12 hrs:   Temp Pulse Resp BP SpO2   04/06/21 2141 99.1 °F (37.3 °C) 99 17 (!) 140/66 94 %   04/06/21 1914 98.5 °F (36.9 °C) 97 17 (!) 140/76 97 %   04/06/21 1530 98.6 °F (37 °C) 86 18 119/66 96 %       Pain Assessment  Pain Intensity 1: 0 (04/06/21 2040)  Pain Location 1: Abdomen  Pain Intervention(s) 1: Declines  Patient Stated Pain Goal: 0    Ambulating  Yes    Shift report given to oncoming nurse at the bedside.     Lanetta East

## 2021-04-08 NOTE — PROGRESS NOTES
Comprehensive Nutrition Assessment  This assessment was completed remotely  Type and Reason for Visit: Reassess  TPN Management (Surgery)    Nutrition Recommendations/Plan:   Discontinue Ensure Clear  · Parenteral Nutrition:  · Continue PPN  · 5%DEX/ 4.25%AA at 100 ml/hr with 250 ml 20% Lipids daily over 24 hrs  · To provide: 1316 kcal/d (88% of needs), 102 grams of protein/d (107% of needs), 120 grams of CHO/d and 2650 ml of total volume/d   · Lytes/L:   · Sodium 30 meq (30 meq NaCl), Potassium 20 meq (10 meq KCl, 10 meq KPO4), 3 meq Mg, 4.5 meq Calcium. Osmolality is 815 mOsm and appropriate for peripheral infusion. · Other additives: MTE, MVI, 100 mg thiamin  · IVF:  ·  IVF to 50 ml/hr ( per MD)  · Vitamin and Mineral Supplement Therapy:  · Electrolyte management replacement protocol implemented. · Labs:   · BMP daily,Phos and Mg MWF.  POC Glucoses/SSI if Glucose > 180 mg/dl on AM BMP. · Daily weight     Malnutrition Assessment:  Malnutrition Status: Severe malnutrition  Context: Chronic illness  Findings of clinical characteristics of malnutrition:   Energy Intake:  7 - 75% or less est energy requirements for 1 month or longer  Weight Loss:  7.0 - Greater than 7.5% over 3 months((226# to 182# (19.5%), 226# to 201# (11%) in < 3 months)     Body Fat Loss:  Unable to assess,     Muscle Mass Loss:  Unable to assess,    Fluid Accumulation:  Unable to assess,     Strength:  Not performed       Nutrition Assessment:   Nutrition History: 4/6: Pt reports at baseline she eats 2 small meals a day of oatmeal or grits for breakfast and then a \"normal\" meal for her 2nd meal of the day. About 8 weeks ago, she was eating a [employer] meal and had N/V which she thought was food poisioning. However she continued to have N/V with all po intake, thus intake was <25% of usual. She was mainly consuming gatorafde, Boost or Ensure PTA. She had weight loss from 226# to 182#.  She reports addtional weight loss since her pre assessment weight on 3/24/2021. Nutrition Background: H/O: breast cancer with bilateral mastectomy, uterine cancer s/p hysterectomy, DM, HTN, RLS, GERD and claustrophobia. 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 She has had postop N&V and been unable to tolerate po diet progression  Daily Update:  PPN started 4/6 d/t continued N/V. Pt has 2 PIV sites. No vomiting after starting reglan and ativan yesterday until last night. Vomited again this am. NGT placed 4/8. Plan for gastrograffin today  Abdominal Status (last documented): Distended, Obese, Soft abdomen with Hypoactive  bowel sounds. Last BM 04/06/21. X 3  Pertinent Medications: MOM ( held 4/7 and 4/8), reglan  added 4/7), zofran, protonix, scopolamine patch  Dulcolax suppository 4/5 PM  IVF: 1/2 NS with 20 meq KCl at 50 ml/hr  Pertinent Labs:   Lab Results   Component Value Date/Time    Sodium 138 04/08/2021 05:47 AM    Potassium 3.8 04/08/2021 05:47 AM    Chloride 109 (H) 04/08/2021 05:47 AM    CO2 23 04/08/2021 05:47 AM    Anion gap 6 (L) 04/08/2021 05:47 AM    Glucose 151 (H) 04/08/2021 05:47 AM    BUN 34 (H) 04/08/2021 05:47 AM    Creatinine 0.84 04/08/2021 05:47 AM    Calcium 8.7 04/08/2021 05:47 AM    Albumin 2.8 (L) 04/08/2021 05:47 AM    Magnesium 2.3 04/07/2021 04:35 AM    Phosphorus 3.0 04/07/2021 04:35 AM   cCa  9.66-current PPN contains no calcium. Could add Calcium to PPN. Current PPN contains 60 meq NaCl/L. IVF is 1/2 NS. Could decrease or omit Na from PPN. Has increased Cl and K losses d/t HERRMANN. PPN provides 48 meq/d, IVF provides 24 meq/d    Nutrition Related Findings:   Diet: 3/30 Clear, 4/2 NPO, 4/5 sips of clears, Ensure clear. 4/6: PPN started Wound Type: Surgical incision    Current Nutrition Therapies:  DIET NUTRITIONAL SUPPLEMENTS All Meals;  Ensure Clear ( )  Current Parenteral Nutrition Orders:  · Type and Formula: 5%DEX/4.25%AA   · Lipids: 250ml, Daily  · Duration: Continuous  · Rate/Volume: 100 ml/hr  · Current PN Order Provides: at goal  · Goal PN Orders Provides: 1316 kcal/d (88% of needs), 102 grams of protein/d (107% of needs), 120 grams of CHO/d and 2650 ml of total volume/d      Current Intake:   Average Meal Intake: (Sips of clear liquids is nutrtionally inadeqaute) Average Supplement Intake: (Sips of Ensure clear is nutrtionally inadequate)      Anthropometric Measures:  Height: 5' 6\" (167.6 cm)  Current Body Wt: 91.7 kg (202 lb 2.6 oz)(4/8), Weight source: Bed scale   Last 3 Recorded Weights in this Encounter    04/07/21 0643 04/08/21 0400 04/08/21 0612   Weight: 91.3 kg (201 lb 3.2 oz) 91.2 kg (201 lb 1 oz) 91.7 kg (202 lb 3.2 oz)   Weight variances likely d/t fluid and scale variances. BMI: 32.6, Obese class 1 (BMI 30.0-34. 9)  Admission Body Weight: (Pre assessment weight was 87.3 kg 3/24/2021 with pt report of additional weight loss down to 182# PTA)  Ideal Body Weight (lbs) (Calculated): 130 lbs (59 kg), 148 %  Usual Body Wt: 102.5 kg (226 lb), Percent weight change: -19.5          Edema: No data recorded   Estimated Daily Nutrient Needs:  Energy (kcal/day): 3080-3111 (Kcal/kg(15-20), Weight Used: Current(91.3 kg-bed scale 4/7))  Protein (g/day): 69-91 Weight Used: ((20% of kcal))  Fluid (ml/day):   (1 ml/kcal)    Nutrition Diagnosis:   · Inadequate oral intake related to altered GI function, inadequate protein-energy intake(continued N/V) as evidenced by NPO or clear liquid status due to medical condition    · Severe malnutrition related to inadequate protein-energy intake as evidenced by (s/s as above per malnutrition assessment)    Nutrition Interventions:   Food and/or Nutrient Delivery: Discontinue oral nutrition supplement, Modify parenteral nutrition     Coordination of Nutrition Care: Continue to monitor while inpatient  Plan of Care discussed with Radha Yin RN    Goals:   Previous Goal Met: Goal(s) achieved  Active Goal: Continue PPN to meet estimated needs until able to tolerate po intake >75% of needs    Nutrition Monitoring and Evaluation:      Food/Nutrient Intake Outcomes: Diet advancement/tolerance, Parenteral nutrition intake/tolerance  Physical Signs/Symptoms Outcomes: Biochemical data, Nausea/vomiting, Weight, GI status    Discharge Planning:     Too soon to determine    Tyler Garcia, [address], [address] [place] [address], [address] [place] [address]

## 2021-04-08 NOTE — PROGRESS NOTES
[facility] Progress Note         Admit Date:  3/30/2021    Today's Date:  4/8/2021    CC: N&V    Subjective:     Patient: No vomiting after starting metoclopramide and ativan yesterday until last night. Vomited again this am. Now with NG and feeling better. BM yesterday x3    Medications:   Current Facility-Administered Medications   Medication Dose Route Frequency    metoclopramide HCl (REGLAN) injection 5 mg  5 mg IntraVENous Q6H    LORazepam (ATIVAN) tablet 1 mg  1 mg Oral BID    TPN ADULT - dextrose 5% amino acid 4.25%   IntraVENous QPM    pantoprazole (PROTONIX) 40 mg in 0.9% sodium chloride 10 mL injection  40 mg IntraVENous Q12H    ondansetron (ZOFRAN) injection 8 mg  8 mg IntraVENous Q4H    magnesium hydroxide (MILK OF MAGNESIA) 400 mg/5 mL oral suspension 30 mL  30 mL Oral DAILY    NUTRITIONAL SUPPORT ELECTROLYTE PRN ORDERS   Does Not Apply PRN    fat emulsion 20% (LIPOSYN, INTRAlipid) infusion 250 mL  250 mL IntraVENous QPM    0.45% sodium chloride with KCl 20 mEq/L infusion   IntraVENous CONTINUOUS    enoxaparin (LOVENOX) injection 40 mg  40 mg SubCUTAneous Q24H    scopolamine (TRANSDERM-SCOP) 1 mg over 3 days 1 Patch  1 Patch TransDERmal Q72H    enalaprilat (VASOTEC) injection 0.625 mg  0.625 mg IntraVENous Q6H PRN    HYDROmorphone (DILAUDID) injection 1 mg  1 mg IntraVENous Q3H PRN    oxyCODONE-acetaminophen (PERCOCET 7.5) 7.5-325 mg per tablet 1 Tab  1 Tab Oral Q4H PRN       Review of Systems:  ROS was obtained, with pertinent positives as listed above. No chest pain or SOB. Diet:  Sips and nutritional supplements    Objective:   Vitals:  Visit Vitals  /66   Pulse (!) 117   Temp 100.2 °F (37.9 °C) Comment: Nurse Aware   Resp 18   Ht 5' 6\" (1.676 m)   Wt 91.7 kg (202 lb 3.2 oz)   SpO2 94%   BMI 32.64 kg/m²     Intake/Output:  No intake/output data recorded.   04/06 1901 - 04/08 0700  In: 4706 [I.V.:4706]  Out: 0502 [Urine:1200]  Exam:  General appearance: alert, cooperative, no distress NG IN PLACE WITH BILIOUS DRAINAGE. SON AT BEDSIDE  Lungs: clear to auscultation bilaterally anteriorly  Heart: TACHYCARDIA  Abdomen: soft, non-tender. Bowel sounds normal X 4; HEALING SUTURES No masses, no organomegaly  Extremities: extremities normal, atraumatic, no cyanosis or edema  Neuro:  alert and oriented X4    Data Review (Labs):    Recent Labs     04/08/21  0547 04/07/21  0435 04/06/21  0912   WBC 15.4*  --   --    HGB 12.1  --   --    HCT 37.8  --   --      --   --    MCV 77.9*  --   --     137 140   K 3.8 4.0 4.2   * 106 100   CO2 23 26 25   BUN 34* 26* 23   CREA 0.84 0.78 1.04*   CA 8.7 8.7 10.0   MG  --  2.3 2.4   * 161* 131*   AP 55  --  64   ALT 38  --  40   TBILI 0.4  --  0.6   CBIL  --   --  0.2   ALB 2.8*  --  3.2   TP 6.4  --  7.0   AML  --   --  77   LPSE  --   --  571*       Assessment:     Principal Problem:    Colon cancer ([facility] (3/30/2021)    Active Problems:    Severe protein-calorie malnutrition ([facility] (4/7/2021)    80 y.o. female who we are following for post-op N&V. She underwent right hemicolectomy by Dr. Naima Coley on 7/18/12 for right invasive adenocarcinoma. She has not responded to Pepcid 20mg every 12 hours, PRN phenergan (although this was discontinued due to delirium) and zofran or scheduled scopolamine patch. She was started on scheduled zofran, PPI , MOM and dulcolax on 4/5 without relief. Metoclopramide and Ativan for started on 4/7 and she did well until late last night. Suspect her current constipation as seen on KUB 4/5 is increasing her nausea. She had mild elevation lipase on 4/6/2021 571, but suspect this is secondary to persistent vomiting. Plan:     -Agree with NG  -Continue metoclopramide, Ativan, PPI  -Continue bowel regimen  -Will follow  Denise Butterfield in collaboration with Dr. René Muñiz.    [facility]

## 2021-04-08 NOTE — PROGRESS NOTES
General Surgery Progress Note    4/8/2021    Admit Date: 3/30/2021    Subjective:     Surgery POD #9  Pt continues with bilious vomiting-- refused NGT yesterday. Finally allowed it to be placed this Am and immediately had 1L return. +flatus and BMs. Abdomen is benign without worsening abdominal pain. She is afebrile, WBC up to 15. Objective:     Visit Vitals  BP (!) 113/58   Pulse (!) 118   Temp 98.9 °F (37.2 °C)   Resp 18   Ht 5' 6\" (1.676 m)   Wt 202 lb 3.2 oz (91.7 kg)   SpO2 95%   BMI 32.64 kg/m²         Intake/Output Summary (Last 24 hours) at 4/8/2021 1414  Last data filed at 4/8/2021 1336  Gross per 24 hour   Intake 4877.83 ml   Output 2300 ml   Net 2577.83 ml        EXAM:  +bilious vomit; 1L bilious NGT output  ABD soft, mild incisional tenderness, active BS'S. Wounds are intact without signs of bleeding or infection. Data Review    Recent Results (from the past 24 hour(s))   METABOLIC PANEL, COMPREHENSIVE    Collection Time: 04/08/21  5:47 AM   Result Value Ref Range    Sodium 138 136 - 145 mmol/L    Potassium 3.8 3.5 - 5.1 mmol/L    Chloride 109 (H) 98 - 107 mmol/L    CO2 23 21 - 32 mmol/L    Anion gap 6 (L) 7 - 16 mmol/L    Glucose 151 (H) 65 - 100 mg/dL    BUN 34 (H) 8 - 23 MG/DL    Creatinine 0.84 0.6 - 1.0 MG/DL    GFR est AA >60 >60 ml/min/1.73m2    GFR est non-AA >60 >60 ml/min/1.73m2    Calcium 8.7 8.3 - 10.4 MG/DL    Bilirubin, total 0.4 0.2 - 1.1 MG/DL    ALT (SGPT) 38 12 - 65 U/L    AST (SGOT) 17 15 - 37 U/L    Alk.  phosphatase 55 50 - 136 U/L    Protein, total 6.4 6.3 - 8.2 g/dL    Albumin 2.8 (L) 3.2 - 4.6 g/dL    Globulin 3.6 (H) 2.3 - 3.5 g/dL    A-G Ratio 0.8 (L) 1.2 - 3.5     CBC WITH AUTOMATED DIFF    Collection Time: 04/08/21  5:47 AM   Result Value Ref Range    WBC 15.4 (H) 4.3 - 11.1 K/uL    RBC 4.85 4.05 - 5.2 M/uL    HGB 12.1 11.7 - 15.4 g/dL    HCT 37.8 35.8 - 46.3 %    MCV 77.9 (L) 79.6 - 97.8 FL    MCH 24.9 (L) 26.1 - 32.9 PG    MCHC 32.0 31.4 - 35.0 g/dL    RDW 18.6 (H) 11.9 - 14.6 %    PLATELET 675 122 - 640 K/uL    MPV 10.1 9.4 - 12.3 FL    ABSOLUTE NRBC 0.00 0.0 - 0.2 K/uL    DF AUTOMATED      NEUTROPHILS 76 43 - 78 %    LYMPHOCYTES 15 13 - 44 %    MONOCYTES 7 4.0 - 12.0 %    EOSINOPHILS 1 0.5 - 7.8 %    BASOPHILS 0 0.0 - 2.0 %    IMMATURE GRANULOCYTES 1 0.0 - 5.0 %    ABS. NEUTROPHILS 11.7 (H) 1.7 - 8.2 K/UL    ABS. LYMPHOCYTES 2.4 0.5 - 4.6 K/UL    ABS. MONOCYTES 1.1 0.1 - 1.3 K/UL    ABS. EOSINOPHILS 0.1 0.0 - 0.8 K/UL    ABS. BASOPHILS 0.1 0.0 - 0.2 K/UL    ABS. IMM. GRANS. 0.1 0.0 - 0.5 K/UL        Hospital Problems  Date Reviewed: 3/23/2021          Codes Class Noted POA    Severe protein-calorie malnutrition ([facility] ICD-10-CM: A43  ICD-9-CM: 687  4/7/2021 Yes        * (Principal) Colon cancer ([facility] ICD-10-CM: C18.9  ICD-9-CM: 153.9  3/30/2021 Unknown            Pt has bowel function with a benign abdominal exam yet continues with vomiting  PPN  NPO  Appreciate GI assistance  CTAP today per GI  Start Cipro and Flagyl as WBC has risen to 15 (abx allergy to PCN)  Follow labs  Prophylaxis with SCDS, IS, Protonix, Lovenox.     KANE Powers

## 2021-04-09 NOTE — PROGRESS NOTES
[facility] Progress Note         Admit Date:  3/30/2021    Today's Date:  4/9/2021    CC:  N&V    Subjective:     Patient: Still with nausea and vomiting when NG clamped. No BM in 2 days. Medications:   Current Facility-Administered Medications   Medication Dose Route Frequency    TPN ADULT - dextrose 5% amino acid 4.25%   IntraVENous QPM    phenol throat spray (CHLORASEPTIC) 1 Spray  1 Spray Oral PRN    metroNIDAZOLE (FLAGYL) IVPB premix 500 mg  500 mg IntraVENous Q12H    ciprofloxacin HCl (CIPRO) tablet 500 mg  500 mg Oral Q12H    metoclopramide HCl (REGLAN) injection 5 mg  5 mg IntraVENous Q6H    LORazepam (ATIVAN) tablet 1 mg  1 mg Oral BID    pantoprazole (PROTONIX) 40 mg in 0.9% sodium chloride 10 mL injection  40 mg IntraVENous Q12H    ondansetron (ZOFRAN) injection 8 mg  8 mg IntraVENous Q4H    magnesium hydroxide (MILK OF MAGNESIA) 400 mg/5 mL oral suspension 30 mL  30 mL Oral DAILY    NUTRITIONAL SUPPORT ELECTROLYTE PRN ORDERS   Does Not Apply PRN    fat emulsion 20% (LIPOSYN, INTRAlipid) infusion 250 mL  250 mL IntraVENous QPM    0.45% sodium chloride with KCl 20 mEq/L infusion   IntraVENous CONTINUOUS    enoxaparin (LOVENOX) injection 40 mg  40 mg SubCUTAneous Q24H    scopolamine (TRANSDERM-SCOP) 1 mg over 3 days 1 Patch  1 Patch TransDERmal Q72H    enalaprilat (VASOTEC) injection 0.625 mg  0.625 mg IntraVENous Q6H PRN    HYDROmorphone (DILAUDID) injection 1 mg  1 mg IntraVENous Q3H PRN    oxyCODONE-acetaminophen (PERCOCET 7.5) 7.5-325 mg per tablet 1 Tab  1 Tab Oral Q4H PRN       Review of Systems:  ROS was obtained, with pertinent positives as listed above. No chest pain or SOB.     Diet:  NPO, TPN    Objective:   Vitals:  Visit Vitals  BP (!) 106/50 (BP 1 Location: Left upper arm, BP Patient Position: At rest)   Pulse 88   Temp 98.2 °F (36.8 °C)   Resp 18   Ht 5' 6\" (1.676 m)   Wt 90.2 kg (198 lb 14.4 oz)   SpO2 94%   BMI 32.10 kg/m²     Intake/Output:  No intake/output data recorded. 04/07 1901 - 04/09 0700  In: 3601.8 [I.V.:3601.8]  Out: 4710 [Urine:1450]  Exam:  General appearance: alert, cooperative, no distress SITTING ON SIDE OF BED WITH NG IN PLACE; ILL APPEARING; SURGERY AT BEDSIDE  Lungs: clear to auscultation bilaterally anteriorly  Heart: regular rate and rhythm  Abdomen: soft, non-tender. Bowel sounds normal. No masses, no organomegaly  Extremities: extremities normal, atraumatic, no cyanosis or edema  Neuro:  alert and oriented X4    Data Review (Labs):    Recent Labs     04/08/21  0547 04/07/21  0435   WBC 15.4*  --    HGB 12.1  --    HCT 37.8  --      --    MCV 77.9*  --     137   K 3.8 4.0   * 106   CO2 23 26   BUN 34* 26*   CREA 0.84 0.78   CA 8.7 8.7   MG  --  2.3   * 161*   AP 55  --    ALT 38  --    TBILI 0.4  --    ALB 2.8*  --    TP 6.4  --        Assessment:     Principal Problem:    Colon cancer (H[facility]) (3/30/2021)    Active Problems:    Severe protein-calorie malnutrition ([address] (4/7/2021)    80 y.o. female who we are following for post-op N&V. She underwent right hemicolectomy by Dr. Aylin Ramírez on 5/34/24 for right invasive adenocarcinoma. She has not responded to Pepcid 20mg every 12 hours, PRN phenergan (although this was discontinued due to delirium) and zofran or scheduled scopolamine patch. She was started on scheduled zofran, PPI , MOM and dulcolax on 4/5 without relief. Metoclopramide and Ativan for started on 4/7 and she did well for several hours. Suspect her current constipation as seen on KUB 4/5 is increasing her nausea. She had mild elevation lipase on 4/6/2021 571, but suspect this is secondary to persistent vomiting. NG was placed on 4/8 with significant output 2960. CT of the abdomen/pelvis with contrast on 4/8 did not reveal an etiology. Suspect her symptoms are multifactorial with recent surgery, constipation, and dysmotility.     Plan:     -Obtain CT of the head with and without contrast   -Continue metoclopramide and ativan  -Continue PPI  -Continue NG decompression  -Continue MOM daily  -Add dulcolax supp daily  Hubert Royal in collaboration with Dr. Josep Jeff  [facility]

## 2021-04-09 NOTE — PROGRESS NOTES
END OF SHIFT NOTE:     Pt slept soundly. Pt had no vomiting, and commented that she only had a \"twinge of nausea. \" Pt is concerned that she will always feel the way she does right now. INTAKE/OUTPUT  04/08 0701 - 04/09 0700  In: 471 [I.V.:471]  Out: 4010 [Urine:1050]  Voiding: YES  Catheter: NO  Drain:   Nasogastric Tube 04/08/21 (Active)   Site Assessment Clean, dry, & intact 04/09/21 0547   Securement Device Tape 04/09/21 0547   G Port Status Intermittent Suction 04/09/21 0547   External Insertion Levy (cms) 62 cms 04/09/21 0547   Action Taken Placement verified (comment) 04/09/21 0547   Drainage Description Green 04/09/21 0547   Gastric Residual (mL) 1000 ml 04/08/21 0800   Drainage Chamber Level (ml) 940 ml 04/09/21 0519   Output (ml) 610 ml 04/09/21 0519               Flatus: Patient does not have flatus present today. Stool:  0 occurrences. Characteristics:  Stool Assessment  Stool Color: Brown  Stool Appearance: Loose(per pt)  Stool Amount: Smear  Stool Source/Status: Rectum    Emesis: 0 occurrences. Characteristics:   Emesis Assessment  Appearance: Green  Emesis Amount: Medium(400 ml)    VITAL SIGNS  Patient Vitals for the past 12 hrs:   Temp Pulse Resp BP SpO2   04/09/21 0656 98.2 °F (36.8 °C) 88 18 (!) 106/50 94 %   04/09/21 0318 98.9 °F (37.2 °C) 89 16 (!) 103/54 92 %   04/08/21 2311 99.2 °F (37.3 °C) 100 17 124/66 94 %       Pain Assessment  Pain Intensity 1: 0 (04/08/21 1956)  Pain Location 1: Abdomen  Pain Intervention(s) 1: Declines  Patient Stated Pain Goal: 0    Ambulating  Yes    Shift report given to oncoming nurse at the bedside.     Oriana Fowler

## 2021-04-09 NOTE — CONSULTS
Comprehensive Nutrition Assessment  This assessment was completed remotely  Type and Reason for Visit: Reassess, Consult for change from PPN to CPN  TPN Management (Surgery)    Nutrition Recommendations/Plan:   · Parenteral Nutrition:  · Change PPN to CPN:10%DEX/4.25%AA at 85 ml/hr with 250 ml 20% Lipids daily over 24 hrs. To provide: 1510 kcal/d (100% of needs), 85 grams of protein/d (100% of needs), 200 grams of CHO/d and 2290 ml of total volume/d   · Lytes/L:   · Sodium 0 meq (0 meq NaCl), Potassium 30 meq (0 meq KCl, 30 meq KPO4), 5 meq Mg, 4.5 meq Calcium. · Other additives: MTE, MVI  · IVF:  ·  IVF per MD  (Currently at 100ml/hr)  · Vitamin and Mineral Supplement Therapy:  · Electrolyte management replacement protocol implemented. · Labs:   · BMP daily,Phos and Mg MWF. Phos in AM.  POC Glucoses/SSI if Glucose > 180 mg/dl on AM BMP. · Daily weight     Malnutrition Assessment:  Malnutrition Status: Severe malnutrition  Context: Chronic illness  Findings of clinical characteristics of malnutrition:   Energy Intake:  7 - 75% or less est energy requirements for 1 month or longer  Weight Loss:  7.0 - Greater than 7.5% over 3 months((226# to 182# (19.5%), 226# to 201# (11%) in < 3 months)     Body Fat Loss:  Unable to assess,     Muscle Mass Loss:  Unable to assess,    Fluid Accumulation:  Unable to assess,     Strength:  Not performed       Nutrition Assessment:   Nutrition History: 4/6: Pt reports at baseline she eats 2 small meals a day of oatmeal or grits for breakfast and then a \"normal\" meal for her 2nd meal of the day. About 8 weeks ago, she was eating a [employer] meal and had N/V which she thought was food poisioning. However she continued to have N/V with all po intake, thus intake was <25% of usual. She was mainly consuming gatorade, Boost or Ensure PTA. She had weight loss from 226# to 182#. She reports addtional weight loss since her pre assessment weight on 3/24/2021.       Nutrition Background: H/O: breast cancer with bilateral mastectomy, uterine cancer s/p hysterectomy, DM, HTN, RLS, GERD and claustrophobia. 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 She has had postop N&V and been unable to tolerate po diet progression  Daily Update:  PPN started 4/6 d/t continued N/V. Pt not a candidate for PICC line so is to have central line placed in IR today. CTAP 4/8 negative for acute surgical findings. Abdominal Status (last documented): Not passing flatus, Nausea, Soft abdomen with Hypoactive  bowel sounds. Last BM 04/07/21. Output by Drain (mL) 04/08/21 0701 - 04/08/21 1900 04/08/21 1901 - 04/09/21 0700 04/09/21 0701 - 04/09/21 1206 Range Total   Nasogastric Tube 04/08/21 2350    Pertinent Medications: Cipro, MOM ( held 4/7 and 4/8), reglan ( added 4/7), flagyl, zofran, protonix, scopolamine patch  Dulcolax suppository 4/5 PM  IVF: 1/2 NS with 20 meq KCl at 100 ml/hr( increased from 50 ml/hr yesterday)  Pertinent Labs:   Lab Results   Component Value Date/Time    Sodium 136 04/09/2021 10:20 AM    Potassium 4.0 04/09/2021 10:20 AM    Chloride 110 (H) 04/09/2021 10:20 AM    CO2 21 04/09/2021 10:20 AM    Anion gap 5 (L) 04/09/2021 10:20 AM    Glucose 139 (H) 04/09/2021 10:20 AM    BUN 21 04/09/2021 10:20 AM    Creatinine 0.65 04/09/2021 10:20 AM    Calcium 8.8 04/09/2021 10:20 AM    Albumin 2.8 (L) 04/08/2021 05:47 AM    Magnesium 2.3 04/09/2021 10:20 AM    Phosphorus 2.3 04/09/2021 10:20 AM   Has increased Cl and K losses d/t HERRMANN. Current PPN contains no Na and provides 24 meq Phos/d 48 meq K/d. IVF provides an additional 48 meq/d. Would benefit from increased Phos and K in PN. Nutrition Related Findings:   Diet: 3/30 Clear, 4/2 NPO, 4/5 sips of clears, Ensure clear. 4/6: PPN started. 4/8:NGT placed.  Wound Type: Surgical incision    Current Nutrition Therapies:  DIET NPO With Ice Chips  Current Parenteral Nutrition Orders:  · Type and Formula: 5%DEX/4.25%AA   · Lipids: 250ml, Daily  · Duration: Continuous  · Rate/Volume: 100 ml/hr  · Current PN Order Provides: at goal  · Goal PN Orders Provides: 1316 kcal/d (88% of needs), 102 grams of protein/d (107% of needs), 120 grams of CHO/d and 2650 ml of total volume/d      Current Intake:   Average Meal Intake: (Sips of clear liquids is nutrtionally inadeqaute) Average Supplement Intake: (Sips of Ensure clear is nutrtionally inadequate)      Anthropometric Measures:  Height: 5' 6\" (167.6 cm)  Current Body Wt: 90.2 kg (198 lb 13.7 oz)(4/9), Weight source: Bed scale   Last 3 Recorded Weights in this Encounter    04/08/21 0400 04/08/21 0612 04/09/21 0318   Weight: 91.2 kg (201 lb 1 oz) 91.7 kg (202 lb 3.2 oz) 90.2 kg (198 lb 14.4 oz)   Weight variances likely d/t fluid and scale variances. BMI: 32.1, Obese class 1 (BMI 30.0-34. 9)  Admission Body Weight: (Pre assessment weight was 87.3 kg 3/24/2021 with pt report of additional weight loss down to 182# PTA)  Ideal Body Weight (lbs) (Calculated): 130 lbs (59 kg), 148 %  Usual Body Wt: 102.5 kg (226 lb), Percent weight change: -19.5          Edema: No data recorded   Estimated Daily Nutrient Needs:  Energy (kcal/day): 3723-5595 (Kcal/kg(15-20), Weight Used: Current(91.3 kg-bed scale 4/7))  Protein (g/day): 69-91 Weight Used: ((20% of kcal))  Fluid (ml/day):   (1 ml/kcal)    Nutrition Diagnosis:   · Inadequate oral intake related to altered GI function, inadequate protein-energy intake(continued N/V) as evidenced by nutrition support-parenteral nutrition, NPO or clear liquid status due to medical condition    · Severe malnutrition related to inadequate protein-energy intake as evidenced by (s/s as above per malnutrition assessment)    Nutrition Interventions:   Food and/or Nutrient Delivery: Modify parenteral nutrition     Coordination of Nutrition Care: Continue to monitor while inpatient  Plan of Care discussed with March Roger, RN    Goals:   Previous Goal Met: Goal(s) achieved  Active Goal: Continue to meet estimated needs via PN until able to tolerate po intake >75% of needs    Nutrition Monitoring and Evaluation:      Food/Nutrient Intake Outcomes: Parenteral nutrition intake/tolerance  Physical Signs/Symptoms Outcomes: Biochemical data, GI status, Weight    Discharge Planning:     Too soon to determine    Anthony Morse, [address] [place] [address], [address] [place] [address], [address] [place] [address]

## 2021-04-09 NOTE — PROGRESS NOTES
Picc line ordered for TPN. Pt is not a candidate for PICC placement due to hx of bilateral mastectomies. Primary nurse notified.

## 2021-04-09 NOTE — PROGRESS NOTES
END OF SHIFT NOTE:    INTAKE/OUTPUT  04/08 0701 - 04/09 0700  In: 471 [I.V.:471]  Out: 4210 [Urine:1050]  Voiding: YES  Catheter: NO  Drain:   Nasogastric Tube 04/08/21 (Active)   Site Assessment Clean, dry, & intact 04/09/21 1345   Securement Device Tape 04/09/21 1345   G Port Status Intermittent Suction 04/09/21 1345   External Insertion Levy (cms) 62 cms 04/09/21 1345   Action Taken Tubing changed 04/09/21 1820   Drainage Description Green 04/09/21 1345   Gastric Residual (mL) 1000 ml 04/08/21 0800   Drainage Chamber Level (ml) 0 ml 04/09/21 1820   Output (ml) 900 ml 04/09/21 1730               Flatus: Patient does not have flatus present. Stool:  2 occurrences. Characteristics:  Stool Assessment  Stool Color: Brown  Stool Appearance: Loose(per pt)  Stool Amount: Smear  Stool Source/Status: Rectum    Emesis: 0 occurrences. Characteristics:   Emesis Assessment  Appearance: Green  Emesis Amount: Medium(400 ml)    VITAL SIGNS  Patient Vitals for the past 12 hrs:   Temp Pulse Resp BP SpO2   04/09/21 1658 98.2 °F (36.8 °C) (!) 107 18 (!) 145/76 96 %   04/09/21 1506 97.6 °F (36.4 °C) (!) 105 16 (!) 160/73 96 %   04/09/21 1130 98.6 °F (37 °C) 99 20 130/64 94 %       Pain Assessment  Pain Intensity 1: 0 (04/09/21 1506)  Pain Location 1: Abdomen  Pain Intervention(s) 1: Declines  Patient Stated Pain Goal: 0    Ambulating  Yes    Shift report given to oncoming nurse at the bedside.     Gonzales Farah RN

## 2021-04-09 NOTE — PROCEDURES
Department of Interventional Radiology  [phone]        Interventional Radiology Brief Procedure Note    Patient: Lakisha Weaver MRN: [id number]  SSN: [id number]    YOB: 1940  Age: [de-identified] y.o.   Sex: female      Date of Procedure: 4/9/2021    Pre-Procedure Diagnosis: post op N/V, malnutrition    Post-Procedure Diagnosis: SAME    Procedure(s): Temporary Central Venous Catheter    Brief Description of Procedure: US, fluoro guided right IJ temp CVC placed    Performed By: Wilver Arcos PA-C     Assistants: None    Anesthesia:Lidocaine    Estimated Blood Loss: None    Specimens:  None    Implants:  Temporary Central Venous Catheter    Findings: catheter tip in right atrium     Complications: None    Recommendations: ok to use catheter     Follow Up: prn    Signed By: Wilver Arcos PA-C     April 9, 2021

## 2021-04-09 NOTE — PROGRESS NOTES
General Surgery Progress Note    4/9/2021    Admit Date: 3/30/2021    Subjective:     Surgery POD #10  Pt denies N/V overnight. -flatus or BM since yesterday. Abdomen is benign without worsening abdominal pain. She is afebrile, labs pending this AM.     Objective:     Visit Vitals  BP (!) 106/50 (BP 1 Location: Left upper arm, BP Patient Position: At rest)   Pulse 88   Temp 98.2 °F (36.8 °C)   Resp 18   Ht 5' 6\" (1.676 m)   Wt 198 lb 14.4 oz (90.2 kg)   SpO2 94%   BMI 32.10 kg/m²         Intake/Output Summary (Last 24 hours) at 4/9/2021 0950  Last data filed at 4/9/2021 0519  Gross per 24 hour   Intake 471 ml   Output 3010 ml   Net -2539 ml        EXAM:   bilious NGT output  ABD soft, mild incisional tenderness, active BS'S. Wounds are intact without signs of bleeding or infection. Data Review    Recent Results (from the past 24 hour(s))   GLUCOSE, POC    Collection Time: 04/09/21  7:10 AM   Result Value Ref Range    Glucose (POC) 122 (H) 65 - 100 mg/dL    Performed by [facility] Problems  Date Reviewed: 3/23/2021          Codes Class Noted POA    Severe protein-calorie malnutrition ([facility] [address] [address] ICD-10-CM: Z69  ICD-9-CM: 989  4/7/2021 Yes        * (Principal) Colon cancer ([facility] [address] ICD-10-CM: C18.9  ICD-9-CM: 153.9  3/30/2021 Unknown            Pt has bowel function with a benign abdominal exam yet continues with vomiting  PPN-->obtain PICC, switch to TPN  Maintenance IVFs increased yesterday  NPO  Appreciate GI assistance  CTAP negative for acute surgical findings  Continue Cipro and Flagyl as WBC has risen (abx allergy to PCN)  Follow labs- pending today  Prophylaxis with SCDS, IS, Protonix, Lovenox.     KANE Powers

## 2021-04-09 NOTE — PROGRESS NOTES
TRANSFER - OUT REPORT:    Verbal report given to Maik Royal RN(name) on Haven Connell  being transferred to [facility](unit) for routine progression of care       Report consisted of patients Situation, Background, Assessment and   Recommendations(SBAR). Information from the following report(s) SBAR, Procedure Summary, Intake/Output, MAR and Accordion was reviewed with the receiving nurse. Lines:   Triple Lumen 04/09/21 Anterior;Right Neck (Active)       Peripheral IV 04/05/21 Right Forearm (Active)   Site Assessment Clean, dry, & intact 04/09/21 1130   Phlebitis Assessment 0 04/09/21 0758   Infiltration Assessment 0 04/09/21 1130   Dressing Status Clean, dry, & intact 04/09/21 0758   Dressing Type Tape;Transparent 04/09/21 0758   Hub Color/Line Status Infusing 04/09/21 0758   Alcohol Cap Used No 04/09/21 0547       Peripheral IV 04/06/21 Left; Anterior Arm (Active)   Site Assessment Clean, dry, & intact 04/09/21 1130   Phlebitis Assessment 0 04/09/21 0758   Infiltration Assessment 0 04/09/21 1130   Dressing Status Clean, dry, & intact 04/09/21 0758   Dressing Type Tape;Transparent 04/09/21 0758   Hub Color/Line Status Infusing 04/09/21 0758   Alcohol Cap Used No 04/08/21 0655        Opportunity for questions and clarification was provided.       Patient transported with:   [place]

## 2021-04-09 NOTE — PROGRESS NOTES
Chart screened by  for discharge planning. Patient has NGT at this time. Patient will have a CT of her head today. CM will continue to follow patient during hospitalization for discharge planning and needs. No CM needs identified at this time. Please consult or notify  if any new issues arise.

## 2021-04-10 NOTE — PROGRESS NOTES
[facility] Progress Note         Admit Date:  3/30/2021    Today's Date:  4/10/2021    CC:  N&V    Subjective:     Patient: N&V improved, but still with increased NG output. Denies any BM in 3 days. Just received dulcolax suppository. Medications:   Current Facility-Administered Medications   Medication Dose Route Frequency    bisacodyL (DULCOLAX) suppository 10 mg  10 mg Rectal DAILY    TPN ADULT - dextrose 10% amino acid 4.25%   IntraVENous QPM    fat emulsion 20% (LIPOSYN, INTRAlipid) infusion 250 mL  250 mL IntraVENous QPM    phenol throat spray (CHLORASEPTIC) 1 Spray  1 Spray Oral PRN    metroNIDAZOLE (FLAGYL) IVPB premix 500 mg  500 mg IntraVENous Q12H    ciprofloxacin HCl (CIPRO) tablet 500 mg  500 mg Oral Q12H    metoclopramide HCl (REGLAN) injection 5 mg  5 mg IntraVENous Q6H    LORazepam (ATIVAN) tablet 1 mg  1 mg Oral BID    pantoprazole (PROTONIX) 40 mg in 0.9% sodium chloride 10 mL injection  40 mg IntraVENous Q12H    ondansetron (ZOFRAN) injection 8 mg  8 mg IntraVENous Q4H    magnesium hydroxide (MILK OF MAGNESIA) 400 mg/5 mL oral suspension 30 mL  30 mL Oral DAILY    NUTRITIONAL SUPPORT ELECTROLYTE PRN ORDERS   Does Not Apply PRN    0.45% sodium chloride with KCl 20 mEq/L infusion   IntraVENous CONTINUOUS    enoxaparin (LOVENOX) injection 40 mg  40 mg SubCUTAneous Q24H    scopolamine (TRANSDERM-SCOP) 1 mg over 3 days 1 Patch  1 Patch TransDERmal Q72H    enalaprilat (VASOTEC) injection 0.625 mg  0.625 mg IntraVENous Q6H PRN    HYDROmorphone (DILAUDID) injection 1 mg  1 mg IntraVENous Q3H PRN    oxyCODONE-acetaminophen (PERCOCET 7.5) 7.5-325 mg per tablet 1 Tab  1 Tab Oral Q4H PRN       Review of Systems:  ROS was obtained, with pertinent positives as listed above. No chest pain or SOB. Diet:  NPO with ice chips; TPN    Objective:   Vitals:  Visit Vitals  BP (!) 114/58 Comment: RN was notified.    Pulse 94   Temp 98.6 °F (37 °C)   Resp 16   Ht 5' 6\" (1.676 m) Wt 91.2 kg (201 lb)   SpO2 96%   BMI 32.44 kg/m²     Intake/Output:  04/10 0701 - 04/10 1900  In: -   Out: 400 [Urine:400]  04/08 1901 - 04/10 0700  In: 2454 [I.V.:2454]  Out: 1540 [Urine:1050]  Exam:  General appearance: alert, cooperative, no distress Nursing at bedside; NG in place with 2000cc of output yesterday  Lungs: clear to auscultation bilaterally anteriorly  Heart: regular rate and rhythm  Abdomen: soft,Bowel sounds normal. Healing surgical site No masses, no organomegaly  Extremities: extremities normal, atraumatic, no cyanosis or edema  Neuro:  alert and oriented    Data Review (Labs):    Recent Labs     04/10/21  0526 04/09/21  1020 04/08/21  0547   WBC  --  14.3* 15.4*   HGB  --  11.3* 12.1   HCT  --  35.4* 37.8   PLT  --  282 311   MCV  --  77.8* 77.9*    136 138   K 4.1 4.0 3.8   * 110* 109*   CO2 20* 21 23   BUN 13 21 34*   CREA 0.53* 0.65 0.84   CA 8.5 8.8 8.7   MG  --  2.3  --    * 139* 151*   AP  --   --  55   ALT  --   --  38   TBILI  --   --  0.4   ALB  --   --  2.8*   TP  --   --  6.4       Assessment:     Principal Problem:    Colon cancer (HCC) (3/30/2021)    Active Problems:    Severe protein-calorie malnutrition ([address] (4/7/2021)    80 y.o. female who we are following for post-op N&V. She underwent right hemicolectomy by Dr. Margarito Saxena on 1/64/56 for right invasive adenocarcinoma. She has not responded to Pepcid 20mg every 12 hours, PRN phenergan (although this was discontinued due to delirium) and zofran or scheduled scopolamine patch. She was started on scheduled zofran, PPI , MOM and dulcolax on 4/5 without relief. Metoclopramide and Ativan for started on 4/7 and she did well for several hours. Suspect her current constipation as seen on KUB 4/5 is increasing her nausea. She had mild elevation lipase on 4/6/2021 571, but suspect this is secondary to persistent vomiting. NG was placed on 4/8 with significant output 2960, and current output on 4/9 still 2000cc.  CT of the abdomen/pelvis with contrast on 4/8 did not reveal an etiology. Flagyl on 4/8 was started for leukocytosis which is improving, but nauseated prior to this. CT of head on 4/9 also unremarkable. Suspect her symptoms are multifactorial with recent surgery, constipation, and dysmotility    Plan:     -Increase metoclopramide to 10mg every 6 hours  -continue Ativan 1mg bid  -Continue dulcolax 10mg pr daily  -Continue pantoprazole 40mg IV every 12 hours  -continue Zofran 8mg every 4 hours  -continue NG decompression  Denise Pill. Rosette Butterfield in collaboration with   [address] [place] [address], [address]  [facility]

## 2021-04-10 NOTE — PROGRESS NOTES
Reviewed notes for concerns    Patient was resting peacefully    Will continue to assess how to best serve this family

## 2021-04-10 NOTE — PROGRESS NOTES
END OF SHIFT NOTE:    INTAKE/OUTPUT  04/09 0701 - 04/10 0700  In: 6019 [I.V.:2454]  Out: 2400 [Urine:400]  Voiding: YES  Catheter: NO  Drain:   Nasogastric Tube 04/08/21 (Active)   Site Assessment Clean, dry, & intact 04/10/21 1245   Securement Device Tape 04/10/21 1245   G Port Status Intermittent Suction 04/10/21 1245   External Insertion Levy (cms) 62 cms 04/10/21 1245   Action Taken Tubing changed 04/09/21 1820   Drainage Description Green 04/10/21 1245   Gastric Residual (mL) 1000 ml 04/08/21 0800   Drainage Chamber Level (ml) 700 ml 04/10/21 1245   Output (ml) 400 ml 04/10/21 1245     Flatus: Patient does have flatus present. Stool:  1 occurrences. Characteristics:  Stool Assessment  Stool Color: Brown  Stool Appearance: Loose  Stool Amount: Smear  Stool Source/Status: Rectum    Emesis: 0 occurrences. Characteristics:   Emesis Assessment  Appearance: Green  Emesis Amount: Medium(400 ml)    VITAL SIGNS  Patient Vitals for the past 12 hrs:   Temp Pulse Resp BP SpO2   04/10/21 1528 97.7 °F (36.5 °C) 96 16 128/70 99 %   04/10/21 1103 98.2 °F (36.8 °C) 76 15 107/66 98 %       Pain Assessment  Pain Intensity 1: 0(denies) (04/10/21 1245)  Pain Location 1: Abdomen  Pain Intervention(s) 1: Declines  Patient Stated Pain Goal: 1    Ambulating  Yes    Shift report given to oncoming nurse at the bedside.     Ana Maria Fitzpatrick RN

## 2021-04-10 NOTE — PROGRESS NOTES
END OF SHIFT NOTE:    INTAKE/OUTPUT  04/09 0701 - 04/10 0700  In: 9223 [I.V.:2454]  Out: 2400 [Urine:400]  Voiding: YES  Catheter: NO  Drain:   Nasogastric Tube 04/08/21 (Active)   Site Assessment Clean, dry, & intact 04/10/21 0643   Securement Device Tape 04/10/21 0643   G Port Status Intermittent Suction 04/10/21 0643   External Insertion Levy (cms) 62 cms 04/09/21 1915   Action Taken Tubing changed 04/09/21 1820   Drainage Description Green 04/10/21 6690   Gastric Residual (mL) 1000 ml 04/08/21 0800   Drainage Chamber Level (ml) 0 ml 04/09/21 1820   Output (ml) 250 ml 04/10/21 0643               Flatus: Patient does have flatus present. Stool:  0 occurrences. Characteristics:      Emesis: 0 occurrences. Characteristics:   Emesis Assessment  Appearance: Green  Emesis Amount: Medium(400 ml)    VITAL SIGNS  Patient Vitals for the past 12 hrs:   Temp Pulse Resp BP SpO2   04/10/21 0432 99 °F (37.2 °C) 98 17 116/65 94 %   04/10/21 0242 98.8 °F (37.1 °C) (!) 104 18 138/74 95 %   04/09/21 2334 99.7 °F (37.6 °C) (!) 103 18 (!) 146/79 95 %   04/09/21 1920 97.9 °F (36.6 °C) 99 18 134/74 97 %       Pain Assessment  Pain Intensity 1: 1 (04/09/21 2031)  Pain Location 1: Abdomen  Pain Intervention(s) 1: Declines  Patient Stated Pain Goal: 1    Ambulating  Yes    Shift report given to oncoming nurse at the bedside.     Dianna Miles RN

## 2021-04-10 NOTE — PROGRESS NOTES
General Surgery Progress Note    4/10/2021    Admit Date: 3/30/2021    Subjective:     Surgery POD #11  Pt sitting in chair. No new issues. Denies N/V overnight. -flatus/+BMx 2 since yesterday, AF. NAD. Objective:     Visit Vitals  BP (!) 114/58 Comment: RN was notified. Pulse 94   Temp 98.6 °F (37 °C)   Resp 16   Ht 5' 6\" (1.676 m)   Wt 201 lb (91.2 kg)   SpO2 96%   BMI 32.44 kg/m²         Intake/Output Summary (Last 24 hours) at 4/10/2021 1154  Last data filed at 4/10/2021 0826  Gross per 24 hour   Intake 2454 ml   Output 2500 ml   Net -46 ml        EXAM:   bilious NGT output  ABD soft, mild incisional tenderness, active BS'S. Wounds are intact without signs of bleeding or infection.         Data Review    Recent Results (from the past 24 hour(s))   METABOLIC PANEL, BASIC    Collection Time: 04/10/21  5:26 AM   Result Value Ref Range    Sodium 138 136 - 145 mmol/L    Potassium 4.1 3.5 - 5.1 mmol/L    Chloride 110 (H) 98 - 107 mmol/L    CO2 20 (L) 21 - 32 mmol/L    Anion gap 8 7 - 16 mmol/L    Glucose 119 (H) 65 - 100 mg/dL    BUN 13 8 - 23 MG/DL    Creatinine 0.53 (L) 0.6 - 1.0 MG/DL    GFR est AA >60 >60 ml/min/1.73m2    GFR est non-AA >60 >60 ml/min/1.73m2    Calcium 8.5 8.3 - 10.4 MG/DL   PHOSPHORUS    Collection Time: 04/10/21  5:26 AM   Result Value Ref Range    Phosphorus 2.9 2.3 - 3.7 MG/DL        Hospital Problems  Date Reviewed: 3/23/2021          Codes Class Noted POA    Severe protein-calorie malnutrition ([facility] [address] ICD-10-CM: H38  ICD-9-CM: 475  4/7/2021 Yes        * (Principal) Colon cancer ([facility] [address] ICD-10-CM: C18.9  ICD-9-CM: 153.9  3/30/2021 Unknown            Pt has bowel function with a benign abdominal exam yet continues with vomiting  PPN-->obtain PICC, switch to TPN  Maintenance IVFs increased yesterday  NPO  Appreciate GI assistance  CTAP negative for acute surgical findings  Continue Cipro and Flagyl as WBC has risen (abx allergy to PCN)  Follow labs  Prophylaxis with SCDS, IS, Protonix, Lovenox.     Renaye Cogan, NP

## 2021-04-10 NOTE — PROGRESS NOTES
Comprehensive Nutrition Assessment    Type and Reason for Visit: Reassess  TPN Management (Surgery)    Nutrition Recommendations/Plan:   · Parenteral Nutrition:  · Continue Central TPN:10%DEX/4.25%AA at 85 ml/hr with 250 ml 20% Lipids daily over 24 hrs. To provide: 1510 kcal/d (100% of needs), 85 grams of protein/d (100% of needs), 200 grams of CHO/d and 2290 ml of total volume/d   · Lytes/L:   · Sodium 0 meq (0 meq NaCl), Potassium 30 meq (0 meq KCl, 30 meq KPO4), 5 meq Mg, 4.5 meq Calcium. · Other additives: MTE, MVI  · IVF:  ·  IVF per MD  (Currently at 100ml/hr)  · Vitamin and Mineral Supplement Therapy:  · Electrolyte management replacement protocol implemented. · Labs:   · BMP daily,Phos and Mg MWF. Phos in AM.  POC Glucoses/SSI if Glucose > 180 mg/dl on AM BMP. · Daily weight     Malnutrition Assessment:  Malnutrition Status: Severe malnutrition  Context: Chronic illness  Findings of clinical characteristics of malnutrition:   Energy Intake:  7 - 75% or less est energy requirements for 1 month or longer  Weight Loss:  7.0 - Greater than 7.5% over 3 months((226# to 182# (19.5%), 226# to 201# (11%) in < 3 months)       Nutrition Assessment:   Nutrition History: 4/6: Pt reports at baseline she eats 2 small meals a day of oatmeal or grits for breakfast and then a \"normal\" meal for her 2nd meal of the day. About 8 weeks ago, she was eating a [employer] meal and had N/V which she thought was food poisioning. However she continued to have N/V with all po intake, thus intake was <25% of usual. She was mainly consuming gatorade, Boost or Ensure PTA. She had weight loss from 226# to 182#. She reports addtional weight loss since her pre assessment weight on 3/24/2021. Nutrition Background: H/O: breast cancer with bilateral mastectomy, uterine cancer s/p hysterectomy, DM, HTN, RLS, GERD and claustrophobia. 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  She has had postop N&V and been unable to tolerate po diet progression  Daily Update:  Central line (IJ) placed 4/9 and started on a Central OPN solution that now meets her needs. NG for decompression 2 L in last 24 hours. Had dulcolax 4/9  and produced BM x2. Abdominal Status (last documented): Not passing flatus, Semi-soft, Tender abdomen with Hypoactive  bowel sounds. Last BM 04/09/21. Pertinent Medications: Cipro, MOM ( held 4/7 and 4/8), reglan ( added 4/7, changed 4/10), flagyl, zofran, protonix, scopolamine patch, dulcolax  IVF: 1/2 NS KCl at 100 ml/hr  Pertinent Labs:   Lab Results   Component Value Date/Time    Sodium 138 04/10/2021 05:26 AM    Potassium 4.1 04/10/2021 05:26 AM    Chloride 110 (H) 04/10/2021 05:26 AM    CO2 20 (L) 04/10/2021 05:26 AM    Anion gap 8 04/10/2021 05:26 AM    Glucose 119 (H) 04/10/2021 05:26 AM    BUN 13 04/10/2021 05:26 AM    Creatinine 0.53 (L) 04/10/2021 05:26 AM    Calcium 8.5 04/10/2021 05:26 AM    Albumin 2.8 (L) 04/08/2021 05:47 AM    Magnesium 2.3 04/09/2021 10:20 AM    Phosphorus 2.9 04/10/2021 05:26 AM     Nutrition Related Findings:   Diet: 3/30 Clear, 4/2 NPO, 4/5 sips of clears, Ensure clear. 4/6: PPN started. 4/8:NGT placed. 4/10 NPO with ice chips, NG to suction (2 L/day) Wound Type: Surgical incision    Current Nutrition Therapies:  DIET NPO With Ice Chips  Current Parenteral Nutrition Orders:  · Type and Formula: 10%DEX/4.25%AA    · Lipids: 250ml, Daily  · Duration: Continuous  · Rate/Volume: 85 ml/hr  · Current PN Order Provides: at goal  · Goal PN Orders Provides: 1510 kcal/d (100% of needs), 85 grams of protein/d (100% of needs), 200 grams of CHO/d and 2290 ml of total volume/d     Current Intake:   Average Meal Intake: (NPO with ice chips) Average Supplement Intake: None ordered      Anthropometric Measures:  Height: 5' 6\" (167.6 cm)  Current Body Wt: 91.2 kg (201 lb 1 oz)(4/10), Weight source: Bed scale  BMI: 32.5, Obese class 1 (BMI 30.0-34. 9)  Admission Body Weight: (Pre assessment weight was 87.3 kg 3/24/2021 with pt report of additional weight loss down to 182# PTA)  Ideal Body Weight (lbs) (Calculated): 130 lbs (59 kg), 148 %  Usual Body Wt: 102.5 kg (226 lb), Percent weight change: -19.5          Edema: No data recorded   Estimated Daily Nutrient Needs:  Energy (kcal/day): 0601-3675 (Kcal/kg(15-20), Weight Used: Current(91.3 kg-bed scale 4/7))  Protein (g/day): 69-91 Weight Used: ((20% of kcal))  Fluid (ml/day):   (1 ml/kcal)    Nutrition Diagnosis:   · Inadequate oral intake related to altered GI function, inadequate protein-energy intake(continued N/V) as evidenced by nutrition support-parenteral nutrition, NPO or clear liquid status due to medical condition    · Severe malnutrition related to inadequate protein-energy intake as evidenced by (s/s as above per malnutrition assessment)    Nutrition Interventions:   Food and/or Nutrient Delivery: Continue parenteral nutrition     Coordination of Nutrition Care: Continue to monitor while inpatient  Plan of Care discussed with Hang Davila RN    Goals:   Previous Goal Met: Goal(s) achieved  Active Goal: Continue to meet estimated needs via PN until able to tolerate po intake >75% of needs    Nutrition Monitoring and Evaluation:      Food/Nutrient Intake Outcomes: Parenteral nutrition intake/tolerance  Physical Signs/Symptoms Outcomes: Biochemical data, GI status, Weight    Discharge Planning:     Too soon to determine    Trendsue Hanley, [address] [place] [address],   Contact: [phone]

## 2021-04-11 NOTE — PROGRESS NOTES
END OF SHIFT NOTE:    INTAKE/OUTPUT  04/10 0701 - 04/11 0700  In: 4442.5 [I.V.:4442.5]  Out: 3250 [ETDKZ:8864]  Voiding: YES  Catheter: NO  Drain:   Nasogastric Tube 04/08/21 (Active)   Site Assessment Clean, dry, & intact 04/11/21 0612   Securement Device Tape 04/11/21 0612   G Port Status Intermittent Suction 04/11/21 0612   External Insertion Levy (cms) 62 cms 04/11/21 0612   Action Taken Tubing changed 04/09/21 1820   Drainage Description Green 04/11/21 0612   Gastric Residual (mL) 1000 ml 04/08/21 0800   Drainage Chamber Level (ml) 350 ml 04/10/21 1756   Output (ml) 300 ml 04/11/21 0612               Flatus: Patient does have flatus present. Stool:  0 occurrences. Characteristics:      Emesis: 0 occurrences. Characteristics:   Emesis Assessment  Appearance: Green  Emesis Amount: Medium(400 ml)    VITAL SIGNS  Patient Vitals for the past 12 hrs:   Temp Pulse Resp BP SpO2   04/11/21 0428 98.2 °F (36.8 °C) -- 20 121/66 94 %   04/10/21 2332 98 °F (36.7 °C) (!) 112 20 139/78 97 %   04/10/21 1946 98.5 °F (36.9 °C) 99 17 (!) 116/52 99 %       Pain Assessment  Pain Intensity 1: 0 (04/10/21 1946)  Pain Location 1: Abdomen  Pain Intervention(s) 1: Declines  Patient Stated Pain Goal: 0    Ambulating  Yes    Shift report given to oncoming nurse at the bedside.     Alex Henriquez, RN

## 2021-04-11 NOTE — PROGRESS NOTES
General Surgery Progress Note    4/11/2021    Admit Date: 3/30/2021    Subjective:     Surgery POD #12  Pt sitting on side of bed. No new issues. NG patent with 1350mL 24hr output. +BMx 4 (small) last 24 hours, AF. NAD. Objective:     Visit Vitals  /62   Pulse (!) 109   Temp 98.8 °F (37.1 °C)   Resp 17   Ht 5' 6\" (1.676 m)   Wt 198 lb 4.8 oz (89.9 kg)   SpO2 94%   BMI 32.01 kg/m²         Intake/Output Summary (Last 24 hours) at 4/11/2021 1036  Last data filed at 4/11/2021 0852  Gross per 24 hour   Intake 4442.47 ml   Output 2500 ml   Net 1942.47 ml        EXAM:   bilious NGT output  ABD soft, mild incisional tenderness, active BS'S. Wounds are intact without signs of bleeding or infection. Data Review    Recent Results (from the past 24 hour(s))   METABOLIC PANEL, BASIC    Collection Time: 04/11/21  5:21 AM   Result Value Ref Range    Sodium 136 136 - 145 mmol/L    Potassium 3.9 3.5 - 5.1 mmol/L    Chloride 112 (H) 98 - 107 mmol/L    CO2 21 21 - 32 mmol/L    Anion gap 3 (L) 7 - 16 mmol/L    Glucose 162 (H) 65 - 100 mg/dL    BUN 13 8 - 23 MG/DL    Creatinine 0.60 0.6 - 1.0 MG/DL    GFR est AA >60 >60 ml/min/1.73m2    GFR est non-AA >60 >60 ml/min/1.73m2    Calcium 8.5 8.3 - 10.4 MG/DL   CBC WITH AUTOMATED DIFF    Collection Time: 04/11/21  5:21 AM   Result Value Ref Range    WBC 10.3 4.3 - 11.1 K/uL    RBC 4.42 4.05 - 5.2 M/uL    HGB 10.9 (L) 11.7 - 15.4 g/dL    HCT 34.1 (L) 35.8 - 46.3 %    MCV 77.1 (L) 79.6 - 97.8 FL    MCH 24.7 (L) 26.1 - 32.9 PG    MCHC 32.0 31.4 - 35.0 g/dL    RDW 18.5 (H) 11.9 - 14.6 %    PLATELET 395 818 - 642 K/uL    MPV 10.6 9.4 - 12.3 FL    ABSOLUTE NRBC 0.00 0.0 - 0.2 K/uL    DF AUTOMATED      NEUTROPHILS 73 43 - 78 %    LYMPHOCYTES 14 13 - 44 %    MONOCYTES 9 4.0 - 12.0 %    EOSINOPHILS 3 0.5 - 7.8 %    BASOPHILS 1 0.0 - 2.0 %    IMMATURE GRANULOCYTES 1 0.0 - 5.0 %    ABS. NEUTROPHILS 7.5 1.7 - 8.2 K/UL    ABS. LYMPHOCYTES 1.4 0.5 - 4.6 K/UL    ABS.  MONOCYTES 1.0 0.1 - 1.3 K/UL    ABS. EOSINOPHILS 0.3 0.0 - 0.8 K/UL    ABS. BASOPHILS 0.1 0.0 - 0.2 K/UL    ABS. IMM. GRANS. 0.1 0.0 - 0.5 K/UL   EKG, 12 LEAD, INITIAL    Collection Time: 04/11/21  7:53 AM   Result Value Ref Range    Ventricular Rate 106 BPM    Atrial Rate 106 BPM    P-R Interval 162 ms    QRS Duration 132 ms    Q-T Interval 346 ms    QTC Calculation (Bezet) 459 ms    Calculated P Axis 53 degrees    Calculated R Axis -25 degrees    Calculated T Axis -7 degrees    Diagnosis       Sinus tachycardia  Non-specific intra-ventricular conduction block  Cannot rule out Anterior infarct , age undetermined  Abnormal ECG  When compared with ECG of 28-DEC-2018 07:28,  Vent. rate has increased BY  61 BPM  Non-specific intra-ventricular conduction block has replaced Right bundle   branch block  Minimal criteria for Anterior infarct are now Present          Hospital Problems  Date Reviewed: 3/23/2021          Codes Class Noted POA    Severe protein-calorie malnutrition ([facility] ICD-10-CM: W51  ICD-9-CM: 972  4/7/2021 Yes        * (Principal) Colon cancer ([facility] ICD-10-CM: C18.9  ICD-9-CM: 153.9  3/30/2021 Unknown            Pt has bowel function with a benign abdominal exam yet continues with vomiting  NG tube  TPN  Maintenance IVFs   NPO  Appreciate GI assistance  CTAP 4/8/21 negative for acute surgical findings  Continue Cipro and Flagyl (abx allergy to PCN) WBC down to 10.3  Follow labs  Prophylaxis with SCDS, IS, Protonix, Lovenox.   Dulcolax daily    Yesenia Robertson, YURIY

## 2021-04-11 NOTE — PROGRESS NOTES
Comprehensive Nutrition Assessment    Type and Reason for Visit: Reassess  TPN Management (Surgery)    Nutrition Recommendations/Plan:   · Parenteral Nutrition:  · Central TPN:  · 10%DEX/4.25%AA at 85 ml/hr with 250 ml 20% Lipids daily over 12 hrs. · Lytes/L: No changes warranted today  · Potassium 30 meq (30 meq KPO4), 5 meq Mg, 4.5 meq Calcium. · Other additives: MTE, MVI  · IVF:  ·  IVF per MD  (Currently at 100ml/hr)  · Vitamin and Mineral Supplement Therapy:  · Electrolyte management replacement protocol implemented. · Labs:   · BMP daily,Phos and Mg MWF. Phos in AM.  POC Glucoses/SSI if Glucose > 180 mg/dl on AM BMP. · Daily weight     Malnutrition Assessment:  Malnutrition Status: Severe malnutrition  Context: Chronic illness  Findings of clinical characteristics of malnutrition:   Energy Intake:  7 - 75% or less est energy requirements for 1 month or longer  Weight Loss:  7.0 - Greater than 7.5% over 3 months((226# to 182# (19.5%), 226# to 201# (11%) in < 3 months)     Body Fat Loss:  Unable to assess,     Muscle Mass Loss:  Unable to assess,    Fluid Accumulation:  Unable to assess,     Strength:  Not performed     Nutrition Assessment:   Nutrition History: 4/6: Pt reports at baseline she eats 2 small meals a day of oatmeal or grits for breakfast and then a \"normal\" meal for her 2nd meal of the day. About 8 weeks ago, she was eating a [employer] meal and had N/V which she thought was food poisioning. However she continued to have N/V with all po intake, thus intake was <25% of usual. She was mainly consuming gatorade, Boost or Ensure PTA. She had weight loss from 226# to 182#. She reports addtional weight loss since her pre assessment weight on 3/24/2021. Nutrition Background: H/O: breast cancer with bilateral mastectomy, uterine cancer s/p hysterectomy, DM, HTN, RLS, GERD and claustrophobia. 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  She has had postop N&V and been unable to tolerate po diet progression  Daily Update:  Observed TPN infusing at 85ml/hr with IVF infusing at 100ml/hr. Pt sleeping. RN reports 725ml HERRMANN during her shift. Abdominal Status (last documented): Passing flatus, Tender abdomen with Hypoactive  bowel sounds. Last BM 04/10/21. Pertinent Medications: dulcolax daily, MOM daily, reglan Q6H, zofran Q4H, protonix  IVF: 1/2 NS with 20meq KCl @ 100ml/hr  Pertinent Labs:     Lab Results   Component Value Date/Time     04/11/2021 05:21 AM    K 3.9 04/11/2021 05:21 AM     (H) 04/11/2021 05:21 AM    CO2 21 04/11/2021 05:21 AM    AGAP 3 (L) 04/11/2021 05:21 AM     (H) 04/11/2021 05:21 AM    BUN 13 04/11/2021 05:21 AM    CREA 0.60 04/11/2021 05:21 AM    GFRAA >60 04/11/2021 05:21 AM    GFRNA >60 04/11/2021 05:21 AM    CA 8.5 04/11/2021 05:21 AM     Output by Drain (mL) 04/10/21 0701 - 04/10/21 1900 04/10/21 1901 - 04/11/21 0700 04/11/21 0701 - 04/11/21 1312 Range Total   Nasogastric Tube 04/08/21 450 900  1350       Nutrition Related Findings:   Diet: 3/30 Clear, 4/2 NPO, 4/5 sips of clears, Ensure clear. 4/6: PPN started. 4/8:NGT placed. 4/10 NPO with ice chips, NG to suction (2 L/day) Wound Type: Surgical incision    Current Nutrition Therapies:  DIET NPO With Ice Chips  Current Parenteral Nutrition Orders:  · Type and Formula: 10%DEX/4.25%AA    · Lipids: 250ml, Daily  · Duration: Continuous  · Rate/Volume: 85 ml/hr  · Current PN Order Provides: at goal  · Goal PN Orders Provides: 1510 kcal/d (100% of needs), 85 grams of protein/d (100% of needs), 200 grams of CHO/d and 2290 ml of total volume/d     Current Intake:   Average Meal Intake: NPO Average Supplement Intake: NPO      Anthropometric Measures:  Height: 5' 6\" (167.6 cm)  Current Body Wt: 89.9 kg (198 lb 3.1 oz)(4/11), Weight source: Not specified  BMI: 32, Obese class 1 (BMI 30.0-34. 9)  Admission Body Weight: (Pre assessment weight was 87.3 kg 3/24/2021 with pt report of additional weight loss down to 182# PTA)  Ideal Body Weight (lbs) (Calculated): 130 lbs (59 kg), 148 %  Usual Body Wt: 102.5 kg (226 lb), Percent weight change: -19.5          Edema: No data recorded   Estimated Daily Nutrient Needs:  Energy (kcal/day): 6044-2969 (Kcal/kg(15-20), Weight Used: Current(91.3 kg-bed scale 4/7))  Protein (g/day): 69-91 Weight Used: ((20% of kcal))  Fluid (ml/day):   (1 ml/kcal)    Nutrition Diagnosis:   · Inadequate oral intake related to altered GI function, inadequate protein-energy intake(continued N/V) as evidenced by nutrition support-parenteral nutrition, NPO or clear liquid status due to medical condition    · Severe malnutrition related to inadequate protein-energy intake as evidenced by (s/s as above per malnutrition assessment)    Nutrition Interventions:   Food and/or Nutrient Delivery: Continue NPO, Continue parenteral nutrition     Coordination of Nutrition Care: Continue to monitor while inpatient  Plan of Care discussed with Richard Urias RN    Goals:   Previous Goal Met: Goal(s) achieved  Active Goal: Continue to meet estimated needs via PN until able to tolerate po intake >75% of needs    Nutrition Monitoring and Evaluation:      Food/Nutrient Intake Outcomes: Parenteral nutrition intake/tolerance  Physical Signs/Symptoms Outcomes: Biochemical data, GI status    Discharge Planning:     Too soon to determine    Electronically signed by Sanket Valdivia MS, RDN, LD 4/11/2021 at 1:12 PM  Contact: [phone]

## 2021-04-11 NOTE — PROGRESS NOTES
[facility] Progress Note         Admit Date:  3/30/2021    Today's Date:  4/11/2021    CC:  N&V    Subjective:     Feeling better with more strength but still weak. Sitting up in chair. In better spirits. No vomiting, nausea controlled. Still with significant NG output greenish. 900 mL last 12 hour shift. Denies any significant intake of ice chips. Scopolamine patch removed yesterday. Reglan increased - tolerating well. Reports 3 small BM yesterday after suppository - none in prior 3 days. BM described as small and soft. Willing to take another suppository. Slept well. Denies excessive somnolence. Denies abdominal pain. Feeling of lump in throat, possibly from NG tube. Using throat spray with benefit. Questions \"how long can this go on?\". Seen by Dr. Elbert Ashton this morning - encouraged with improvement.     Medications:   Current Facility-Administered Medications   Medication Dose Route Frequency    lip protectant (BLISTEX) ointment 1 Each  1 Each Topical PRN    metoclopramide HCl (REGLAN) injection 10 mg  10 mg IntraVENous Q6H    TPN ADULT - dextrose 10% amino acid 4.25%   IntraVENous QPM    bisacodyL (DULCOLAX) suppository 10 mg  10 mg Rectal DAILY    fat emulsion 20% (LIPOSYN, INTRAlipid) infusion 250 mL  250 mL IntraVENous QPM    phenol throat spray (CHLORASEPTIC) 1 Spray  1 Spray Oral PRN    metroNIDAZOLE (FLAGYL) IVPB premix 500 mg  500 mg IntraVENous Q12H    ciprofloxacin HCl (CIPRO) tablet 500 mg  500 mg Oral Q12H    LORazepam (ATIVAN) tablet 1 mg  1 mg Oral BID    pantoprazole (PROTONIX) 40 mg in 0.9% sodium chloride 10 mL injection  40 mg IntraVENous Q12H    ondansetron (ZOFRAN) injection 8 mg  8 mg IntraVENous Q4H    magnesium hydroxide (MILK OF MAGNESIA) 400 mg/5 mL oral suspension 30 mL  30 mL Oral DAILY    NUTRITIONAL SUPPORT ELECTROLYTE PRN ORDERS   Does Not Apply PRN    0.45% sodium chloride with KCl 20 mEq/L infusion   IntraVENous CONTINUOUS    enoxaparin (LOVENOX) injection 40 mg  40 mg SubCUTAneous Q24H    enalaprilat (VASOTEC) injection 0.625 mg  0.625 mg IntraVENous Q6H PRN    HYDROmorphone (DILAUDID) injection 1 mg  1 mg IntraVENous Q3H PRN    oxyCODONE-acetaminophen (PERCOCET 7.5) 7.5-325 mg per tablet 1 Tab  1 Tab Oral Q4H PRN       Review of Systems:  ROS was obtained, with pertinent positives as listed above. No chest pain or SOB. Diet:  NPO with ice chips; TPN    Objective:   Vitals:  Visit Vitals  /62   Pulse (!) 109   Temp 98.8 °F (37.1 °C)   Resp 17   Ht 5' 6\" (1.676 m)   Wt 89.9 kg (198 lb 4.8 oz)   SpO2 94%   BMI 32.01 kg/m²     Intake/Output:  No intake/output data recorded. 04/09 1901 - 04/11 0700  In: 6896.5 [I.V.:6896.5]  Out: 4450 [Urine:2300]  Exam:  General appearance: alert, cooperative, no distress. No family at bedside this morning. Lungs: clear to auscultation bilaterally anteriorly  Heart: regular rate and rhythm. + Systolic murmur over precordium. Abdomen: soft, minimal distension. Bowel sounds normal. Healing surgical sites. No tenderness.  No masses, no organomegaly  Extremities: no cyanosis or edema  Neuro:  alert and oriented    Data Review (Labs):    Recent Labs     04/11/21  0521 04/10/21  0526 04/09/21  1020   WBC 10.3  --  14.3*   HGB 10.9*  --  11.3*   HCT 34.1*  --  35.4*     --  282   MCV 77.1*  --  77.8*    138 136   K 3.9 4.1 4.0   * 110* 110*   CO2 21 20* 21   BUN 13 13 21   CREA 0.60 0.53* 0.65   CA 8.5 8.5 8.8   MG  --   --  2.3   * 119* 139*     4/11/2021  9:06 AM - [place], Card Result In  Component Value Ref Range & Units Status   Ventricular Rate 106  BPM Incomplete   Atrial Rate 106  BPM Incomplete   P-R Interval 162  ms Incomplete   QRS Duration 132  ms Incomplete   Q-T Interval 346  ms Incomplete   QTC Calculation (Bezet) 459  ms Incomplete   Calculated P Axis 53  degrees Incomplete   Calculated R Axis -25  degrees Incomplete   Calculated T Axis -7  degrees Incomplete Diagnosis   Incomplete   Sinus tachycardia   Non-specific intra-ventricular conduction block   Cannot rule out Anterior infarct , age undetermined   Abnormal ECG   When compared with ECG of 28-DEC-2018 07:28,   Vent. rate has increased BY  61 BPM   Non-specific intra-ventricular conduction block has replaced Right bundle   branch block   Minimal criteria for Anterior infarct are now Present          Assessment:     Principal Problem:    Colon cancer ([facility] (3/30/2021)  S/P right hemicolectomy 3/30/21    Active Problems:    Severe protein-calorie malnutrition ([facility] (4/7/2021)    Recent Weight loss (Before surgery)    Severe nausea and bilious vomiting - post op but present prior to Surgery    Hiatal hernia    Gastritis noted on EGD - 3/17/21 - on PPI therapy at home (was on Pepcid post Op, Changed to PPI)     Constipation    Leucocytosis    Hx of DM - hyperglycemia on TPN      80 y.o. female who we are following for post-op N&V. She underwent right hemicolectomy by Dr. Aylin Ramírez on 9/56/91 for right invasive adenocarcinoma. She has not responded to Pepcid 20mg every 12 hours, PRN phenergan (although this was discontinued due to delirium) and zofran or scheduled scopolamine patch. She was started on scheduled zofran, PPI , MOM and dulcolax on 4/5 without relief. Metoclopramide and Ativan for started on 4/7 and she did well for several hours. Suspect her current constipation as seen on KUB 4/5 is increasing her nausea. She had mild elevation lipase on 4/6/2021 571, but suspect this is secondary to persistent vomiting. NG was placed on 4/8 with significant output 2960, and output on 4/9 2000cc, 4/10 1350. CT of the abdomen/pelvis with contrast on 4/8 did not reveal an etiology. Flagyl was started on 4/8 for leukocytosis which is improving, (Flagyl can cause nausea but symptoms present prior to this). CT of head  on 4/9 also unremarkable (without iv contrast due to iv contrast for CT A/P on 4/8).  Suspect her symptoms are multifactorial with recent surgery, constipation, and dysmotility. Plan:     -Tolerating increased metoclopramide to 10mg every 6 hours 4/10/21  -Scopolamine patch removed on 4/10   -Continue Ativan 1mg bid which seems to have helped and without excessive somnolence  -Continue dulcolax 10mg pr daily scheduled  -Continue pantoprazole 40mg IV every 12 hours  -continue Zofran every 4 hours scheduled - will decrease to 4 mg (, QTc 459)  -Has narcotics ordered but not in pain and not using. Avoid or minimize use.  -continue NG decompression. Will plan to clamp when output decreased. If persistent, consider Gastrografin SB X-rays.  -Continue TPN for nutritional support (Central line placed by IR) - monitor hyperglycemia and electrolytes. -Check with Surgery if antibiotics need to be continued.  -Encouraged increased activities - sitting up in chair. Dr. Annie Arechiga and inpatient rounding team will follow. Discussed with patient.      Claribel Whelan MD  [facility]

## 2021-04-12 NOTE — PROGRESS NOTES
Feeling a little better this afternoon. Pt feels like the zofran is making her sick. She has asked that we stop it and try other measures.      May have ice chips and sips of water   Awaiting results of small bowel study

## 2021-04-12 NOTE — PROGRESS NOTES
Chart screened by  for discharge planning. Patient continues to have an NGT in place, NPO, and TPN. Per PT notes in past patient has no further skilled needs. CM will continue to follow patient during hospitalization for discharge planning and needs. No needs identified at this time. Please consult or notify  if any new issues arise.   LOS= 12 days

## 2021-04-12 NOTE — PROGRESS NOTES
Spiritual Care Visit, initial visit. Visited with patient at bedside. Prayed for patient's healing and health. Visit by Rell Cabrera, Staff .  Madelin., Antonino.B., B.A.

## 2021-04-12 NOTE — PROGRESS NOTES
PLAN:  Pt has bowel function with a benign abdominal exam yet continues with vomiting  NG tube  TPN  Maintenance IVFs   NPO  Appreciate GI assistance  CTAP 4/8/21 negative for acute surgical findings  Stop Cipro/Flagyl  Follow labs  Prophylaxis with SCDS, IS, Protonix, Lovenox. Dulcolax daily  Will give 1L bolus for tachycardia, fluid depletion  Check SBFT-Ok to use NGT for contrast      ASSESSMENT:  Admit Date: 3/30/2021   13 Days Post-Op  Procedure(s):  RIGHT ROBOTIC ASSISTED RIGHT COLECTOMY    Principal Problem:    Colon cancer ([facility] [address] (3/30/2021)    Active Problems:    Severe protein-calorie malnutrition ([facility] [address] (4/7/2021)         SUBJECTIVE:  4/12/21 13 Days Post-Op No new issues. NG patent with 1L 24hr output. No BM overnight. AF. Tachy. NAD.  -2.1L fluid volume      Intake/Output Summary (Last 24 hours) at 4/12/2021 1014  Last data filed at 4/12/2021 0935  Gross per 24 hour   Intake 2164 ml   Output 4320 ml   Net -2156 ml     OBJECTIVE:  Constitutional: Alert oriented cooperative patient in no acute distress; appears stated age   Visit Vitals  /63 (BP 1 Location: Left upper arm, BP Patient Position: Lying)   Pulse 80   Temp 98.8 °F (37.1 °C)   Resp 18   Ht 5' 6\" (1.676 m)   Wt 193 lb (87.5 kg)   SpO2 95%   BMI 31.15 kg/m²     Eyes:Sclera are clear. ENMT: no external lesions gross hearing normal; no obvious neck masses, no ear or lip lesions  CV: RRR. Normal perfusion  Resp: No JVD. Breathing is  non-labored; no audible wheezing. GI: soft, incisions c/d/i   Musculoskeletal: unremarkable with normal function. No embolic signs or cyanosis.    Neuro:  Oriented; moves all 4; no focal deficits  Psychiatric: normal affect and mood, no memory impairment      Patient Vitals for the past 24 hrs:   BP Temp Pulse Resp SpO2 Weight   04/12/21 0854 105/63 98.8 °F (37.1 °C) 80 18 95 % --   04/12/21 0633 -- -- -- -- -- 193 lb (87.5 kg)   04/12/21 0354 -- -- (!) 106 -- -- --   04/12/21 0237 (!) 121/58 98.2 °F (36.8 °C) (!) 112 19 94 % --   04/11/21 2334 125/64 98 °F (36.7 °C) (!) 115 19 94 % --   04/11/21 2001 (!) 120/56 98.8 °F (37.1 °C) 100 18 98 % --   04/11/21 1609 (!) 124/59 98.9 °F (37.2 °C) (!) 108 17 95 % --   04/11/21 1136 119/73 98.4 °F (36.9 °C) (!) 108 17 97 % --     Labs:    Recent Labs     04/12/21  0400 04/11/21  0521   WBC  --  10.3   HGB  --  10.9*   PLT  --  256    136   K 4.4 3.9   * 112*   CO2 21 21   BUN 13 13   CREA 0.62 0.60   * 162*       Signed:  Attila Alonzo NP

## 2021-04-12 NOTE — PROGRESS NOTES
END OF SHIFT NOTE:    INTAKE/OUTPUT  04/11 0701 - 04/12 0700  In: 2164 [I.V.:2164]  Out: 4820 [Urine:3820]  Voiding: YES  Catheter: NO  Drain:   Nasogastric Tube 04/08/21 (Active)   Site Assessment Clean, dry, & intact 04/12/21 1751   Securement Device Tape 04/12/21 1751   G Port Status Intermittent Suction 04/12/21 1751   External Insertion Levy (cms) 62 cms 04/12/21 1751   Action Taken Retaped 04/11/21 1255   Drainage Description Green 04/12/21 1751   Gastric Residual (mL) 1000 ml 04/08/21 0800   Drainage Chamber Level (ml) 0 ml 04/12/21 1809   Output (ml) 850 ml 04/12/21 1808               Flatus: Patient does have flatus present; while pt was having BM and nurse witnessed. Stool:  1 occurrences. Characteristics:  Stool Assessment  Stool Color: Brown  Stool Appearance: Formed  Stool Amount: Smear  Stool Source/Status: Rectum    Emesis: 0 occurrences. Characteristics:   Emesis Assessment  Appearance: Green  Emesis Amount: Medium(400 ml)    VITAL SIGNS  Patient Vitals for the past 12 hrs:   Temp Pulse Resp BP SpO2   04/12/21 1506 97.9 °F (36.6 °C) (!) 123 18 122/69 98 %   04/12/21 1453 -- 84 18 115/72 100 %   04/12/21 1117 98.6 °F (37 °C) (!) 106 18 111/71 97 %   04/12/21 0854 98.8 °F (37.1 °C) 80 18 105/63 95 %       Pain Assessment  Pain Intensity 1: 0 (04/12/21 0854)  Pain Location 1: Abdomen  Pain Intervention(s) 1: Declines  Patient Stated Pain Goal: 0    Ambulating  Yes    Shift report given to oncoming nurse at the bedside.     Gus Smith RN

## 2021-04-12 NOTE — PROGRESS NOTES
END OF SHIFT NOTE:    INTAKE/OUTPUT  04/10 0701 - 04/11 0700  In: 4442.5 [I.V.:4442.5]  Out: 3250 [ARNBR:2836]  Voiding: YES  Catheter: NO  Drain:   Nasogastric Tube 04/08/21 (Active)   Site Assessment Clean, dry, & intact 04/11/21 1255   Securement Device Tape 04/11/21 1255   G Port Status Intermittent Suction 04/11/21 1255   External Insertion Levy (cms) 62 cms 04/11/21 1255   Action Taken Retaped 04/11/21 1255   Drainage Description Green 04/11/21 1255   Gastric Residual (mL) 1000 ml 04/08/21 0800   Drainage Chamber Level (ml) 400 ml 04/11/21 0710   Output (ml) 300 ml 04/11/21 0612       Flatus: Patient does have flatus present. Stool:  0 occurrences. Characteristics:  Stool Assessment  Stool Color: Brown  Stool Appearance: Formed  Stool Amount: Smear  Stool Source/Status: Rectum    Emesis: 0 occurrences. Characteristics:   Emesis Assessment  Appearance: Green  Emesis Amount: Medium(400 ml)    VITAL SIGNS  Patient Vitals for the past 12 hrs:   Temp Pulse Resp BP SpO2   04/11/21 2001 98.8 °F (37.1 °C) 100 18 (!) 120/56 98 %   04/11/21 1609 98.9 °F (37.2 °C) (!) 108 17 (!) 124/59 95 %   04/11/21 1136 98.4 °F (36.9 °C) (!) 108 17 119/73 97 %       Pain Assessment  Pain Intensity 1: 0(denies) (04/11/21 1255)  Pain Location 1: Abdomen  Pain Intervention(s) 1: Declines  Patient Stated Pain Goal: 0    Ambulating  Yes: to bathroom and around hallway today. Tolerated fairly well. Shift report given to oncoming nurse at the bedside.     Denisse Vang, JAZMINE

## 2021-04-12 NOTE — PROGRESS NOTES
END OF SHIFT NOTE:    INTAKE/OUTPUT  04/11 0701 - 04/12 0700  In: 106 [I.V.:106]  Out: 4220 [Urine:3520]  Voiding: YES  Catheter: NO  Drain:   Nasogastric Tube 04/08/21 (Active)   Site Assessment Clean, dry, & intact 04/12/21 0359   Securement Device Tape 04/12/21 0359   G Port Status Intermittent Suction 04/12/21 0359   External Insertion Levy (cms) 62 cms 04/12/21 0359   Action Taken Retaped 04/11/21 1255   Drainage Description Green 04/12/21 0359   Gastric Residual (mL) 1000 ml 04/08/21 0800   Drainage Chamber Level (ml) 400 ml 04/11/21 0710   Output (ml) 550 ml 04/12/21 0110               Flatus: Patient does not have flatus present. Stool:  0 occurrences. Characteristics:      Emesis: 0 occurrences. Characteristics:   Emesis Assessment  Appearance: Green  Emesis Amount: Medium(400 ml)    VITAL SIGNS  Patient Vitals for the past 12 hrs:   Temp Pulse Resp BP SpO2   04/12/21 0354 -- (!) 106 -- -- --   04/12/21 0237 98.2 °F (36.8 °C) (!) 112 19 (!) 121/58 94 %   04/11/21 2334 98 °F (36.7 °C) (!) 115 19 125/64 94 %   04/11/21 2001 98.8 °F (37.1 °C) 100 18 (!) 120/56 98 %       Pain Assessment  Pain Intensity 1: 0 (04/11/21 2001)  Pain Location 1: Abdomen  Pain Intervention(s) 1: Declines  Patient Stated Pain Goal: 0    Ambulating  Yes    Shift report given to oncoming nurse at the bedside.     Yamilka Babcock, RN

## 2021-04-12 NOTE — PROGRESS NOTES
Comprehensive Nutrition Assessment    Type and Reason for Visit: Reassess  TPN Management (Surgery)    Nutrition Recommendations/Plan:   · Parenteral Nutrition:  · Central TPN:  · 10%DEX/4.25%AA at 85 ml/hr with 250 ml 20% Lipids daily over 12 hrs. · Lytes/L:   · Sodium 80 meq (80 NaAcetate), Potassium 10 meq (10 meq KPO4), 5 meq Mg, 4.5 meq Calcium. · Other additives: MTE, MVI  · IVF:  ·  IVF per MD  · Vitamin and Mineral Supplement Therapy:  · Electrolyte management replacement protocol implemented. · Labs:   · BMP daily,Phos and Mg MWF. Phos in AM.  POC Glucoses/SSI if Glucose > 180 mg/dl on AM BMP. · Daily weight     Malnutrition Assessment:  Malnutrition Status: Severe malnutrition  Context: Chronic illness  Findings of clinical characteristics of malnutrition:   Energy Intake:  7 - 75% or less est energy requirements for 1 month or longer  Weight Loss:  7.0 - Greater than 7.5% over 3 months((226# to 182# (19.5%), 226# to 201# (11%) in < 3 months)     Body Fat Loss:  Unable to assess,     Muscle Mass Loss:  Unable to assess,    Fluid Accumulation:  Unable to assess,     Strength:  Not performed       Nutrition Assessment:   Nutrition History: 4/6: Pt reports at baseline she eats 2 small meals a day of oatmeal or grits for breakfast and then a \"normal\" meal for her 2nd meal of the day. About 8 weeks ago, she was eating a [employer] meal and had N/V which she thought was food poisioning. However she continued to have N/V with all po intake, thus intake was <25% of usual. She was mainly consuming gatorade, Boost or Ensure PTA. She had weight loss from 226# to 182#. She reports addtional weight loss since her pre assessment weight on 3/24/2021. Nutrition Background: H/O: breast cancer with bilateral mastectomy, uterine cancer s/p hysterectomy, DM, HTN, RLS, GERD and claustrophobia. 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  She has had postop N&V and been unable to tolerate po diet progression  Daily Update:  Patient seen lying in bed. NGT in place but currently clamped. When RD asking about bowel movements, patient states \"that's my problem. \" She reports that she is now having hunger pains. She states she feels like she may need to have a BM. Patient to have SBFT today. Noted decline in HERRMANN over last 3 days. 1000 ml recorded over last 24 hrs. Abdominal Status (last documented): Nausea, Not passing flatus, Obese, Semi-soft, Tender abdomen with Hypoactive  bowel sounds. Last BM 04/10/21. Pertinent Medications: Dulcolax, Pepcid, MOM, Reglan, Zofran  IVF: 1/2NS with 20 KCl @ 100, 1 L bolus NS  Pertinent Labs:   Lab Results   Component Value Date/Time    Sodium 137 04/12/2021 04:00 AM    Potassium 4.4 04/12/2021 04:00 AM    Chloride 109 (H) 04/12/2021 04:00 AM    CO2 21 04/12/2021 04:00 AM    Anion gap 7 04/12/2021 04:00 AM    Glucose 138 (H) 04/12/2021 04:00 AM    BUN 13 04/12/2021 04:00 AM    Creatinine 0.62 04/12/2021 04:00 AM    Calcium 8.4 04/12/2021 04:00 AM    Albumin 2.8 (L) 04/08/2021 05:47 AM    Magnesium 2.1 04/12/2021 04:00 AM    Phosphorus 3.4 04/12/2021 04:00 AM   Labs remarkable for K 4.4 and trending up, Cl trending high and CO2 trending low since 4/8    Nutrition Related Findings:   Diet: 3/30 Clear, 4/2 NPO, 4/5 sips of clears, Ensure clear. 4/6: PPN started. 4/8:NGT placed. CVC placed 4/9 and converted to CPN.  Wound Type: Surgical incision    Current Nutrition Therapies:  DIET NPO With Ice Chips  Current Parenteral Nutrition Orders:  · Type and Formula: 10%DEX/4.25%AA    · Lipids: 250ml, Daily  · Duration: Continuous  · Rate/Volume: 85 ml/hr  · Current PN Order Provides: at goal  · Goal PN Orders Provides: 1510 kcal/d (100% of needs), 85 grams of protein/d (100% of needs), 200 grams of CHO/d and 2290 ml of total volume/d     Current Intake:   Average Meal Intake: NPO Average Supplement Intake: NPO      Anthropometric Measures:  Height: 5' 6\" (167.6 cm)  Current Body Wt: 87.5 kg (192 lb 14.4 oz)(4/12), Weight source: Bed scale  BMI: 31.2, Obese class 1 (BMI 30.0-34. 9)  Admission Body Weight: (Pre assessment weight was 87.3 kg 3/24/2021 with pt report of additional weight loss down to 182# PTA)  Ideal Body Weight (lbs) (Calculated): 130 lbs (59 kg), 148 %  Usual Body Wt: 102.5 kg (226 lb), Percent weight change: -19.5          Edema: No data recorded   Estimated Daily Nutrient Needs:  Energy (kcal/day): 8404-1154 (Kcal/kg(15-20), Weight Used: Current(91.3 kg-bed scale 4/7))  Protein (g/day): 69-91 Weight Used: ((20% of kcal))  Fluid (ml/day):   (1 ml/kcal)    Nutrition Diagnosis:   · Inadequate oral intake related to altered GI function, inadequate protein-energy intake(continued N/V) as evidenced by nutrition support-parenteral nutrition, NPO or clear liquid status due to medical condition    · Severe malnutrition related to inadequate protein-energy intake as evidenced by (s/s as above per malnutrition assessment)    Nutrition Interventions:   Food and/or Nutrient Delivery: Continue NPO, Modify parenteral nutrition     Coordination of Nutrition Care: Continue to monitor while inpatient  Plan of Care discussed with Hunter Joe RN    Goals:   Previous Goal Met: Goal(s) achieved  Active Goal: Continue to meet estimated needs via PN until able to tolerate po intake >75% of needs    Nutrition Monitoring and Evaluation:      Food/Nutrient Intake Outcomes: Parenteral nutrition intake/tolerance  Physical Signs/Symptoms Outcomes: Biochemical data, GI status    Discharge Planning:     Too soon to determine    Chepe Puxico [place], LD on 4/12/2021 at 12:23 PM  Contact: [phone]

## 2021-04-12 NOTE — PROGRESS NOTES
[facility] Progress Note         Admit Date:  3/30/2021    Today's Date:  4/12/2021    CC:  N & V    Subjective:     Patient complaining of weakness this morning. She does have some waves of nausea, but reports has improved. She still has greenish NG output. 550cc in last 12 hours. NGT total output 1000cc for 4/11. No BM yesterday. Reports that suppository was inserted vaginally and not OH. Surgery has ordered gastrografin small bowel imaging for today. Antibiotics have been discontinued. Medications:   Current Facility-Administered Medications   Medication Dose Route Frequency    pantoprazole (PROTONIX) 40 mg in 0.9% sodium chloride 10 mL injection  40 mg IntraVENous Q24H    lip protectant (BLISTEX) ointment 1 Each  1 Each Topical PRN    ondansetron (ZOFRAN) injection 4 mg  4 mg IntraVENous Q4H    TPN ADULT - dextrose 10% amino acid 4.25%   IntraVENous QPM    metoclopramide HCl (REGLAN) injection 10 mg  10 mg IntraVENous Q6H    bisacodyL (DULCOLAX) suppository 10 mg  10 mg Rectal DAILY    fat emulsion 20% (LIPOSYN, INTRAlipid) infusion 250 mL  250 mL IntraVENous QPM    phenol throat spray (CHLORASEPTIC) 1 Spray  1 Spray Oral PRN    LORazepam (ATIVAN) tablet 1 mg  1 mg Oral BID    magnesium hydroxide (MILK OF MAGNESIA) 400 mg/5 mL oral suspension 30 mL  30 mL Oral DAILY    NUTRITIONAL SUPPORT ELECTROLYTE PRN ORDERS   Does Not Apply PRN    0.45% sodium chloride with KCl 20 mEq/L infusion   IntraVENous CONTINUOUS    enoxaparin (LOVENOX) injection 40 mg  40 mg SubCUTAneous Q24H    enalaprilat (VASOTEC) injection 0.625 mg  0.625 mg IntraVENous Q6H PRN    oxyCODONE-acetaminophen (PERCOCET 7.5) 7.5-325 mg per tablet 1 Tab  1 Tab Oral Q4H PRN       Review of Systems:  ROS was obtained, with pertinent positives as listed above. No chest pain or SOB.     Diet:  NPO with ice chips    Objective:   Vitals:  Visit Vitals  BP (!) 121/58   Pulse (!) 106   Temp 98.2 °F (36.8 °C)   Resp 19   Ht 5' 6\" (1.676 m)   Wt 87.5 kg (193 lb)   SpO2 94%   BMI 31.15 kg/m²     Intake/Output:  No intake/output data recorded. 04/10 1901 - 04/12 0700  In: 6606.5 [I.V.:6606.5]  Out: 6320 [Urine:4420]  Exam:  General appearance: alert, cooperative, no distress  Lungs: clear to auscultation bilaterally anteriorly  Heart: regular rate and rhythm. Murmur noted. Abdomen: soft, non-tender. Bowel sounds normal. No masses, no organomegaly. Healing surgical sites. Extremities: extremities normal, atraumatic, no cyanosis or edema  Neuro:  alert and oriented    Data Review (Labs):    Recent Labs     04/12/21  0400 04/11/21  0521 04/10/21  0526 04/09/21  1020   WBC  --  10.3  --  14.3*   HGB  --  10.9*  --  11.3*   HCT  --  34.1*  --  35.4*   PLT  --  256  --  282   MCV  --  77.1*  --  77.8*    136 138 136   K 4.4 3.9 4.1 4.0   * 112* 110* 110*   CO2 21 21 20* 21   BUN 13 13 13 21   CREA 0.62 0.60 0.53* 0.65   CA 8.4 8.5 8.5 8.8   MG 2.1  --   --  2.3   * 162* 119* 139*       Assessment:     Principal Problem:    Colon cancer ([facility] (3/30/2021)    Active Problems:    Severe protein-calorie malnutrition ([facility] (4/7/2021)    80 y.o. female who we are following for post-op N&V. She underwent right hemicolectomy by Dr. Ivanna Harley on 6/88/14 for right invasive adenocarcinoma. She has not responded to Pepcid 20mg every 12 hours, PRN phenergan (although this was discontinued due to delirium) and zofran or scheduled scopolamine patch. She was started on scheduled zofran, PPI , MOM and dulcolax on 4/5 without relief. Metoclopramide and Ativan for started on 4/7 and she did well for several hours. Suspect her current constipation as seen on KUB 4/5 is increasing her nausea. She had mild elevation lipase on 4/6/2021 571, but suspect this is secondary to persistent vomiting. NG was placed on 4/8 with significant output 2960, and output on 4/9 2000cc, 4/10 1350cc, 4/11 1000cc.  CT of the abdomen/pelvis with contrast on 4/8 did not reveal an etiology. Flagyl was started on 4/8 for leukocytosis which is improving, (Flagyl can cause nausea but symptoms present prior to this). CT of head  on 4/9 also unremarkable (without iv contrast due to iv contrast for CT A/P on 4/8). Suspect her symptoms are multifactorial with recent surgery, constipation, and dysmotility. Plan:     -Continue metoclopramide 10mg q6hr  -Continue Ativan 1mg BID  -Continue dulcolax 10mg VA daily  -Continue pantoprazole 40mg IV BID  -Zofran decreased to 4mg q4hrs on 4/11  -NG in place. Surgical team has ordered imaging today.  -On TPN  -Will follow      BECKY Cao  Patient is seen and examined in collaboration with Dr. Lori Perez. Assessment and plan as per Dr. Lori Perez.

## 2021-04-13 NOTE — PROGRESS NOTES
[facility] Progress Note         Admit Date:  3/30/2021    Today's Date:  4/13/2021    CC:  N & V    Subjective:     NGT output recorded as 2200cc for yesterday. Currently 200cc in cannister. Nurse as bedside states that last documented empty was 8:50pm last night. Small bowel study 4/12/2021 revealed mildly delayed gastric emptying and markedly hypotonic bowel. KUB after study showed that all contrast had bee removed from stomach. Zofran d/c by surgical team yesterday as pt felt like it was making her sick. Pt complains of some LLQ pain with coughing, otherwise no abdominal pain. Some improvement in nausea. Small BM yesterday after suppository. Not passing flatus, no belching.      Medications:   Current Facility-Administered Medications   Medication Dose Route Frequency    famotidine (PF) (PEPCID) 20 mg in 0.9% sodium chloride 10 mL injection  20 mg IntraVENous Q12H    TPN ADULT - dextrose 10% amino acid 4.25%   IntraVENous QPM    promethazine (PHENERGAN) with saline injection 12.5 mg  12.5 mg IntraVENous Q4H PRN    lip protectant (BLISTEX) ointment 1 Each  1 Each Topical PRN    metoclopramide HCl (REGLAN) injection 10 mg  10 mg IntraVENous Q6H    bisacodyL (DULCOLAX) suppository 10 mg  10 mg Rectal DAILY    fat emulsion 20% (LIPOSYN, INTRAlipid) infusion 250 mL  250 mL IntraVENous QPM    phenol throat spray (CHLORASEPTIC) 1 Spray  1 Spray Oral PRN    LORazepam (ATIVAN) tablet 1 mg  1 mg Oral BID    magnesium hydroxide (MILK OF MAGNESIA) 400 mg/5 mL oral suspension 30 mL  30 mL Oral DAILY    NUTRITIONAL SUPPORT ELECTROLYTE PRN ORDERS   Does Not Apply PRN    0.45% sodium chloride with KCl 20 mEq/L infusion   IntraVENous CONTINUOUS    enoxaparin (LOVENOX) injection 40 mg  40 mg SubCUTAneous Q24H    enalaprilat (VASOTEC) injection 0.625 mg  0.625 mg IntraVENous Q6H PRN    oxyCODONE-acetaminophen (PERCOCET 7.5) 7.5-325 mg per tablet 1 Tab  1 Tab Oral Q4H PRN       Review of Systems:  ROS was obtained, with pertinent positives as listed above. No chest pain or SOB. Diet:  NPO with ice chips    Objective:   Vitals:  Visit Vitals  /63   Pulse (!) 102   Temp 99.1 °F (37.3 °C)   Resp 17   Ht 5' 6\" (1.676 m)   Wt 89.9 kg (198 lb 1.6 oz)   SpO2 93%   BMI 31.97 kg/m²     Intake/Output:  No intake/output data recorded. 04/11 1901 - 04/13 0700  In: 9561 [P.O.:50; I.V.:4744]  Out: 5560 [Urine:4370]  Exam:  General appearance: alert, cooperative, no distress  Lungs: clear to auscultation bilaterally anteriorly  Heart: regular rate and rhythm. Murmur noted. Abdomen: soft, non-tender. Bowel sounds normal. No masses, no organomegaly. Healing surgical sites. Extremities: extremities normal, atraumatic, no cyanosis or edema  Neuro:  alert and oriented    Data Review (Labs):    Recent Labs     04/13/21  0455 04/12/21  0400 04/11/21  0521   WBC 8.2  --  10.3   HGB 10.9*  --  10.9*   HCT 33.6*  --  34.1*     --  256   MCV 76.0*  --  77.1*    137 136   K 4.0 4.4 3.9   * 109* 112*   CO2 24 21 21   BUN 12 13 13   CREA 0.55* 0.62 0.60   CA 8.6 8.4 8.5   MG  --  2.1  --    * 138* 162*     XR GASTROGRAFFIN SMALL BOWEL on 4/12/2021   Findings:      view of the abdomen demonstrates NG tube in place with otherwise paucity  of bowel gas . No evidence of abnormal calcification or organomegaly is  demonstrated. Surgical staples are seen.        Following administration of Gastrografin through the NG tube, timed film studies  demonstrate mildly delayed gastric emptying with only minimal contrast entering  the proximal small bowel is virtually no significant progression after 4 hours. There is however no evidence of bowel disc dilatation or gastric distention.     Total images: No fluoroscopy images were acquired. Total fluoroscopy time: None.     IMPRESSION     1. Markedly hypotonic bowel.     Assessment:     Principal Problem:    Colon cancer ([address] (3/30/2021)    Active Problems:    Severe protein-calorie malnutrition ([facility] (4/7/2021)    80 y.o. female who we are following for post-op N&V. She underwent right hemicolectomy by Dr. Frederick Saldana on 8/29/76 for right invasive adenocarcinoma. She has not responded to Pepcid 20mg every 12 hours, PRN phenergan (although this was discontinued due to delirium) and zofran or scheduled scopolamine patch. She was started on scheduled zofran, PPI , MOM and dulcolax on 4/5 without relief. Metoclopramide and Ativan for started on 4/7 and she did well for several hours. Suspect her current constipation as seen on KUB 4/5 is increasing her nausea. She had mild elevation lipase on 4/6/2021 571, but suspect this is secondary to persistent vomiting. NG was placed on 4/8 with significant output 2960, and output on 4/9 2000cc, 4/10 1350cc, 4/11 1000cc. CT of the abdomen/pelvis with contrast on 4/8 did not reveal an etiology. Flagyl was started on 4/8 for leukocytosis which is improving, (Flagyl can cause nausea but symptoms present prior to this). CT of head  on 4/9 also unremarkable (without iv contrast due to iv contrast for CT A/P on 4/8). Gastrografin SB study 4/12/2021 showed markedly hypotonic bowel. Plan:     -Continue metoclopramide 10mg q6hr  -Continue Ativan 1mg BID  -Continue dulcolax 10mg MO daily  -Continue pantoprazole 40mg IV BID  -NG in place.  -On TPN  -Will follow      BECKY Dougherty  Patient is seen and examined in collaboration with Dr. Wendy Gonzalez. Assessment and plan as per Dr. Wendy Gonzalez.

## 2021-04-13 NOTE — PROGRESS NOTES
Comprehensive Nutrition Assessment    Type and Reason for Visit: Reassess  TPN Management (Surgery)    Nutrition Recommendations/Plan:   · Parenteral Nutrition:  · Central TPN:  · 10%DEX/4.25%AA at 85 ml/hr with 250 ml 20% Lipids daily over 12 hrs. · Lytes/L:   · Sodium 80 meq (80 NaAcetate), Potassium 10 meq (10 meq KPO4), 5 meq Mg, 4.5 meq Calcium. No changes to lytes today. · Other additives: MTE, MVI  · IVF:  ·  IVF per MD  · Vitamin and Mineral Supplement Therapy:  · Electrolyte management replacement protocol implemented. · Labs:   · BMP daily,Phos and Mg MWF. Phos in AM.  POC Glucoses/SSI if Glucose > 180 mg/dl on AM BMP. · Daily weight     Malnutrition Assessment:  Malnutrition Status: Severe malnutrition  Context: Chronic illness  Findings of clinical characteristics of malnutrition:   Energy Intake:  7 - 75% or less est energy requirements for 1 month or longer  Weight Loss:  7.0 - Greater than 7.5% over 3 months((226# to 182# (19.5%), 226# to 201# (11%) in < 3 months)     Body Fat Loss:  Unable to assess,     Muscle Mass Loss:  Unable to assess,    Fluid Accumulation:  Unable to assess,     Strength:  Not performed       Nutrition Assessment:   Nutrition History: 4/6: Pt reports at baseline she eats 2 small meals a day of oatmeal or grits for breakfast and then a \"normal\" meal for her 2nd meal of the day. About 8 weeks ago, she was eating a [employer] meal and had N/V which she thought was food poisioning. However she continued to have N/V with all po intake, thus intake was <25% of usual. She was mainly consuming gatorade, Boost or Ensure PTA. She had weight loss from 226# to 182#. She reports addtional weight loss since her pre assessment weight on 3/24/2021. Nutrition Background: H/O: breast cancer with bilateral mastectomy, uterine cancer s/p hysterectomy, DM, HTN, RLS, GERD and claustrophobia. 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  She has had postop N&V and been unable to tolerate po diet progression  Daily Update:  Patient sleeping at RD visit and did not wake. TPN infusing at ordered rate. RN reports no issues with PN. States that patient reported very small BM yesterday. Patient now getting up and walking. Noted in note from surgery trying to clamp NGT today and will give 1 bottle Mad Citrate. HERRMANN: 2200 ml over last 24 hrs  Abdominal Status (last documented): Nausea, Semi-soft abdomen with Hypoactive  bowel sounds. Last BM 04/10/21. Pertinent Medications: Dulcolax, MOM, Reglan, Pepcid, Ativan  IVF: 1/2NS with KCl at 100 ml/hr  Pertinent Labs:   Lab Results   Component Value Date/Time    Sodium 138 04/13/2021 04:55 AM    Potassium 4.0 04/13/2021 04:55 AM    Chloride 109 (H) 04/13/2021 04:55 AM    CO2 24 04/13/2021 04:55 AM    Anion gap 5 (L) 04/13/2021 04:55 AM    Glucose 122 (H) 04/13/2021 04:55 AM    BUN 12 04/13/2021 04:55 AM    Creatinine 0.55 (L) 04/13/2021 04:55 AM    Calcium 8.6 04/13/2021 04:55 AM    Albumin 2.8 (L) 04/08/2021 05:47 AM    Magnesium 2.1 04/12/2021 04:00 AM    Phosphorus 3.0 04/13/2021 04:55 AM   Labs remarkable for Na stable, K stable, Phos stable, CO2 improved    Nutrition Related Findings:   Diet: 3/30 Clear, 4/2 NPO, 4/5 sips of clears, Ensure clear. 4/6: PPN started. 4/8:NGT placed. CVC placed 4/9 and converted to CPN. SBFT 4/12 with delayed gstric emptying with hypotonic bowel with no contrast in small bowel or colon.   Wound Type: Surgical incision    Current Nutrition Therapies:  DIET NPO With Ice Chips  Current Parenteral Nutrition Orders:  · Type and Formula: 10%DEX/4.25%AA    · Lipids: 250ml, Daily  · Duration: Continuous  · Rate/Volume: 85 ml/hr  · Current PN Order Provides: at goal  · Goal PN Orders Provides: 1510 kcal/d (100% of needs), 85 grams of protein/d (100% of needs), 200 grams of CHO/d and 2290 ml of total volume/d       Current Intake:   Average Meal Intake: NPO Average Supplement Intake: NPO      Anthropometric Measures:  Height: 5' 6\" (167.6 cm)  Current Body Wt: 89.9 kg (198 lb 3.1 oz)(4/13), Weight source: Bed scale  BMI: 32, Obese class 1 (BMI 30.0-34. 9)  Admission Body Weight: (Pre assessment weight was 87.3 kg 3/24/2021 with pt report of additional weight loss down to 182# PTA)  Ideal Body Weight (lbs) (Calculated): 130 lbs (59 kg), 148 %  Usual Body Wt: 102.5 kg (226 lb), Percent weight change: -19.5          Edema: No data recorded   Estimated Daily Nutrient Needs:  Energy (kcal/day): 8006-0380 (Kcal/kg(15-20), Weight Used: Current(91.3 kg-bed scale 4/7))  Protein (g/day): 69-91 Weight Used: ((20% of kcal))  Fluid (ml/day):   (1 ml/kcal)    Nutrition Diagnosis:   · Inadequate oral intake related to altered GI function as evidenced by nutrition support-parenteral nutrition, NPO or clear liquid status due to medical condition    · Severe malnutrition related to inadequate protein-energy intake as evidenced by (s/s as above per malnutrition assessment)    Nutrition Interventions:   Food and/or Nutrient Delivery: Continue NPO, Continue parenteral nutrition     Coordination of Nutrition Care: Continue to monitor while inpatient  Plan of Care discussed with Karlos Bridges RN    Goals:   Previous Goal Met: Goal(s) achieved  Active Goal: Continue to meet estimated needs via PN until able to tolerate po intake >75% of needs    Nutrition Monitoring and Evaluation:      Food/Nutrient Intake Outcomes: Parenteral nutrition intake/tolerance  Physical Signs/Symptoms Outcomes: Biochemical data, GI status    Discharge Planning:     Too soon to determine    Chepe Prien [place], LD on 4/13/2021 at 10:38 AM  Contact: [phone]

## 2021-04-13 NOTE — PROGRESS NOTES
PLAN:  Pt has bowel function with a benign abdominal exam yet continues with vomiting  SBFT showed hypotonic bowel  Clamp NGT  Give 1 bottle of Mag Citrate  TPN  Maintenance IVFs   NPO  Appreciate GI assistance  CTAP 4/8/21 negative for acute surgical findings  Stop Cipro/Flagyl  Follow labs  Prophylaxis with SCDS, IS, Protonix, Lovenox. Dulcolax supp daily  DC staples    ASSESSMENT:  Admit Date: 3/30/2021   14 Days Post-Op  Procedure(s):  RIGHT ROBOTIC ASSISTED RIGHT COLECTOMY    Principal Problem:    Colon cancer ([facility] [address] (3/30/2021)    Active Problems:    Severe protein-calorie malnutrition ([facility] [address] (4/7/2021)         SUBJECTIVE:  4/12/21 13 Days Post-Op No new issues. NG patent with 1L 24hr output. No BM overnight. AF. Tachy. NAD.  -2.1L fluid volume  4/13/21 14 Days Post-Op awake in bed, no complaints. Pain controlled. AF, tachy in the 100s. -1.4L fluid volume. No BM. Intake/Output Summary (Last 24 hours) at 4/13/2021 0955  Last data filed at 4/13/2021 0827  Gross per 24 hour   Intake 2630 ml   Output 4100 ml   Net -1470 ml     OBJECTIVE:  Constitutional: Alert oriented cooperative patient in no acute distress; appears stated age   Visit Vitals  /63   Pulse (!) 102   Temp 99.1 °F (37.3 °C)   Resp 17   Ht 5' 6\" (1.676 m)   Wt 198 lb 1.6 oz (89.9 kg)   SpO2 93%   BMI 31.97 kg/m²     Eyes:Sclera are clear. ENMT: no external lesions gross hearing normal; no obvious neck masses, no ear or lip lesions  CV: RRR. Normal perfusion  Resp: No JVD. Breathing is  non-labored; no audible wheezing. GI: soft, incisions c/d/i   Musculoskeletal: unremarkable with normal function. No embolic signs or cyanosis.    Neuro:  Oriented; moves all 4; no focal deficits  Psychiatric: normal affect and mood, no memory impairment      Patient Vitals for the past 24 hrs:   BP Temp Pulse Resp SpO2 Weight   04/13/21 0716 117/63 99.1 °F (37.3 °C) (!) 102 17 93 % --   04/13/21 0432 -- -- -- -- -- 198 lb 1.6 oz (89.9 kg)   04/13/21 0353 (!) 108/58 98.8 °F (37.1 °C) (!) 108 16 94 % --   04/12/21 2305 116/71 99 °F (37.2 °C) (!) 110 17 99 % --   04/12/21 1933 117/74 98.4 °F (36.9 °C) (!) 104 16 98 % --   04/12/21 1840 -- -- (!) 101 16 95 % --   04/12/21 1506 122/69 97.9 °F (36.6 °C) (!) 123 18 98 % --   04/12/21 1453 115/72 -- 84 18 100 % --   04/12/21 1117 111/71 98.6 °F (37 °C) (!) 106 18 97 % --     Labs:    Recent Labs     04/13/21  0455   WBC 8.2   HGB 10.9*         K 4.0   *   CO2 24   BUN 12   CREA 0.55*   *       Signed:  Tonya Harper, NP

## 2021-04-13 NOTE — PROGRESS NOTES
Patient resting in bed on left side. Administered milk and molasses enema per order. Patient unable to retain much of the enema. Instructed to lay on left side for 15 minutes. Patient verbalized understanding.

## 2021-04-13 NOTE — PROGRESS NOTES
END OF SHIFT NOTE:    INTAKE/OUTPUT  04/12 0701 - 04/13 0700  In: 2754 [P.O.:50; I.V.:2580]  Out: 3945 [Urine:1650]  Voiding: YES  Catheter: NO  Drain:   Nasogastric Tube 04/08/21 (Active)   Site Assessment Clean, dry, & intact 04/13/21 0012   Securement Device Tape 04/13/21 0012   G Port Status Intermittent Suction 04/13/21 0012   External Insertion Levy (cms) 62 cms 04/13/21 0012   Action Taken Retaped 04/11/21 1255   Drainage Description Green 04/13/21 0012   Gastric Residual (mL) 1000 ml 04/08/21 0800   Drainage Chamber Level (ml) 850 ml 04/13/21 0606   Output (ml) 850 ml total for shift 04/13/21 0606               Flatus: Patient does have flatus present. Stool:  0 occurrences. Characteristics:  Stool Assessment  Stool Color: Brown  Stool Appearance: Formed  Stool Amount: Smear  Stool Source/Status: Rectum    Emesis: 0 occurrences. Characteristics:   Emesis Assessment  Appearance: Green  Emesis Amount: Medium(400 ml)    VITAL SIGNS  Patient Vitals for the past 12 hrs:   Temp Pulse Resp BP SpO2   04/13/21 0353 98.8 °F (37.1 °C) (!) 108 16 (!) 108/58 94 %   04/12/21 2305 99 °F (37.2 °C) (!) 110 17 116/71 99 %   04/12/21 1933 98.4 °F (36.9 °C) (!) 104 16 117/74 98 %       Pain Assessment  Pain Intensity 1: 0 (04/12/21 2052)  Pain Location 1: Abdomen  Pain Intervention(s) 1: Declines  Patient Stated Pain Goal: 0    Ambulating  Yes    Shift report given to oncoming nurse at the bedside.     Wu Dear, RN

## 2021-04-13 NOTE — PROGRESS NOTES
Patient sitting on side of bed. Vomited 275 cc light green emesis. Connected NG tube to LIS and immediate 500 cc of light green fluid obtained. Dr. Sandoval Fruit in to see patient. Notified of the amount of emesis. Order received for Milk and Molasses enema. [address], NP of amount of emesis. Order received to hold off on removing NG tube for now.

## 2021-04-14 NOTE — PROGRESS NOTES
Chart screened by  for discharge planning. Patient NGT to be discontinues today. Patient still with TPN. Patient to return home at discharge. No further skilled needs per PT. CM will continue to follow patient during hospitalization for discharge planning and needs. No needs identified at this time. Please consult  if any new issues arise.

## 2021-04-14 NOTE — PROGRESS NOTES
END OF SHIFT NOTE:    INTAKE/OUTPUT  04/12 0701 - 04/13 0700  In: 2791 [P.O.:50; I.V.:2580]  Out: 5395 [[facility]:5900]  Voiding: YES  Catheter: NO  Drain:   Nasogastric Tube 04/08/21 (Active)   Site Assessment Clean, dry, & intact 04/13/21 1840   Securement Device Tape 04/13/21 1840   G Port Status Intermittent Suction 04/13/21 1840   External Insertion Levy (cms) 62 cms 04/13/21 1137   Action Taken Retaped 04/11/21 1255   Drainage Description Green 04/13/21 1840   Gastric Residual (mL) 1000 ml 04/08/21 0800   Drainage Chamber Level (ml) 500 ml 04/13/21 1840   Output (ml) 500 ml 04/13/21 1840               Flatus: Patient does not have flatus present. Stool:  1 occurrences. Characteristics:  Stool Assessment  Stool Color: Brown  Stool Appearance: Mucous  Stool Amount: Small  Stool Source/Status: Rectum    Emesis: 1 occurrences. Characteristics:   Emesis Assessment  Appearance: Green  Emesis Amount: Medium(400 ml)    VITAL SIGNS  Patient Vitals for the past 12 hrs:   Temp Pulse Resp BP SpO2   04/13/21 1640 -- (!) 108 -- (!) 101/55 --   04/13/21 1517 98.8 °F (37.1 °C) (!) 110 20 98/62 98 %   04/13/21 1105 99.2 °F (37.3 °C) (!) 103 18 (!) 106/55 94 %       Pain Assessment  Pain Intensity 1: 0 (04/13/21 1340)  Pain Location 1: Abdomen  Pain Intervention(s) 1: Declines  Patient Stated Pain Goal: 0    Ambulating  Yes with assistance in hallway    Shift report given to oncoming nurse at the bedside.     Maia Schrader RN

## 2021-04-14 NOTE — PROGRESS NOTES
Pt resting in bed. Removed staples to abdominal incisions and applied steri strips. Per order. Patient tolerated well. Family at bedside.

## 2021-04-14 NOTE — PROGRESS NOTES
Comprehensive Nutrition Assessment    Type and Reason for Visit: Reassess  TPN Management (Surgery)    Nutrition Recommendations/Plan:   · Parenteral Nutrition:  · Central TPN:  · 10%DEX/4.25%AA at 85 ml/hr with 250 ml 20% Lipids daily over 12 hrs. · Lytes/L:   · Sodium 80 meq (80 NaAcetate), Potassium 10 meq (10 meq KPO4), omit Mg, 4.5 meq Calcium. · Other additives: MTE, MVI  · IVF:  ·  IVF per MD  · Vitamin and Mineral Supplement Therapy:  · Electrolyte management replacement protocol implemented. · Labs:   · BMP daily,Phos and Mg MWF. Phos in AM.  POC Glucoses/SSI if Glucose > 180 mg/dl on AM BMP. · Daily weight    · Oral Nutrition:  · Advance diet as medically appropriate     Malnutrition Assessment:  Malnutrition Status: Severe malnutrition  Context: Chronic illness  Findings of clinical characteristics of malnutrition:   Energy Intake:  7 - 75% or less est energy requirements for 1 month or longer  Weight Loss:  7.0 - Greater than 7.5% over 3 months((226# to 182# (19.5%), 226# to 201# (11%) in < 3 months)     Body Fat Loss:  Unable to assess,     Muscle Mass Loss:  Unable to assess,    Fluid Accumulation:  Unable to assess,     Strength:  Not performed     Nutrition Assessment:   Nutrition History: 4/6: Pt reports at baseline she eats 2 small meals a day of oatmeal or grits for breakfast and then a \"normal\" meal for her 2nd meal of the day. About 8 weeks ago, she was eating a [employer] meal and had N/V which she thought was food poisioning. However she continued to have N/V with all po intake, thus intake was <25% of usual. She was mainly consuming gatorade, Boost or Ensure PTA. She had weight loss from 226# to 182#. She reports addtional weight loss since her pre assessment weight on 3/24/2021. Nutrition Background: H/O: breast cancer with bilateral mastectomy, uterine cancer s/p hysterectomy, DM, HTN, RLS, GERD and claustrophobia. 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 She has had postop N&V and been unable to tolerate po diet progression  Daily Update:  Patient seen with RN at bedside changing tubing on all lines. Noted NGT clamped last night and patient with large volume emesis. NGT reconnected and 1750 ml out. NGT discontinue this am and Reglan increased. Patient is awake and denies nausea, asking when she will be able to eat. Noted that she has had several stools in last 24 hrs. She states her stomach feels much better. Abdominal Status (last documented): Nausea, Semi-soft, Obese, Tender abdomen with Hypoactive  bowel sounds. Last BM 04/14/21. Pertinent Medications: Dulcolax, Pepcid, MOM, Reglan now 20 mf Q6  IVF: 1/2 NS with KCl at 125 ml/hr  Pertinent Labs:   Lab Results   Component Value Date/Time    Sodium 138 04/14/2021 05:32 AM    Potassium 4.2 04/14/2021 05:32 AM    Chloride 107 04/14/2021 05:32 AM    CO2 24 04/14/2021 05:32 AM    Anion gap 7 04/14/2021 05:32 AM    Glucose 102 (H) 04/14/2021 05:32 AM    BUN 16 04/14/2021 05:32 AM    Creatinine 0.49 (L) 04/14/2021 05:32 AM    Calcium 8.5 04/14/2021 05:32 AM    Albumin 2.8 (L) 04/08/2021 05:47 AM    Magnesium 2.5 (H) 04/14/2021 05:32 AM    Phosphorus 3.1 04/14/2021 05:32 AM   Labs remarkable for Na, K, and Phos stable. Cl and CO2 corrected, Mg elevated    Nutrition Related Findings:   Diet: 3/30 Clear, 4/2 NPO, 4/5 sips of clears, Ensure clear. 4/6: PPN started. 4/8:NGT placed. CVC placed 4/9 and converted to CPN. SBFT 4/12 with delayed gstric emptying with hypotonic bowel with no contrast in small bowel or colon.   Wound Type: Surgical incision    Current Nutrition Therapies:  DIET NPO With Ice Chips  Current Parenteral Nutrition Orders:  · Type and Formula: 10%DEX/4.25%AA    · Lipids: 250ml, Daily  · Duration: Continuous  · Rate/Volume: 85 ml/hr  · Current PN Order Provides: at goal  · Goal PN Orders Provides: 1510 kcal/d (100% of needs), 85 grams of protein/d (100% of needs), 200 grams of CHO/d and 2290 ml of total volume/d       Current Intake:   Average Meal Intake: NPO Average Supplement Intake: NPO      Anthropometric Measures:  Height: 5' 6\" (167.6 cm)  Current Body Wt: 89.9 kg (198 lb 3.1 oz)(4/14), Weight source: Bed scale  BMI: 32, Obese class 1 (BMI 30.0-34. 9)  Admission Body Weight: 192 lb 7.4 oz(3/24 pre assessment)  Ideal Body Weight (lbs) (Calculated): 130 lbs (59 kg), 148 %  Usual Body Wt: 102.5 kg (226 lb), Percent weight change: -19.5          Edema: No data recorded   Estimated Daily Nutrient Needs:  Energy (kcal/day): 6502-1016 (Kcal/kg(15-20), Weight Used: Current(91.3 kg-bed scale 4/7))  Protein (g/day): 69-91 Weight Used: ((20% of kcal))  Fluid (ml/day):   (1 ml/kcal)    Nutrition Diagnosis:   · Inadequate oral intake related to altered GI function as evidenced by nutrition support-parenteral nutrition, NPO or clear liquid status due to medical condition    · Severe malnutrition related to inadequate protein-energy intake as evidenced by (s/s as above per malnutrition assessment)    Nutrition Interventions:   Food and/or Nutrient Delivery: Modify parenteral nutrition(Advance diet as medically appropriate)     Coordination of Nutrition Care: Continue to monitor while inpatient  Plan of Care discussed with Shiloh Saavedra RN    Goals:   Previous Goal Met: Goal(s) achieved  Active Goal: Continue to meet estimated needs via PN until able to tolerate po intake >75% of needs    Nutrition Monitoring and Evaluation:      Food/Nutrient Intake Outcomes: Diet advancement/tolerance, Parenteral nutrition intake/tolerance  Physical Signs/Symptoms Outcomes: Biochemical data, GI status    Discharge Planning:     Too soon to determine    [id number] [place], LD on 4/14/2021 at 11:47 AM  Contact: [phone]

## 2021-04-14 NOTE — PROGRESS NOTES
PLAN:  Pt has bowel function with a benign abdominal exam yet continues with vomiting  SBFT showed hypotonic bowel  DC NGT  TPN  Maintenance IVFs   NPO  Appreciate GI assistance  CTAP 4/8/21 negative for acute surgical findings  Stop Cipro/Flagyl  Follow labs  Prophylaxis with SCDS, IS, Protonix, Lovenox. Staples out 4/13    ASSESSMENT:  Admit Date: 3/30/2021   14 Days Post-Op  Procedure(s):  RIGHT ROBOTIC ASSISTED RIGHT COLECTOMY    Principal Problem:    Colon cancer ([facility] [address] (3/30/2021)    Active Problems:    Severe protein-calorie malnutrition ([facility] [address] (4/7/2021)         SUBJECTIVE:  4/12/21 13 Days Post-Op No new issues. NG patent with 1L 24hr output. No BM overnight. AF. Tachy. NAD.  -2.1L fluid volume  4/13/21 14 Days Post-Op awake in bed, no complaints. Pain controlled. AF, tachy in the 100s. -1.4L fluid volume. No BM.  4/14/21 15 Days Post-Op awake in bed, no complaints. Pain controlled. AF, tachy in the 100s. +BMx4    Intake/Output Summary (Last 24 hours) at 4/14/2021 0932  Last data filed at 4/14/2021 2859  Gross per 24 hour   Intake 4739 ml   Output 2625 ml   Net 2114 ml     OBJECTIVE:  Constitutional: Alert oriented cooperative patient in no acute distress; appears stated age   Visit Vitals  /66   Pulse 97   Temp 99.1 °F (37.3 °C)   Resp 19   Ht 5' 6\" (1.676 m)   Wt 198 lb 3.2 oz (89.9 kg)   SpO2 96%   BMI 31.99 kg/m²     Eyes:Sclera are clear. ENMT: no external lesions gross hearing normal; no obvious neck masses, no ear or lip lesions  CV: RRR. Normal perfusion  Resp: No JVD. Breathing is  non-labored; no audible wheezing. GI: soft, incisions c/d/i   Musculoskeletal: unremarkable with normal function. No embolic signs or cyanosis.    Neuro:  Oriented; moves all 4; no focal deficits  Psychiatric: normal affect and mood, no memory impairment      Patient Vitals for the past 24 hrs:   BP Temp Pulse Resp SpO2 Weight   04/14/21 0720 113/66 99.1 °F (37.3 °C) 97 19 96 % --   04/14/21 0630 -- -- -- -- -- 198 lb 3.2 oz (89.9 kg)   04/14/21 0430 108/62 98.9 °F (37.2 °C) (!) 108 20 93 % --   04/13/21 2357 110/68 98.7 °F (37.1 °C) (!) 120 21 94 % --   04/13/21 1932 110/75 98 °F (36.7 °C) (!) 104 21 100 % --   04/13/21 1640 (!) 101/55 -- (!) 108 -- -- --   04/13/21 1517 98/62 98.8 °F (37.1 °C) (!) 110 20 98 % --   04/13/21 1105 (!) 106/55 99.2 °F (37.3 °C) (!) 103 18 94 % --     Labs:    Recent Labs     04/14/21  0532 04/13/21  0455   WBC  --  8.2   HGB  --  10.9*   PLT  --  335    138   K 4.2 4.0    109*   CO2 24 24   BUN 16 12   CREA 0.49* 0.55*   * 122*       Signed:  Ankush Clarke, NP

## 2021-04-14 NOTE — PROGRESS NOTES
[facility] Progress Note         Admit Date:  3/30/2021    Today's Date:  4/14/2021    CC:  N & V    Subjective:     Reglan increased to 20mg q6hr yesterday. Also had molasses enema yesterday. Unable to retain much. 2 BM recorded yesterday. Also reports BM this am and urge to go again. 1450cc of NGT output recorded for yesterday. No abdominal pain. No nausea. Medications:   Current Facility-Administered Medications   Medication Dose Route Frequency    diphenhydrAMINE (BENADRYL) injection 25 mg  25 mg IntraVENous Q6H PRN    TPN ADULT - dextrose 10% amino acid 4.25%   IntraVENous QPM    metoclopramide HCl (REGLAN) injection 20 mg  20 mg IntraVENous Q6H    promethazine (PHENERGAN) with saline injection 12.5 mg  12.5 mg IntraVENous Q6H PRN    famotidine (PF) (PEPCID) 20 mg in 0.9% sodium chloride 10 mL injection  20 mg IntraVENous Q12H    lip protectant (BLISTEX) ointment 1 Each  1 Each Topical PRN    bisacodyL (DULCOLAX) suppository 10 mg  10 mg Rectal DAILY    fat emulsion 20% (LIPOSYN, INTRAlipid) infusion 250 mL  250 mL IntraVENous QPM    phenol throat spray (CHLORASEPTIC) 1 Spray  1 Spray Oral PRN    LORazepam (ATIVAN) tablet 1 mg  1 mg Oral BID    magnesium hydroxide (MILK OF MAGNESIA) 400 mg/5 mL oral suspension 30 mL  30 mL Oral DAILY    NUTRITIONAL SUPPORT ELECTROLYTE PRN ORDERS   Does Not Apply PRN    0.45% sodium chloride with KCl 20 mEq/L infusion   IntraVENous CONTINUOUS    enoxaparin (LOVENOX) injection 40 mg  40 mg SubCUTAneous Q24H    enalaprilat (VASOTEC) injection 0.625 mg  0.625 mg IntraVENous Q6H PRN    oxyCODONE-acetaminophen (PERCOCET 7.5) 7.5-325 mg per tablet 1 Tab  1 Tab Oral Q4H PRN       Review of Systems:  ROS was obtained, with pertinent positives as listed above. No chest pain or SOB.     Diet:  NPO with ice chips    Objective:   Vitals:  Visit Vitals  /66   Pulse 97   Temp 99.1 °F (37.3 °C)   Resp 19   Ht 5' 6\" (1.676 m)   Wt 89.9 kg (198 lb 3.2 oz)   SpO2 96%   BMI 31.99 kg/m²     Intake/Output:  No intake/output data recorded. 04/12 1901 - 04/14 0700  In: 7369 [P.O.:50; I.V.:7319]  Out: 9601 [Urine:1800]  Exam:  General appearance: alert, cooperative, no distress  Lungs: clear to auscultation bilaterally anteriorly  Heart: regular rate and rhythm. Murmur noted. Abdomen: soft, non-tender. Bowel sounds normal. No masses, no organomegaly. Healing surgical sites. Extremities: extremities normal, atraumatic, no cyanosis or edema  Neuro:  alert and oriented    Data Review (Labs):    Recent Labs     04/14/21  0532 04/13/21  0455 04/12/21  0400   WBC  --  8.2  --    HGB  --  10.9*  --    HCT  --  33.6*  --    PLT  --  335  --    MCV  --  76.0*  --     138 137   K 4.2 4.0 4.4    109* 109*   CO2 24 24 21   BUN 16 12 13   CREA 0.49* 0.55* 0.62   CA 8.5 8.6 8.4   MG 2.5*  --  2.1   * 122* 138*     XR GASTROGRAFFIN SMALL BOWEL on 4/12/2021   Findings:      view of the abdomen demonstrates NG tube in place with otherwise paucity  of bowel gas . No evidence of abnormal calcification or organomegaly is  demonstrated. Surgical staples are seen.        Following administration of Gastrografin through the NG tube, timed film studies  demonstrate mildly delayed gastric emptying with only minimal contrast entering  the proximal small bowel is virtually no significant progression after 4 hours. There is however no evidence of bowel disc dilatation or gastric distention.     Total images: No fluoroscopy images were acquired. Total fluoroscopy time: None.     IMPRESSION     1. Markedly hypotonic bowel. Assessment:     Principal Problem:    Colon cancer ([facility] (3/30/2021)    Active Problems:    Severe protein-calorie malnutrition ([facility] (4/7/2021)    80 y.o. female who we are following for post-op N&V. She underwent right hemicolectomy by Dr. Ivanna Harley on 9/43/55 for right invasive adenocarcinoma. She has not responded to Pepcid 20mg every 12 hours, PRN phenergan (although this was discontinued due to delirium) and zofran or scheduled scopolamine patch. She was started on scheduled zofran, PPI , MOM and dulcolax on 4/5 without relief. Metoclopramide and Ativan for started on 4/7 and she did well for several hours. Suspect her current constipation as seen on KUB 4/5 is increasing her nausea. She had mild elevation lipase on 4/6/2021 571, but suspect this is secondary to persistent vomiting. NG was placed on 4/8 with significant output 2960, and output on 4/9 2000cc, 4/10 1350cc, 4/11 1000cc. CT of the abdomen/pelvis with contrast on 4/8 did not reveal an etiology. Flagyl was started on 4/8 for leukocytosis which is improving, (Flagyl can cause nausea but symptoms present prior to this). CT of head  on 4/9 also unremarkable (without iv contrast due to iv contrast for CT A/P on 4/8). Gastrografin SB study 4/12/2021 showed markedly hypotonic bowel. Plan:     -Continue metoclopramide 20mg q6hr  -Continue Ativan 1mg BID  -Continue bowel regimen  -Continue pantoprazole 40mg IV BID  -NG in place-consider clamping today  -On TPN  -Will follow      BECKY Raymundo  Patient is seen and examined in collaboration with Dr. Amber Moser. Assessment and plan as per Dr. Amber Moser.

## 2021-04-14 NOTE — PROGRESS NOTES
END OF SHIFT NOTE:    INTAKE/OUTPUT  04/13 0701 - 04/14 0700  In: 3592 [I.V.:4739]  Out: 0378 [Urine:850]  Voiding: YES  Catheter: NO  Drain:              Flatus: Patient does have flatus present. Stool:  2 occurrences. Characteristics:  Stool Assessment  Stool Color: (dark brown)  Stool Appearance: Formed  Stool Amount: Small  Stool Source/Status: Rectum    Emesis: 1 occurrences. Characteristics:   Emesis Assessment  Appearance: Green  Emesis Amount: Medium(400 ml)    VITAL SIGNS  Patient Vitals for the past 12 hrs:   Temp Pulse Resp BP SpO2   04/14/21 1602 98.3 °F (36.8 °C) (!) 108 19 118/78 98 %   04/14/21 1132 98.6 °F (37 °C) (!) 105 18 115/63 94 %   04/14/21 0720 99.1 °F (37.3 °C) 97 19 113/66 96 %       Pain Assessment  Pain Intensity 1: 0 (04/14/21 0805)  Pain Location 1: Abdomen  Pain Intervention(s) 1: Declines  Patient Stated Pain Goal: 0    Ambulating  Yes, sat up in chair,walked  With  therapy    Shift report given to oncoming nurse at the bedside.     Miriam Winston RN

## 2021-04-15 NOTE — PROGRESS NOTES
END OF SHIFT NOTE:    INTAKE/OUTPUT  04/14 0701 - 04/15 0700  In: 4559 [I.V.:4559]  Out: 2400 [Urine:800]  Voiding: YES  Catheter: NO  Drain:              Flatus: Patient does have flatus present. Stool:  2 occurrences. Characteristics:  Stool Assessment  Stool Color: (dark brown)  Stool Appearance: Formed  Stool Amount: Small  Stool Source/Status: Rectum    Emesis: 3 occurrences. Characteristics:   Emesis Assessment  Appearance: Green  Emesis Amount: Medium(400 ml)    VITAL SIGNS  Patient Vitals for the past 12 hrs:   Temp Pulse Resp BP SpO2   04/15/21 1522 98.8 °F (37.1 °C) 80 18 106/67 97 %   04/15/21 1118 98.3 °F (36.8 °C) 98 18 112/70 94 %   04/15/21 0726 98.4 °F (36.9 °C) (!) 104 20 116/74 93 %       Pain Assessment  Pain Intensity 1: 0 (04/15/21 0751)  Pain Location 1: Abdomen  Pain Intervention(s) 1: Declines  Patient Stated Pain Goal: 0    Ambulating  Yes    Shift report given to oncoming nurse at the bedside.     Brittney Benz, RN

## 2021-04-15 NOTE — PROGRESS NOTES
PLAN:  Pt has bowel function with a benign abdominal exam yet continues with vomiting  SBFT showed hypotonic bowel  NGT out 4/14  TPN  Maintenance IVFs   NPO with ice chips; will try to advance once vomiting stops  Appreciate GI assistance  CTAP 4/8/21 negative for acute surgical findings  Stopped Cipro/Flagyl 4/14  Follow labs  Prophylaxis with SCDS, IS, Protonix, Lovenox. Staples out 4/13    ASSESSMENT:  Admit Date: 3/30/2021   16 Days Post-Op  Procedure(s):  RIGHT ROBOTIC ASSISTED RIGHT COLECTOMY    Principal Problem:    Colon cancer ([facility] [address] (3/30/2021)    Active Problems:    Severe protein-calorie malnutrition ([facility] [address] (4/7/2021)         SUBJECTIVE:  4/12/21 13 Days Post-Op No new issues. NG patent with 1L 24hr output. No BM overnight. AF. Tachy. NAD.  -2.1L fluid volume  4/13/21 14 Days Post-Op awake in bed, no complaints. Pain controlled. AF, tachy in the 100s. -1.4L fluid volume. No BM.  4/14/21 15 Days Post-Op awake in bed, no complaints. Pain controlled. AF, tachy in the 100s. +BMx4  4/14/21 16 Days Post op; awake in bed; emesis x 2 since NGT removal but reports feeling well overall. She denies abdominal pain. Bm X 3.  AF, tachy. 800mL UOP. Intake/Output Summary (Last 24 hours) at 4/15/2021 0911  Last data filed at 4/15/2021 0848  Gross per 24 hour   Intake 4559 ml   Output 2650 ml   Net 1909 ml     OBJECTIVE:  Constitutional: Alert oriented cooperative patient in no acute distress; appears stated age   Visit Vitals  /74   Pulse (!) 104   Temp 98.4 °F (36.9 °C)   Resp 20   Ht 5' 6\" (1.676 m)   Wt 195 lb 12.8 oz (88.8 kg)   SpO2 93%   BMI 31.60 kg/m²     Eyes:Sclera are clear. ENMT: no external lesions gross hearing normal; no obvious neck masses, no ear or lip lesions  CV: RRR. Normal perfusion  Resp: No JVD. Breathing is  non-labored; no audible wheezing. GI: soft, ND, NT,  incisions c/d/i; hypoactive BS  Musculoskeletal: unremarkable with normal function. No embolic signs or cyanosis. Neuro: Oriented; moves all 4; no focal deficits  Psychiatric: normal affect and mood, no memory impairment      Patient Vitals for the past 24 hrs:   BP Temp Pulse Resp SpO2 Weight   04/15/21 0726 116/74 98.4 °F (36.9 °C) (!) 104 20 93 % --   04/15/21 0637 -- -- -- -- -- 195 lb 12.8 oz (88.8 kg)   04/15/21 0416 116/85 99 °F (37.2 °C) 99 18 94 % --   04/14/21 2341 122/75 99.2 °F (37.3 °C) (!) 107 19 95 % --   04/14/21 1926 123/81 98.1 °F (36.7 °C) (!) 106 19 95 % --   04/14/21 1602 118/78 98.3 °F (36.8 °C) (!) 108 19 98 % --   04/14/21 1132 115/63 98.6 °F (37 °C) (!) 105 18 94 % --     Labs:    Recent Labs     04/15/21  0448 04/13/21  0455 04/13/21  0455   WBC  --   --  8.2   HGB  --   --  10.9*   PLT  --   --  335      < > 138   K 3.7   < > 4.0      < > 109*   CO2 28   < > 24   BUN 15   < > 12   CREA 0.54*   < > 0.55*   *   < > 122*    < > = values in this interval not displayed. Signed:  KANE Laughlin     At this point, I think we should decrease TPN rate and let patient take PO at her leisure. Will order full liquid diet and let patient choose what she would like to try. I see no mechanical reason she can not take PO. I appreciate GI help with this difficult problem.     Gem Hugo MD

## 2021-04-15 NOTE — PROGRESS NOTES
END OF SHIFT NOTE:    INTAKE/OUTPUT  04/14 0701 - 04/15 0700  In: 4559 [I.V.:4559]  Out: 2400 [Urine:800]  Voiding: YES  Catheter: NO  Drain:              Flatus: Patient does have flatus present. Stool:  1 occurrences. Characteristics:  Stool Assessment  Stool Color: (dark brown)  Stool Appearance: Formed  Stool Amount: Small  Stool Source/Status: Rectum    Emesis: 2 occurrences. 1300mls total  Characteristics:   Emesis Assessment  Appearance: Green  Emesis Amount: Medium(400 ml)    VITAL SIGNS  Patient Vitals for the past 12 hrs:   Temp Pulse Resp BP SpO2   04/15/21 0416 99 °F (37.2 °C) 99 18 116/85 94 %   04/14/21 2341 99.2 °F (37.3 °C) (!) 107 19 122/75 95 %   04/14/21 1926 98.1 °F (36.7 °C) (!) 106 19 123/81 95 %       Pain Assessment  Pain Intensity 1: 0 (04/15/21 0050)  Pain Location 1: Abdomen  Pain Intervention(s) 1: Declines  Patient Stated Pain Goal: 0    Ambulating  Yes    Shift report given to oncoming nurse at the bedside.     Laura Durbin RN

## 2021-04-15 NOTE — PROGRESS NOTES
[facility] Progress Note         Admit Date:  3/30/2021    Today's Date:  4/15/2021    CC:  N & V    Subjective:     NGT removed yesterday. Had some nausea yesterday. Nausea and vomiting this am, but feels better. Small BM yesterday. Just received suppository. No abdominal pain. Denies any tremors. States that primary team mentioned clear liquid diet trial today. Medications:   Current Facility-Administered Medications   Medication Dose Route Frequency    diphenhydrAMINE (BENADRYL) injection 25 mg  25 mg IntraVENous Q6H PRN    TPN ADULT - dextrose 10% amino acid 4.25%   IntraVENous QPM    metoclopramide HCl (REGLAN) injection 20 mg  20 mg IntraVENous Q6H    promethazine (PHENERGAN) with saline injection 12.5 mg  12.5 mg IntraVENous Q6H PRN    famotidine (PF) (PEPCID) 20 mg in 0.9% sodium chloride 10 mL injection  20 mg IntraVENous Q12H    lip protectant (BLISTEX) ointment 1 Each  1 Each Topical PRN    bisacodyL (DULCOLAX) suppository 10 mg  10 mg Rectal DAILY    fat emulsion 20% (LIPOSYN, INTRAlipid) infusion 250 mL  250 mL IntraVENous QPM    phenol throat spray (CHLORASEPTIC) 1 Spray  1 Spray Oral PRN    LORazepam (ATIVAN) tablet 1 mg  1 mg Oral BID    magnesium hydroxide (MILK OF MAGNESIA) 400 mg/5 mL oral suspension 30 mL  30 mL Oral DAILY    NUTRITIONAL SUPPORT ELECTROLYTE PRN ORDERS   Does Not Apply PRN    0.45% sodium chloride with KCl 20 mEq/L infusion   IntraVENous CONTINUOUS    enoxaparin (LOVENOX) injection 40 mg  40 mg SubCUTAneous Q24H    enalaprilat (VASOTEC) injection 0.625 mg  0.625 mg IntraVENous Q6H PRN    oxyCODONE-acetaminophen (PERCOCET 7.5) 7.5-325 mg per tablet 1 Tab  1 Tab Oral Q4H PRN       Review of Systems:  ROS was obtained, with pertinent positives as listed above. No chest pain or SOB.     Diet:  NPO with ice chips    Objective:   Vitals:  Visit Vitals  /74   Pulse (!) 104   Temp 98.4 °F (36.9 °C)   Resp 20   Ht 5' 6\" (1.676 m)   Wt 88.8 kg (195 lb 12.8 oz)   SpO2 93%   BMI 31.60 kg/m²     Intake/Output:  04/15 0701 - 04/15 1900  In: -   Out: 450 [Urine:450]  04/13 1901 - 04/15 0700  In: 4088 [I.V.:6919]  Out: 2750 [Urine:1150]  Exam:  General appearance: alert, cooperative, no distress  Lungs: clear to auscultation bilaterally anteriorly  Heart: regular rate and rhythm. Murmur noted. Abdomen: soft, non-tender. Bowel sounds normal. No masses, no organomegaly. Healing surgical sites. Extremities: extremities normal, atraumatic, no cyanosis or edema  Neuro:  alert and oriented    Data Review (Labs):    Recent Labs     04/15/21  0448 04/14/21  0532 04/13/21  0455   WBC  --   --  8.2   HGB  --   --  10.9*   HCT  --   --  33.6*   PLT  --   --  335   MCV  --   --  76.0*    138 138   K 3.7 4.2 4.0    107 109*   CO2 28 24 24   BUN 15 16 12   CREA 0.54* 0.49* 0.55*   CA 8.5 8.5 8.6   MG  --  2.5*  --    * 102* 122*     XR GASTROGRAFFIN SMALL BOWEL on 4/12/2021   Findings:      view of the abdomen demonstrates NG tube in place with otherwise paucity  of bowel gas . No evidence of abnormal calcification or organomegaly is  demonstrated. Surgical staples are seen.        Following administration of Gastrografin through the NG tube, timed film studies  demonstrate mildly delayed gastric emptying with only minimal contrast entering  the proximal small bowel is virtually no significant progression after 4 hours. There is however no evidence of bowel disc dilatation or gastric distention.     Total images: No fluoroscopy images were acquired. Total fluoroscopy time: None.     IMPRESSION     1. Markedly hypotonic bowel. Assessment:     Principal Problem:    Colon cancer ([facility] (3/30/2021)    Active Problems:    Severe protein-calorie malnutrition ([facility] (4/7/2021)    80 y.o. female who we are following for post-op N&V. She underwent right hemicolectomy by Dr. Ebonie Suggs on 6/00/02 for right invasive adenocarcinoma. She has not responded to Pepcid 20mg every 12 hours, PRN phenergan (although this was discontinued due to delirium) and zofran or scheduled scopolamine patch. She was started on scheduled zofran, PPI , MOM and dulcolax on 4/5 without relief. Metoclopramide and Ativan for started on 4/7 and she did well for several hours. Suspect her current constipation as seen on KUB 4/5 is increasing her nausea. She had mild elevation lipase on 4/6/2021 571, but suspect this is secondary to persistent vomiting. NG was placed on 4/8 with significant output 2960, and output on 4/9 2000cc, 4/10 1350cc, 4/11 1000cc. CT of the abdomen/pelvis with contrast on 4/8 did not reveal an etiology. Flagyl was started on 4/8 for leukocytosis which is improving, (Flagyl can cause nausea but symptoms present prior to this). CT of head  on 4/9 also unremarkable (without iv contrast due to iv contrast for CT A/P on 4/8). Gastrografin SB study 4/12/2021 showed markedly hypotonic bowel. Plan:     -Continue metoclopramide 20mg q6hr  -Continue Ativan 1mg BID  -Continue bowel regimen  -Continue pantoprazole 40mg IV BID  -On TPN  -Will follow      BECKY Garza  Patient is seen and examined in collaboration with Dr. Trevor Lechuga. Assessment and plan as per Dr. Trevor Lechuga.

## 2021-04-15 NOTE — PROGRESS NOTES
Comprehensive Nutrition Assessment    Type and Reason for Visit: Reassess  TPN Management (Surgery)    Addendum: Call from pharmacy regarding change to TPN after ordered by RD. Noted addendum from Dr. Aylin Ramírez to decrease TPN, advance diet to full liquids and allow patient to eat at her leisure. Will modify TPN to reflect half rate (1L) as below:    Nutrition Recommendations/Plan:   · Parenteral Nutrition:  · Central TPN:  · 10%DEX/4.25%AA at 44 ml/hr (1L) with 250 ml 20% Lipids daily over 12 hrs. · New regimen to provide 1010 kcal, 43 g pro, 1250 total volume  · Lytes/L:   · Sodium 80 meq (45 meq NaCl, 35 meq NaAcetate), Potassium 20 meq (10 meq KCl,10 meq KPO4), omit Mg, 4.5 meq Calcium. Solution now 1:1 Cl:Acetate ratio  · Other additives: MTE, MVI  · IVF:  ·  IVF per MD  · Vitamin and Mineral Supplement Therapy:  · Electrolyte management replacement protocol implemented. · Labs:   · BMP daily,Phos and Mg MWF. Phos in AM.  POC Glucoses/SSI if Glucose > 180 mg/dl on AM BMP. · Daily weight     · Oral Nutrition:  · Advance diet as medically appropriate  · Start Ensure Enlive to continue with diet advance (350 kcal and 20 g pro) - already included on FLD     Malnutrition Assessment:  Malnutrition Status: Severe malnutrition  Context: Chronic illness  Findings of clinical characteristics of malnutrition:   Energy Intake:  7 - 75% or less est energy requirements for 1 month or longer  Weight Loss:  7.0 - Greater than 7.5% over 3 months((226# to 182# (19.5%), 226# to 201# (11%) in < 3 months)     Body Fat Loss:  Unable to assess,     Muscle Mass Loss:  Unable to assess,    Fluid Accumulation:  Unable to assess,     Strength:  Not performed       Nutrition Assessment:   Nutrition History: 4/6: Pt reports at baseline she eats 2 small meals a day of oatmeal or grits for breakfast and then a \"normal\" meal for her 2nd meal of the day.  About 8 weeks ago, she was eating a [employer] meal and had N/V which she thought was food poisioning. However she continued to have N/V with all po intake, thus intake was <25% of usual. She was mainly consuming gatorade, Boost or Ensure PTA. She had weight loss from 226# to 182#. She reports addtional weight loss since her pre assessment weight on 3/24/2021. Nutrition Background: H/O: breast cancer with bilateral mastectomy, uterine cancer s/p hysterectomy, DM, HTN, RLS, GERD and claustrophobia. 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 She has had postop N&V and been unable to tolerate po diet progression  Daily Update:  Patient seen in bed this am. She states no current nausea, but did have nausea and vomiting yesterday and overnight. She states that she has had a small BM and feels like she might have another soon. She denies any needs just ready to start diet when able. Abdominal Status (last documented): Nausea, Passing flatus, Semi-soft abdomen with Hypoactive  bowel sounds. Last BM 04/14/21. Pertinent Medications: Dulcolax, Pepcid, Ativan, MOM, Reglan  IVF: 1/2 NS with KCl  Pertinent Labs:   Lab Results   Component Value Date/Time    Sodium 138 04/15/2021 04:48 AM    Potassium 3.7 04/15/2021 04:48 AM    Chloride 103 04/15/2021 04:48 AM    CO2 28 04/15/2021 04:48 AM    Anion gap 7 04/15/2021 04:48 AM    Glucose 114 (H) 04/15/2021 04:48 AM    BUN 15 04/15/2021 04:48 AM    Creatinine 0.54 (L) 04/15/2021 04:48 AM    Calcium 8.5 04/15/2021 04:48 AM    Albumin 2.8 (L) 04/08/2021 05:47 AM    Magnesium 2.5 (H) 04/14/2021 05:32 AM    Phosphorus 3.1 04/14/2021 05:32 AM   Labs remarkable for K decreased, Cl trending down, CO2 trending up    Nutrition Related Findings:   Diet: 3/30 Clear, 4/2 NPO, 4/5 sips of clears, Ensure clear. 4/6: PPN started. 4/8:NGT placed. CVC placed 4/9 and converted to CPN. SBFT 4/12 with delayed gstric emptying with hypotonic bowel with no contrast in small bowel or colon. NGT out 4/14.  Wound Type: Surgical incision    Current Nutrition Therapies:  DIET NPO With Ice Chips  Current Parenteral Nutrition Orders:  · Type and Formula: 10%DEX/4.25%AA    · Lipids: 250ml, Daily  · Duration: Continuous  · Rate/Volume: 85 ml/hr  · Current PN Order Provides: at goal  · Goal PN Orders Provides: 1510 kcal/d (100% of needs), 85 grams of protein/d (100% of needs), 200 grams of CHO/d and 2290 ml of total volume/d       Current Intake:   Average Meal Intake: NPO Average Supplement Intake: NPO      Anthropometric Measures:  Height: 5' 6\" (167.6 cm)  Current Body Wt: 88.8 kg (195 lb 12.3 oz)(4/15), Weight source: Bed scale  BMI: 31.6, Obese class 1 (BMI 30.0-34. 9)  Admission Body Weight: 192 lb 7.4 oz(3/24 pre assessment)  Ideal Body Weight (lbs) (Calculated): 130 lbs (59 kg), 148 %  Usual Body Wt: 102.5 kg (226 lb), Percent weight change: -19.5          Edema: No data recorded   Estimated Daily Nutrient Needs:  Energy (kcal/day): 9016-1183 (Kcal/kg(15-20), Weight Used: Current(91.3 kg-bed scale 4/7))  Protein (g/day): 69-91 Weight Used: ((20% of kcal))  Fluid (ml/day):   (1 ml/kcal)    Nutrition Diagnosis:   · Inadequate oral intake related to altered GI function as evidenced by nutrition support-parenteral nutrition, NPO or clear liquid status due to medical condition    · Severe malnutrition related to inadequate protein-energy intake as evidenced by (s/s as above per malnutrition assessment)    Nutrition Interventions:   Food and/or Nutrient Delivery: Modify parenteral nutrition(Advance diet as medically appropriate/tolerated)     Coordination of Nutrition Care: Continue to monitor while inpatient  Plan of Care discussed with Geri Huynh RN    Goals:   Previous Goal Met: Goal(s) achieved  Active Goal: Continue to meet estimated needs via PN until able to tolerate po intake >75% of needs    Nutrition Monitoring and Evaluation:      Food/Nutrient Intake Outcomes: Diet advancement/tolerance, Parenteral nutrition intake/tolerance  Physical Signs/Symptoms Outcomes: Biochemical data, GI status    Discharge Planning:     Too soon to determine    [address] [place], LD on 4/15/2021 at 10:16 AM  Contact: [phone]

## 2021-04-16 NOTE — PROGRESS NOTES
END OF SHIFT NOTE:    INTAKE/OUTPUT  04/15 0701 - 04/16 0700  In: 2052 [P.O.:180; I.V.:3133]  Out: 4700 [Urine:1000]  Voiding: YES  Catheter: NO  Drain:              Flatus: Patient does have flatus present. Stool:  1 occurrences. Characteristics:  Stool Assessment  Stool Color: Other (Comment), Brown, Red(clear looking with some brown and red)  Stool Appearance: Mucous  Stool Amount: Small  Stool Source/Status: Rectum    Emesis: 3 occurrences. 2100ml  Characteristics:   Emesis Assessment  Appearance: Green  Emesis Amount: Medium    VITAL SIGNS  Patient Vitals for the past 12 hrs:   Temp Pulse Resp BP SpO2   04/16/21 1629 98 °F (36.7 °C) 97 16 112/64 95 %   04/16/21 1204 98.1 °F (36.7 °C) (!) 101 16 99/66 92 %   04/16/21 0755 98.1 °F (36.7 °C) (!) 105 17 99/61 93 %       Pain Assessment  Pain Intensity 1: 0 (04/15/21 2247)  Pain Location 1: Abdomen  Pain Intervention(s) 1: Declines  Patient Stated Pain Goal: 0    Ambulating  Yes    Shift report given to oncoming nurse at the bedside.     Gonzales Farah RN

## 2021-04-16 NOTE — PROGRESS NOTES
[facility] Progress Note         Admit Date:  3/30/2021    Today's Date:  4/16/2021    CC:  N & V    Subjective:     Denies any tremors. Tolerating diet. Some early satiety. Nausea and vomiting this am. Small BM yesterday. None yet today. Medications:   Current Facility-Administered Medications   Medication Dose Route Frequency    TPN ADULT - dextrose 10% amino acid 4.25%   IntraVENous QPM    TPN ADULT - dextrose 10% amino acid 4.25%   IntraVENous QPM    diphenhydrAMINE (BENADRYL) injection 25 mg  25 mg IntraVENous Q6H PRN    metoclopramide HCl (REGLAN) injection 20 mg  20 mg IntraVENous Q6H    promethazine (PHENERGAN) with saline injection 12.5 mg  12.5 mg IntraVENous Q6H PRN    famotidine (PF) (PEPCID) 20 mg in 0.9% sodium chloride 10 mL injection  20 mg IntraVENous Q12H    lip protectant (BLISTEX) ointment 1 Each  1 Each Topical PRN    bisacodyL (DULCOLAX) suppository 10 mg  10 mg Rectal DAILY    fat emulsion 20% (LIPOSYN, INTRAlipid) infusion 250 mL  250 mL IntraVENous QPM    phenol throat spray (CHLORASEPTIC) 1 Spray  1 Spray Oral PRN    LORazepam (ATIVAN) tablet 1 mg  1 mg Oral BID    magnesium hydroxide (MILK OF MAGNESIA) 400 mg/5 mL oral suspension 30 mL  30 mL Oral DAILY    NUTRITIONAL SUPPORT ELECTROLYTE PRN ORDERS   Does Not Apply PRN    0.45% sodium chloride with KCl 20 mEq/L infusion   IntraVENous CONTINUOUS    enoxaparin (LOVENOX) injection 40 mg  40 mg SubCUTAneous Q24H    enalaprilat (VASOTEC) injection 0.625 mg  0.625 mg IntraVENous Q6H PRN    oxyCODONE-acetaminophen (PERCOCET 7.5) 7.5-325 mg per tablet 1 Tab  1 Tab Oral Q4H PRN       Review of Systems:  ROS was obtained, with pertinent positives as listed above. No chest pain or SOB.     Diet:  NPO with ice chips    Objective:   Vitals:  Visit Vitals  BP 99/61   Pulse (!) 105   Temp 98.1 °F (36.7 °C)   Resp 17   Ht 5' 6\" (1.676 m)   Wt 88.8 kg (195 lb 12.8 oz)   SpO2 93%   BMI 31.60 kg/m² Intake/Output:  04/16 0701 - 04/16 1900  In: 3125 [I.V.:1554]  Out: 700   04/14 1901 - 04/16 0700  In: 5927 [P.O.:180; I.V.:5747]  Out: 8209 [Urine:1800]  Exam:  General appearance: alert, cooperative, no distress  Lungs: clear to auscultation bilaterally anteriorly  Heart: regular rate and rhythm. Murmur noted. Abdomen: soft, non-tender. Bowel sounds normal. No masses, no organomegaly. Healing surgical sites. Extremities: extremities normal, atraumatic, no cyanosis or edema  Neuro:  alert and oriented    Data Review (Labs):    Recent Labs     04/16/21  0645 04/15/21  0448 04/14/21  0532    138 138   K 3.6 3.7 4.2   CL 99 103 107   CO2 30 28 24   BUN 15 15 16   CREA 0.60 0.54* 0.49*   CA 8.9 8.5 8.5   MG 2.2  --  2.5*   * 114* 102*     XR GASTROGRAFFIN SMALL BOWEL on 4/12/2021   Findings:      view of the abdomen demonstrates NG tube in place with otherwise paucity  of bowel gas . No evidence of abnormal calcification or organomegaly is  demonstrated. Surgical staples are seen.        Following administration of Gastrografin through the NG tube, timed film studies  demonstrate mildly delayed gastric emptying with only minimal contrast entering  the proximal small bowel is virtually no significant progression after 4 hours. There is however no evidence of bowel disc dilatation or gastric distention.     Total images: No fluoroscopy images were acquired. Total fluoroscopy time: None.     IMPRESSION     1. Markedly hypotonic bowel. Assessment:     Principal Problem:    Colon cancer ([facility] [address] (3/30/2021)    Active Problems:    Severe protein-calorie malnutrition ([facility] [address] (4/7/2021)    80 y.o. female who we are following for post-op N&V. She underwent right hemicolectomy by Dr. Epifanio Lawton on 1/18/64 for right invasive adenocarcinoma. She has not responded to Pepcid 20mg every 12 hours, PRN phenergan (although this was discontinued due to delirium) and zofran or scheduled scopolamine patch. She was started on scheduled zofran, PPI , MOM and dulcolax on 4/5 without relief. Metoclopramide and Ativan for started on 4/7 and she did well for several hours. Suspect her current constipation as seen on KUB 4/5 is increasing her nausea. She had mild elevation lipase on 4/6/2021 571, but suspect this is secondary to persistent vomiting. NG was placed on 4/8 with significant output 2960, and output on 4/9 2000cc, 4/10 1350cc, 4/11 1000cc. CT of the abdomen/pelvis with contrast on 4/8 did not reveal an etiology. Flagyl was started on 4/8 for leukocytosis which is improving, (Flagyl can cause nausea but symptoms present prior to this). CT of head  on 4/9 also unremarkable (without iv contrast due to iv contrast for CT A/P on 4/8). Gastrografin SB study 4/12/2021 showed markedly hypotonic bowel. Plan:     -Continue metoclopramide 20mg q6hr  -Continue Ativan 1mg BID  -Continue bowel regimen  -Continue pantoprazole 40mg IV BID  -On TPN  -Reglan and Zofran scheduled prior to meals and nightly.   -Will follow      BECKY Hernandez  Patient is seen and examined in collaboration with Dr. Milly Handley. Assessment and plan as per Dr. Milly Handley.

## 2021-04-16 NOTE — PROGRESS NOTES
Comprehensive Nutrition Assessment    Type and Reason for Visit: Reassess  TPN Management (Surgery)     Addendum: Call from pharmacy that TPN was cancelled in the middle of mixing. No notes from physician yet. Discussed with NP Kp Campbell and sarai to reorder and complete mixing and run again tonight. Then will stop tomorrow. She will let MD know. Will reorder TPN as below. Nutrition Recommendations/Plan:   · Parenteral Nutrition:  · Central TPN:  · Continue 10%DEX/4.25%AA at 44 ml/hr (1L) with 250 ml 20% Lipids daily over 12 hrs. · Lytes/L:   · Sodium 80 meq (80 meq NaCl), Potassium 30 meq (30 meq KCl), omit Mg, 4.5 meq Calcium. Solution now max Chloride. · Other additives: MTE, MVI  · IVF:  ·  IVF per MD  · Vitamin and Mineral Supplement Therapy:  · Electrolyte management replacement protocol implemented. · Labs:   · BMP daily,Phos and Mg MWF. Phos in AM.  POC Glucoses/SSI if Glucose > 180 mg/dl on AM BMP. · Daily weight     · Oral Nutrition:  · Advance diet as medically appropriate  · Change Ensure Enlive to Glucerna for butter pecan flavor (220 kcal, 10 g pro per bottle)  · Add Magic Cup TID (290 kcal, 9 g pro per cup)  · Small frequent meals/snacks     Malnutrition Assessment:  Malnutrition Status: Severe malnutrition  Context: Chronic illness  Findings of clinical characteristics of malnutrition:   Energy Intake:  7 - 75% or less est energy requirements for 1 month or longer  Weight Loss:  7.0 - Greater than 7.5% over 3 months((226# to 182# (19.5%), 226# to 201# (11%) in < 3 months)     Body Fat Loss:  Unable to assess,     Muscle Mass Loss:  Unable to assess,    Fluid Accumulation:  Unable to assess,     Strength:  Not performed       Nutrition Assessment:   Nutrition History: 4/6: Pt reports at baseline she eats 2 small meals a day of oatmeal or grits for breakfast and then a \"normal\" meal for her 2nd meal of the day.  About 8 weeks ago, she was eating a [employer] meal and had N/V which she thought was food poisioning. However she continued to have N/V with all po intake, thus intake was <25% of usual. She was mainly consuming gatorade, Boost or Ensure PTA. She had weight loss from 226# to 182#. She reports addtional weight loss since her pre assessment weight on 3/24/2021. Nutrition Background: H/O: breast cancer with bilateral mastectomy, uterine cancer s/p hysterectomy, DM, HTN, RLS, GERD and claustrophobia. 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 She has had postop N&V and been unable to tolerate po diet progression  Daily Update:  Patient seen and discussed with RN and NP. Patient states that she is feeling a little better. She thinks that some of her antiemetics are actually making her feel more sick. She states that when she vomits she feels better. She states that she is not really vomiting food, just bile. She also reports that she is just taking teaspoons of foods and beverages. She does admit to now having hunger pains and states that the cream based soups have been so good. She states that she has not been drinking the Ensure because she \"lived off of them\" for 2 weeks and she is now turned off. She thinks that she might be able to tolerate butter pecan. She also agrees to try [employer]. Discussed how hunger and nausea can mimic each other and drive each other. Recommended that she try to take small amounts of food and fluid through the day to keep something on her stomach and increase overall intake. Discussed trying to eat portions of her meal and using nutrition supplements between meals as \"snacks\". Also discussed snacks available on floor stock. Abdominal Status (last documented): Passing flatus, Nausea, Semi-soft abdomen with Hypoactive  bowel sounds. Last BM 04/14/21.  Noted emesis 3700 ml over last 24 hrs  Pertinent Medications: Dulcolax daily, Pepcid, MOM - pt refusing, Reglan, Ativan - pt refusing  IVF: 1/2 NS with KCl @125 ml/hr  Pertinent Labs:   Lab Results   Component Value Date/Time    Sodium 137 04/16/2021 06:45 AM    Potassium 3.6 04/16/2021 06:45 AM    Chloride 99 04/16/2021 06:45 AM    CO2 30 04/16/2021 06:45 AM    Anion gap 8 04/16/2021 06:45 AM    Glucose 117 (H) 04/16/2021 06:45 AM    BUN 15 04/16/2021 06:45 AM    Creatinine 0.60 04/16/2021 06:45 AM    Calcium 8.9 04/16/2021 06:45 AM    Albumin 2.8 (L) 04/08/2021 05:47 AM    Magnesium 2.2 04/16/2021 06:45 AM    Phosphorus 4.0 (H) 04/16/2021 06:45 AM   Labs remarkable for Na stable, K continues to trend down,, Cl continues trend down, CO2 continues to trend up, Phos elevated      Nutrition Related Findings:   Diet: 3/30 Clear, 4/2 NPO, 4/5 sips of clears, Ensure clear. 4/6: PPN started. 4/8:NGT placed. CVC placed 4/9 and converted to CPN. SBFT 4/12 with delayed gstric emptying with hypotonic bowel with no contrast in small bowel or colon. NGT out 4/14. 4/15 diet advanced to FLD and TPN decreased to 1L per MD. Wound Type: Surgical incision    Current Nutrition Therapies:  DIET FULL LIQUID  DIET NUTRITIONAL SUPPLEMENTS All Meals; Glucerna Shake ( )  Current Parenteral Nutrition Orders:  · Type and Formula: 10%DEX/4.25%AA    · Lipids: 250ml, Daily  · Duration: Continuous  · Rate/Volume: 44 (1L)  · Current PN Order Provides: 1010 kcal, 43 g pro, 1250 total volume    Current Intake:   Average Meal Intake: 1-25% Average Supplement Intake: Refusing to take      Anthropometric Measures:  Height: 5' 6\" (167.6 cm)  Current Body Wt: 88.8 kg (195 lb 12.3 oz)(4/15), Weight source: Bed scale  BMI: 31.6, Obese class 1 (BMI 30.0-34. 9)  Admission Body Weight: 192 lb 7.4 oz(3/24 pre assessment)  Ideal Body Weight (lbs) (Calculated): 130 lbs (59 kg), 148 %  Usual Body Wt: 102.5 kg (226 lb), Percent weight change: -19.5          Edema: No data recorded   Estimated Daily Nutrient Needs:  Energy (kcal/day): 1890-1197 (Kcal/kg(15-20), Weight Used: Current(91.3 kg-bed scale 4/7))  Protein (g/day): 69-91 Weight Used: ((20% of kcal))  Fluid (ml/day):   (1 ml/kcal)    Nutrition Diagnosis:   · Inadequate oral intake related to altered GI function as evidenced by (nausea and vomiting, poor PO, supplemental PN)    Nutrition Interventions:   Food and/or Nutrient Delivery: Modify oral nutrition supplement, Modify parenteral nutrition(Continue to advance diet as appropritate/tolerated)     Coordination of Nutrition Care: Continue to monitor while inpatient  Plan of Care discussed with Maik Royal RN and Sherri Lopez NP    Goals:   Previous Goal Met: Goal(s) achieved  Active Goal: Continue to tolerate supplemental PN until meeting at least 50% needs orally    Nutrition Monitoring and Evaluation:      Food/Nutrient Intake Outcomes: Diet advancement/tolerance, Food and nutrient intake, Supplement intake, Parenteral nutrition intake/tolerance  Physical Signs/Symptoms Outcomes: Biochemical data, GI status    Discharge Planning:     Too soon to determine    [address] RADHA Diop on 4/16/2021 at 11:26 AM  Contact: [phone]

## 2021-04-16 NOTE — PROGRESS NOTES
PLAN:  Pt has bowel function with a benign abdominal exam yet continues with vomiting  SBFT showed hypotonic bowel  NGT out 4/14  TPN  Maintenance IVFs   Full Liquids  Appreciate GI assistance  CTAP 4/8/21 negative for acute surgical findings  Stopped Cipro/Flagyl 4/14  Follow labs  Prophylaxis with SCDS, IS, Protonix, Lovenox. Staples out 4/13    At this point, I think we should decrease TPN rate and let patient take PO at her leisure. Will order full liquid diet and let patient choose what she would like to try. I see no mechanical reason she can not take PO. I appreciate GI help with this difficult problem. ASSESSMENT:  Admit Date: 3/30/2021   17 Days Post-Op  Procedure(s):  RIGHT ROBOTIC ASSISTED RIGHT COLECTOMY    Principal Problem:    Colon cancer ([facility] [address] (3/30/2021)    Active Problems:    Severe protein-calorie malnutrition ([facility] [address] (4/7/2021)         SUBJECTIVE:  4/12/21 13 Days Post-Op No new issues. NG patent with 1L 24hr output. No BM overnight. AF. Tachy. NAD.  -2.1L fluid volume  4/13/21 14 Days Post-Op awake in bed, no complaints. Pain controlled. AF, tachy in the 100s. -1.4L fluid volume. No BM.  4/14/21 15 Days Post-Op awake in bed, no complaints. Pain controlled. AF, tachy in the 100s. +BMx4  4/15/21 16 Days Post op; awake in bed; emesis x 2 since NGT removal but reports feeling well overall. She denies abdominal pain. Bm X 3.  AF, tachy. 800mL UOP.  4/16/21 17 Days Post op; awake in bed; emesis x 1 this am but reports feeling good. She denies abdominal pain. Can hear bowel sounds when standing in room Bm X 2.  AF, tachy. Voiding without difficulty.       Intake/Output Summary (Last 24 hours) at 4/16/2021 1132  Last data filed at 4/16/2021 0908  Gross per 24 hour   Intake 4867 ml   Output 4400 ml   Net 467 ml     OBJECTIVE:  Constitutional: Alert oriented cooperative patient in no acute distress; appears stated age   Visit Vitals  BP 99/61   Pulse (!) 105   Temp 98.1 °F (36.7 °C)   Resp 17 Ht 5' 6\" (1.676 m)   Wt 195 lb 12.8 oz (88.8 kg)   SpO2 93%   BMI 31.60 kg/m²     Eyes:Sclera are clear. ENMT: no external lesions gross hearing normal; no obvious neck masses, no ear or lip lesions  CV: RRR. Normal perfusion  Resp: No JVD. Breathing is  non-labored; no audible wheezing. GI: soft, ND, NT,  incisions c/d/i; active BS  Musculoskeletal: unremarkable with normal function. No embolic signs or cyanosis.    Neuro:  Oriented; moves all 4; no focal deficits  Psychiatric: normal affect and mood, no memory impairment      Patient Vitals for the past 24 hrs:   BP Temp Pulse Resp SpO2   04/16/21 0755 99/61 98.1 °F (36.7 °C) (!) 105 17 93 %   04/16/21 0445 117/71 99.4 °F (37.4 °C) (!) 107 18 94 %   04/15/21 2236 115/67 98.6 °F (37 °C) (!) 106 18 96 %   04/15/21 1925 127/80 98.4 °F (36.9 °C) (!) 110 18 95 %   04/15/21 1522 106/67 98.8 °F (37.1 °C) 80 18 97 %     Labs:    Recent Labs     04/16/21  0645      K 3.6   CL 99   CO2 30   BUN 15   CREA 0.60   *         Signed:  Jani Brennan NP

## 2021-04-16 NOTE — PROGRESS NOTES
Chart screened by  for discharge planning. Physicians continue to work with increasing patient diet. PT has not worked with patient since 4/3/2021 and recommended no further skilled therapy. CM will continue to follow patient during hospitalization for discharge planning and needs. No needs identified at this time. Please consult or notify  if any new issues arise.

## 2021-04-16 NOTE — PROGRESS NOTES
END OF SHIFT NOTE:    INTAKE/OUTPUT  04/15 0701 - 04/16 0700  In: 1263 [P.O.:180; I.V.:4687]  Out: 4700 [Urine:1000]  Voiding: YES  Catheter: NO  Drain:              Flatus: Patient does not have flatus present. Stool:  0 occurrences. Characteristics:      Emesis: 3 occurrences. Characteristics:   Emesis Assessment  Appearance: Green  Emesis Amount: Medium    VITAL SIGNS  Patient Vitals for the past 12 hrs:   Temp Pulse Resp BP SpO2   04/16/21 0445 99.4 °F (37.4 °C) (!) 107 18 117/71 94 %   04/15/21 2236 98.6 °F (37 °C) (!) 106 18 115/67 96 %       Pain Assessment  Pain Intensity 1: 0 (04/15/21 2247)  Pain Location 1: Abdomen  Pain Intervention(s) 1: Declines  Patient Stated Pain Goal: 0    Ambulating  Yes    Shift report given to oncoming nurse at the bedside.     Pranav Arellano RN

## 2021-04-17 NOTE — PROGRESS NOTES
Comprehensive Nutrition Assessment    Type and Reason for Visit: Reassess  TPN Management (Surgery)    Nutrition Recommendations/Plan:   · Parenteral Nutrition:  · Central TPN: Supplemental TPN to continue d/t vomiting at this time. · Continue 10%DEX/4.25%AA at 44 ml/hr (1L) with 250 ml 20% Lipids daily over 12 hrs. · Lytes/L: No changes indicated today. · Sodium 80 meq (80 meq NaCl), Potassium 30 meq (30 meq KCl), omit Mg, 4.5 meq Calcium. Solution now max Chloride. · Other additives: MTE, MVI  · IVF:  ·  IVF per MD  · Vitamin and Mineral Supplement Therapy:  · Electrolyte management replacement protocol implemented. · Labs:   · BMP daily,Phos and Mg MWF. Phos in AM.  POC Glucoses/SSI if Glucose > 180 mg/dl on AM BMP. · Daily weight   · Oral Nutrition:  · Advance diet as medically appropriate  · Continue Glucerna for butter pecan flavor (220 kcal, 10 g pro per bottle)  · Continue Magic Cup TID (290 kcal, 9 g pro per cup)  · Small frequent meals/snacks     Malnutrition Assessment:  Malnutrition Status: Severe malnutrition  Context: Chronic illness  Findings of clinical characteristics of malnutrition:   Energy Intake:  7 - 75% or less est energy requirements for 1 month or longer  Weight Loss:  7.0 - Greater than 7.5% over 3 months((226# to 182# (19.5%), 226# to 201# (11%) in < 3 months)     Body Fat Loss:  Unable to assess,     Muscle Mass Loss:  Unable to assess,    Fluid Accumulation:  Unable to assess,     Strength:  Not performed     Nutrition Assessment:   Nutrition History: 4/6: Pt reports at baseline she eats 2 small meals a day of oatmeal or grits for breakfast and then a \"normal\" meal for her 2nd meal of the day. About 8 weeks ago, she was eating a [employer] meal and had N/V which she thought was food poisioning. However she continued to have N/V with all po intake, thus intake was <25% of usual. She was mainly consuming gatorade, Boost or Ensure PTA. She had weight loss from 226# to 182#.  She reports addtional weight loss since her pre assessment weight on 3/24/2021. Nutrition Background: H/O: breast cancer with bilateral mastectomy, uterine cancer s/p hysterectomy, DM, HTN, RLS, GERD and claustrophobia. 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 She has had postop N&V and been unable to tolerate po diet progression  Daily Update:  Pt up to chair at RD visit. She reports taking small bites and sips with increasing frequency. Continues with ongoing vomiting which she associates with IV push indicating no vomiting with oral intake. Abdominal Status (last documented): Distended, Obese, Not passing flatus, Semi-soft, Tender, Nausea, Vomiting abdomen with Hypoactive , Distant bowel sounds. Last BM 04/16/21. Pertinent Medications: pepcid, refusing MOM and ativan, reglan, zofran scheduled  IVF: NS with 20 KCl @ 125ml/hr  Pertinent Labs:   Lab Results   Component Value Date/Time    Sodium 137 04/17/2021 06:53 AM    Potassium 3.6 04/17/2021 06:53 AM    Chloride 94 (L) 04/17/2021 06:53 AM    CO2 39 (H) 04/17/2021 06:53 AM    Anion gap 4 (L) 04/17/2021 06:53 AM    Glucose 141 (H) 04/17/2021 06:53 AM    BUN 20 04/17/2021 06:53 AM    Creatinine 0.99 04/17/2021 06:53 AM    Calcium 10.1 04/17/2021 06:53 AM    Albumin 2.8 (L) 04/08/2021 05:47 AM    Magnesium 2.2 04/16/2021 06:45 AM    Phosphorus 4.0 (H) 04/16/2021 06:45 AM   PN is max chloride. Nutrition Related Findings:   Diet: 3/30 Clear, 4/2 NPO, 4/5 sips of clears, Ensure clear. 4/6: PPN started. 4/8:NGT placed. CVC placed 4/9 and converted to CPN. SBFT 4/12 with delayed gstric emptying with hypotonic bowel with no contrast in small bowel or colon. NGT out 4/14. 4/15 diet advanced to FLD and TPN decreased to 1L per MD.  1L TPN to continue per MD 4/17.   Wound Type: Surgical incision    Current Nutrition Therapies:  DIET FULL LIQUID  DIET NUTRITIONAL SUPPLEMENTS All Meals; Marlo Erwin ( )  DIET NUTRITIONAL SUPPLEMENTS All Meals; Magic Cups ( )  Current Parenteral Nutrition Orders:  · Type and Formula: 10%DEX/4.25%AA    · Lipids: 250ml, Daily  · Duration: Continuous  · Rate/Volume: 44ml/hr (1L)  · Current PN Order Provides: 1010 kcal, 43 g pro, 1250 total volume    Current Intake:   Average Meal Intake: 1-25% Average Supplement Intake: 1-25%      Anthropometric Measures:  Height: 5' 6\" (167.6 cm)  Current Body Wt: 88 kg (194 lb 0.1 oz)(4/17), Weight source: Bed scale  BMI: 31.3, Obese class 1 (BMI 30.0-34. 9)  Admission Body Weight: 192 lb 7.4 oz(3/24 pre assessment)  Ideal Body Weight (lbs) (Calculated): 130 lbs (59 kg), 148 %  Usual Body Wt: 102.5 kg (226 lb), Percent weight change: -19.5          Edema: No data recorded   Estimated Daily Nutrient Needs:  Energy (kcal/day): 4214-0224 (Kcal/kg(15-20), Weight Used: Current(91.3 kg-bed scale 4/7))  Protein (g/day): 69-91 Weight Used: ((20% of kcal))  Fluid (ml/day):   (1 ml/kcal)    Nutrition Diagnosis:   · Inadequate oral intake related to altered GI function as evidenced by (nausea and vomiting, poor PO, supplemental PN)    · Severe malnutrition related to inadequate protein-energy intake as evidenced by (s/s as above per malnutrition assessment)    Nutrition Interventions:   Food and/or Nutrient Delivery: Continue current diet, Continue oral nutrition supplement, Continue parenteral nutrition     Coordination of Nutrition Care: Continue to monitor while inpatient  Plan of Care discussed with Asha López RN, Danay Beckford NP and Dr. Aylin Ramírez. Goals:   Previous Goal Met: Goal(s) achieved  Active Goal: Continue to tolerate supplemental PN until meeting at least 50% needs orally    Nutrition Monitoring and Evaluation:      Food/Nutrient Intake Outcomes: Diet advancement/tolerance, Food and nutrient intake, Supplement intake, Parenteral nutrition intake/tolerance  Physical Signs/Symptoms Outcomes: Biochemical data, GI status    Discharge Planning:     Too soon to determine    [facility] Luanne Davenport RD, RADHAN on 4/17/2021 at 10:59 AM  Contact: [phone]

## 2021-04-17 NOTE — PROGRESS NOTES
END OF SHIFT NOTE:    INTAKE/OUTPUT  04/16 0701 - 04/17 0700  In: 3605 [P.O.:60; I.V.:3199]  Out: 3000   Voiding: YES  Catheter: NO  Drain:              Flatus: Patient does have flatus present. Stool:  0 occurrences. Characteristics:      Emesis: 0 occurrences. Characteristics:   Emesis Assessment  Appearance: Green  Emesis Amount: Large    VITAL SIGNS  Patient Vitals for the past 12 hrs:   Temp Pulse Resp BP SpO2   04/17/21 0312 98.2 °F (36.8 °C) (!) 107 17 103/65 93 %   04/17/21 0030 -- (!) 105 -- -- --   04/16/21 2243 97.4 °F (36.3 °C) (!) 117 18 127/71 93 %   04/16/21 2209 -- (!) 112 -- -- --       Pain Assessment  Pain Intensity 1: 0 (04/16/21 2209)  Pain Location 1: Abdomen  Pain Intervention(s) 1: Declines  Patient Stated Pain Goal: 0    Ambulating  Yes    Shift report given to oncoming nurse at the bedside.     Romario Armenta RN

## 2021-04-17 NOTE — PROGRESS NOTES
[facility] Progress Note         Admit Date:  3/30/2021    Today's Date:  4/17/2021    CC:  N & V    Subjective:     Patient still with vomiting. Per nurse, yesterday, 700 cc in AM, 100 cc mid day, 1300 later in the day. Patient started having borborygmi yesterday and today but no flatus. Medications:   Current Facility-Administered Medications   Medication Dose Route Frequency    ondansetron (ZOFRAN) injection 4 mg  4 mg IntraVENous AC&HS    TPN ADULT - dextrose 10% amino acid 4.25%   IntraVENous QPM    metoclopramide HCl (REGLAN) 20 mg in 0.9% sodium chloride 50 mL IVPB  20 mg IntraVENous TIDAC    diphenhydrAMINE (BENADRYL) injection 25 mg  25 mg IntraVENous Q6H PRN    promethazine (PHENERGAN) with saline injection 12.5 mg  12.5 mg IntraVENous Q6H PRN    famotidine (PF) (PEPCID) 20 mg in 0.9% sodium chloride 10 mL injection  20 mg IntraVENous Q12H    lip protectant (BLISTEX) ointment 1 Each  1 Each Topical PRN    bisacodyL (DULCOLAX) suppository 10 mg  10 mg Rectal DAILY    fat emulsion 20% (LIPOSYN, INTRAlipid) infusion 250 mL  250 mL IntraVENous QPM    phenol throat spray (CHLORASEPTIC) 1 Spray  1 Spray Oral PRN    LORazepam (ATIVAN) tablet 1 mg  1 mg Oral BID    magnesium hydroxide (MILK OF MAGNESIA) 400 mg/5 mL oral suspension 30 mL  30 mL Oral DAILY    NUTRITIONAL SUPPORT ELECTROLYTE PRN ORDERS   Does Not Apply PRN    0.45% sodium chloride with KCl 20 mEq/L infusion   IntraVENous CONTINUOUS    enoxaparin (LOVENOX) injection 40 mg  40 mg SubCUTAneous Q24H    enalaprilat (VASOTEC) injection 0.625 mg  0.625 mg IntraVENous Q6H PRN    oxyCODONE-acetaminophen (PERCOCET 7.5) 7.5-325 mg per tablet 1 Tab  1 Tab Oral Q4H PRN       Review of Systems:  ROS was obtained, with pertinent positives as listed above. No chest pain or SOB.     Diet:  NPO with ice chips    Objective:   Vitals:  Visit Vitals  /63   Pulse (!) 107   Temp 98.1 °F (36.7 °C)   Resp 17   Ht 5' 6\" (1.676 m) Wt 88 kg (194 lb 1.6 oz)   SpO2 94%   BMI 31.33 kg/m²     Intake/Output:  04/17 0701 - 04/17 1900  In: 0   Out: 300   04/15 1901 - 04/17 0700  In: 1448 [P. O.:60; I.V.:3822]  Out: 4950 [Urine:200]  Exam:  General appearance: alert, cooperative, no distress  Abdomen: soft, distended, hypoactive, nontender  Neuro:  alert and oriented    Data Review (Labs):    Recent Labs     04/17/21  0653 04/16/21  0645 04/15/21  0448    137 138   K 3.6 3.6 3.7   CL 94* 99 103   CO2 39* 30 28   BUN 20 15 15   CREA 0.99 0.60 0.54*   CA 10.1 8.9 8.5   MG  --  2.2  --    * 117* 114*     XR GASTROGRAFFIN SMALL BOWEL on 4/12/2021   Findings:      view of the abdomen demonstrates NG tube in place with otherwise paucity  of bowel gas . No evidence of abnormal calcification or organomegaly is  demonstrated. Surgical staples are seen.        Following administration of Gastrografin through the NG tube, timed film studies  demonstrate mildly delayed gastric emptying with only minimal contrast entering  the proximal small bowel is virtually no significant progression after 4 hours. There is however no evidence of bowel disc dilatation or gastric distention.     Total images: No fluoroscopy images were acquired. Total fluoroscopy time: None.     IMPRESSION     1. Markedly hypotonic bowel. Assessment:     Principal Problem:    Colon cancer ([facility] (3/30/2021)    Active Problems:    Severe protein-calorie malnutrition ([facility] (4/7/2021)    80 y.o. female who we are following for post-op N&V. She underwent right hemicolectomy by Dr. Inez Saxena on 6/58/92 for right invasive adenocarcinoma. She has not responded to Pepcid 20mg every 12 hours, PRN phenergan (although this was discontinued due to delirium) and zofran or scheduled scopolamine patch. She was started on scheduled zofran, PPI , MOM and dulcolax on 4/5 without relief.  Metoclopramide and Ativan for started on 4/7 and she did well for several hours. Suspect her current constipation as seen on KUB 4/5 is increasing her nausea. She had mild elevation lipase on 4/6/2021 571, but suspect this is secondary to persistent vomiting. NG was placed on 4/8 with significant output 2960, and output on 4/9 2000cc, 4/10 1350cc, 4/11 1000cc. CT of the abdomen/pelvis with contrast on 4/8 did not reveal an etiology. Flagyl was started on 4/8 for leukocytosis which is improving, (Flagyl can cause nausea but symptoms present prior to this). CT of head  on 4/9 also unremarkable (without iv contrast due to iv contrast for CT A/P on 4/8). Gastrografin SB study 4/12/2021 showed markedly hypotonic bowel. Plan:     -Continue metoclopramide 20mg q6hr  -Continue Ativan 1mg BID  -Continue bowel regimen  -Continue pantoprazole 40mg IV BID  -On TPN  -Reglan and Zofran scheduled prior to meals and nightly.   -Will follow    Unfortunately she is not improving and continues to vomit significant volumes. I am hoping that the borborygmi is a signal of motility.

## 2021-04-17 NOTE — PROGRESS NOTES
PLAN:  Pt has bowel function with a benign abdominal exam yet continues with vomiting  SBFT showed hypotonic bowel  NGT out 4/14  Continue TPN  Maintenance IVFs   Full Liquids  Appreciate GI assistance  CTAP 4/8/21 negative for acute surgical findings  Stopped Cipro/Flagyl 4/14  Follow labs  Prophylaxis with SCDS, IS, Protonix, Lovenox. Staples out 4/13  I appreciate GI help with this difficult problem. Add mucinex  Repeat CT abd/ pelvis today    ASSESSMENT:  Admit Date: 3/30/2021   18 Days Post-Op  Procedure(s):  RIGHT ROBOTIC ASSISTED RIGHT COLECTOMY    Principal Problem:    Colon cancer ([facility] (3/30/2021)    Active Problems:    Severe protein-calorie malnutrition ([facility] (4/7/2021)         SUBJECTIVE:  4/12/21 13 Days Post-Op No new issues. NG patent with 1L 24hr output. No BM overnight. AF. Tachy. NAD.  -2.1L fluid volume  4/13/21 14 Days Post-Op awake in bed, no complaints. Pain controlled. AF, tachy in the 100s. -1.4L fluid volume. No BM.  4/14/21 15 Days Post-Op awake in bed, no complaints. Pain controlled. AF, tachy in the 100s. +BMx4  4/15/21 16 Days Post op; awake in bed; emesis x 2 since NGT removal but reports feeling well overall. She denies abdominal pain. Bm X 3.  AF, tachy. 800mL UOP.  4/16/21 17 Days Post op; awake in bed; emesis x 1 this am but reports feeling good. She denies abdominal pain. Can hear bowel sounds when standing in room Bm X 2.  AF, tachy. Voiding without difficulty. 4/17/21 18 Days Post op; awake in recliner; Currently c/o nausea and vomiting. She denies abdominal pain. BM x 1  Yesterday - bloody mucus. AF, tachy. Voiding without difficulty.         Intake/Output Summary (Last 24 hours) at 4/17/2021 1104  Last data filed at 4/17/2021 0902  Gross per 24 hour   Intake 1705 ml   Output 2600 ml   Net -895 ml     OBJECTIVE:  Constitutional: Alert oriented cooperative patient in no acute distress; appears stated age   Visit Vitals  BP 97/84   Pulse (!) 110   Temp 97.8 °F (36.6 °C) Resp 18   Ht 5' 6\" (1.676 m)   Wt 194 lb 1.6 oz (88 kg)   SpO2 97%   BMI 31.33 kg/m²     Eyes:Sclera are clear. ENMT: no external lesions gross hearing normal; no obvious neck masses, no ear or lip lesions  CV: RRR. Normal perfusion  Resp: No JVD. Breathing is  non-labored; no audible wheezing. GI: soft, ND, NT,  incisions c/d/i; active BS  Musculoskeletal: unremarkable with normal function. No embolic signs or cyanosis.    Neuro:  Oriented; moves all 4; no focal deficits  Psychiatric: normal affect and mood, no memory impairment      Patient Vitals for the past 24 hrs:   BP Temp Pulse Resp SpO2 Weight   04/17/21 1046 97/84 97.8 °F (36.6 °C) (!) 110 18 97 % --   04/17/21 0723 103/63 98.1 °F (36.7 °C) (!) 107 17 94 % --   04/17/21 0312 103/65 98.2 °F (36.8 °C) (!) 107 17 93 % 194 lb 1.6 oz (88 kg)   04/17/21 0030 -- -- (!) 105 -- -- --   04/16/21 2243 127/71 97.4 °F (36.3 °C) (!) 117 18 93 % --   04/16/21 2209 -- -- (!) 112 -- -- --   04/16/21 1917 131/71 98.2 °F (36.8 °C) (!) 114 17 91 % --   04/16/21 1629 112/64 98 °F (36.7 °C) 97 16 95 % --   04/16/21 1204 99/66 98.1 °F (36.7 °C) (!) 101 16 92 % --     Labs:    Recent Labs     04/17/21  0653      K 3.6   CL 94*   CO2 39*   BUN 20   CREA 0.99   *         Signed:  Jamar Arreaga, YURIY

## 2021-04-17 NOTE — PROGRESS NOTES
END OF SHIFT NOTE:    INTAKE/OUTPUT  04/16 0701 - 04/17 0700  In: 7969 [P.O.:60; I.V.:3199]  Out: 3000   Voiding: YES  Catheter: NO  Drain:              Flatus: Patient does have flatus present. Stool:  0 occurrences. Characteristics:  Stool Assessment  Stool Color: Other (Comment), Brown, Red(clear looking with some brown and red)  Stool Appearance: Mucous  Stool Amount: Small  Stool Source/Status: Rectum    Emesis: 3 occurrences. Characteristics:   Emesis Assessment  Appearance: Green  Emesis Amount: Large    VITAL SIGNS  Patient Vitals for the past 12 hrs:   Temp Pulse Resp BP SpO2   04/17/21 1548 98.8 °F (37.1 °C) (!) 105 20 124/73 94 %   04/17/21 1046 97.8 °F (36.6 °C) (!) 110 18 97/84 97 %   04/17/21 0723 98.1 °F (36.7 °C) (!) 107 17 103/63 94 %       Pain Assessment  Pain Intensity 1: 0 (04/16/21 2209)  Pain Location 1: Abdomen  Pain Intervention(s) 1: Declines  Patient Stated Pain Goal: 0    Ambulating  Yes    Shift report given to oncoming nurse at the bedside.     Phuc Garcia RN

## 2021-04-18 NOTE — PROGRESS NOTES
Consent obtained from pt for EGD with anesthesia (MAC or GA) for 4/19/2021 with Dr. Del Isabel. No questions at this time.

## 2021-04-18 NOTE — PROGRESS NOTES
PLAN:  Pt has bowel function with a benign abdominal exam yet continues with vomiting  SBFT showed hypotonic bowel  NGT out 4/14  Continue TPN  Maintenance IVFs   1L NS Bolus today  Full Liquids  NPO after midnight  CTAP 4/8/21 negative for acute surgical findings  Stopped Cipro/Flagyl 4/14  Follow labs  Prophylaxis with SCDS, IS, Protonix, Lovenox. Staples out 4/13  I appreciate GI help with this difficult problem. mucinex  CT abd/ pelvis 4/17/21  EGD tomorrow    ASSESSMENT:  Admit Date: 3/30/2021   18 Days Post-Op  Procedure(s):  RIGHT ROBOTIC ASSISTED RIGHT COLECTOMY    Principal Problem:    Colon cancer ([facility] (3/30/2021)    Active Problems:    Severe protein-calorie malnutrition ([facility] (4/7/2021)         SUBJECTIVE:  4/12/21 13 Days Post-Op No new issues. NG patent with 1L 24hr output. No BM overnight. AF. Tachy. NAD.  -2.1L fluid volume  4/13/21 14 Days Post-Op awake in bed, no complaints. Pain controlled. AF, tachy in the 100s. -1.4L fluid volume. No BM.  4/14/21 15 Days Post-Op awake in bed, no complaints. Pain controlled. AF, tachy in the 100s. +BMx4  4/15/21 16 Days Post op; awake in bed; emesis x 2 since NGT removal but reports feeling well overall. She denies abdominal pain. Bm X 3.  AF, tachy. 800mL UOP.  4/16/21 17 Days Post op; awake in bed; emesis x 1 this am but reports feeling good. She denies abdominal pain. Can hear bowel sounds when standing in room Bm X 2.  AF, tachy. Voiding without difficulty. 4/17/21 18 Days Post op; awake in recliner; Currently c/o nausea and vomiting. She denies abdominal pain. BM x 1  Yesterday - bloody mucus. AF, tachy. Voiding without difficulty. 4/18/21 19 Days Post op; awake in bed; Currently c/o nausea and vomiting. She denies abdominal pain. BM x 1  Yesterday. AF, tachy. Voiding without difficulty.       Intake/Output Summary (Last 24 hours) at 4/18/2021 0954  Last data filed at 4/18/2021 0634  Gross per 24 hour   Intake 1315 ml   Output 3750 ml   Net -2438 ml     OBJECTIVE:  Constitutional: Alert oriented cooperative patient in no acute distress; appears stated age   Visit Vitals  /67   Pulse (!) 103   Temp 98.8 °F (37.1 °C)   Resp 17   Ht 5' 6\" (1.676 m)   Wt 194 lb 1.6 oz (88 kg)   SpO2 95%   BMI 31.33 kg/m²     Eyes:Sclera are clear. ENMT: no external lesions gross hearing normal; no obvious neck masses, no ear or lip lesions  CV: RRR. Normal perfusion  Resp: No JVD. Breathing is  non-labored; no audible wheezing. GI: soft, ND, NT,  incisions c/d/i; active BS  Musculoskeletal: unremarkable with normal function. No embolic signs or cyanosis.    Neuro:  Oriented; moves all 4; no focal deficits  Psychiatric: normal affect and mood, no memory impairment      Patient Vitals for the past 24 hrs:   BP Temp Pulse Resp SpO2 Weight   04/18/21 0231 116/67 98.8 °F (37.1 °C) (!) 103 17 95 % 194 lb 1.6 oz (88 kg)   04/17/21 2317 107/68 98.7 °F (37.1 °C) (!) 113 18 92 % --   04/17/21 1918 114/73 98.5 °F (36.9 °C) (!) 108 19 93 % --   04/17/21 1548 124/73 98.8 °F (37.1 °C) (!) 105 20 94 % --   04/17/21 1046 97/84 97.8 °F (36.6 °C) (!) 110 18 97 % --     Labs:    Recent Labs     04/18/21  0422      K 3.7   CL 93*   CO2 39*   BUN 28*   CREA 1.29*   *         Signed:  Sarah Espinoza NP

## 2021-04-18 NOTE — PROGRESS NOTES
END OF SHIFT NOTE:    INTAKE/OUTPUT  04/17 0701 - 04/18 0700  In: 1315 [I.V.:1315]  Out: 8390 [Urine:300]  Voiding: YES  Catheter: NO  Drain:              Flatus: Patient does have flatus present. Stool:  0 occurrences. Characteristics:  Stool Assessment  Stool Color: Other (Comment)(small white pudding stool)  Stool Appearance: Soft  Stool Amount: Small  Stool Source/Status: Rectum    Emesis: 0 occurrences. Characteristics:   Emesis Assessment  Appearance: Green  Emesis Amount: Medium    VITAL SIGNS  Patient Vitals for the past 12 hrs:   Temp Pulse Resp BP SpO2   04/18/21 1610 98.3 °F (36.8 °C) (!) 105 17 125/75 91 %   04/18/21 1149 97.5 °F (36.4 °C) (!) 119 17 108/64 92 %       Pain Assessment  Pain Intensity 1: 0 (04/17/21 2215)  Pain Location 1: Abdomen  Pain Intervention(s) 1: Declines  Patient Stated Pain Goal: 0    Ambulating  Yes    Shift report given to oncoming nurse at the bedside.     Jewel Ross RN

## 2021-04-18 NOTE — PROGRESS NOTES
[facility] Progress Note         Admit Date:  3/30/2021    Today's Date:  4/18/2021    CC:  N & V    Subjective:     Patient still with vomiting. Per nurse, patient with going N/V. Patient continues to have a \"noisy\" abdomen but no flatus. Medications:   Current Facility-Administered Medications   Medication Dose Route Frequency    LORazepam (ATIVAN) tablet 1 mg  1 mg Oral BID PRN    TPN ADULT - dextrose 10% amino acid 4.25%   IntraVENous QPM    guaiFENesin (ROBITUSSIN) 100 mg/5 mL oral liquid 400 mg  400 mg Oral BID and QHS    ondansetron (ZOFRAN) injection 4 mg  4 mg IntraVENous AC&HS    metoclopramide HCl (REGLAN) 20 mg in 0.9% sodium chloride 50 mL IVPB  20 mg IntraVENous TIDAC    diphenhydrAMINE (BENADRYL) injection 25 mg  25 mg IntraVENous Q6H PRN    promethazine (PHENERGAN) with saline injection 12.5 mg  12.5 mg IntraVENous Q6H PRN    famotidine (PF) (PEPCID) 20 mg in 0.9% sodium chloride 10 mL injection  20 mg IntraVENous Q12H    lip protectant (BLISTEX) ointment 1 Each  1 Each Topical PRN    bisacodyL (DULCOLAX) suppository 10 mg  10 mg Rectal DAILY    fat emulsion 20% (LIPOSYN, INTRAlipid) infusion 250 mL  250 mL IntraVENous QPM    phenol throat spray (CHLORASEPTIC) 1 Spray  1 Spray Oral PRN    magnesium hydroxide (MILK OF MAGNESIA) 400 mg/5 mL oral suspension 30 mL  30 mL Oral DAILY    NUTRITIONAL SUPPORT ELECTROLYTE PRN ORDERS   Does Not Apply PRN    0.45% sodium chloride with KCl 20 mEq/L infusion   IntraVENous CONTINUOUS    enoxaparin (LOVENOX) injection 40 mg  40 mg SubCUTAneous Q24H    enalaprilat (VASOTEC) injection 0.625 mg  0.625 mg IntraVENous Q6H PRN    oxyCODONE-acetaminophen (PERCOCET 7.5) 7.5-325 mg per tablet 1 Tab  1 Tab Oral Q4H PRN       Review of Systems:  ROS was obtained, with pertinent positives as listed above. No chest pain or SOB.     Diet:  NPO with ice chips    Objective:   Vitals:  Visit Vitals  /67   Pulse (!) 103   Temp 98.8 °F (37.1 °C)   Resp 17   Ht 5' 6\" (1.676 m)   Wt 88 kg (194 lb 1.6 oz)   SpO2 95%   BMI 31.33 kg/m²     Intake/Output:  No intake/output data recorded. 04/16 1901 - 04/18 0700  In: 2960 [I.V.:2960]  Out: 4950 [Urine:300]  Exam:  General appearance: alert, cooperative, no distress  Abdomen: soft, distended, hypoactive, nontender  Neuro:  alert and oriented    Data Review (Labs):    Recent Labs     04/18/21  0422 04/17/21  0653 04/16/21  0645    137 137   K 3.7 3.6 3.6   CL 93* 94* 99   CO2 39* 39* 30   BUN 28* 20 15   CREA 1.29* 0.99 0.60   CA 9.4 10.1 8.9   MG  --   --  2.2   * 141* 117*     XR GASTROGRAFFIN SMALL BOWEL on 4/12/2021   Findings:      view of the abdomen demonstrates NG tube in place with otherwise paucity  of bowel gas . No evidence of abnormal calcification or organomegaly is  demonstrated. Surgical staples are seen.        Following administration of Gastrografin through the NG tube, timed film studies  demonstrate mildly delayed gastric emptying with only minimal contrast entering  the proximal small bowel is virtually no significant progression after 4 hours. There is however no evidence of bowel disc dilatation or gastric distention.     Total images: No fluoroscopy images were acquired. Total fluoroscopy time: None.     IMPRESSION     1. Markedly hypotonic bowel. Examination: CT ABD PELV W CONT 4/17/2021 2:52 PM     INDICATION:  Nausea and vomiting status post hemicolectomy. Unable to tolerate  enteric contrast.     COMPARISON: CT abdomen and pelvis April 8, 2021     TECHNIQUE: Contiguous axial computed tomographic images were obtained from the  domes of the diaphragm to the symphysis pubis following administration 100 mL  Iso-avni 370. Coronal reconstructions were also performed. Oral contrast from  prior administration is seen in the colon.     Radiation dose reduction techniques were used for this study.  Our CT scanners  use one or all of the following: Automated exposure control, adjustment of the  mA and/or kV according to patient size, iterative reconstruction.     FINDINGS:     Lung bases: 3 mm nodule along the right hemidiaphragm unchanged from April 8, 2021     Hepatobiliary: Hepatic steatosis. Normal gallbladder. No biliary dilatation.     Spleen, adrenals, pancreas: Normal spleen. No pancreatic ductal dilation. No  adrenal mass.      Kidneys, urinary: No hydronephrosis or nephrolithiasis. Normal bladder.      Reproductive: Prior hysterectomy. No adnexal mass.     Bowel: Prior right hemicolectomy with similar stranding surrounding the surgical  site. No drainable fluid collection or leakage of enteric contrast. The stomach  and duodenum are significantly distended. No dilatation of the jejunum or small  bowel or colon.       Peritoneum: No free air or free fluid.     Lymphovascular: No AAA. No adenopathy.     Abdominal wall: No bowel-containing hernia. Subcutaneous emphysema in the left  lateral abdominal wall likely from injection.     Bones: No suspicious osseous lesion.      IMPRESSION  1. Unchanged postsurgical changes at the right hemicolectomy without drainable  fluid collection. 2. Significant distention of the stomach and duodenum without dilatation of the  jejunum nor remainder of bowel.       Assessment:     Principal Problem:    Colon cancer ([facility] (3/30/2021)    Active Problems:    Severe protein-calorie malnutrition ([facility] (4/7/2021)    80 y.o. female who we are following for post-op N&V. She underwent right hemicolectomy by Dr. Mata Red on 2/53/68 for right invasive adenocarcinoma. She has not responded to Pepcid 20mg every 12 hours, PRN phenergan (although this was discontinued due to delirium) and zofran or scheduled scopolamine patch. She was started on scheduled zofran, PPI , MOM and dulcolax on 4/5 without relief.  Metoclopramide and Ativan for started on 4/7 and she did well for several hours. Suspect her current constipation as seen on KUB 4/5 is increasing her nausea. She had mild elevation lipase on 4/6/2021 571, but suspect this is secondary to persistent vomiting. NG was placed on 4/8 with significant output 2960, and output on 4/9 2000cc, 4/10 1350cc, 4/11 1000cc. CT of the abdomen/pelvis with contrast on 4/8 did not reveal an etiology. Flagyl was started on 4/8 for leukocytosis which is improving, (Flagyl can cause nausea but symptoms present prior to this). CT of head  on 4/9 also unremarkable (without iv contrast due to iv contrast for CT A/P on 4/8). Gastrografin SB study 4/12/2021 showed markedly hypotonic bowel. CT 4-17-21 with ongoing distension of stomach and duodenum    Plan:   N/V. Patient with gastric and duodenal distension but none distal to that. Dr Chester Marie and I discussed this case. He asked about EGD. I feel that given that there has been on improvement all week long, the patient has ongoing N/V, the recent imaging shows persistent gastric and duodenal dilation, that an EGD/small bowel enteroscopy would be indicated. I explained to the patient this also. Risks including aspiration, other sedation events, risks of endoscopy bleeding, etc could occur. The option is to continue current management with TPN and supportive care, waiting. She agrees with small bowel enteroscopy. She is at increased risk of having significant gastric fluid retention. Will defer to Anesthesiology regarding type of sedation.   Also, will have next team review with Radiology whether they saw anything that could look like SMA syndrome on CT (since last night's CT was with contrast)>

## 2021-04-18 NOTE — PROGRESS NOTES
Comprehensive Nutrition Assessment    Type and Reason for Visit: Reassess  TPN Management (Surgery)    Nutrition Recommendations/Plan:   · Parenteral Nutrition:  · Central TPN: Supplemental TPN to continue d/t vomiting at this time. · Continue 10%DEX/4.25%AA at 44 ml/hr (1L) with 250 ml 20% Lipids daily over 12 hrs. · Lytes/L: No changes on indicated today. · Sodium 80 meq (80 meq NaCl), Potassium 30 meq (30 meq KCl), omit Mg, 4.5 meq Calcium. Solution remains max Chloride.   · Other additives: MTE, MVI  · Discussed with Afshin Green NP increasing back to goal TPN s/p EGD if anticipated pt to continue with limited oral intake as it will day 4 with limited nutrition provisions. · IVF:  ·  IVF per MD  · Vitamin and Mineral Supplement Therapy:  · Electrolyte management replacement protocol active. · Labs:   · BMP daily,Phos and Mg MWF.  POC Glucoses/SSI if Glucose > 180 mg/dl on AM BMP. · Daily weight   · Oral Nutrition:  · Advance diet as medically appropriate  · Continue Glucerna for butter pecan flavor (220 kcal, 10 g pro per bottle)  · Continue Magic Cup TID (290 kcal, 9 g pro per cup)  · Small frequent meals/snacks  · NPO after MN order active. Malnutrition Assessment:  Malnutrition Status: Severe malnutrition  Context: Chronic illness  Findings of clinical characteristics of malnutrition:   Energy Intake:  7 - 75% or less est energy requirements for 1 month or longer  Weight Loss:  7.0 - Greater than 7.5% over 3 months((226# to 182# (19.5%), 226# to 201# (11%) in < 3 months)     Body Fat Loss:  Unable to assess,     Muscle Mass Loss:  Unable to assess,    Fluid Accumulation:  Unable to assess,     Strength:  Not performed     Nutrition Assessment:   Nutrition History: 4/6: Pt reports at baseline she eats 2 small meals a day of oatmeal or grits for breakfast and then a \"normal\" meal for her 2nd meal of the day.  About 8 weeks ago, she was eating a [employer] meal and had N/V which she thought was food poisioning. However she continued to have N/V with all po intake, thus intake was <25% of usual. She was mainly consuming gatorade, Boost or Ensure PTA. She had weight loss from 226# to 182#. She reports addtional weight loss since her pre assessment weight on 3/24/2021. Nutrition Background: H/O: breast cancer with bilateral mastectomy, uterine cancer s/p hysterectomy, DM, HTN, RLS, GERD and claustrophobia. 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 She has had postop N&V and been unable to tolerate po diet progression  Daily Update:  Pt lying in bed at RD visit. She continues with abdominal gurgling, no flatus. Plan for fluid bolus today and EGD tomorrow. She accepts sips at best of oral intake, starting to decline medications stating she doesn't feel they work and are making her vomiting worse. She will be NPO after MN. Abdominal Status (last documented): Distended, Not passing flatus, Nausea, Vomiting, Soft abdomen with Hypoactive  bowel sounds. Last BM 04/17/21. Emesis recorded past 24 hours 3750 ml  Pertinent Medications: dulcolax suppository (accepted), pepcid (declined this am), MOM (continues to refuse), reglan and zofran continue with varied acceptance)  IVF: 1/2 NS with 20 meq KCl/L @ 125 ml/hr ongoing, NS bolus today  Pertinent Labs:   Lab Results   Component Value Date/Time    Sodium 136 04/18/2021 04:22 AM    Potassium 3.7 04/18/2021 04:22 AM    Chloride 93 (L) 04/18/2021 04:22 AM    CO2 39 (H) 04/18/2021 04:22 AM    Anion gap 4 (L) 04/18/2021 04:22 AM    Glucose 148 (H) 04/18/2021 04:22 AM    BUN 28 (H) 04/18/2021 04:22 AM    Creatinine 1.29 (H) 04/18/2021 04:22 AM    Calcium 9.4 04/18/2021 04:22 AM    Albumin 2.8 (L) 04/08/2021 05:47 AM    Magnesium 2.2 04/16/2021 06:45 AM    Phosphorus 4.0 (H) 04/16/2021 06:45 AM   Labs are remarkable for increasing BUN/Cr, decreasing GFR consistent with fluid losses. Remaining labs relatively stable.      Nutrition Related Findings:   Diet: 3/30 Clear, 4/2 NPO, 4/5 sips of clears, Ensure clear. 4/6: PPN started. 4/8:NGT placed. CVC placed 4/9 and converted to CPN. SBFT 4/12 with delayed gstric emptying with hypotonic bowel with no contrast in small bowel or colon. NGT out 4/14. 4/15 diet advanced to FLD and TPN decreased to 1L per MD.  1L TPN to continues on 1 liter per surgery. Wound Type: Surgical incision    Current Nutrition Therapies:  DIET FULL LIQUID  DIET NUTRITIONAL SUPPLEMENTS All Meals; Glucerna Shake ( )  DIET NUTRITIONAL SUPPLEMENTS All Meals; Magic Cups ( )  DIET NPO Except Meds  DIET NPO  Current Parenteral Nutrition Orders:  · Type and Formula: 10%DEX/4.25%AA    · Lipids: 250ml, Daily  · Duration: Continuous  · Rate/Volume: 44ml/hr (1L)  · Current PN Order Provides: 1010 kcal, 43 g pro, 1250 total volume    Current Intake:   Average Meal Intake: 1-25%(sips at best) Average Supplement Intake: 1-25%(sips at best)      Anthropometric Measures:  Height: 5' 6\" (167.6 cm)  Current Body Wt: 88 kg (194 lb 0.1 oz)(4/18), Weight source: Bed scale  BMI: 31.3, Obese class 1 (BMI 30.0-34. 9)  Admission Body Weight: 192 lb 7.4 oz(3/24 pre assessment)  Ideal Body Weight (lbs) (Calculated): 130 lbs (59 kg), 148 %  Usual Body Wt: 102.5 kg (226 lb), Percent weight change: -19.5          Edema: No data recorded   Estimated Daily Nutrient Needs:  Energy (kcal/day): 5722-7601 (Kcal/kg(15-20), Weight Used: Current(91.3 kg-bed scale 4/7))  Protein (g/day): 69-91 Weight Used: ((20% of kcal))  Fluid (ml/day):   (1 ml/kcal)    Nutrition Diagnosis:   · Inadequate oral intake related to altered GI function as evidenced by (nausea and vomiting, poor PO, supplemental PN)    · Severe malnutrition related to inadequate protein-energy intake as evidenced by (s/s as above per malnutrition assessment)    Nutrition Interventions:   Food and/or Nutrient Delivery: Continue current diet, Continue oral nutrition supplement, Continue parenteral nutrition Coordination of Nutrition Care: Continue to monitor while inpatient  Plan of Care discussed with Gabriella Kaye RN and Jennifer Vee NP. Goals:   Previous Goal Met: Goal(s) achieved  Active Goal: Continue to tolerate supplemental PN until meeting at least 50% needs orally    Nutrition Monitoring and Evaluation:      Food/Nutrient Intake Outcomes: Diet advancement/tolerance, Food and nutrient intake, Supplement intake, Parenteral nutrition intake/tolerance  Physical Signs/Symptoms Outcomes: Biochemical data, GI status    Discharge Planning:     Too soon to determine    Luanne Winslow RD, LDN on 4/18/2021 at 10:42 AM  Contact: [phone]

## 2021-04-18 NOTE — PROGRESS NOTES
END OF SHIFT NOTE:    INTAKE/OUTPUT  04/17 0701 - 04/18 0700  In: 408 [I.V.:408]  Out: 3600 [Urine:300]  Voiding: YES  Catheter: NO  Drain:              Flatus: Patient does have flatus present. Stool:  1 occurrences. Characteristics:  Stool Assessment  Stool Color: Other (Comment)(small white pudding stool)  Stool Appearance: Soft  Stool Amount: Small  Stool Source/Status: Rectum    Emesis: 3 occurrences. Characteristics:   Emesis Assessment  Appearance: Green  Emesis Amount: Medium    VITAL SIGNS  Patient Vitals for the past 12 hrs:   Temp Pulse Resp BP SpO2   04/18/21 0231 98.8 °F (37.1 °C) (!) 103 17 116/67 95 %   04/17/21 2317 98.7 °F (37.1 °C) (!) 113 18 107/68 92 %   04/17/21 1918 98.5 °F (36.9 °C) (!) 108 19 114/73 93 %       Pain Assessment  Pain Intensity 1: 0 (04/17/21 2215)  Pain Location 1: Abdomen  Pain Intervention(s) 1: Declines  Patient Stated Pain Goal: 0    Ambulating  Yes    Shift report given to oncoming nurse at the bedside.     Inna Barros RN

## 2021-04-19 NOTE — INTERVAL H&P NOTE
Update History & Physical    The Patient's History and Physical.  The surgical site was confirmed by the patient and me. There are no changes    Plan:  The risk, benefits, expected outcome, and alternative to the recommended procedure have been discussed with the patient. Patient understands and wants to proceed with the procedure.     Electronically signed by Josiane Shah MD on 4/19/2021 at 1:38 PM

## 2021-04-19 NOTE — ANESTHESIA POSTPROCEDURE EVALUATION
Procedure(s): ESOPHAGOGASTRODUODENOSCOPY (EGD) ESOPHAGOGASTRODUODENAL (EGD) BIOPSY ENTEROSCOPY. general 
 
Anesthesia Post Evaluation Multimodal analgesia: multimodal analgesia used between 6 hours prior to anesthesia start to PACU discharge Patient location during evaluation: bedside Patient participation: complete - patient participated Level of consciousness: awake and responsive to light touch Pain management: adequate Airway patency: patent Anesthetic complications: no 
Cardiovascular status: acceptable, hemodynamically stable, blood pressure returned to baseline and stable Respiratory status: acceptable, unassisted, spontaneous ventilation and nonlabored ventilation Hydration status: acceptable Post anesthesia nausea and vomiting:  controlled INITIAL Post-op Vital signs:  
Vitals Value Taken Time /59 04/19/21 1507 Temp 37.1 °C (98.8 °F) 04/19/21 1502 Pulse 106 04/19/21 1511 Resp 15 04/19/21 1507 SpO2 98 % 04/19/21 1511 Vitals shown include unvalidated device data.

## 2021-04-19 NOTE — PROGRESS NOTES
END OF SHIFT NOTE:    INTAKE/OUTPUT  04/18 0701 - 04/19 0700  In: 1979 [I.V.:1979]  Out: 3600 [Urine:400]  Voiding: YES  Catheter: NO  Drain:   Nasogastric Tube 04/19/21 (Active)   Site Assessment Clean, dry, & intact 04/19/21 1617   Securement Device Tape 04/19/21 1617   G Port Status Intermittent Suction 04/19/21 1617   External Insertion Levy (cms) 59 cms 04/19/21 1617   Drainage Description Brown 04/19/21 1617   Drainage Chamber Level (ml) 300 ml 04/19/21 1750   Output (ml) 200 ml 04/19/21 1750               Flatus: Patient does have flatus present. Stool:  0 occurrences. Characteristics:  Stool Assessment  Stool Color: Other (Comment)(small white pudding stool)  Stool Appearance: Soft  Stool Amount: Small  Stool Source/Status: Rectum    Emesis: 0 occurrences. Characteristics:   Emesis Assessment  Appearance: Green  Emesis Amount: Small    VITAL SIGNS  Patient Vitals for the past 12 hrs:   Temp Pulse Resp BP SpO2   04/19/21 1605 98.5 °F (36.9 °C) 97 20 100/65 95 %   04/19/21 1532 -- (!) 106 16 118/60 98 %   04/19/21 1527 98.7 °F (37.1 °C) (!) 109 16 (!) 120/59 98 %   04/19/21 1522 -- (!) 104 15 (!) 115/56 97 %   04/19/21 1517 -- (!) 105 16 (!) 120/59 97 %   04/19/21 1512 -- (!) 105 16 (!) 119/59 98 %   04/19/21 1507 -- (!) 107 15 (!) 124/59 97 %   04/19/21 1503 -- (!) 109 15 (!) 145/80 99 %   04/19/21 1502 98.8 °F (37.1 °C) (!) 111 12 133/76 96 %   04/19/21 1457 98.8 °F (37.1 °C) (!) 111 16 133/76 90 %   04/19/21 1348 -- (!) 123 20 131/78 95 %   04/19/21 1310 98 °F (36.7 °C) (!) 119 18 139/81 92 %   04/19/21 1100 98 °F (36.7 °C) (!) 114 16 112/78 92 %   04/19/21 0720 98.3 °F (36.8 °C) (!) 111 17 117/67 90 %       Pain Assessment  Pain Intensity 1: 0 (04/19/21 1617)  Pain Location 1: Abdomen  Pain Intervention(s) 1: Declines  Patient Stated Pain Goal: 0    Ambulating  Yes    Shift report given to oncoming nurse at the bedside.     Sherin Blanca RN

## 2021-04-19 NOTE — PROGRESS NOTES
PLAN:  S/p right robotic colon resection 3/30/21  Pt has bowel function with a benign abdominal exam yet continues with vomiting  CTAP 4/8/21 negative for acute surgical findings  Stopped Cipro/Flagyl 4/14  Full Liquid diet after EGD  Follow labs  Prophylaxis with SCDS, IS, Protonix, Lovenox. Staples out 4/13  Surgically stable    Persistent N/V   NPO after midnight  SBFT showed hypotonic bowel  NGT out 4/14  Continue TPN  Maintenance IVFs   GI following  CT abd/ pelvis 4/17/21 showed significant distention of the stomach and duodenum without dilatation of the jejunum nor remainder of bowel. EGD 4/19/21    ASSESSMENT:  Admit Date: 3/30/2021   20 Days Post-Op  Procedure(s):  RIGHT ROBOTIC ASSISTED RIGHT COLECTOMY    Principal Problem:    Colon cancer ([facility] [address] (3/30/2021)    Active Problems:    Severe protein-calorie malnutrition ([facility] [address] (4/7/2021)         SUBJECTIVE:  4/12/21 13 Days Post-Op No new issues. NG patent with 1L 24hr output. No BM overnight. AF. Tachy. NAD.  -2.1L fluid volume  4/13/21 14 Days Post-Op awake in bed, no complaints. Pain controlled. AF, tachy in the 100s. -1.4L fluid volume. No BM.  4/14/21 15 Days Post-Op awake in bed, no complaints. Pain controlled. AF, tachy in the 100s. +BMx4  4/15/21 16 Days Post op; awake in bed; emesis x 2 since NGT removal but reports feeling well overall. She denies abdominal pain. Bm X 3.  AF, tachy. 800mL UOP.  4/16/21 17 Days Post op; awake in bed; emesis x 1 this am but reports feeling good. She denies abdominal pain. Can hear bowel sounds when standing in room Bm X 2.  AF, tachy. Voiding without difficulty. 4/17/21 18 Days Post op; awake in recliner; Currently c/o nausea and vomiting. She denies abdominal pain. BM x 1  Yesterday - bloody mucus. AF, tachy. Voiding without difficulty. 4/18/21 19 Days Post op; awake in bed; Currently c/o nausea and vomiting. She denies abdominal pain. BM x 1  Yesterday. AF, tachy. Voiding without difficulty.   4/19/21 20 Days Post-Op; Currently c/o nausea and vomiting. She denies abdominal pain. Last BM 4/18. AF, tachy. Voiding without difficulty. EGD today -3.9L fluid volume since admission. Intake/Output Summary (Last 24 hours) at 4/19/2021 0853  Last data filed at 4/19/2021 0626  Gross per 24 hour   Intake 1979 ml   Output 3600 ml   Net -1621 ml     OBJECTIVE:  Constitutional: Alert oriented cooperative patient in no acute distress; appears stated age   Visit Vitals  /67 (BP 1 Location: Right upper arm)   Pulse (!) 111   Temp 98.3 °F (36.8 °C)   Resp 17   Ht 5' 6\" (1.676 m)   Wt 189 lb (85.7 kg)   SpO2 90%   BMI 30.51 kg/m²     Eyes:Sclera are clear. ENMT: no external lesions gross hearing normal; no obvious neck masses, no ear or lip lesions  CV: RRR. Normal perfusion  Resp: No JVD. Breathing is  non-labored; no audible wheezing. GI: soft, non distended, non tender,  Incision healed  Musculoskeletal: unremarkable with normal function. No embolic signs or cyanosis.    Neuro:  Oriented; moves all 4; no focal deficits  Psychiatric: normal affect and mood, no memory impairment      Patient Vitals for the past 24 hrs:   BP Temp Pulse Resp SpO2 Weight   04/19/21 0720 117/67 98.3 °F (36.8 °C) (!) 111 17 90 % --   04/19/21 0319 105/65 97.7 °F (36.5 °C) (!) 107 18 92 % 189 lb (85.7 kg)   04/19/21 0012 (!) 90/58 98.8 °F (37.1 °C) (!) 116 18 90 % --   04/18/21 1914 121/75 99 °F (37.2 °C) (!) 120 18 93 % --   04/18/21 1610 125/75 98.3 °F (36.8 °C) (!) 105 17 91 % --   04/18/21 1149 108/64 97.5 °F (36.4 °C) (!) 119 17 92 % --     Labs:    Recent Labs     04/19/21  0610      K 3.6   CL 91*   CO2 39*   BUN 32*   CREA 1.21*   *         Signed:  Christofer Loya, NP

## 2021-04-19 NOTE — PROGRESS NOTES
Chart screened by  for discharge planning. Patient still having c/o nausea and vomiting. Patient to have EGD today. Patient has not been seen by PT since 4/3/2021. CM placed a consult for patient to be seen by PT. CM will continue to follow patient during hospitalization for discharge planning and needs. No needs identified at this time. Please consult  if any new issues arise.

## 2021-04-19 NOTE — ANESTHESIA PREPROCEDURE EVALUATION
Relevant Problems PERSONAL HX & FAMILY HX OF CANCER  
(+) Colon cancer ([facility] [address]  
(+) Malignant neoplasm of ascending colon ([facility] [address] Anesthetic History No history of anesthetic complications Review of Systems / Medical History Patient summary reviewed and pertinent labs reviewed Pulmonary Within defined limits Neuro/Psych Within defined limits Cardiovascular Exercise tolerance: >4 METS 
  
GI/Hepatic/Renal 
  
GERD: well controlled Endo/Other Obesity and cancer (Breast/Colon) Other Findings Physical Exam 
 
Airway Mallampati: II 
TM Distance: > 6 cm Neck ROM: normal range of motion Mouth opening: Normal 
 
 Cardiovascular Rhythm: regular Rate: normal 
 
 
Pertinent negatives: No murmur Dental 
No notable dental hx Pulmonary Breath sounds clear to auscultation Abdominal 
 
 
 
 Other Findings Anesthetic Plan ASA: 3 Anesthesia type: general 
 
 
 
 
Induction: Intravenous and RSI Anesthetic plan and risks discussed with: Patient and Family Patient vomited @ 9am Bilious fluid, will plan to place NGT prior to induction.

## 2021-04-19 NOTE — PERIOP NOTES
Received report from GI, and pt is coughing and having blood in sputum upon arrival to department. This is reported as being secondary to traumatic NG insertion. Will continue to monitor.

## 2021-04-19 NOTE — PROGRESS NOTES
END OF SHIFT NOTE:    INTAKE/OUTPUT  04/18 0701 - 04/19 0700  In: 869 [I.V.:646]  Out: 3600 [Urine:400]  Voiding: YES  Catheter: NO  Drain:              Flatus: Patient does not have flatus present. Stool:  0 occurrences. Characteristics:      Emesis: 4 occurrences. Characteristics:   Emesis Assessment  Appearance: Green  Emesis Amount: Small    VITAL SIGNS  Patient Vitals for the past 12 hrs:   Temp Pulse Resp BP SpO2   04/19/21 0319 97.7 °F (36.5 °C) (!) 107 18 105/65 92 %   04/19/21 0012 98.8 °F (37.1 °C) (!) 116 18 (!) 90/58 90 %   04/18/21 1914 99 °F (37.2 °C) (!) 120 18 121/75 93 %       Pain Assessment  Pain Intensity 1: 0 (04/18/21 2145)  Pain Location 1: Abdomen  Pain Intervention(s) 1: Declines  Patient Stated Pain Goal: 0    Ambulating  No    Shift report given to oncoming nurse at the bedside.     Dianna Miles RN

## 2021-04-19 NOTE — PROGRESS NOTES
Comprehensive Nutrition Assessment    Type and Reason for Visit: Reassess  TPN Management (Surgery)    Nutrition Recommendations/Plan:   · Parenteral Nutrition:  · Central TPN:    · Increase 10%DEX/4.25%AA to 85 ml/hr (2 L) with 250 ml 20% Lipids daily over 12 hrs. New volume to provide: 1520 kcal (100% needs), 85 g pro (100% needs), 200 g CHO, 2250 ml total volume. · Lytes/L: No changes on indicated today. · Sodium 80 meq (80 meq NaCl), Potassium 15 meq (15 meq KCl), omit Mg, omit Calcium. Solution remains max Chloride.   · Other additives: MTE, MVI  · Discussed with Ana M Beach NP poor PO tolerance and high volume emesis. Take TPN back to goal to prevent further nutrition compromise and replace volume. · IVF:  ·  IVF per MD  · Vitamin and Mineral Supplement Therapy:  · Electrolyte management replacement protocol active. · Labs:   · BMP daily,Phos and Mg MWF.  POC Glucoses/SSI if Glucose > 180 mg/dl on AM BMP. · Daily weight     · Oral Nutrition:  · Advance diet as medically appropriate  · Continue Glucerna for butter pecan flavor (220 kcal, 10 g pro per bottle)  · Continue Magic Cup TID (290 kcal, 9 g pro per cup)  · Small frequent meals/snacks     Malnutrition Assessment:  Malnutrition Status: Severe malnutrition  Context: Chronic illness  Findings of clinical characteristics of malnutrition:   Energy Intake:  7 - 75% or less est energy requirements for 1 month or longer  Weight Loss:  7.0 - Greater than 7.5% over 3 months((226# to 182# (19.5%), 226# to 201# (11%) in < 3 months)     Body Fat Loss:  Unable to assess,     Muscle Mass Loss:  Unable to assess,    Fluid Accumulation:  Unable to assess,     Strength:  Not performed       Nutrition Assessment:   Nutrition History: 4/6: Pt reports at baseline she eats 2 small meals a day of oatmeal or grits for breakfast and then a \"normal\" meal for her 2nd meal of the day.  About 8 weeks ago, she was eating a [employer] meal and had N/V which she thought was food poisioning. However she continued to have N/V with all po intake, thus intake was <25% of usual. She was mainly consuming gatorade, Boost or Ensure PTA. She had weight loss from 226# to 182#. She reports addtional weight loss since her pre assessment weight on 3/24/2021. Nutrition Background: H/O: breast cancer with bilateral mastectomy, uterine cancer s/p hysterectomy, DM, HTN, RLS, GERD and claustrophobia. 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 She has had postop N&V and been unable to tolerate po diet progression  Daily Update:  Patient seen and discussed with RN and NP. Patient states that she continues with frequent emesis and has not been able to tolerate any PO. Noted CT abdomen 4/17 with significant distention of stomach and duodenum. Plan for EGD today. Abdominal Status (last documented): Distended, Not passing flatus, Nausea, Vomiting abdomen with Hypoactive  bowel sounds. Last BM 04/17/21. Emesis recorded over last 24 hrs 3600 ml. Pertinent Medications: Dulcolax, Pepcid, Regglan, Zofran  IVF: 1/2 NS with KCl  @ 150ml/hr (increased this am)  Pertinent Labs:   Lab Results   Component Value Date/Time    Sodium 137 04/19/2021 06:10 AM    Potassium 3.6 04/19/2021 06:10 AM    Chloride 91 (L) 04/19/2021 06:10 AM    CO2 39 (H) 04/19/2021 06:10 AM    Anion gap 7 04/19/2021 06:10 AM    Glucose 152 (H) 04/19/2021 06:10 AM    BUN 32 (H) 04/19/2021 06:10 AM    Creatinine 1.21 (H) 04/19/2021 06:10 AM    Calcium 9.6 04/19/2021 06:10 AM    Albumin 2.8 (L) 04/08/2021 05:47 AM    Magnesium 2.3 04/19/2021 06:10 AM    Phosphorus 4.0 (H) 04/19/2021 06:10 AM   Labs significant for Cl remains low and CO2 remains elevated despite solution in max Chloride, Glucose trending up, Ca trending up, Phos remains elevated    Nutrition Related Findings:   Diet: 3/30 Clear, 4/2 NPO, 4/5 sips of clears, Ensure clear. 4/6: PPN started. 4/8:NGT placed. CVC placed 4/9 and converted to CPN.  SBFT 4/12 with delayed gstric emptying with hypotonic bowel with no contrast in small bowel or colon. NGT out 4/14. 4/15 diet advanced to FLD and TPN decreased to 1L per MD.  1L TPN to continues on 1 liter per surgery. Wound Type: Surgical incision    Current Nutrition Therapies:  DIET NUTRITIONAL SUPPLEMENTS All Meals; Glucerna Shake ( )  DIET NUTRITIONAL SUPPLEMENTS All Meals; Magic Cups ( )  DIET NPO  Current Parenteral Nutrition Orders:  · Type and Formula: 10%DEX/4.25%AA    · Lipids: 250ml, Daily  · Duration: Continuous  · Rate/Volume: 44ml/hr (1L)  · Current PN Order Provides: 1010 kcal, 43 g pro, 1250 total volume    Current Intake:   Average Meal Intake: NPO Average Supplement Intake: NPO      Anthropometric Measures:  Height: 5' 6\" (167.6 cm)  Current Body Wt: 85.7 kg (188 lb 15 oz)(4/19), Weight source: Bed scale  BMI: 30.5, Obese class 1 (BMI 30.0-34. 9)  Admission Body Weight: 192 lb 7.4 oz(3/24 pre assessment)  Ideal Body Weight (lbs) (Calculated): 130 lbs (59 kg), 148 %  Usual Body Wt: 102.5 kg (226 lb), Percent weight change: -19.5          Edema: No data recorded   Estimated Daily Nutrient Needs:  Energy (kcal/day): 6655-8106 (Kcal/kg(15-20), Weight Used: Current(91.3 kg-bed scale 4/7))  Protein (g/day): 69-91 Weight Used: ((20% of kcal))  Fluid (ml/day):   (1 ml/kcal)    Nutrition Diagnosis:   · Inadequate oral intake related to altered GI function as evidenced by (nausea and vomiting, poor PO, supplemental PN)    Nutrition Interventions:   Food and/or Nutrient Delivery: Modify parenteral nutrition(Diet as able/tolerated)     Coordination of Nutrition Care: Continue to monitor while inpatient  Plan of Care discussed with Shahana Nava RN and Margarito Frankel NP    Goals:   Previous Goal Met: Goal(s) achieved  Active Goal: Tolerate goal TPN until able to advance diet and tolerate intake to meet at least 50% needs    Nutrition Monitoring and Evaluation:      Food/Nutrient Intake Outcomes: Diet advancement/tolerance, Food and nutrient intake, Parenteral nutrition intake/tolerance  Physical Signs/Symptoms Outcomes: Biochemical data, GI status    Discharge Planning:     Too soon to determine    [address] [place], LD on 4/19/2021 at 10:51 AM  Contact: [phone]

## 2021-04-19 NOTE — PROGRESS NOTES
EGD results reviewed and discussed with patient.    Await path  Surgical plan pending    Debo Powell NP

## 2021-04-19 NOTE — ACP (ADVANCE CARE PLANNING)
Esophagogastroduodenoscopy    DATE of PROCEDURE: 4/19/2021    INDICATION: Nausea, Vomiting, Abnormal CT     POSTPROCEDURE DIAGNOSIS:  Questionable small bowel mass    MEDICATIONS ADMINISTERED: MAC anesthesia (see anesthesia report)    INSTRUMENT: GIF-H190, PCF-190L    PROCEDURE:  After obtaining informed consent, the patient was placed in the left lateral position and sedated. The endoscope was advanced under direct vision without difficulty. The esophagus, stomach (including retroflexed views) and duodenum were evaluated. The patient was taken to the recovery area in stable condition. FINDINGS:  ESOPHAGUS: Severe circumferential esophagitis  STOMACH: large dilated stomach, 1100cc fluid aspirated through NGT and scope  DUODENUM: normal ampullary segment, friable appearing at ligament of treitz, unable to pass either EGD scope or Peds colonoscope.   Biopsies obtained    Estimated blood loss:5-10cc   Specimens obtained during procedure: none    PLAN:  Await STAT path

## 2021-04-20 NOTE — PROGRESS NOTES
ACUTE PHYSICAL THERAPY GOALS:  (Developed with and agreed upon by patient and/or caregiver.)  1. Ms. Tony Herron will perform all transfers independently in 7 days. 2.  Ms. Tony Herron will perform gait with least restrictive device >250 ft independently in 7 days. 3.  Ms. Tony Herron will go up and down 3 steps with rail independently in 7 days. PHYSICAL THERAPY ASSESSMENT: Re-evaluation and PM PT Treatment Day # 1      Haven Power is a [de-identified] y.o. female   PRIMARY DIAGNOSIS: Colon cancer ([facility]  Malignant neoplasm of colon, unspecified part of colon ([facility] [C18.9]  [facility] [C18.9]  Procedure(s) (LRB):  ESOPHAGOGASTRODUODENOSCOPY (EGD) (N/A)  ESOPHAGOGASTRODUODENAL (EGD) BIOPSY (N/A)  ENTEROSCOPY (N/A)  1 Day Post-Op  Reason for Referral:    ICD-10: Treatment Diagnosis: Generalized Muscle Weakness (M62.81)  Difficulty in walking, Not elsewhere classified (R26.2)  [place]PAT[place]: [place] / [place] PART [place] B / Product Type: Medicare /     ASSESSMENT:     REHAB RECOMMENDATIONS:   Recommendation to date pending progress:  Setting:   Short-term Rehab  Equipment:    None     PRIOR LEVEL OF FUNCTION:  (Prior to Hospitalization) INITIAL/CURRENT LEVEL OF FUNCTION:  (Most Recently Demonstrated)   Bed Mobility:   Independent  Sit to Stand:   Independent  Transfers:   Independent  Gait/Mobility:   Independent Bed Mobility:   Contact Guard Assistance  Sit to Stand:   Contact Guard Assistance  Transfers:   Contact Guard Assistance  Gait/Mobility:   Contact Guard Assistance just to chair 3 ft     ASSESSMENT:  Ms. Tony Herron has had a rough time since seen by PT on 4/3. Still with n/v, on TPN,  Now NGT>  Plan for more surgery on Thursday. She has not been getting up much except to the bathroom and maybe the chair. Today she required cg but just to the chair. Actually amazing that she is not weaker but she will need PT now to help get her stronger.   It is likely she will need a re evaluation after surgery which she says is Thursday. Waiting on path reports. .      SUBJECTIVE:   Ms. Alfonso Carrizales states, \"I cant get up Im having more surgery. \"    SOCIAL HISTORY/LIVING ENVIRONMENT: lives alone in large house, no AD used, IND with all ADLs, drives, retired.    Home Environment: Private residence  One/Two Story Residence: One story  Living Alone: No  Support Systems: Child(lissa), Family member(s)  OBJECTIVE:     PAIN: VITAL SIGNS: LINES/DRAINS:   Pre Treatment: Pain Screen  Pain Scale 1: Numeric (0 - 10)  Pain Intensity 1: 0  Post Treatment: 0   IV  O2 Device: None (Room air)     GROSS EVALUATION:   Within Functional Limits Abnormal/ Functional Abnormal/ Non-Functional (see comments) Not Tested Comments:   AROM [x] [] [] []    PROM [] [] [] []    Strength [] [x] [] [] Generally weak   Balance [x] [] [] []    Posture [x] [] [] []    Sensation [x] [] [] []    Coordination [x] [] [] []    Tone [] [] [] []    Edema [] [] [] []    Activity Tolerance [x] [] [] []     [] [] [] []      COGNITION/  PERCEPTION: Intact Impaired   (see comments) Comments:   Orientation [x] []    Vision [x] []    Hearing [x] []    Command Following [x] []    Safety Awareness [x] []     [] []      MOBILITY: I Mod I S SBA CGA Min Mod Max Total  NT x2 Comments:   Bed Mobility    Rolling [] [] [x] [] [] [] [] [] [] [] []    Supine to Sit [] [] [] [] [] [] [] [] [] [] []    Scooting [] [] [x] [] [] [] [] [] [] [] []    Sit to Supine [] [] [] [] [] [] [] [] [] [] []    Transfers    Sit to Stand [] [] [] [] [x] [] [] [] [] [] []    Bed to Chair [] [] [] [] [x] [] [] [] [] [] []    Stand to Sit [] [] [] [] [x] [] [] [] [] [] []    I=Independent, Mod I=Modified Independent, S=Supervision, SBA=Standby Assistance, CGA=Contact Guard Assistance,   Min=Minimal Assistance, Mod=Moderate Assistance, Max=Maximal Assistance, Total=Total Assistance, NT=Not Tested  GAIT: I Mod I S SBA CGA Min Mod Max Total  NT x2 Comments:   Level of Assistance [] [] [] [] [x] [] [] [] [] [] []    Distance 3    DME None    Gait Quality Reaching for external support    Weightbearing Status N/A     I=Independent, Mod I=Modified Independent, S=Supervision, SBA=Standby Assistance, CGA=Contact Guard Assistance,   Min=Minimal Assistance, Mod=Moderate Assistance, Max=Maximal Assistance, Total=Total Assistance, NT=Not [facility] Form       How much difficulty does the patient currently have. .. Unable A Lot A Little None   1. Turning over in bed (including adjusting bedclothes, sheets and blankets)? [] 1   [] 2   [x] 3   [] 4   2. Sitting down on and standing up from a chair with arms ( e.g., wheelchair, bedside commode, etc.)   [] 1   [] 2   [x] 3   [] 4   3. Moving from lying on back to sitting on the side of the bed? [] 1   [] 2   [x] 3   [] 4   How much help from another person does the patient currently need. .. Total A Lot A Little None   4. Moving to and from a bed to a chair (including a wheelchair)? [] 1   [] 2   [x] 3   [] 4   5. Need to walk in hospital room? [] 1   [] 2   [x] 3   [] 4   6. Climbing 3-5 steps with a railing? [] 1   [x] 2   [] 3   [] 4   © 2007, Trustees of [address] [place] [address], under license to [employer]. All rights reserved     Score:  Initial: 17 Most Recent: X (Date: -- )    Interpretation of Tool:  Represents activities that are increasingly more difficult (i.e. Bed mobility, Transfers, Gait). PLAN:   FREQUENCY/DURATION: PT Plan of Care: 3 times/week for duration of hospital stay or until stated goals are met, whichever comes first.    PROBLEM LIST:   (Skilled intervention is medically necessary to address:)  1. Decreased ADL/Functional Activities  2. Decreased Activity Tolerance  3. Decreased AROM/PROM  4. Decreased Balance  5. Decreased Coordination  6. Decreased Gait Ability  7. Decreased Strength  8. Decreased Transfer Abilities  9.  Increased Pain   INTERVENTIONS PLANNED: (Benefits and precautions of physical therapy have been discussed with the patient.)  1. Therapeutic Activity  2. Therapeutic Exercise/HEP  3. Neuromuscular Re-education  4. Gait Training  5. Education     TREATMENT:     EVALUATION: Low Complexity : (Untimed Charge)    TREATMENT:   ($$ Therapeutic Activity: 8-22 mins    )  Therapeutic Activity (15 Minutes): Therapeutic activity included Rolling, Supine to Sit, Sit to Supine, Transfer Training, Ambulation on level ground, Sitting balance  and Standing balance to improve functional Mobility, Strength and Activity tolerance.     TREATMENT GRID:  N/A    AFTER TREATMENT POSITION/PRECAUTIONS:  Chair, Needs within reach, RN notified and Visitors at bedside    INTERDISCIPLINARY COLLABORATION:  RN/PCT and PT/PTA    TOTAL TREATMENT DURATION:  PT Patient Time In/Time Out  Time In: 1145  Time Out: [address],

## 2021-04-20 NOTE — PROGRESS NOTES
Comprehensive Nutrition Assessment    Type and Reason for Visit: Reassess  TPN Management (Surgery)    Nutrition Recommendations/Plan:   · Parenteral Nutrition:  · Central TPN:    · Continue 10%DEX/4.25%AA to 85 ml/hr (2 L) with 250 ml 20% Lipids daily over 12 hrs. · Lytes/L:  No changes today. · Sodium 80 meq (80 meq NaCl), Potassium 15 meq (15 meq KCl), omit Mg, omit Calcium. Solution remains max Chloride.   · Other additives: MTE, MVI  · IVF:  ·  IVF per MD  · Vitamin and Mineral Supplement Therapy:  · Electrolyte management replacement protocol active. · Labs:   · BMP daily,Phos and Mg MWF.  POC Glucoses/SSI if Glucose > 180 mg/dl on AM BMP. · Daily weight      · Oral Nutrition:  · Advance diet as medically appropriate     Malnutrition Assessment:  Malnutrition Status: Severe malnutrition  Context: Chronic illness  Findings of clinical characteristics of malnutrition:   Energy Intake:  7 - 75% or less est energy requirements for 1 month or longer  Weight Loss:  7.0 - Greater than 7.5% over 3 months((226# to 182# (19.5%), 226# to 201# (11%) in < 3 months)     Body Fat Loss:  Unable to assess,     Muscle Mass Loss:  Unable to assess,    Fluid Accumulation:  Unable to assess,     Strength:  Not performed       Nutrition Assessment:   Nutrition History: 4/6: Pt reports at baseline she eats 2 small meals a day of oatmeal or grits for breakfast and then a \"normal\" meal for her 2nd meal of the day. About 8 weeks ago, she was eating a [employer] meal and had N/V which she thought was food poisioning. However she continued to have N/V with all po intake, thus intake was <25% of usual. She was mainly consuming gatorade, Boost or Ensure PTA. She had weight loss from 226# to 182#. She reports addtional weight loss since her pre assessment weight on 3/24/2021. Nutrition Background: H/O: breast cancer with bilateral mastectomy, uterine cancer s/p hysterectomy, DM, HTN, RLS, GERD and claustrophobia.  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 She has had postop N&V and been unable to tolerate po diet progression  Daily Update:  Patient seen in follow-up. Noted EGD 4/19 with \"FINDINGS:  ESOPHAGUS: Severe circumferential esophagitis  STOMACH: large dilated stomach, 1100cc fluid aspirated through NGT and scope  DUODENUM: normal ampullary segment, friable appearing at ligament of treitz, unable to pass either EGD scope or Peds colonoscope. Biopsies obtained\"  She was sipping on a coke, okay per NP. She states that Dr. Patricia Temple wants to get her back to surgery as soon as possible. She has no needs or questions. Abdominal Status (last documented): Soft, Tender abdomen with Hypoactive  bowel sounds. Last BM 04/17/21. HERRMANN: 2625 ml over last 24 hrs and ~600 ml observed in canister at RD visit  Pertinent Medications: Dulcolax - refused, Pepcid, MOM - refused, Reglan, Zofran, Phenergan  IVF: 1/2NS with KCl @ 150 ml/hr  Pertinent Labs:   Lab Results   Component Value Date/Time    Sodium 137 04/20/2021 06:17 AM    Potassium 3.6 04/20/2021 06:17 AM    Chloride 91 (L) 04/20/2021 06:17 AM    CO2 43 (HH) 04/20/2021 06:17 AM    Anion gap 3 (L) 04/20/2021 06:17 AM    Glucose 145 (H) 04/20/2021 06:17 AM    BUN 35 (H) 04/20/2021 06:17 AM    Creatinine 1.11 (H) 04/20/2021 06:17 AM    Calcium 9.2 04/20/2021 06:17 AM    Albumin 2.8 (L) 04/08/2021 05:47 AM    Magnesium 2.3 04/19/2021 06:10 AM    Phosphorus 4.0 (H) 04/19/2021 06:10 AM   Labs remarkable for continued elevated Cl and low CO2 despite max acetate, Glucose stable    Nutrition Related Findings:   Diet: 3/30 Clear, 4/2 NPO, 4/5 sips of clears, Ensure clear. 4/6: PPN started. 4/8:NGT placed. CVC placed 4/9 and converted to CPN. SBFT 4/12 with delayed gstric emptying with hypotonic bowel with no contrast in small bowel or colon. NGT out 4/14. 4/15 diet advanced to FLD and TPN decreased to 1L per MD.  1L TPN to continues on 1 liter per surgery.  TPN taken back to goal of 2 L 4/19. NGT placed prior to EGD 4/19, now to LIS. Wound Type: Surgical incision    Current Nutrition Therapies:  DIET NUTRITIONAL SUPPLEMENTS All Meals; Glucerna Shake ( )  DIET NUTRITIONAL SUPPLEMENTS All Meals; Magic Cups ( )  DIET NPO  Current Parenteral Nutrition Orders:  · Type and Formula: 10%DEX/4.25%AA    · Lipids: 250ml, Daily  · Duration: Continuous  · Rate/Volume: 85 ml/hr (2L)  · Current PN Order Provides: at goal  · Goal PN Orders Provides: 1520 kcal (100% needs), 85 g pro (100% needs), 200 g CHO, 2250 ml total volume      Current Intake:   Average Meal Intake: NPO Average Supplement Intake: NPO      Anthropometric Measures:  Height: 5' 6\" (167.6 cm)  Current Body Wt: 86.2 kg (190 lb 0.6 oz)(4/20), Weight source: Bed scale  BMI: 30.7, Obese class 1 (BMI 30.0-34. 9)  Admission Body Weight: 192 lb 7.4 oz(3/24 pre assessment)  Ideal Body Weight (lbs) (Calculated): 130 lbs (59 kg), 148 %  Usual Body Wt: 102.5 kg (226 lb), Percent weight change: -19.5          Edema: No data recorded   Estimated Daily Nutrient Needs:  Energy (kcal/day): 7823-7510 (Kcal/kg(15-20), Weight Used: Current(91.3 kg-bed scale 4/7))  Protein (g/day): 69-91 Weight Used: ((20% of kcal))  Fluid (ml/day):   (1 ml/kcal)    Nutrition Diagnosis:   · Inadequate oral intake related to altered GI function as evidenced by (SBO, NGT to LIS)    · Severe malnutrition related to inadequate protein-energy intake as evidenced by (s/s as above per malnutrition assessment)    Nutrition Interventions:   Food and/or Nutrient Delivery: Continue NPO, Continue parenteral nutrition, Modify parenteral nutrition     Coordination of Nutrition Care: Continue to monitor while inpatient  Plan of Care discussed with Hari Duenas RN    Goals:   Previous Goal Met: Goal(s) achieved  Active Goal: Continue to tolerate TPNat goal    Nutrition Monitoring and Evaluation:      Food/Nutrient Intake Outcomes: Parenteral nutrition intake/tolerance  Physical Signs/Symptoms Outcomes: Biochemical data, GI status    Discharge Planning:     Too soon to determine    [address], LD on 4/20/2021 at 11:26 AM  Contact: [phone]

## 2021-04-20 NOTE — OP NOTES
[facility] Progress Note         Admit Date:  3/30/2021    Today's Date:  4/20/2021    CC: vomiting    Subjective:     Patient complains of vomiting. EGD demonstrates obstructing mass at ligament of treitz    Medications:   Current Facility-Administered Medications   Medication Dose Route Frequency    TPN ADULT - dextrose 10% amino acid 4.25%   IntraVENous QPM    guaiFENesin (ROBITUSSIN) 100 mg/5 mL oral liquid 400 mg  400 mg Oral Q6H PRN    TPN ADULT - dextrose 10% amino acid 4.25%   IntraVENous QPM    LORazepam (ATIVAN) tablet 1 mg  1 mg Oral BID PRN    famotidine (PF) (PEPCID) 20 mg in 0.9% sodium chloride 10 mL injection  20 mg IntraVENous Q24H    ondansetron (ZOFRAN) injection 4 mg  4 mg IntraVENous AC&HS    metoclopramide HCl (REGLAN) 20 mg in 0.9% sodium chloride 50 mL IVPB  20 mg IntraVENous TIDAC    diphenhydrAMINE (BENADRYL) injection 25 mg  25 mg IntraVENous Q6H PRN    promethazine (PHENERGAN) with saline injection 12.5 mg  12.5 mg IntraVENous Q6H PRN    lip protectant (BLISTEX) ointment 1 Each  1 Each Topical PRN    bisacodyL (DULCOLAX) suppository 10 mg  10 mg Rectal DAILY    fat emulsion 20% (LIPOSYN, INTRAlipid) infusion 250 mL  250 mL IntraVENous QPM    phenol throat spray (CHLORASEPTIC) 1 Spray  1 Spray Oral PRN    magnesium hydroxide (MILK OF MAGNESIA) 400 mg/5 mL oral suspension 30 mL  30 mL Oral DAILY    NUTRITIONAL SUPPORT ELECTROLYTE PRN ORDERS   Does Not Apply PRN    0.45% sodium chloride with KCl 20 mEq/L infusion   IntraVENous CONTINUOUS    enoxaparin (LOVENOX) injection 40 mg  40 mg SubCUTAneous Q24H    enalaprilat (VASOTEC) injection 0.625 mg  0.625 mg IntraVENous Q6H PRN    oxyCODONE-acetaminophen (PERCOCET 7.5) 7.5-325 mg per tablet 1 Tab  1 Tab Oral Q4H PRN       Review of Systems:  ROS was obtained, with pertinent positives as listed above. No chest pain or SOB.     Diet:      Objective:   Vitals:  Visit Vitals  /72   Pulse (!) 102   Temp 98.6 °F (37 °C)   Resp 18   Ht 5' 6\" (1.676 m)   Wt 86.2 kg (190 lb)   SpO2 93%   BMI 30.67 kg/m²     Intake/Output:  04/20 0701 - 04/20 1900  In: 30 [P.O.:30]  Out: 1401 [Urine:401]  04/18 1901 - 04/20 0700  In: 7017 [I.V.:7017]  Out: 6000 [Urine:575]  Exam:  General appearance: alert, cooperative, no distress  Lungs: clear to auscultation bilaterally anteriorly  Heart: regular rate and rhythm  Abdomen: soft, non-tender. Bowel sounds normal. No masses, no organomegaly  Extremities: extremities normal, atraumatic, no cyanosis or edema  Neuro:  alert and oriented    Data Review (Labs):    Recent Labs     04/20/21  0617 04/19/21  0610 04/18/21  0422    137 136   K 3.6 3.6 3.7   CL 91* 91* 93*   CO2 43* 39* 39*   BUN 35* 32* 28*   CREA 1.11* 1.21* 1.29*   CA 9.2 9.6 9.4   MG  --  2.3  --    * 152* 148*       Assessment:     Principal Problem:    Colon cancer ([facility] (3/30/2021)    Active Problems:    Severe protein-calorie malnutrition ([facility] (4/7/2021)        Plan:   Small bowel adenocarcinoma-  Results discussed with patient and Dr Frederick Saldana.   Plans for surgery anticipated

## 2021-04-20 NOTE — PROGRESS NOTES
END OF SHIFT NOTE:    Hourly rounds conducted. INTAKE/OUTPUT  04/19 0701 - 04/20 0700  In: 2115 [I.V.:2115]  Out: 2725 [Urine:575]  Voiding: YES  Catheter: NO  Drain:   Nasogastric Tube 04/19/21 (Active)   Site Assessment Clean, dry, & intact 04/20/21 0137   Securement Device Tape 04/20/21 0137   G Port Status Intermittent Suction 04/20/21 0137   External Insertion Levy (cms) 54 cms 04/20/21 0137   Action Taken Placement verified (comment) 04/20/21 0137   Drainage Description Clide Fender 04/20/21 0137   Drainage Chamber Level (ml) 950 ml 04/19/21 2155   Output (ml) 650 ml 04/19/21 2155               Flatus: Patient does have flatus present. Stool:  0 occurrences. Characteristics:  Stool Assessment  Stool Color: Other (Comment)(small white pudding stool)  Stool Appearance: Soft  Stool Amount: Small  Stool Source/Status: Rectum    Emesis: 0 occurrences. Characteristics:   Emesis Assessment  Appearance: Green  Emesis Amount: Small    VITAL SIGNS  Patient Vitals for the past 12 hrs:   Temp Pulse Resp BP SpO2   04/20/21 0426 99.1 °F (37.3 °C) (!) 108 20 103/60 91 %   04/19/21 2309 98.6 °F (37 °C) (!) 108 21 107/65 94 %   04/19/21 2110 -- -- -- -- 98 %   04/19/21 1943 98.9 °F (37.2 °C) 93 21 115/70 98 %       Pain Assessment  Pain Intensity 1: 0 (04/19/21 1617)  Pain Location 1: Abdomen  Pain Intervention(s) 1: Declines  Patient Stated Pain Goal: 0    Ambulating  Yes    Shift report given to oncoming nurse at the bedside.     Darcus Cockayne

## 2021-04-20 NOTE — CONSULTS
[facility]  [facility]. [address] [place] [address]  [phone]     2021  Patient:  Ranjeet Dumont  : 1940    HPI  Haven Chris is a [de-identified] y.o. female who is seen for a newly diagnosed duodenal cancer. 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 As noted, she had been having trouble eating for months prior to recent surgery with weight loss. She has extensive personal and family history of cancer. Personally she had bilateral breast cancer, uterine cancer and recent colon cancer. Her sister had pancreatic cancer and total of nine other family member had cancer. Other medical issues are reviewed as below, but she denies any significant cardiopulmonary issues. Past Medical History:   Diagnosis Date    Adverse effect of anesthesia     pt states she woke up during colonoscopy X1. No complications with any other procedures.     Claustrophobia     Colon cancer ([address]     GERD (gastroesophageal reflux disease)     managed with Prilosec    History of breast cancer in female     bilateral mastectomy and 6 months of chemo    History of diabetes mellitus      pt no longer required to take medications or monitor BS- weight loss/ Glucose on labs 3.24.21 results: 153    History of uterine cancer 2012    resulted in complete hysterectomy    RLS (restless legs syndrome)     managed with PRN meds     Current Facility-Administered Medications   Medication Dose Route Frequency Provider Last Rate Last Admin    TPN ADULT - dextrose 10% amino acid 4.25%   IntraVENous QPM Trenton Rankin MD 85 mL/hr at 21 1701 New Bag at 21 1701    guaiFENesin (ROBITUSSIN) 100 mg/5 mL oral liquid 400 mg  400 mg Oral Q6H PRN Ifrah Hollingsworth, NP        LORazepam (ATIVAN) tablet 1 mg  1 mg Oral BID PRN Mike Delgado NP        famotidine (PF) (PEPCID) 20 mg in 0.9% sodium chloride 10 mL injection  20 mg IntraVENous G58A Jeremias Henderson MD   20 mg at 04/20/21 0828    ondansetron (ZOFRAN) injection 4 mg  4 mg IntraVENous AC&HS Rock WELLS NP   4 mg at 04/20/21 1700    diphenhydrAMINE (BENADRYL) injection 25 mg  25 mg IntraVENous Q6H PRN Nova Natalee CLEMENTS, NP   25 mg at 04/14/21 0155    promethazine (PHENERGAN) with saline injection 12.5 mg  12.5 mg IntraVENous Q6H PRN Maxwell Carranza MD        lip protectant (BLISTEX) ointment 1 Each  1 Each Topical PRN Jeremias Henderson MD        bisacodyL (DULCOLAX) suppository 10 mg  10 mg Rectal DAILY Ulghassan Waldrop NP   10 mg at 04/18/21 0840    fat emulsion 20% (LIPOSYN, INTRAlipid) infusion 250 mL  250 mL IntraVENous QPM Jeremias Henderson MD   250 mL at 04/20/21 1701    phenol throat spray (CHLORASEPTIC) 1 Spray  1 Spray Oral PRN Jeremias Henderson MD   1 Spray at 04/08/21 1249    magnesium hydroxide (MILK OF MAGNESIA) 400 mg/5 mL oral suspension 30 mL  30 mL Oral DAILY Ulysess YURIY Waldrop   30 mL at 04/14/21 0753    NUTRITIONAL SUPPORT ELECTROLYTE PRN ORDERS   Does Not Apply PRN Jeremias Henderson MD        0.45% sodium chloride with KCl 20 mEq/L infusion   IntraVENous CONTINUOUS Nova Natalee CLEMENTS,  mL/hr at 04/20/21 1729 New Bag at 04/20/21 1729    enoxaparin (LOVENOX) injection 40 mg  40 mg SubCUTAneous Q24H Kelsie Kanner, MD   40 mg at 04/20/21 1118    enalaprilat (VASOTEC) injection 0.625 mg  0.625 mg IntraVENous Q6H PRN Nova Fee D, NP   0.625 mg at 04/04/21 0416    oxyCODONE-acetaminophen (PERCOCET 7.5) 7.5-325 mg per tablet 1 Tab  1 Tab Oral B2Y PRN Jeremias Henderson MD   1 Tab at 03/31/21 1823     Allergies   Allergen Reactions    Augmentin [Amoxicillin-Pot Clavulanate] Rash and Swelling    Pcn [Penicillins] Rash and Swelling    Phenergan [Promethazine] Other (comments)     Delirious per patient     Past Surgical History:   Procedure Laterality Date    HX BILATERAL MASTECTOMY Bilateral     HX CATARACT REMOVAL Bilateral 2020    with lens implants    HX COLONOSCOPY      HX ALEJANDRO AND BSO      HX TONSILLECTOMY      IR INSERT NON TUNL CVC OVER 5 YRS  4/9/2021     Family History   Problem Relation Age of Onset    Cancer Mother     Dementia Father     No Known Problems Sister     No Known Problems Brother     Cancer Maternal Grandmother     No Known Problems Sister     Cancer Sister         Pancreatic CA    Diabetes Brother     Stroke Brother     Cancer Brother         Colon CA     Cancer Brother      Social History     Tobacco Use    Smoking status: Never Smoker    Smokeless tobacco: Never Used   Substance Use Topics    Alcohol use: Never     Frequency: Never        Review of Systems   A comprehensive review of systems was negative except for that written in the HPI.      Physical Exam  Visit Vitals  /72   Pulse (!) 102   Temp 98.6 °F (37 °C)   Resp 18   Ht 5' 6\" (1.676 m)   Wt 190 lb (86.2 kg)   SpO2 93%   BMI 30.67 kg/m²        General: Alert, oriented, cooperative, awake patient in no acute distress   Skin:  Warm, moist with good texture   Eyes:   Sclera are clear, extraocular muscles intact  HENT:  Normocephalic; oral mucosa moist, nares patent; neck is supple; trachea midline  Respiratory: Lungs clear to auscultation bilaterally, breathing is non-labored   Chest:  Symmetric throughout one respiratory excursion; no supraclavicular lymphadenopathy  CV:  Regular rate and rhythm, no appreciable murmurs, rubs, gallops  Abdomen: Soft, protuberant but non-distended; bowel sounds are normoactive   Extremities: No cyanosis, clubbing or edema  Neurological: No focal signs      Labs:   Lab Results   Component Value Date/Time    WBC 8.2 04/13/2021 04:55 AM    HGB 10.9 (L) 04/13/2021 04:55 AM HCT 33.6 (L) 04/13/2021 04:55 AM    PLATELET 376 60/38/3410 04:55 AM    MCV 76.0 (L) 04/13/2021 04:55 AM     Lab Results   Component Value Date/Time    Sodium 137 04/20/2021 06:17 AM    Potassium 3.6 04/20/2021 06:17 AM    Chloride 91 (L) 04/20/2021 06:17 AM    CO2 43 (HH) 04/20/2021 06:17 AM    Anion gap 3 (L) 04/20/2021 06:17 AM    Glucose 145 (H) 04/20/2021 06:17 AM    BUN 35 (H) 04/20/2021 06:17 AM    Creatinine 1.11 (H) 04/20/2021 06:17 AM    GFR est AA >60 04/20/2021 06:17 AM    GFR est non-AA 50 (L) 04/20/2021 06:17 AM    Calcium 9.2 04/20/2021 06:17 AM    Bilirubin, total 0.4 04/08/2021 05:47 AM    Alk. phosphatase 55 04/08/2021 05:47 AM    Protein, total 6.4 04/08/2021 05:47 AM    Albumin 2.8 (L) 04/08/2021 05:47 AM    Globulin 3.6 (H) 04/08/2021 05:47 AM    A-G Ratio 0.8 (L) 04/08/2021 05:47 AM    ALT (SGPT) 38 04/08/2021 05:47 AM    AST (SGOT) 17 04/08/2021 05:47 AM     CT Results (most recent):  Results from [place] encounter on 03/30/21   CT ABD PELV W CONT    Narrative Examination: CT ABD PELV W CONT 4/17/2021 2:52 PM    INDICATION:  Nausea and vomiting status post hemicolectomy. Unable to tolerate  enteric contrast.    COMPARISON: CT abdomen and pelvis April 8, 2021    TECHNIQUE: Contiguous axial computed tomographic images were obtained from the  domes of the diaphragm to the symphysis pubis following administration 100 mL  Iso-avni 370. Coronal reconstructions were also performed. Oral contrast from  prior administration is seen in the colon. Radiation dose reduction techniques were used for this study. Our CT scanners  use one or all of the following: Automated exposure control, adjustment of the  mA and/or kV according to patient size, iterative reconstruction. FINDINGS:    Lung bases: 3 mm nodule along the right hemidiaphragm unchanged from April 8, 2021    Hepatobiliary: Hepatic steatosis. Normal gallbladder. No biliary dilatation. Spleen, adrenals, pancreas: Normal spleen. No pancreatic ductal dilation. No  adrenal mass. Kidneys, urinary: No hydronephrosis or nephrolithiasis. Normal bladder. Reproductive: Prior hysterectomy. No adnexal mass. Bowel: Prior right hemicolectomy with similar stranding surrounding the surgical  site. No drainable fluid collection or leakage of enteric contrast. The stomach  and duodenum are significantly distended. No dilatation of the jejunum or small  bowel or colon. Peritoneum: No free air or free fluid. Lymphovascular: No AAA. No adenopathy. Abdominal wall: No bowel-containing hernia. Subcutaneous emphysema in the left  lateral abdominal wall likely from injection. Bones: No suspicious osseous lesion. Impression 1. Unchanged postsurgical changes at the right hemicolectomy without drainable  fluid collection. 2. Significant distention of the stomach and duodenum without dilatation of the  jejunum nor remainder of bowel. EGD  Reviewed    PATH: 4/19/21   SMALL BOWEL MASS BIOPSIES: INFLAMED SMALL INTESTINAL MUCOSA INVOLVED BY ADENOCARCINOMA. SEE COMMENT. Assessment/Plan:   Ranjeet Dumont is a [de-identified] y.o. female who has signs and symptoms consistent with obstructive duodenal cancer. I reviewed her recent CT scan, the cancer is identified at 2nd/3rd junction of duodenum, probably involves uncinate process and root of mesentery, SMV and SMA are clear, this appears resectable with Whipple procedure. There appears a small cystic mass at neck of pancreas, likely benign. I had extensive discussion with patient and her son and daughter in law at bedside, about Whipple versus palliative bypass, benefits and risks. They want to proceed with definitive procedure asap. Will try to arrange urgently. Need CT chest, tumor markers.      Haynes Mcburney, MD

## 2021-04-20 NOTE — PROGRESS NOTES
Spiritual Care Visit, initial visit. Request by family/friends. Visited with patient at bedside. Patient was trying to sleep, so  agreed to leave and come back at a later time. Visit by Gaurav Figueredo, Staff .  BRANDI.Nael., Antonino.B., B.A.

## 2021-04-20 NOTE — PROGRESS NOTES
's follow-up visit to MsManuel Nicanor Dennis. Patient declined a visit, seeking to rest. Patient's niece, Mrs. Alonzo Mehta, spoke to me outside patient room. Chaplains remain available for support.      Jose Mg 68  Board Certified

## 2021-04-20 NOTE — PROGRESS NOTES
END OF SHIFT NOTE:    INTAKE/OUTPUT  04/19 0701 - 04/20 0700  In: 4281 [I.V.:5038]  Out: 4400 [Urine:575]  Voiding: YES  Catheter: NO  Drain:   Nasogastric Tube 04/19/21 (Active)   Site Assessment Clean, dry, & intact 04/20/21 1450   Securement Device Tape 04/20/21 1450   G Port Status Intermittent Suction 04/20/21 1450   External Insertion Levy (cms) 54 cms 04/20/21 1450   Action Taken Placement verified (comment) 04/20/21 0137   Drainage Description Gee Bowie 04/20/21 1450   Drainage Chamber Level (ml) 350 ml 04/20/21 1709   Output (ml) 350 ml 04/20/21 1709               Flatus: Patient does have flatus present. Stool:  0 occurrences. Characteristics:  Stool Assessment  Stool Color: Other (Comment)(small white pudding stool)  Stool Appearance: Soft  Stool Amount: Small  Stool Source/Status: Rectum    Emesis: 0 occurrences. Characteristics:   Emesis Assessment  Appearance: Green  Emesis Amount: Small    VITAL SIGNS  Patient Vitals for the past 12 hrs:   Temp Pulse Resp BP SpO2   04/20/21 1521 98.6 °F (37 °C) (!) 102 18 123/72 93 %   04/20/21 1106 98.6 °F (37 °C) 93 17 (!) 102/58 90 %   04/20/21 0807 -- -- -- -- 92 %   04/20/21 0712 97.3 °F (36.3 °C) (!) 106 19 103/63 90 %       Pain Assessment  Pain Intensity 1: 0 (04/20/21 1106)  Pain Location 1: Abdomen  Pain Intervention(s) 1: Declines  Patient Stated Pain Goal: 0    Ambulating  Yes, ambulated to [facility] and back      Shift report given to oncoming nurse at the bedside.     Brittney Benz, RN

## 2021-04-21 NOTE — PROGRESS NOTES
Patient has been recommended for STR. CM spoke with patient regarding discharge planning. Patient is agreeable to STR. CM provided patient with a choice list and informed patient to make 3 choices. CM will follow back up with patient regarding choices. Patient is scheduled for an OR procedure 4/22/2021.

## 2021-04-21 NOTE — PROGRESS NOTES
Comprehensive Nutrition Assessment    Type and Reason for Visit: Reassess  TPN Management (Surgery)    Nutrition Recommendations/Plan:   · Parenteral Nutrition:  · Central TPN:    · Continue 10%DEX/4.25%AA to 85 ml/hr (2 L) with 250 ml 20% Lipids daily over 12 hrs.   · Lytes/L:   · Sodium 80 meq (80 meq NaCl), Potassium 25 meq (15 meq KCl, 10 meq KPO4), omit Mg, omit Calcium. Solution remains max Chloride.   · Other additives: MTE, MVI  · IVF:  ·  IVF per MD  · Vitamin and Mineral Supplement Therapy:  · Electrolyte management replacement protocol active. · Will replaced K per protocol  · Labs:   · BMP daily,Phos and Mg MWF.  POC Glucoses/SSI if Glucose > 180 mg/dl on AM BMP. · Daily weight     Discontinue oral nutrition supplements. Malnutrition Assessment:  Malnutrition Status: Severe malnutrition  Context: Chronic illness  Findings of clinical characteristics of malnutrition:   Energy Intake:  7 - 75% or less est energy requirements for 1 month or longer  Weight Loss:  7.0 - Greater than 7.5% over 3 months((226# to 182# (19.5%), 226# to 201# (11%) in < 3 months)     Body Fat Loss:  Unable to assess,     Muscle Mass Loss:  Unable to assess,    Fluid Accumulation:  Unable to assess,     Strength:  Not performed       Nutrition Assessment:   Nutrition History: 4/6: Pt reports at baseline she eats 2 small meals a day of oatmeal or grits for breakfast and then a \"normal\" meal for her 2nd meal of the day. About 8 weeks ago, she was eating a [employer] meal and had N/V which she thought was food poisioning. However she continued to have N/V with all po intake, thus intake was <25% of usual. She was mainly consuming gatorade, Boost or Ensure PTA. She had weight loss from 226# to 182#. She reports addtional weight loss since her pre assessment weight on 3/24/2021. Nutrition Background: H/O: breast cancer with bilateral mastectomy, uterine cancer s/p hysterectomy, DM, HTN, RLS, GERD and claustrophobia.  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 She has had postop N&V and been unable to tolerate po diet progression  Daily Update:  Patient seen today. She is ready for surgery tomorrow. She denies nausea or vomiting. Just asks for a new towel. Discussed with NP and RN. Plan for Whipple with jejunostomy placement tomorrow with Dr. Jossy Araya. NP asks RD to manage elytes. Abdominal Status (last documented): Tender, Soft, Passing flatus abdomen with Hypoactive  bowel sounds. Last BM 04/17/21. HERRMANN: 4500 ml over last 24 hrs  Pertinent Medications: MOM, Dulcolax, Pepecid, Zofran  IVF: 1/2NS with 20 meq KCl  Pertinent Labs:   Lab Results   Component Value Date/Time    Sodium 139 04/21/2021 05:00 AM    Potassium 3.1 (L) 04/21/2021 05:00 AM    Chloride 94 (L) 04/21/2021 05:00 AM    CO2 41 (HH) 04/21/2021 05:00 AM    Anion gap 4 (L) 04/21/2021 05:00 AM    Glucose 142 (H) 04/21/2021 05:00 AM    BUN 31 (H) 04/21/2021 05:00 AM    Creatinine 0.91 04/21/2021 05:00 AM    Calcium 8.8 04/21/2021 05:00 AM    Albumin 2.8 (L) 04/08/2021 05:47 AM    Magnesium 2.1 04/21/2021 05:00 AM    Phosphorus 2.5 04/21/2021 05:00 AM   Labs remarkable for Na improving, Cl and CO2 improving, K reduced despite K in IVF and PN (likely due to high volume NG losses), Glucose stable    Nutrition Related Findings:   Diet: 3/30 Clear, 4/2 NPO, 4/5 sips of clears, Ensure clear. 4/6: PPN started. 4/8:NGT placed. CVC placed 4/9 and converted to CPN. SBFT 4/12 with delayed gstric emptying with hypotonic bowel with no contrast in small bowel or colon. NGT out 4/14. 4/15 diet advanced to FLD and TPN decreased to 1L per MD.  1L TPN to continues on 1 liter per surgery. TPN taken back to goal of 2 L 4/19. NGT placed prior to EGD 4/19, now to LIS.  Wound Type: Surgical incision    Current Nutrition Therapies:  DIET NUTRITIONAL SUPPLEMENTS All Meals; Glucerna Shake ( )  DIET NUTRITIONAL SUPPLEMENTS All Meals; Magic Cups ( )  DIET NPO  Current Parenteral Nutrition Orders:  · Type and Formula: 10%DEX/4.25%AA    · Lipids: 250ml, Daily  · Duration: Continuous  · Rate/Volume: 85 ml/hr (2L)  · Current PN Order Provides: at goal  · Goal PN Orders Provides: 1520 kcal (100% needs), 85 g pro (100% needs), 200 g CHO, 2250 ml total volume      Current Intake:   Average Meal Intake: NPO Average Supplement Intake: NPO      Anthropometric Measures:  Height: 5' 6\" (167.6 cm)  Current Body Wt: 86.5 kg (190 lb 11.2 oz)(4/21), Weight source: Bed scale  BMI: 30.8, Obese class 1 (BMI 30.0-34. 9)  Admission Body Weight: 192 lb 7.4 oz(3/24 pre assessment)  Ideal Body Weight (lbs) (Calculated): 130 lbs (59 kg), 148 %  Usual Body Wt: 102.5 kg (226 lb), Percent weight change: -19.5          Edema: No data recorded   Estimated Daily Nutrient Needs:  Energy (kcal/day): 2958-2837 (Kcal/kg(15-20), Weight Used: Current(91.3 kg-bed scale 4/7))  Protein (g/day): 69-91 Weight Used: ((20% of kcal))  Fluid (ml/day):   (1 ml/kcal)    Nutrition Diagnosis:   · Inadequate oral intake related to altered GI structure as evidenced by (duodenal mass, NPO, NGT to LIS, PN for primary needs)    · Severe malnutrition related to inadequate protein-energy intake as evidenced by (s/s as above per malnutrition assessment)    Nutrition Interventions:   Food and/or Nutrient Delivery: Continue NPO, Modify parenteral nutrition     Coordination of Nutrition Care: Continue to monitor while inpatient  Plan of Care discussed with Mateusz Noel RN and Diane Sanabria NP    Goals:   Previous Goal Met: Goal(s) achieved  Active Goal: Continue to tolerate TPNat goal    Nutrition Monitoring and Evaluation:      Food/Nutrient Intake Outcomes: Parenteral nutrition intake/tolerance  Physical Signs/Symptoms Outcomes: Biochemical data, GI status    Discharge Planning:     Too soon to determine    [address] [place], LD on 4/21/2021 at 11:10 AM  Contact: [phone]

## 2021-04-21 NOTE — PROGRESS NOTES
PLAN:  S/p right robotic colon resection 3/30/21  Surgically stable    Adenocarcinoma duodenum   NPO after midnight  Dr. Rachel Lewis is planning whipple procedure  Further surgical plans per Dr. Rachel Lewis    Malnutrition  PICC/TPN  Electrolyte management per RD    Hypercapnia  Continue to monitor   Low dose O2 via NC      ASSESSMENT:  Admit Date: 3/30/2021   22 Days Post-Op  Procedure(s):  ESOPHAGOGASTRODUODENOSCOPY (EGD)  ESOPHAGOGASTRODUODENAL (EGD) BIOPSY  ENTEROSCOPY    Principal Problem:    Colon cancer ([facility] [address] (3/30/2021)    Active Problems:    Severe protein-calorie malnutrition ([facility] [address] (4/7/2021)         SUBJECTIVE:  4/12/21 13 Days Post-Op No new issues. NG patent with 1L 24hr output. No BM overnight. AF. Tachy. NAD.  -2.1L fluid volume  4/13/21 14 Days Post-Op awake in bed, no complaints. Pain controlled. AF, tachy in the 100s. -1.4L fluid volume. No BM.  4/14/21 15 Days Post-Op awake in bed, no complaints. Pain controlled. AF, tachy in the 100s. +BMx4  4/15/21 16 Days Post op; awake in bed; emesis x 2 since NGT removal but reports feeling well overall. She denies abdominal pain. Bm X 3.  AF, tachy. 800mL UOP.  4/16/21 17 Days Post op; awake in bed; emesis x 1 this am but reports feeling good. She denies abdominal pain. Can hear bowel sounds when standing in room Bm X 2.  AF, tachy. Voiding without difficulty. 4/17/21 18 Days Post op; awake in recliner; Currently c/o nausea and vomiting. She denies abdominal pain. BM x 1  Yesterday - bloody mucus. AF, tachy. Voiding without difficulty. 4/18/21 19 Days Post op; awake in bed; Currently c/o nausea and vomiting. She denies abdominal pain. BM x 1  Yesterday. AF, tachy. Voiding without difficulty. 4/19/21 20 Days Post-Op; Currently c/o nausea and vomiting. She denies abdominal pain. Last BM 4/18. AF, tachy. Voiding without difficulty. EGD today -3.9L fluid volume since admission. 4/20/21 21 Days Post-Op; Awaiting path  4/21/21 22 Days Post-Op; seen by Dr. Rachel Lewis yesterday. Path discussed. +adenocarcinoma of the duodenum. Discussed surgical plan with patient. She is happy to have an answer to her past few months of vomiting and is eager for a resolution. CO2 41. K+3.1      Intake/Output Summary (Last 24 hours) at 4/21/2021 0907  Last data filed at 4/21/2021 0810  Gross per 24 hour   Intake 2270 ml   Output 5650 ml   Net -3380 ml     OBJECTIVE:  Constitutional: Alert oriented cooperative patient in no acute distress; appears stated age   Visit Vitals  /63   Pulse (!) 102   Temp 98.7 °F (37.1 °C)   Resp 18   Ht 5' 6\" (1.676 m)   Wt 190 lb 11.2 oz (86.5 kg)   SpO2 94%   BMI 30.78 kg/m²     Eyes:Sclera are clear. ENMT: no external lesions gross hearing normal; no obvious neck masses, no ear or lip lesions, +NGT  CV: RRR. Normal perfusion  Resp: No JVD. Breathing is  non-labored; no audible wheezing. GI: soft, non distended, non tender,  Incision healed  Musculoskeletal: unremarkable with normal function. No embolic signs or cyanosis.    Neuro:  Oriented; moves all 4; no focal deficits  Psychiatric: normal affect and mood, no memory impairment      Patient Vitals for the past 24 hrs:   BP Temp Pulse Resp SpO2 Weight   04/21/21 0810 104/63 98.7 °F (37.1 °C) (!) 102 18 94 % --   04/21/21 0608 -- -- -- -- -- 190 lb 11.2 oz (86.5 kg)   04/21/21 0345 115/63 98.6 °F (37 °C) (!) 109 20 90 % --   04/20/21 2330 102/61 98.4 °F (36.9 °C) (!) 114 21 91 % --   04/20/21 1958 109/60 98.6 °F (37 °C) (!) 115 21 90 % --   04/20/21 1521 123/72 98.6 °F (37 °C) (!) 102 18 93 % --   04/20/21 1106 (!) 102/58 98.6 °F (37 °C) 93 17 90 % --     Labs:    Recent Labs     04/21/21  0500   WBC 10.4   HGB 11.4*         K 3.1*   CL 94*   CO2 41*   BUN 31*   CREA 0.91   *         Signed:  Anya Sandoval, NP

## 2021-04-21 NOTE — PROGRESS NOTES
END OF SHIFT NOTE:    Hourly rounds conducted. INTAKE/OUTPUT  04/20 0701 - 04/21 0700  In: 2270 [P.O.:30; I.V.:2240]  Out: 6809 [Urine:951]  Voiding: YES  Catheter: NO  Drain:   Nasogastric Tube 04/19/21 (Active)   Site Assessment Drainage (comment); Intact;Dry 04/21/21 0121   Securement Device Tape 04/21/21 0121   G Port Status Intermittent Suction 04/21/21 0121   External Insertion Levy (cms) 54 cms 04/21/21 0121   Action Taken Placement verified (comment) 04/20/21 1950   Drainage Description [employer] 04/21/21 0121   Drainage Chamber Level (ml) 1150 ml 04/21/21 0421   Output (ml) 2150 ml 04/21/21 0421               Flatus: Patient does have flatus present. Stool:  0 occurrences. Characteristics:  Stool Assessment  Stool Color: Other (Comment)(small white pudding stool)  Stool Appearance: Soft  Stool Amount: Small  Stool Source/Status: Rectum    Emesis: 0 occurrences. Characteristics:   Emesis Assessment  Appearance: Green  Emesis Amount: Small    VITAL SIGNS  Patient Vitals for the past 12 hrs:   Temp Pulse Resp BP SpO2   04/21/21 0345 98.6 °F (37 °C) (!) 109 20 115/63 90 %   04/20/21 2330 98.4 °F (36.9 °C) (!) 114 21 102/61 91 %   04/20/21 1958 98.6 °F (37 °C) (!) 115 21 109/60 90 %       Pain Assessment  Pain Intensity 1: 0 (04/20/21 1950)  Pain Location 1: Abdomen  Pain Intervention(s) 1: Declines  Patient Stated Pain Goal: 0    Ambulating  Yes    Shift report given to oncoming nurse at the bedside.     Doretah Mcdonald

## 2021-04-21 NOTE — PROGRESS NOTES
Spiritual Care Visit, follow up visit. Visited with patient at bedside. Prayed for patient's healing and health. Visit by Annita Martinez, Staff .  Madelin., .B., B.A.

## 2021-04-21 NOTE — PROGRESS NOTES
ACUTE PHYSICAL THERAPY GOALS:  (Developed with and agreed upon by patient and/or caregiver.)  1. Ms. Nighat Woo will perform all transfers independently in 7 days. 2.  Ms. Nighat Woo will perform gait with least restrictive device >250 ft independently in 7 days. 3.  Ms. Nighat Woo will go up and down 3 steps with rail independently in 7 days    PHYSICAL THERAPY: Daily Note and AM Treatment Day # 2    Haven Del Valle is a [de-identified] y.o. female   PRIMARY DIAGNOSIS: Colon cancer ([facility]  Malignant neoplasm of colon, unspecified part of colon ([facility] [C18.9]  Colon cancer ([facility] [C18.9]  Procedure(s) (LRB):  ESOPHAGOGASTRODUODENOSCOPY (EGD) (N/A)  ESOPHAGOGASTRODUODENAL (EGD) BIOPSY (N/A)  ENTEROSCOPY (N/A)  2 Days Post-Op    ASSESSMENT:     REHAB RECOMMENDATIONS: CURRENT LEVEL OF FUNCTION:  (Most Recently Demonstrated)   Recommendation to date pending progress:  [address] [place] [address] (need to re assess post op. This may change.)  Equipment:    To Be Determined Bed Mobility:   Standby Assistance  Sit to Stand:   Contact Guard Assistance  Transfers:   Contact Guard Assistance  Gait/Mobility:   Contact Guard Assistance     ASSESSMENT:  Ms. 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 She was in good spirits and ready to feel better. Therapy was limited today due to just not feeling well. We talked about how PT would be back in after surgery. I expect that goals will need to be adjusted post op along with discharge plan. She lives with family but will need to see how she progresses to determine if she will need a rehab stay or not. SUBJECTIVE:   Ms. Nighat Woo states, \"I hope it works. I havent been able to really eat in months. \"    SOCIAL HISTORY/ LIVING ENVIRONMENT:   Home Environment: Private residence  One/Two Story Residence: One story  Living Alone: No  Support Systems: Child(lissa), Family member(s)  OBJECTIVE:     PAIN: VITAL SIGNS: LINES/DRAINS:   Pre Treatment: Pain Screen  Pain Scale 1: Numeric (0 - 10)  Pain Intensity 1: 0  Post Treatment: 0   Nasogastric Tube  O2 Device: None (Room air)     MOBILITY: I Mod I S SBA CGA Min Mod Max Total  NT x2 Comments:   Bed Mobility    Rolling [] [] [] [x] [] [] [] [] [] [] []    Supine to Sit [] [] [] [x] [] [] [] [] [] [] []    Scooting [] [] [] [x] [] [] [] [] [] [] []    Sit to Supine [] [] [] [] [] [] [] [] [] [] []    Transfers    Sit to Stand [] [] [] [x] [x] [] [] [] [] [] []    Bed to Chair [] [] [] [x] [x] [] [] [] [] [] []    Stand to Sit [] [] [] [x] [x] [] [] [] [] [] []    I=Independent, Mod I=Modified Independent, S=Supervision, SBA=Standby Assistance, CGA=Contact Guard Assistance,   Min=Minimal Assistance, Mod=Moderate Assistance, Max=Maximal Assistance, Total=Total Assistance, NT=Not Tested    BALANCE: Good Fair+ Fair Fair- Poor NT Comments   Sitting Static [] [] [x] [] [] []    Sitting Dynamic [] [] [x] [] [] []              Standing Static [] [] [x] [] [] []    Standing Dynamic [] [] [x] [] [] []      GAIT: I Mod I S SBA CGA Min Mod Max Total  NT x2 Comments:   Level of Assistance [] [] [] [] [x] [] [] [] [] [] []    Distance 3    DME None    Gait Quality Slow reaching for support    Weightbearing  Status N/A     I=Independent, Mod I=Modified Independent, S=Supervision, SBA=Standby Assistance, CGA=Contact Guard Assistance,   Min=Minimal Assistance, Mod=Moderate Assistance, Max=Maximal Assistance, Total=Total Assistance, NT=Not Tested    PLAN:   FREQUENCY/DURATION: PT Plan of Care: 3 times/week for duration of hospital stay or until stated goals are met, whichever comes first.  TREATMENT:     TREATMENT:   ($$ Therapeutic Exercises: 8-22 mins    )  Therapeutic Exercise (15 Minutes): Therapeutic exercises noted below to improve functional AROM and strength.      TREATMENT GRID:   Date:  4/21 Date:   Date:     Activity/Exercise Parameters Parameters Parameters   AP X 15     LAQ X 15     Marching X 15 abd/add X 15                             AFTER TREATMENT POSITION/PRECAUTIONS:  Chair, Needs within reach and RN notified    INTERDISCIPLINARY COLLABORATION:  RN/PCT and PT/PTA    TOTAL TREATMENT DURATION:  PT Patient Time In/Time Out  Time In: 0937  Time Out: 1000 Steve Nowak PT

## 2021-04-21 NOTE — PROGRESS NOTES
CM followed up with patient regarding choices for STR. Patient son at bedside stated patient has been sleeping a lot today and they have not made choices. CM again asked for patient to review list and provide CM with options. CM informed patient son I would follow up tomorrow with choices. Son and patient verbalized understanding.

## 2021-04-21 NOTE — PROGRESS NOTES
PLAN:  S/p right robotic colon resection 3/30/21  Pt has bowel function with a benign abdominal exam yet continues with vomiting  CTAP 4/8/21 negative for acute surgical findings  Stopped Cipro/Flagyl 4/14  Full Liquid diet after EGD  Follow labs  Prophylaxis with SCDS, IS, Protonix, Lovenox. Staples out 4/13  Surgically stable    Persistent N/V   NPO after midnight  SBFT showed hypotonic bowel  NGT out 4/14  Continue TPN  Maintenance IVFs   GI following  CT abd/ pelvis 4/17/21 showed significant distention of the stomach and duodenum without dilatation of the jejunum nor remainder of bowel. EGD 4/19/21    ASSESSMENT:  Admit Date: 3/30/2021   20 Days Post-Op  Procedure(s):  ESOPHAGOGASTRODUODENOSCOPY (EGD)  ESOPHAGOGASTRODUODENAL (EGD) BIOPSY  ENTEROSCOPY    Principal Problem:    Colon cancer ([facility] (3/30/2021)    Active Problems:    Severe protein-calorie malnutrition ([facility] (4/7/2021)         SUBJECTIVE:  4/12/21 13 Days Post-Op No new issues. NG patent with 1L 24hr output. No BM overnight. AF. Tachy. NAD.  -2.1L fluid volume  4/13/21 14 Days Post-Op awake in bed, no complaints. Pain controlled. AF, tachy in the 100s. -1.4L fluid volume. No BM.  4/14/21 15 Days Post-Op awake in bed, no complaints. Pain controlled. AF, tachy in the 100s. +BMx4  4/15/21 16 Days Post op; awake in bed; emesis x 2 since NGT removal but reports feeling well overall. She denies abdominal pain. Bm X 3.  AF, tachy. 800mL UOP.  4/16/21 17 Days Post op; awake in bed; emesis x 1 this am but reports feeling good. She denies abdominal pain. Can hear bowel sounds when standing in room Bm X 2.  AF, tachy. Voiding without difficulty. 4/17/21 18 Days Post op; awake in recliner; Currently c/o nausea and vomiting. She denies abdominal pain. BM x 1  Yesterday - bloody mucus. AF, tachy. Voiding without difficulty. 4/18/21 19 Days Post op; awake in bed; Currently c/o nausea and vomiting. She denies abdominal pain. BM x 1  Yesterday. AF, tachy. Voiding without difficulty. 4/19/21 20 Days Post-Op; Currently c/o nausea and vomiting. She denies abdominal pain. Last BM 4/18. AF, tachy. Voiding without difficulty. EGD today -3.9L fluid volume since admission. 4/20/21 21 Days Post-Op; Awaiting path      Intake/Output Summary (Last 24 hours) at 4/21/2021 0919  Last data filed at 4/21/2021 0810  Gross per 24 hour   Intake 2270 ml   Output 5650 ml   Net -3380 ml     OBJECTIVE:  Constitutional: Alert oriented cooperative patient in no acute distress; appears stated age   Visit Vitals  /63   Pulse (!) 102   Temp 98.7 °F (37.1 °C)   Resp 18   Ht 5' 6\" (1.676 m)   Wt 190 lb 11.2 oz (86.5 kg)   SpO2 94%   BMI 30.78 kg/m²     Eyes:Sclera are clear. ENMT: no external lesions gross hearing normal; no obvious neck masses, no ear or lip lesions  CV: RRR. Normal perfusion  Resp: No JVD. Breathing is  non-labored; no audible wheezing. GI: soft, non distended, non tender,  Incision healed  Musculoskeletal: unremarkable with normal function. No embolic signs or cyanosis.    Neuro:  Oriented; moves all 4; no focal deficits  Psychiatric: normal affect and mood, no memory impairment      Patient Vitals for the past 24 hrs:   BP Temp Pulse Resp SpO2 Weight   04/21/21 0810 104/63 98.7 °F (37.1 °C) (!) 102 18 94 % --   04/21/21 0608 -- -- -- -- -- 190 lb 11.2 oz (86.5 kg)   04/21/21 0345 115/63 98.6 °F (37 °C) (!) 109 20 90 % --   04/20/21 2330 102/61 98.4 °F (36.9 °C) (!) 114 21 91 % --   04/20/21 1958 109/60 98.6 °F (37 °C) (!) 115 21 90 % --   04/20/21 1521 123/72 98.6 °F (37 °C) (!) 102 18 93 % --   04/20/21 1106 (!) 102/58 98.6 °F (37 °C) 93 17 90 % --     Labs:    Recent Labs     04/21/21  0500   WBC 10.4   HGB 11.4*         K 3.1*   CL 94*   CO2 41*   BUN 31*   CREA 0.91   *         Signed:  Dixon Holloway NP

## 2021-04-21 NOTE — PROGRESS NOTES
END OF SHIFT NOTE:    INTAKE/OUTPUT  04/20 0701 - 04/21 0700  In: 2549 [P.O.:30; I.V.:2240]  Out: 5451 [Urine:951]  Voiding: YES  Catheter: NO  Drain:   Nasogastric Tube 04/19/21 (Active)   Site Assessment Intact;Dry 04/21/21 1407   Securement Device Tape 04/21/21 1407   G Port Status Intermittent Suction 04/21/21 1407   External Insertion Levy (cms) 54 cms 04/21/21 1407   Action Taken Placement verified (comment) 04/20/21 1950   Drainage Description Green 04/21/21 1407   Drainage Chamber Level (ml) 900 ml 04/21/21 1133   Output (ml) 300 ml 04/21/21 1133               Flatus: Patient does  have flatus present. Stool:  0 occurrences. Characteristics:  Stool Assessment  Stool Color: Other (Comment)(small white pudding stool)  Stool Appearance: Soft  Stool Amount: Small  Stool Source/Status: Rectum    Emesis: 0 occurrences. Characteristics:   Emesis Assessment  Appearance: Green  Emesis Amount: Small    VITAL SIGNS  Patient Vitals for the past 12 hrs:   Temp Pulse Resp BP SpO2   04/21/21 1500 98.7 °F (37.1 °C) 96 18 107/67 96 %   04/21/21 1133 97.5 °F (36.4 °C) (!) 102 18 114/79 96 %   04/21/21 0810 98.7 °F (37.1 °C) (!) 102 18 104/63 94 %       Pain Assessment  Pain Intensity 1: 0 (04/21/21 1407)  Pain Location 1: Abdomen  Pain Intervention(s) 1: Declines  Patient Stated Pain Goal: 0    Ambulating  Yes    Shift report given to oncoming nurse at the bedside.     Ashwin Mccauley RN

## 2021-04-22 PROBLEM — C17.0 ADENOCARCINOMA OF DUODENUM (HCC): Status: ACTIVE | Noted: 2021-01-01

## 2021-04-22 NOTE — ANESTHESIA PROCEDURE NOTES
Arterial Line Placement Start time: 4/22/2021 1:40 PM 
End time: 4/22/2021 1:45 PM 
Performed by: Castillo Cline CRNA Authorized by: Rik Meza MD  
 
Pre-Procedure Indications:  Arterial pressure monitoring and blood sampling Preanesthetic Checklist: patient identified, risks and benefits discussed, anesthesia consent, site marked, patient being monitored, timeout performed and patient being monitored Timeout Time: 13:40 Procedure:  
Prep:  ChloraPrep Seldinger Technique?: No   
Orientation:  Left Location:  Radial artery Catheter size:  20 G Number of attempts:  2 Assessment:  
Post-procedure:  Line secured and sterile dressing applied Patient Tolerance:  Patient tolerated the procedure well with no immediate complications Comment: Once by [facility], placed by CRNA

## 2021-04-22 NOTE — PROGRESS NOTES
PT note:    Pt off floor for procedure at this time. Will check back on pt later as schedule allows.     Samara Harvey, PTA

## 2021-04-22 NOTE — ANESTHESIA POSTPROCEDURE EVALUATION
Procedure(s): WHIPPLE PROCEDURE. general 
 
Anesthesia Post Evaluation Multimodal analgesia: multimodal analgesia used between 6 hours prior to anesthesia start to PACU discharge Patient location during evaluation: ICU Patient participation: complete - patient cannot participate Level of consciousness: obtunded/minimal responses Pain management: adequate Airway patency: patent Anesthetic complications: no 
Cardiovascular status: acceptable and hemodynamically stable Respiratory status: acceptable and ETT Hydration status: acceptable Post anesthesia nausea and vomiting:  none Final Post Anesthesia Temperature Assessment:  Normothermia (36.0-37.5 degrees C) INITIAL Post-op Vital signs:  
Vitals Value Taken Time /63 04/22/21 1945 Temp 36.9 °C (98.4 °F) 04/22/21 1945 Pulse 102 04/22/21 1955 Resp 25 04/22/21 1955 SpO2 100 % 04/22/21 1955 Vitals shown include unvalidated device data.

## 2021-04-22 NOTE — PROGRESS NOTES
TRANSFER - OUT REPORT:    Verbal report given to Kay Jansen RN(name) on Haven Díaz  being transferred to PREOP(unit) for routine progression of care       Report consisted of patients Situation, Background, Assessment and   Recommendations(SBAR). Information from the following report(s) SBAR, Kardex, Procedure Summary, Intake/Output and MAR was reviewed with the receiving nurse. Lines:   Triple Lumen 04/09/21 Anterior;Right Neck (Active)   Central Line Being Utilized Yes 04/22/21 1054   Criteria for Appropriate Use Total parenteral nutrition 04/22/21 1054   Site Assessment Clean, dry, & intact 04/22/21 1054   Infiltration Assessment 0 04/22/21 1054   Affected Extremity/Extremities Color distal to insertion site pink (or appropriate for race) 04/22/21 0730   Date of Last Dressing Change 04/16/21 04/22/21 1054   Dressing Status Clean, dry, & intact 04/22/21 1054   Dressing Type Disk with Chlorhexadine gluconate (CHG); Transparent 04/22/21 0730   Action Taken Dressing changed; Tubing changed 04/22/21 0730   Proximal Hub Color/Line Status Infusing;Patent 04/22/21 0730   Positive Blood Return (Medial Site) Yes 04/22/21 0730   Medial Hub Color/Line Status Infusing;Patent 04/22/21 0730   Positive Blood Return (Lateral Site) Yes 04/22/21 0730   Distal Hub Color/Line Status Patent 04/22/21 0730   Positive Blood Return (Site #3) Yes 04/22/21 0730   Alcohol Cap Used No 04/21/21 2025        Opportunity for questions and clarification was provided.       Patient transported with:   [employer]

## 2021-04-22 NOTE — PROGRESS NOTES
Comprehensive Nutrition Assessment    Type and Reason for Visit: Reassess  TPN Management (Surgery)    Nutrition Recommendations/Plan:   · Parenteral Nutrition:  · Central TPN:    · Continue 10%DEX/4.25%AA to 85 ml/hr (2 L) with 250 ml 20% Lipids daily over 12 hrs.   · Lytes/L:   · Sodium 100 meq (100 meq NaCl), Potassium 25 meq (15 meq KCl, 10 meq KPO4), omit Mg, 4.5 meq Calcium. Solution remains max Chloride.   · Other additives: MTE, MVI  · IVF:  ·  IVF per MD  · Vitamin and Mineral Supplement Therapy:  · Electrolyte management replacement protocol active. · Hold K replacement as patient about to go to surgery  · Labs:   · BMP daily,Phos and Mg MWF.  POC Glucoses/SSI if Glucose > 180 mg/dl on AM BMP. · Daily weight      Malnutrition Assessment:  Malnutrition Status: Severe malnutrition  Context: Chronic illness  Findings of clinical characteristics of malnutrition:   Energy Intake:  7 - 75% or less est energy requirements for 1 month or longer  Weight Loss:  7.0 - Greater than 7.5% over 3 months((226# to 182# (19.5%), 226# to 201# (11%) in < 3 months)     Body Fat Loss:  Unable to assess,     Muscle Mass Loss:  Unable to assess,    Fluid Accumulation:  Unable to assess,     Strength:  Not performed       Nutrition Assessment:   Nutrition History: 4/6: Pt reports at baseline she eats 2 small meals a day of oatmeal or grits for breakfast and then a \"normal\" meal for her 2nd meal of the day. About 8 weeks ago, she was eating a [employer] meal and had N/V which she thought was food poisioning. However she continued to have N/V with all po intake, thus intake was <25% of usual. She was mainly consuming gatorade, Boost or Ensure PTA. She had weight loss from 226# to 182#. She reports addtional weight loss since her pre assessment weight on 3/24/2021. Nutrition Background: H/O: breast cancer with bilateral mastectomy, uterine cancer s/p hysterectomy, DM, HTN, RLS, GERD and claustrophobia.  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 She has had postop N&V and been unable to tolerate po diet progression  Daily Update:  Patient seen with multiple family members at bedside. She is going for [place] with Dr. Buck Varela today. She denies any current complaints. States she was having severe nausea prior to NGT being returned to suction this am. Noted NGT placed to suction by NP with immediate 700 ml out. Abdominal Status (last documented): Nausea, Tender, Passing flatus, Soft abdomen with Hypoactive  bowel sounds. Last BM 04/17/21. HERRMANN:3200 ml over last 24 hrs (+ 700 ml this am)  Pertinent Medications: Pepcid, Zofran, Zosyn, Diflucan  IVF: 1/2NS with 20 meq KCL @ 150 ml/hr  Pertinent Labs:   Lab Results   Component Value Date/Time    Sodium 136 04/22/2021 05:37 AM    Potassium 3.3 (L) 04/22/2021 05:37 AM    Chloride 95 (L) 04/22/2021 05:37 AM    CO2 39 (H) 04/22/2021 05:37 AM    Anion gap 2 (L) 04/22/2021 05:37 AM    Glucose 157 (H) 04/22/2021 05:37 AM    BUN 30 (H) 04/22/2021 05:37 AM    Creatinine 0.84 04/22/2021 05:37 AM    Calcium 8.8 04/22/2021 05:37 AM    Albumin 2.7 (L) 04/22/2021 05:37 AM    Magnesium 2.1 04/21/2021 05:00 AM    Phosphorus 2.5 04/21/2021 05:00 AM   Labs remarkable for: NA trending down, K 3.3 (improved with K replacement and increase in TPN last evening, will hold on increasing in TPN tonight due to surgery), Cl and CO2 continue to improve, CCa 9.84, Glucose stable    Nutrition Related Findings:   Diet: 3/30 Clear, 4/2 NPO, 4/5 sips of clears, Ensure clear. 4/6: PPN started. 4/8:NGT placed. CVC placed 4/9 and converted to CPN. SBFT 4/12 with delayed gstric emptying with hypotonic bowel with no contrast in small bowel or colon. NGT out 4/14. 4/15 diet advanced to FLD and TPN decreased to 1L per MD.  1L TPN to continues on 1 liter per surgery. TPN taken back to goal of 2 L 4/19. NGT placed prior to EGD 4/19, now to LIS.  Wound Type: Surgical incision    Current Nutrition Therapies:  DIET NPO  Current Parenteral Nutrition Orders:  · Type and Formula: 10%DEX/4.25%AA    · Lipids: 250ml, Daily  · Duration: Continuous  · Rate/Volume: 85 ml/hr (2L)  · Current PN Order Provides: at goal  · Goal PN Orders Provides: 1520 kcal (100% needs), 85 g pro (100% needs), 200 g CHO, 2250 ml total volume      Current Intake:   Average Meal Intake: NPO Average Supplement Intake: NPO      Anthropometric Measures:  Height: 5' 6\" (167.6 cm)  Current Body Wt: 86.2 kg (190 lb 0.6 oz)(bed scale), Weight source: Bed scale  BMI: 30.7, Obese class 1 (BMI 30.0-34. 9)  Admission Body Weight: 192 lb 7.4 oz(3/24 pre assessment)  Ideal Body Weight (lbs) (Calculated): 130 lbs (59 kg), 148 %  Usual Body Wt: 102.5 kg (226 lb), Percent weight change: -19.5          Edema: No data recorded   Estimated Daily Nutrient Needs:  Energy (kcal/day): 3432-3299 (Kcal/kg(15-20), Weight Used: Current(91.3 kg-bed scale 4/7))  Protein (g/day): 69-91 Weight Used: ((20% of kcal))  Fluid (ml/day):   (1 ml/kcal)    Nutrition Diagnosis:   · Inadequate oral intake related to altered GI structure as evidenced by (duodenal mass, NPO, NGT to LIS, PN for primary needs)    · Severe malnutrition related to inadequate protein-energy intake as evidenced by (s/s as above per malnutrition assessment)    Nutrition Interventions:   Food and/or Nutrient Delivery: Continue NPO, Modify parenteral nutrition     Coordination of Nutrition Care: Continue to monitor while inpatient  Plan of Care discussed with [facility], RN and Fer Cervantes NP    Goals:   Previous Goal Met: Goal(s) achieved  Active Goal: Continue to tolerate TPNat goal    Nutrition Monitoring and Evaluation:      Food/Nutrient Intake Outcomes: Parenteral nutrition intake/tolerance  Physical Signs/Symptoms Outcomes: Biochemical data, GI status    Discharge Planning:     Too soon to determine    Chepe Ashwood [place], LD on 4/22/2021 at 10:22 AM  Contact: [phone]

## 2021-04-22 NOTE — PROGRESS NOTES
PLAN:  S/p right robotic colon resection 3/30/21  Surgically stable    Adenocarcinoma duodenum   NPO after midnight  Dr. Jennifer Clark is planning whipple procedure  Further surgical plans per Dr. Jennifer Clark  Ca-19-9 elevated  Chest CT w/o metastasis     Malnutrition  PICC/TPN  Electrolyte management per RD    Hypercapnia  Continue to monitor   Low dose O2 via NC      ASSESSMENT:  Admit Date: 3/30/2021   23 Days Post-Op  Right robotic colon resection with Dr. Margarito Saxena    Principal Problem:    Colon cancer ([facility] (3/30/2021)    Active Problems:    Severe protein-calorie malnutrition ([facility] (4/7/2021)         SUBJECTIVE:  4/12/21 13 Days Post-Op No new issues. NG patent with 1L 24hr output. No BM overnight. AF. Tachy. NAD.  -2.1L fluid volume  4/13/21 14 Days Post-Op awake in bed, no complaints. Pain controlled. AF, tachy in the 100s. -1.4L fluid volume. No BM.  4/14/21 15 Days Post-Op awake in bed, no complaints. Pain controlled. AF, tachy in the 100s. +BMx4  4/15/21 16 Days Post op; awake in bed; emesis x 2 since NGT removal but reports feeling well overall. She denies abdominal pain. Bm X 3.  AF, tachy. 800mL UOP.  4/16/21 17 Days Post op; awake in bed; emesis x 1 this am but reports feeling good. She denies abdominal pain. Can hear bowel sounds when standing in room Bm X 2.  AF, tachy. Voiding without difficulty. 4/17/21 18 Days Post op; awake in recliner; Currently c/o nausea and vomiting. She denies abdominal pain. BM x 1  Yesterday - bloody mucus. AF, tachy. Voiding without difficulty. 4/18/21 19 Days Post op; awake in bed; Currently c/o nausea and vomiting. She denies abdominal pain. BM x 1  Yesterday. AF, tachy. Voiding without difficulty. 4/19/21 20 Days Post-Op; Currently c/o nausea and vomiting. She denies abdominal pain. Last BM 4/18. AF, tachy. Voiding without difficulty. EGD today -3.9L fluid volume since admission. 4/20/21 21 Days Post-Op; Awaiting path  4/21/21 22 Days Post-Op; seen by Dr. Jennifer Clark yesterday.  Path discussed. +adenocarcinoma of the duodenum. Discussed surgical plan with patient. She is happy to have an answer to her past few months of vomiting and is eager for a resolution. CO2 41. K+3.1  4/22/21 23 Days Post-Op; Chest CT w/o evidence of metastasis.  461.9. CO2 39. K+3.3. NGT was not hooked up this morning and pt c/o nausea and abdominal pain. This NP connected NGT with approx 700 immediate return. Patient states she vomited overnight due to problems with her NGT. She immediately felt better after NGT resumed function. She is to go for [place] today with Dr. Wan Parish who will likely then assume primary care. Intake/Output Summary (Last 24 hours) at 4/22/2021 0846  Last data filed at 4/22/2021 0750  Gross per 24 hour   Intake 7269 ml   Output 3350 ml   Net 3919 ml     OBJECTIVE:  Constitutional: Alert oriented cooperative patient in no acute distress; appears stated age   Visit Vitals  /71   Pulse (!) 108   Temp 97.4 °F (36.3 °C)   Resp 18   Ht 5' 6\" (1.676 m)   Wt 190 lb (86.2 kg)   SpO2 93%   BMI 30.67 kg/m²     Eyes:Sclera are clear. ENMT: no external lesions gross hearing normal; no obvious neck masses, no ear or lip lesions, +NGT  CV: RRR. Normal perfusion  Resp: No JVD. Breathing is  non-labored; no audible wheezing. GI: soft, non distended, non tender,  Incision healed  Musculoskeletal: unremarkable with normal function. No embolic signs or cyanosis.    Neuro:  Oriented; moves all 4; no focal deficits  Psychiatric: normal affect and mood, no memory impairment      Patient Vitals for the past 24 hrs:   BP Temp Pulse Resp SpO2 Weight   04/22/21 0750 108/71 97.4 °F (36.3 °C) (!) 108 18 93 % --   04/22/21 0509 -- -- -- -- -- 190 lb (86.2 kg)   04/22/21 0325 126/74 97.9 °F (36.6 °C) (!) 107 19 97 % --   04/21/21 2251 121/77 98.3 °F (36.8 °C) (!) 102 19 95 % --   04/21/21 2007 128/87 98.2 °F (36.8 °C) 74 19 97 % --   04/21/21 1500 107/67 98.7 °F (37.1 °C) 96 18 96 % --   04/21/21 1133 114/79 97.5 °F (36.4 °C) (!) 102 18 96 % --     Labs:    Recent Labs     04/22/21  0537 04/22/21  0536 04/21/21  0500   WBC  --   --  10.4   HGB  --   --  11.4*   PLT  --   --  288     --  139   K 3.3*  --  3.1*   CL 95*  --  94*   CO2 39*  --  41*   BUN 30*  --  31*   CREA 0.84  --  0.91   *  --  142*   PTP  --  12.7  --    INR  --  0.9  --    APTT  --  20.8*  --    TBILI 0.4  --   --    ALT 39  --   --    AP 83  --   --          Signed:  Luis Eduardo Flood, NP

## 2021-04-22 NOTE — PROGRESS NOTES
END OF SHIFT NOTE:    Hourly rounds conducted. INTAKE/OUTPUT  04/21 0701 - 04/22 0700  In: -   Out: 2500 [Urine:750]  Voiding: YES  Catheter: NO  Drain:   Nasogastric Tube 04/19/21 (Active)   Site Assessment Intact;Dry 04/22/21 0110   Securement Device Tape 04/22/21 0110   G Port Status Intermittent Suction 04/22/21 0110   External Insertion Levy (cms) 54 cms 04/22/21 0110   Action Taken Placement verified (comment) 04/20/21 1950   Drainage Description Green 04/22/21 0110   Drainage Chamber Level (ml) 850 ml 04/21/21 1407   Output (ml) 850 ml 04/21/21 1407               Flatus: Patient does have flatus present. Stool:  0 occurrences. Characteristics:  Stool Assessment  Stool Color: Other (Comment)(small white pudding stool)  Stool Appearance: Soft  Stool Amount: Small  Stool Source/Status: Rectum    Emesis: 0 occurrences. Characteristics:   Emesis Assessment  Appearance: Green  Emesis Amount: Small    VITAL SIGNS  Patient Vitals for the past 12 hrs:   Temp Pulse Resp BP SpO2   04/21/21 2251 98.3 °F (36.8 °C) (!) 102 19 121/77 95 %   04/21/21 2007 98.2 °F (36.8 °C) 74 19 128/87 97 %       Pain Assessment  Pain Intensity 1: 0 (04/21/21 2025)  Pain Location 1: Abdomen  Pain Intervention(s) 1: Declines  Patient Stated Pain Goal: 0    Ambulating  Yes    Shift report given to oncoming nurse at the bedside.     Valencia Carroll

## 2021-04-22 NOTE — PERIOP NOTES
TRANSFER - IN REPORT:    Verbal report received from 211(name) on [facility]  being received from Suzanna Coleman RN(unit) for ordered procedure      Report consisted of patients Situation, Background, Assessment and   Recommendations(SBAR). Information from the following report(s) SBAR was reviewed with the receiving nurse. Opportunity for questions and clarification was provided.

## 2021-04-22 NOTE — ANESTHESIA PREPROCEDURE EVALUATION
Relevant Problems PERSONAL HX & FAMILY HX OF CANCER  
(+) Adenocarcinoma of duodenum (HCC)  
(+) Colon cancer ([facility]  
(+) Malignant neoplasm of ascending colon ([facility] Anesthetic History No history of anesthetic complications Review of Systems / Medical History Patient summary reviewed and pertinent labs reviewed Pulmonary Within defined limits Neuro/Psych Within defined limits Cardiovascular Exercise tolerance: <4 METS 
  
GI/Hepatic/Renal 
  
GERD: well controlled Endo/Other Obesity and cancer (Breast/Colon) Other Findings Comments: Duodenal obstruction with NGT in place, persistent low level tachycardia. On TPN severe malnourishment. See Surgical notes for progression to whipple decision. Physical Exam 
 
Airway Mallampati: II 
TM Distance: > 6 cm Neck ROM: normal range of motion Mouth opening: Normal 
 
Comments: NGT Cardiovascular Rhythm: regular Rate: normal 
 
 
Pertinent negatives: No murmur and peripheral edema Dental 
No notable dental hx Pulmonary Breath sounds clear to auscultation Abdominal 
 
 
 
 Other Findings Anesthetic Plan ASA: 3 Anesthesia type: general 
 
Monitoring Plan: Arterial line Post-op pain plan if not by surgeon: peripheral nerve block single Post procedure ventilation Induction: Intravenous and RSI Anesthetic plan and risks discussed with: Patient and Family R IJ triple lumen cath in situ

## 2021-04-22 NOTE — PERIOP NOTES
Family updated at 6:23 PM by Treasure Hollingsworth RN.  4 digit security code verified. Spoke with family member Makeda on her cell phone.

## 2021-04-22 NOTE — BRIEF OP NOTE
Brief Postoperative Note    Patient: Fede Silverman  YOB: 1940  MRN: [id number]    Date of Procedure: 4/22/2021     Pre-Op Diagnosis: duodenal cancer    Post-Op Diagnosis: large uncinate process pancreatic cancer with invasion of duodenum and root of mesentery    Procedure(s):  1. WHIPPLE PROCEDURE  2. Extensive retroperitoneal dissection with root of mesentery resection over 5 cm  3. Feeding Jejunostomy tube  4. Falciform ligament flap and omental flap    Surgeon(s):   MD Chaparro Hernandes MD    Surgical Assistant: Nurse Practitioner: Nidia Drake NP    Anesthesia: General     Estimated Blood Loss (mL): 861    Complications: None    Specimens:   ID Type Source Tests Collected by Time Destination   1 : GALLBLADDER Preservative Gallbladder  Alisa Rojas MD 4/22/2021 1531 Pathology   2 : COMMON HEPATIC LYMPH NODE Preservative Lymph Node  Alisa Rojas MD 4/22/2021 1545 Pathology   3 : ROOT OF MESENTRIC MARGIN Preservative Abdomen  Alisa Rojas MD 4/22/2021 1642 Pathology   4 : WHIPPLE Fresh Pancreas  Alisa Rojas MD 4/22/2021 1658 Pathology        Implants: * No implants in log *    Drains:   Nasogastric Tube 04/19/21 (Active)   Site Assessment Intact;Dry 04/22/21 0730   Securement Device Tape 04/22/21 0730   G Port Status Intermittent Suction 04/22/21 0730   External Insertion Levy (cms) 54 cms 04/22/21 0110   Action Taken Placement verified (comment) 04/20/21 1950   Drainage Description Green 04/22/21 0730   Drainage Chamber Level (ml) 900 ml 04/21/21 2130   Output (ml) 250 ml 04/22/21 0730       Juli Carrington #1 04/22/21 Left Abdomen (Active)       Juli Carrington #2 04/22/21 Left Abdomen (Active)       Feeding Tube 04/22/21 (Active)       [REMOVED] Nasogastric Tube 04/08/21 (Removed)   Site Assessment Clean, dry, & intact 04/14/21 0805   Securement Device Tape 04/14/21 0805   G Port Status Intermittent Suction 04/14/21 0845   External Insertion Levy (cms) 62 cms 04/14/21 0845   Action Taken Retaped 04/11/21 1255   Drainage Description Green 04/14/21 0845   Gastric Residual (mL) 1000 ml 04/08/21 0800   Drainage Chamber Level (ml) 800 ml 04/14/21 0923   Output (ml) 200 ml 04/14/21 5634       Findings: as post op diagnosis    Electronically Signed by Cora Nath MD on 4/22/2021 at 7:43 PM

## 2021-04-22 NOTE — ROUTINE PROCESS
TRANSFER - OUT REPORT:    Verbal report given to Sophie Ervin RN) on [facility]  being transferred to [facility](unit) for routine progression of care       Report consisted of patients Situation, Background, Assessment and   Recommendations(SBAR). Information from the following report(s) OR Summary was reviewed with the receiving nurse. Lines:   Triple Lumen 04/09/21 Anterior;Right Neck (Active)   Central Line Being Utilized Yes 04/22/21 1054   Criteria for Appropriate Use Total parenteral nutrition 04/22/21 1054   Site Assessment Clean, dry, & intact 04/22/21 1054   Infiltration Assessment 0 04/22/21 1054   Affected Extremity/Extremities Color distal to insertion site pink (or appropriate for race) 04/22/21 0730   Date of Last Dressing Change 04/16/21 04/22/21 1054   Dressing Status Clean, dry, & intact 04/22/21 1054   Dressing Type Disk with Chlorhexadine gluconate (CHG); Transparent 04/22/21 0730   Action Taken Dressing changed; Tubing changed 04/22/21 0730   Proximal Hub Color/Line Status Infusing;Patent 04/22/21 0730   Positive Blood Return (Medial Site) Yes 04/22/21 0730   Medial Hub Color/Line Status Infusing;Patent 04/22/21 0730   Positive Blood Return (Lateral Site) Yes 04/22/21 0730   Distal Hub Color/Line Status Patent 04/22/21 0730   Positive Blood Return (Site #3) Yes 04/22/21 0730   Alcohol Cap Used No 04/21/21 2025       Arterial Line 04/22/21 Left Radial artery (Active)        Opportunity for questions and clarification was provided.       Patient transported with:   O2 @ 15 liters, [facility] RN

## 2021-04-22 NOTE — CONSULTS
CONSULT NOTE    [facility]    4/22/2021    Date of Admission:  3/30/2021    The patient's chart is reviewed and the patient is discussed with the staff. Subjective:     Patient is a [de-identified] y.o.  female seen and evaluated at the request of Dr. Jennifer Clark.    Lore Renee is a [de-identified] y.o. female with past medical history of obesity, RLS, uterine cancer s/p complete hysterectomy, diabetes controlled with diet, breast cancer s/p bilateral mastectomy and chemo, GERD,  newly diagnosed duodenal cancer with right hemicolectomy 3 weeks ago. She developed persistent nausea, vomiting since surgery, which required NG tube and TPN. Eventually she was found to have gastric outlet obstruction, and EGD visualized an obstructive distal duodenal lesion and biopsy confrimed adenocarcinoma. As noted, she had been having trouble eating for months prior to recent surgery with weight loss.      She has extensive personal and family history of cancer. Personally she had bilateral breast cancer, uterine cancer and recent colon cancer. Her sister had pancreatic cancer and total of nine other family member had cancer. Other medical issues are reviewed as below, but she denies any significant cardiopulmonary issues.      Pt underwent Whipple procedure with Dr Jennifer Clark on 4/22. Patient came back from the operation room intubated sedated off phenylephrine. Patient lost around 600 mL of blood. Review of Systems  Review of systems not obtained due to patient factors. Patient Active Problem List   Diagnosis Code    Malignant neoplasm of ascending colon ([address] C18.2    Colon cancer ([address] C18.9    Severe protein-calorie malnutrition ([address] E43    Adenocarcinoma of duodenum ([address] C17.0           Prior to Admission Medications   Prescriptions Last Dose Informant Patient Reported? Taking?    HYDROcodone-acetaminophen (NORCO) 5-325 mg per tablet 3/29/2021 at Unknown time  Yes Yes   Sig: Take  by mouth as needed for Pain.   omeprazole (PRILOSEC) 40 mg capsule 3/30/2021 at Unknown time  Yes Yes   Sig: Take 40 mg by mouth daily. ondansetron hcl (Zofran) 4 mg tablet 3/29/2021 at Unknown time  Yes Yes   Sig: Take 4 mg by mouth every eight (8) hours as needed for Nausea or Vomiting. Facility-Administered Medications: None       Past Medical History:   Diagnosis Date    Adverse effect of anesthesia     pt states she woke up during colonoscopy X1. No complications with any other procedures.     Claustrophobia     Colon cancer ([address]     GERD (gastroesophageal reflux disease)     managed with Prilosec    History of breast cancer in female     bilateral mastectomy and 6 months of chemo    History of diabetes mellitus      pt no longer required to take medications or monitor BS- weight loss/ Glucose on labs 3.24.21 results: 153    History of uterine cancer 2012    resulted in complete hysterectomy    RLS (restless legs syndrome)     managed with PRN meds     Past Surgical History:   Procedure Laterality Date    HX BILATERAL MASTECTOMY Bilateral     HX CATARACT REMOVAL Bilateral 2020    with lens implants    HX COLONOSCOPY      HX ALEJANDRO AND BSO      HX TONSILLECTOMY      IR INSERT NON TUNL CVC OVER 5 YRS  4/9/2021     Social History     Socioeconomic History    Marital status:      Spouse name: Not on file    Number of children: Not on file    Years of education: Not on file    Highest education level: Not on file   Occupational History    Not on file   Social Needs    Financial resource strain: Not on file    Food insecurity     Worry: Not on file     Inability: Not on file   [employer] needs     Medical: Not on file     Non-medical: Not on file   Tobacco Use    Smoking status: Never Smoker    Smokeless tobacco: Never Used   Substance and Sexual Activity    Alcohol use: Never     Frequency: Never    Drug use: Never    Sexual activity: Not on file   Lifestyle    Physical activity     Days per week: Not on file     Minutes per session: Not on file    Stress: Not on file   Relationships    Social connections     Talks on phone: Not on file     Gets together: Not on file     Attends [employer] service: Not on file     Active member of club or organization: Not on file     Attends meetings of clubs or organizations: Not on file     Relationship status: Not on file    Intimate partner violence     Fear of current or ex partner: Not on file     Emotionally abused: Not on file     Physically abused: Not on file     Forced sexual activity: Not on file   Other Topics Concern    Not on file   Social History Narrative    Not on file     Family History   Problem Relation Age of Onset    Cancer Mother     Dementia Father     No Known Problems Sister     No Known Problems Brother     Cancer Maternal Grandmother     No Known Problems Sister     Cancer Sister         Pancreatic CA    Diabetes Brother     Stroke Brother     Cancer Brother         Colon CA     Cancer Brother      Allergies   Allergen Reactions    Augmentin [Amoxicillin-Pot Clavulanate] Rash and Swelling    Pcn [Penicillins] Rash and Swelling    Phenergan [Promethazine] Other (comments)     Delirious per patient       Objective:     Vitals:    04/22/21 0325 04/22/21 0509 04/22/21 0750 04/22/21 1053   BP: 126/74  108/71 126/61   Pulse: (!) 107  (!) 108 (!) 104   Resp: 19  18 18   Temp: 97.9 °F (36.6 °C)  97.4 °F (36.3 °C)    SpO2: 97%  93% 95%   Weight:  190 lb (86.2 kg)     Height:           PHYSICAL EXAM     Constitutional: Sedated on mechanical ventilation  EENMT:  Sclera clear, pupils equal, oral mucosa moist orally intubated  Respiratory: Clear equal air sounds bilateral no crackles no wheezing  Cardiovascular:  RRR without M,G,R  Gastrointestinal: Laparotomy incision with drains soft  Musculoskeletal: warm without cyanosis. There is no lower extremity edema.   Skin:  no jaundice or rashes  Neurologic: Sedated not following commands  Psychiatric: Sedated    CXR:        Recent Labs     04/22/21  0536 04/21/21  0500   WBC  --  10.4   HGB  --  11.4*   HCT  --  37.2   PLT  --  288   INR 0.9  --      Recent Labs     04/22/21  0537 04/21/21  0500 04/20/21  0617    139 137   K 3.3* 3.1* 3.6   CL 95* 94* 91*   * 142* 145*   CO2 39* 41* 43*   BUN 30* 31* 35*   CREA 0.84 0.91 1.11*   MG  --  2.1  --    PHOS  --  2.5  --    CA 8.8 8.8 9.2   ALB 2.7*  --   --    TBILI 0.4  --   --    ALT 39  --   --      Recent Labs     04/22/21  1414 04/22/21  1401   PHI 7.56* 7.56*   PCO2I 35.0 36.2   PO2I 174* 205*   HCO3I 31.5* 32.7*     No results for input(s): LCAD, LAC in the last 72 hours. Assessment:  (Medical Decision Making)     Hospital Problems  Date Reviewed: 3/23/2021          Codes Class Noted POA    * (Principal) Adenocarcinoma of duodenum ([facility] ICD-10-CM: C17.0  ICD-9-CM: 152.0  4/22/2021 Unknown        Severe protein-calorie malnutrition ([facility] ICD-10-CM: E43  ICD-9-CM: 262  4/7/2021 Yes        Colon cancer ([facility] ICD-10-CM: C18.9  ICD-9-CM: 153.9  3/30/2021 Unknown            -Acute hypoxic respite failure requiring mechanical ventilation postoperatively  -Status post Whipple procedure  -Hypokalemia    Plan:  (Medical Decision Making)     --Transfer to intensive care for further management  --Adjust mechanical ventilation use lung protective strategy keep pulse ox above 92%  --Sedated with fentanyl and propofol  --I will send for stat CBC and BMP  --Patient is on TPN  --Start LR at 50 mL an hour  --N.p.o.  -- Replace potassium  --Observe drains for any signs of bleeding  --Discussed with bedside nurse  --We appreciate the consult will continue following    I have spent 32 minutes of critical care time this time was spent at bedside or in the unit this time was exclusive of any billable procedures patient is needing intensive care due to complex medical problems requiring complex medical decisions.     More than 50% of the time documented was spent in face-to-face contact with the patient and in the care of the patient on the floor/unit where the patient is located. Thank you very much for this referral.  We appreciate the opportunity to participate in this patient's care. Will follow along with above stated plan.     Ariel Silver MD

## 2021-04-22 NOTE — PERIOP NOTES
Family updated at 4:25 PM by Thomas Villa RN.  4 DIGIT SECURITY CODE VERIFIED. SPOKE WITH FAMILY MEMBER EVELINA ON HER CELL PHONE.

## 2021-04-23 PROBLEM — Z99.11 ENCOUNTER FOR WEANING FROM VENTILATOR (HCC): Status: ACTIVE | Noted: 2021-01-01

## 2021-04-23 NOTE — PROGRESS NOTES
Chart reviewed and pt discussed in am IDR s/p tx to ICU post  Exploratory laparotomy with Whipple procedure/  Extensive retroperitoneal dissection with root of mesenteric resection over 5 cm/ Feeding jejunostomy tube placement/ Falciform ligament flap and omental flap per Dr Bianca Castañeda. Pt now extubated per Intensivist. Remains on TPN. CM screen completed prior to tx to ICU by RNCM. CM following for d/c POC to STR at SNF per notes. Son to chose facilities x3. PPD already in place. PT/OT/ST per MD when stable.      LOS 24 days.

## 2021-04-23 NOTE — PROGRESS NOTES
Ventilator check complete; patient has a #7. 0 ET tube secured at the 21 at the lip. Patient is sedated. Patient is able to follow commands. Breath sounds are diminished. Trachea is midline, Negative for subcutaneous air, and chest excursion is symmetric. Patient is also Negative for cyanosis. All alarms are set and audible. Resuscitation bag is at the head of the bed.       Ventilator Settings  Mode FIO2 Rate Tidal Volume Pressure PEEP I:E Ratio   Pressure support  30 %     10 cm H2O  8 cm H20  1:2      Peak airway pressure: 18 cm H2O   Minute ventilation: 7.7 l/min         Korina Granados, RT

## 2021-04-23 NOTE — OP NOTES
[address] [facility] 
OPERATIVE REPORT Name:  Mariel Groves 
MR#:  [id number] :  1940 ACCOUNT #:  [de-identified] DATE OF SERVICE:  2021 PREOPERATIVE DIAGNOSIS:  Duodenal cancer. 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 PROCEDURES PERFORMED: 
1. Exploratory laparotomy with Whipple procedure. 2.  Extensive retroperitoneal dissection with root of mesenteric resection over 5 cm. 3.  Feeding jejunostomy tube placement. 4.  Falciform ligament flap and omental flap. SURGEON:  Bianca Valentino MD 
 
ASSISTANTS:  River Lopez NP and Rajendra Castaneda MD 
 
ANESTHESIA:  general 
 
COMPLICATIONS:  none SPECIMENS REMOVED:  As above IMPLANTS: Andre x 2, J tube x 1 
 
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 As noted, she had a recent right hemicolectomy for right colon cancer. The patient has been having trouble eating for quite a while and this obviously worsened after her recent colon surgery and she has been in the hospital over three weeks with NG tube and eventually, this led to the diagnosis of duodenal cancer. She was in reasonable health prior to her recent surgery and that she is motivated to have surgery to remove her cancer especially to make her eat again. The patient understands this is major surgery and she agreed to proceed. FINDINGS:  She does have a large mass in her uncinate process with a circumferential invasion into the third portion of duodenum and the root of the mesentery. Extensive dissection performed to dissect the mass off the superior mesenteric artery and the superior mesenteric vein. Both blood vessels were preserved and then the root of mesentery mass was removed.  
 
PROCEDURE:  After informed consent obtained, the patient brought into the operating room, left in supine position. General anesthesia was administered. The patient's abdomen was prepped and draped in routine fashion. We took a generous upper midline incision extending from the xiphoid process to the umbilicus. Peritoneal cavity was entered and the falciform ligament was first dissected and preserved and then the Omni retractor was placed. We first explored the duodenum. We lifted the transverse colon up and the ligament of Treitz was identified and proximal to the ligament of Treitz, a large mass was immediately identified and I cannot feel the proximal margins from the left side. Then, I started to mobilize the second portion of duodenum from the right side and the duodenum was kocherized and lifted from retroperitoneum and soon we encountered a large mass at the third portion of duodenum. Clearly, it invaded outside the duodenum into retroperitoneal tissues. This was resected en bloc and then I started dissection from the left side. The ligament of Treitz was taken down and the duodenum was mobilized posteriorly first and taken off the aorta and the inferior vena cava. Pretty convincing to me, the mass was stuck anteriorly into the superior mesenteric vessel and appeared to be arising from uncinate process since the duodenum was not  from the pancreas. At this point, the Whipple procedure was clearly indicated. We started to expose the pancreas first and the gastrocolic ligament was divided. The lesser sac was entered. The anterior surface of pancreas was exposed and then we were able to identify the inferior border of pancreas. The middle colic vein was identified. This was followed into the superior mesenteric vein and this vein was then dissected. I was able to make the tunnel below the neck of the pancreas in front of this vein distally. This was followed and as noted, the uncinate process mass was stuck on this superior mesenteric vein at the distal portion.   This was carefully dissected. The vein was able to peel off. The uncinate process and a small venotomy was made and this was repaired with a 5-0 Prolene stitch. At this point, I felt I should leave this uncinate process at the last portion since this tumor invaded below the superior mesenteric vessel towards the left side. This was the most challenging portion of this procedure. Then, I moved attention to the gallbladder. The gallbladder was removed from the liver bed with a cautery device. Dissection carried down to the cystic duct and cystic artery, both was tied with 0 silk and then we started hilar dissection to expose the common hepatic artery. The common hepatic lymph nodes were removed separately and then gastroduodenal artery was identified. After this was confirmed, this was tied with 0 silk. The proximal end was oversewn with 5-0 Prolene. Then the common bile duct was dissected circumferentially and then divided with sharp scissors. The distal duct was closed with a silk stitch. The proximal duct was occluded with a vascular clamp. Then the portal vein was exposed and then I was able to dissect along the anterior surface of portal vein downward to meet the tunnel we made previously. The tunnel was easily met. At this point, we felt the pancreas should be transected first.  Before the transection of pancreas, we isolated the stomach at the greater and lesser curvature. The stomach was divided with KINSEY stapler and then two stay sutures were placed on the superior and inferior borders of pancreas. This was done with 2-0 Prolene. Then a large clamp was placed behind the neck of the pancreas in front of the portal and the superior mesenteric vein confluence. The pancreas was then divided with Bovie and the pancreatic duct was easily identified. An 8-Belarusian feeding tube was placed distally into the pancreatic duct and then the head of the pancreas was retracted towards the right side.   The upper part of the pancreas was easily  from the portal vein. The retroperitoneum was divided with combination of bipolar device and the Bovie. With the dissection continued distally into the superior mesenteric vein and the root of mesentery, the large uncinate process mass was peeled off. This clearly invaded into the duodenum, also invaded into the root of the mesentery. With extreme precautions, the mass was carefully dissected off the superior mesenteric vein and superior mesenteric arteries. This was done with a combination of Bovie and bipolar device. Significant amount of retraction was applied to the mass. During the entire dissection, the artery and vein were clearly visualized. We did made a small arteriotomy and venotomy which we were able to repair. After the mass was removed, we marked the root of mesentery margin. We also took additional root of mesentery tissue as additional margin. We also marked this mesentery with the surgical clips for potential radiation in the future. After the specimen removed, the surgical field inspected. There was good hemostasis at the transection site and then we started reconstructions. We first divided the proximal jejunum to create two Bi limbs and the enteroenterostomy was performed with KINSEY TA stapler and then the first Bi limb was brought up antecolically to the stomach and the gastrojejunostomy was performed. Again, this was done with KINSEY TA stapler in the routine fashion. The staple line was hemostatic. The anastomosis wide open. The NG tube was also advanced and confirmed in the appropriate positioning in proximal stomach. Then the second Bi limb was brought up to the right upper quadrant retrocolically. We first performed the choledochojejunostomy and this is a ductal mucosa anastomosis. The enterotomy was tacked down with 7-0 PDS on the mucosa and then the anastomosis was performed with running 5-0 PDS.   Then the pancreaticojejunostomy was also performed in ductal mucosa anastomosis and this was done in interrupted stitch with a 6-0 PDS. I did place a small pancreatic stent and this was a number the #8-Ugandan feeding tube and the outer layer was reinforced with 2-0 silk stitch. After anastomosis, I sprayed Tisseel along the anastomosis and then I brought down the falciform ligament flap. Also, the right side of omentum was mobilized and brought up to cover the anastomosis. Two #19 Andre drains were left at the upper abdomen. Lastly, I placed a feeding tube. This was a 14-Ugandan red rubber catheter placed just distal to the enteroenterostomy. It was affixed to the anterior abdominal wall in all four quadrants. Lastly, surgical field inspected. No evidence of bleeding and no evidence of bowel injury. Then, the midline fascia was closed with #1 looped PDS in a running fashion. Skin closed with staples. The patient tolerated the procedure well, transferred to recovery room in stable condition. All the instrument count and lap count were correct. Estimated blood loss was about 400 mL. As noted, Dulce Saba was the first assistant in this case. She offered excellent exposure and helped with stapling and suturing and tying, and it would be very difficult or impossible to perform this case without her assistance. Angie Rajput MD 
 
 
[id number] 
D:  04/22/2021 20:04 
T:  04/23/2021 6:41 JOB #:  [id number]

## 2021-04-23 NOTE — PROGRESS NOTES
Comprehensive Nutrition Assessment    Type and Reason for Visit: Reassess  TPN Management (Surgery)    Nutrition Recommendations/Plan:   · Parenteral Nutrition:  · Central TPN:    · Change to 15%DEX/5%AA to 88 ml/hr (2 L) with 250 ml 20% Lipids daily over 12 hrs - 1920 calories/day (100% calorie goal), 100 gm protein (100% protein goal) in ~2250ml volume/day.   · Lytes/L:   · Sodium 100 meq (100 meq NaCl), Potassium 35 meq (25 meq KCl, 10 meq KPO4), omit Mg, 4.5 meq Calcium. Solution remains max Chloride.   · Other additives: MTE, MVI, 15 units insulin/liter  · IVF:  ·  IVF per MD  · Vitamin and Mineral Supplement Therapy:  · Electrolyte management replacement protocol active.   · Labs:   · BMP daily,Phos and Mg MWF.  Continue POC Glucoses/SSI   · Daily weight     Malnutrition Assessment:  Malnutrition Status: Severe malnutrition  Context: Chronic illness  Findings of clinical characteristics of malnutrition:   Energy Intake:  7 - 75% or less est energy requirements for 1 month or longer  Weight Loss:  7.0 - Greater than 7.5% over 3 months((226# to 182# (19.5%), 226# to 201# (11%) in < 3 months)     Body Fat Loss:  Unable to assess,     Muscle Mass Loss:  Unable to assess,    Fluid Accumulation:  Unable to assess,     Strength:  Not performed       Nutrition Assessment:   Nutrition History: 4/6: Pt reports at baseline she eats 2 small meals a day of oatmeal or grits for breakfast and then a \"normal\" meal for her 2nd meal of the day. About 8 weeks ago, she was eating a [employer] meal and had N/V which she thought was food poisioning. However she continued to have N/V with all po intake, thus intake was <25% of usual. She was mainly consuming gatorade, Boost or Ensure PTA. She had weight loss from 226# to 182#. She reports addtional weight loss since her pre assessment weight on 3/24/2021.       Nutrition Background: H/O: breast cancer with bilateral mastectomy, uterine cancer s/p hysterectomy, DM, HTN, RLS, GERD and claustrophobia. 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 She has had postop N&V and been unable to tolerate po diet progression  Daily Update: The patient is s/p Whipple yesterday. Fourteen [place] JT was placed during surgery. She was extubated this morning to nasal cannula. Abdominal Status (last documented): Semi-soft, Other (comment)(surgical incision) abdomen with Absent  bowel sounds. Last BM (roxana). Pertinent Medications: Protonix, Zofran, SSI coverage  IVF: LR @ 50 ml/hr  Pertinent Labs:   Lab Results   Component Value Date/Time    Sodium 138 04/23/2021 11:03 AM    Potassium 4.0 04/23/2021 11:03 AM    Chloride 106 04/23/2021 11:03 AM    CO2 28 04/23/2021 11:03 AM    Anion gap 4 (L) 04/23/2021 11:03 AM    Glucose 229 (H) 04/23/2021 11:03 AM    BUN 33 (H) 04/23/2021 11:03 AM    Creatinine 0.69 04/23/2021 11:03 AM    Calcium 8.2 (L) 04/23/2021 11:03 AM    Albumin 2.4 (L) 04/23/2021 03:40 AM    Magnesium 2.0 04/23/2021 11:03 AM    Phosphorus 2.4 04/23/2021 03:40 AM     Lab Results   Component Value Date/Time    Glucose 229 (H) 04/23/2021 11:03 AM    Glucose (POC) 217 (H) 04/23/2021 07:16 AM    Glucose (POC) 224 (H) 04/23/2021 04:43 AM    Glucose (POC) 237 (H) 04/23/2021 01:35 AM    Glucose (POC) 258 (H) 04/22/2021 09:05 PM    Glucose (POC) 186 (H) 04/22/2021 11:03 AM    Glucose (POC) 139 (H) 04/09/2021 11:34 AM    Glucose,  (H) 04/22/2021 08:11 PM    Glucose,  (H) 04/22/2021 02:14 PM    Glucose,  (H) 04/22/2021 02:01 PM     Nutrition Related Findings:   Diet: 3/30 Clear, 4/2 NPO, 4/5 sips of clears, Ensure clear. 4/6: PPN started. 4/8:NGT placed. CVC placed 4/9 and converted to CPN. SBFT 4/12 with delayed gstric emptying with hypotonic bowel with no contrast in small bowel or colon. NGT out 4/14. 4/15 diet advanced to FLD and TPN decreased to 1L per MD.  1L TPN to continues on 1 liter per surgery. TPN taken back to goal of 2 L 4/19.  NGT placed prior to EGD 4/19, now to LIS. 4/23: in CCU post-op Whipple 4/22. Ext 4/23, NGT to LIS. Wound Type: Surgical incision    Current Nutrition Therapies:  Current Parenteral Nutrition Orders:  · Type and Formula: 10%DEX/4.25%AA    · Lipids: 250ml, Daily  · Duration: Continuous  · Rate/Volume: 85 ml/hr (2L)  · Current PN Order Provides: at goal  · Goal PN Orders Provides: 1520 kcal (100% needs), 85 g pro (100% needs), 200 g CHO, 2250 ml total volume    Current Intake:   Average Meal Intake: NPO Average Supplement Intake: NPO      Anthropometric Measures:  Height: 5' 6\" (167.6 cm)  Current Body Wt: 83.5 kg (184 lb 1.4 oz)(4/22/21 CCU), Weight source: Bed scale  BMI: 29.7, Obese class 1 (BMI 30.0-34. 9)  Admission Body Weight: 192 lb 7.4 oz(3/24 pre assessment)  Ideal Body Weight (lbs) (Calculated): 130 lbs (59 kg), 148 %  Usual Body Wt: 102.5 kg (226 lb), Percent weight change: -19.5  Edema: No data recorded     Estimated Daily Nutrient Needs:  Energy (kcal/day): 7504-3499(45 -25 jessica/kg/day using 83 kg - obese, post-op Whipple) (Kcal/kg, Weight Used: Current(91.3 kg-bed scale 4/7))  Protein (g/day): 100-166 (1.2-2 gm pro/kg/day using 83 kg - obese, post-op Whipple) Weight Used: (Current)  Fluid (ml/day):   (1 ml/kcal)    Nutrition Diagnosis:   · Inadequate oral intake related to altered GI structure as evidenced by (NPO. NGT to LIS, post-op Whipple)    · Severe malnutrition related to inadequate protein-energy intake as evidenced by (s/s as above per malnutrition assessment)    Nutrition Interventions:   Food and/or Nutrient Delivery: Continue NPO, Modify parenteral nutrition     Coordination of Nutrition Care: Continue to monitor while inpatient, Interdisciplinary rounds  Plan of Care discussed with Elisabet Saeed RN    Goals:   Previous Goal Met: Progressing toward goal(s)  Active Goal: Tolerate TPN at revised goal.    Nutrition Monitoring and Evaluation:      Food/Nutrient Intake Outcomes: Parenteral nutrition intake/tolerance  Physical Signs/Symptoms Outcomes: Biochemical data, GI status    Discharge Planning: Too soon to determine    [facility].  Mary Melgar RD, RADHA on 4/23/2021 at 1:15 PM  Contact: [phone]

## 2021-04-23 NOTE — PROGRESS NOTES
Spiritual Care visit. Initial Visit, Pre Surgery Consult. Visit and prayer before patient goes to surgery.     Visit by Chas Raines M.Ed., Th.B. ,Staff

## 2021-04-23 NOTE — PROGRESS NOTES
A follow up visit was made to the patient. Emotional support, spiritual presence and   prayer were provided for the patient. She was resting and awoke when addressed.       Peng Gauthier, [address] [facility][note 3], [facility]

## 2021-04-23 NOTE — PROGRESS NOTES
Physical Therapy Note:    Therapist is discontinuing physical therapy at this time due to decline in medical status/transfer to ICU level of care. MsManuel Janine Kojo physical therapy goals were not met. Please reorder PT when our services are again appropriate.       Thank you,  Shanda Newton, PT, DPT  4/23/2021

## 2021-04-23 NOTE — PROGRESS NOTES
Critical Care Daily Progress Note: 4/23/2021  Admission Date: 3/30/2021     The patient's chart is reviewed and the patient is discussed with the staff. Fiona Zamudio is an 80-year-old female with a past medical history of RLS, uterine cancer status post ALEJANDRO, diet-controlled diabetes, breast cancer status post bilateral mastectomy/chemo, GERD, duodenal cancer status post right hemicolectomy 3 weeks ago. 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 She underwent Whipple procedure on 4/22/2021 by Dr. Yuly Vinson without immediate complication. Subjective:   Awake and doing well on pressure support. Required small bolus overnight. On TPN and low rate LR. Urine output has been somewhat low this morning. Hemodynamically stable. Received 1 mg of Dilaudid this morning and respiratory rate dropped.     Current Facility-Administered Medications   Medication Dose Route Frequency    oxyCODONE IR (ROXICODONE) tablet 5 mg  5 mg Oral ONCE PRN    oxyCODONE IR (ROXICODONE) tablet 10 mg  10 mg Oral ONCE PRN    naloxone (NARCAN) injection 0.1 mg  0.1 mg IntraVENous Multiple    flumazeniL (ROMAZICON) 0.1 mg/mL injection 0.2 mg  0.2 mg IntraVENous PRN    diphenhydrAMINE (BENADRYL) injection 12.5 mg  12.5 mg IntraVENous Q15MIN PRN    TPN ADULT - dextrose 10% amino acid 4.25%   IntraVENous QPM    enoxaparin (LOVENOX) injection 40 mg  40 mg SubCUTAneous Q24H    famotidine (PF) (PEPCID) 20 mg in 0.9% sodium chloride 10 mL injection  20 mg IntraVENous Q12H    LORazepam (ATIVAN) injection 1 mg  1 mg IntraVENous Q6H PRN    HYDROmorphone (DILAUDID) injection 0.5-1 mg  0.5-1 mg IntraVENous Q2H PRN    propofol (DIPRIVAN) 10 mg/mL infusion  0-50 mcg/kg/min IntraVENous TITRATE    fentaNYL in normal saline (pf) 25 mcg/mL infusion  0-200 mcg/hr IntraVENous TITRATE    lactated Ringers infusion  50 mL/hr IntraVENous CONTINUOUS    insulin regular (NOVOLIN R, HUMULIN R) injection   SubCUTAneous Q4H    ondansetron (ZOFRAN) injection 4 mg  4 mg IntraVENous AC&HS    diphenhydrAMINE (BENADRYL) injection 25 mg  25 mg IntraVENous Q6H PRN    promethazine (PHENERGAN) with saline injection 12.5 mg  12.5 mg IntraVENous Q6H PRN    lip protectant (BLISTEX) ointment 1 Each  1 Each Topical PRN    fat emulsion 20% (LIPOSYN, INTRAlipid) infusion 250 mL  250 mL IntraVENous QPM    phenol throat spray (CHLORASEPTIC) 1 Spray  1 Spray Oral PRN    NUTRITIONAL SUPPORT ELECTROLYTE PRN ORDERS   Does Not Apply PRN    enalaprilat (VASOTEC) injection 0.625 mg  0.625 mg IntraVENous Q6H PRN       Review of Systems  Unobtainable due to patient status. Objective:     Vitals:    04/23/21 0830 04/23/21 0833 04/23/21 0845 04/23/21 0900   BP:       Pulse: (!) 114 (!) 114 (!) 114 (!) 117   Resp: 17 21 21 23   Temp:       SpO2: 97% 97% 97% 96%   Weight:       Height:             Intake/Output Summary (Last 24 hours) at 4/23/2021 0947  Last data filed at 4/23/2021 0800  Gross per 24 hour   Intake 5229.74 ml   Output 1615 ml   Net 3614.74 ml         Physical Exam:          Constitutional:  intubated and mechanically ventilated.   EENMT:  Sclera clear, pupils equal, oral mucosa moist  Respiratory: CTA on vent  Cardiovascular:  RRR with no M,G,R;  Gastrointestinal:  soft with no tenderness; decreased bowel sounds with surgical site/drain cdi  Musculoskeletal:  warm with no cyanosis, no lower extremity edema  Skin:  no jaundice or ecchymosis  Neurologic: no gross neuro deficits     Psychiatric:  Awake and alert     LINES:    ETT  Art line  R IJ CVC    DRIPS:    TPN  LR    CXR:        Ventilator Settings  Mode FIO2 Rate Tidal Volume Pressure PEEP   Pressure support  30 %    450 ml  5 cm H2O(decreased from 10)  8 cm H20      Peak airway pressure: 13 cm H2O   Minute ventilation: 6.5 l/min     ABG:   Recent Labs     04/23/21  0344 04/22/21 2011 04/22/21  1414   PHI 7.44 7. 45 7.56*   PCO2I 36.0 36.3 35.0   PO2I 137* 484* 174*   HCO3I 24.5 25.1 31.5*       LAB  Recent Labs     04/23/21  0716 04/23/21  0443 04/23/21  0135 04/22/21 2105 04/22/21 2011   GLUCPOC 217* 224* 237* 258* 253*     Recent Labs     04/23/21  0340 04/22/21 2022 04/22/21  0536 04/21/21  0500   WBC 13.6* 14.5*  --  10.4   HGB 10.7* 10.5*  --  11.4*   HCT 32.7* 33.0*  --  37.2    227  --  288   INR  --   --  0.9  --      Recent Labs     04/23/21 0340 04/22/21 2022 04/22/21  0537 04/21/21  0500    138 136 139   K 3.9 3.5 3.3* 3.1*    104 95* 94*   CO2 25 26 39* 41*   * 277* 157* 142*   BUN 32* 29* 30* 31*   CREA 0.75 0.86 0.84 0.91   MG 2.4 1.6*  --  2.1   PHOS 2.4  --   --  2.5   CA 8.0* 7.6* 8.8 8.8   ALB 2.4*  --  2.7*  --      No results for input(s): LCAD, LAC in the last 72 hours. Assessment:  (Medical Decision Making)     Hospital Problems  Date Reviewed: 3/23/2021          Codes Class Noted POA    Encounter for weaning from ventilator [facility]) ICD-10-CM: Z99.11  ICD-9-CM: V46.13  4/23/2021 Unknown        * (Principal) Adenocarcinoma of duodenum ([facility] ICD-10-CM: C17.0  ICD-9-CM: 152.0  4/22/2021 Unknown        Severe protein-calorie malnutrition ([facility] ICD-10-CM: E43  ICD-9-CM: 262  4/7/2021 Yes        Colon cancer ([facility] ICD-10-CM: C18.9  ICD-9-CM: 153.9  3/30/2021 Unknown              Plan:  (Medical Decision Making)     --Will wean to extubate today  --Discuss code status when more alert and extubated. --Continue TPN/fluids per Dr. Jerardo Reyna  --Incentive spirometry post-op  --use lower dose dilaudid  --250ml bolus to maintain UOP. --monitor tachycardia- suspect due to pain and perhaps slight hypovolemia. More than 50% of the time documented was spent in face-to-face contact with the patient and in the care of the patient on the floor/unit where the patient is located.     La Diaz MD

## 2021-04-23 NOTE — PROGRESS NOTES
Was awaking up, following command, on pressure support  Hemodynamically stable  Borderline UOP  ABD soft, drain serous  Labs ok    Extubate, watch UOP, out of bed.

## 2021-04-23 NOTE — PROGRESS NOTES
Respiratory Mechanics completed and are as follows:  Weaning Parameters  Spontaneous Breathing Trial Complete: Yes  Resp Rate Observed: 16  Ve: 6  VT: 415  RSBI: 25  VC: 1026  Blackmon Agitation Sedation Scale (RASS): Light sedation  Patient extubated to a 4L NC. Patient is able to communicate and is negative for stridor. Breath sounds are diminished. No complications with extubation.      Annika Mcgarry, RT

## 2021-04-23 NOTE — PROGRESS NOTES
Ventilator check complete; patient has a #7. 0 ET tube secured at the 20 at the lip. Patient is sedated. Patient is not able to follow commands. Breath sounds are coarse and diminished. Trachea is midline, Negative for subcutaneous air, and chest excursion is symmetric. Patient is also Negative for cyanosis and is Negative for pitting edema. All alarms are set and audible. Resuscitation bag is at the head of the bed.       Ventilator Settings  Mode FIO2 Rate Tidal Volume Pressure PEEP I:E Ratio   PRVC  40 %   18 450 ml     8 cm H20         Peak airway pressure: 19 cm H2O   Minute ventilation: 7.4 l/min       Conrad Harvey, RT

## 2021-04-24 PROBLEM — R00.0 TACHYCARDIA: Status: ACTIVE | Noted: 2021-01-01

## 2021-04-24 NOTE — PROGRESS NOTES
ACUTE PHYSICAL THERAPY GOALS:  (Developed with and agreed upon by patient and/or caregiver.)  STG:  (1.)Ms. Cony Duran will move from supine to sit and sit to supine , scoot up and down and roll side to side with MINIMAL ASSIST within 3 treatment day(s). (2.)Ms. Cony Duran will transfer from bed to chair and chair to bed with MINIMAL ASSIST using the least restrictive device within 3 treatment day(s). (3.)Ms. Cony Duran will ambulate with MINIMAL ASSIST for 50 feet with the least restrictive device within 3 treatment day(s). (4.)Ms. Cony Duran will perform standing static and dynamic balance activities x 15 minutes with MINIMAL ASSIST to improve safety within 3 treatment day(s). (5.)Ms. Cony Duran will maintain stable vital signs throughout all functional mobility within 3 treatment days. LTG:  (1.)Ms. Cony Duran will move from supine to sit and sit to supine , scoot up and down and roll side to side in bed with CONTACT GUARD ASSIST within 7 treatment day(s). (2.)Ms. Cony Duran will transfer from bed to chair and chair to bed with CONTACT GUARD ASSIST using the least restrictive device within 7 treatment day(s). (3.)Ms. Cony Duran will ambulate with CONTACT GUARD ASSIST for 150 feet with the least restrictive device within 7 treatment day(s). (4.)Ms. Cony Duran will perform standing static and dynamic balance activities x 15 minutes with CONTACT GUARD ASSIST to improve safety within 7 treatment day(s). (5.)Ms. Cony Duran will ambulate and/or perform functional activities for 15 consecutive minutes with stable vital signs and no rests required to improve activity tolerance within 7 treatment days.   ________________________________________________________________________________________________    PHYSICAL THERAPY ASSESSMENT: Daily Note, Re-evaluation and AM PT Treatment Day # 1      Haven Betancourt is a [de-identified] y.o. female   PRIMARY DIAGNOSIS: Adenocarcinoma of duodenum ([facility]  [facility], [facility] [C18.9]  Colon cancer ([facility] [C18.9]  Procedure(s) (LRB):  WHIPPLE PROCEDURE (N/A)  2 Days Post-Op  Reason for Referral:    ICD-10: Treatment Diagnosis: Generalized Muscle Weakness (M62.81)  Difficulty in walking, Not elsewhere classified (R26.2)  [place]PAT[place] / [place] PART [place] B / Product Type: Medicare /     ASSESSMENT:     REHAB RECOMMENDATIONS:   Recommendation to date pending progress:  [address] [place] [address] vs. Short-term Rehab  Equipment:    To Be Determined     PRIOR LEVEL OF FUNCTION:  (Prior to Hospitalization) INITIAL/CURRENT LEVEL OF FUNCTION:  (Most Recently Demonstrated)   Bed Mobility:   Independent  Sit to Stand:   Independent  Transfers:   Independent  Gait/Mobility:   Independent Bed Mobility:   Moderate Assistance x 2  Sit to Stand:   Moderate Assistance  Transfers:   Moderate Assistance  Gait/Mobility:   Moderate Assistance side steps at edge of bed     ASSESSMENT:  Ms. Curly Tobar is seen this date following whipple procedure and extubation yesterday for physical therapy re-evaluation. Educated on log roll technique and able to perform with moderate assist x2. HR elevated prior to therapy in 120's so per MD request, dangling at edge of bed. Only increased to 130 with EOB sitting but patient reported increase in pain. She sat edge of bed working on activity tolerance and then was able to complete sit to stand transfer and side steps at edge of bed with moderate assist. Returned to supine with max A x2 secondary to pain and RN medicated following PT session. She would likely be able to transfer to the chair, however with elevated HR and per MD request, assisted back to bed. She has experienced a decline in physical functioning and goals have been revised. Haven Staton is currently functioning below her baseline and would benefit from skilled PT during acute care stay to maximize safety and independence with functional mobility. SUBJECTIVE:   Ms. Chan Sick states, \"This doesn't hurt too too bad (gives pain 10/10 while sitting edge of bed)\"    SOCIAL HISTORY/LIVING ENVIRONMENT: lives alone in large house, no AD used, IND with all ADLs, drives, retired. Family 0.25 miles away.   Home Environment: Private residence  One/Two Story Residence: One story  Living Alone: No  Support Systems: Child(lissa), Family member(s)  OBJECTIVE:     PAIN: VITAL SIGNS: LINES/DRAINS:   Pre Treatment: Pain Screen  Pain Scale 1: Numeric (0 - 10)  Pain Intensity 1: 8  Post Treatment: 10, RN notified and providing pain medicine   Arroyo Catheter, IV and abdominal drains x2 and J-tube to arroyo  O2 Device: Nasal cannula     GROSS EVALUATION:   Within Functional Limits Abnormal/ Functional Abnormal/ Non-Functional (see comments) Not Tested Comments:   AROM [x] [x] [] []    PROM [] [x] [] []    Strength [] [x] [] [] Generally weak   Balance [] [x] [] []    Posture [] [] [] []    Sensation [] [] [] []    Coordination [] [] [] []    Tone [] [] [] []    Edema [] [] [] []    Activity Tolerance [] [x] [x] [] 3 L/min O2    [] [] [] []      COGNITION/  PERCEPTION: Intact Impaired   (see comments) Comments:   Orientation [] [x]    Vision [x] []    Hearing [x] []    Command Following [x] []    Safety Awareness [] [x]     [] []      MOBILITY: I Mod I S SBA CGA Min Mod Max Total  NT x2 Comments:   Bed Mobility    Rolling [] [] [] [] [] [] [x] [] [] [] [x]    Supine to Sit [] [] [] [] [] [] [x] [] [] [] [x]    Scooting [] [] [] [] [] [] [x] [] [] [] [x]    Sit to Supine [] [] [] [] [] [] [] [x] [] [] [x]    Transfers    Sit to Stand [] [] [] [] [] [] [x] [] [] [] []    Bed to Chair [] [] [] [] [] [] [] [] [] [x] []    Stand to Sit [] [] [] [] [] [] [x] [] [] [] []    I=Independent, Mod I=Modified Independent, S=Supervision, SBA=Standby Assistance, CGA=Contact Guard Assistance,   Min=Minimal Assistance, Mod=Moderate Assistance, Max=Maximal Assistance, Total=Total Assistance, NT=Not Tested  GAIT: I Mod I S SBA CGA Min Mod Max Total  NT x2 Comments:   Level of Assistance [] [] [] [] [] [] [x] [] [] [] []    Distance 3'    DME Rolling Walker    Gait Quality Decreased step clearance, side step at EOB    Weightbearing Status N/A     I=Independent, Mod I=Modified Independent, S=Supervision, SBA=Standby Assistance, CGA=Contact Guard Assistance,   Min=Minimal Assistance, Mod=Moderate Assistance, Max=Maximal Assistance, Total=Total Assistance, NT=Not Tested    [place] Form       How much difficulty does the patient currently have. .. Unable A Lot A Little None   1. Turning over in bed (including adjusting bedclothes, sheets and blankets)? [] 1   [x] 2   [] 3   [] 4   2. Sitting down on and standing up from a chair with arms ( e.g., wheelchair, bedside commode, etc.)   [] 1   [x] 2   [] 3   [] 4   3. Moving from lying on back to sitting on the side of the bed? [] 1   [x] 2   [] 3   [] 4   How much help from another person does the patient currently need. .. Total A Lot A Little None   4. Moving to and from a bed to a chair (including a wheelchair)? [] 1   [x] 2   [] 3   [] 4   5. Need to walk in hospital room? [] 1   [x] 2   [] 3   [] 4   6. Climbing 3-5 steps with a railing? [x] 1   [] 2   [] 3   [] 4   © 2007, Trustees of [facility] [employer], under license to [employer]. All rights reserved     Score:  Initial: 17 Most Recent: 11 (Date: 04/24/21 )    Interpretation of Tool:  Represents activities that are increasingly more difficult (i.e. Bed mobility, Transfers, Gait). PLAN:   FREQUENCY/DURATION: PT Plan of Care: 3 times/week for duration of hospital stay or until stated goals are met, whichever comes first.    PROBLEM LIST:   (Skilled intervention is medically necessary to address:)  1. Decreased ADL/Functional Activities  2. Decreased Activity Tolerance  3. Decreased AROM/PROM  4. Decreased Balance  5.  Decreased Coordination  6. Decreased Gait Ability  7. Decreased Strength  8. Decreased Transfer Abilities  9. Increased Pain   INTERVENTIONS PLANNED:   (Benefits and precautions of physical therapy have been discussed with the patient.)  1. Therapeutic Activity  2. Therapeutic Exercise/HEP  3. Neuromuscular Re-education  4. Gait Training  5. Education     TREATMENT:     EVALUATION: Moderate Complexity : (Untimed Charge)    TREATMENT:   ($$ Therapeutic Activity: 23-37 mins    )  Therapeutic Activity (23 Minutes): Therapeutic activity included Rolling, Supine to Sit, Sit to Supine, Transfer Training, Ambulation on level ground, Sitting balance  and Standing balance to improve functional Mobility, Strength and Activity tolerance.     TREATMENT GRID:  N/A    AFTER TREATMENT POSITION/PRECAUTIONS:  Bed, Needs within reach, RN notified and Visitors at bedside    INTERDISCIPLINARY COLLABORATION:  RN/PCT and PT/PTA    TOTAL TREATMENT DURATION:  PT Patient Time In/Time Out  Time In: 1030  Time Out: [address], PT, DPT

## 2021-04-24 NOTE — PROGRESS NOTES
Albumin infusion completed. Dr Trixie Daly updated on VS, UOP and CVP. New orders noted  Goal of 10 for CVP. Pt is flushed, temp 101.1 Dr Trixie Daly updated again. New orders for blood cultures and urine culture. Sputum culture already sent earlier today. Dr Jona Pugh updated on pt's temp and the start of zosyn.

## 2021-04-24 NOTE — PROGRESS NOTES
Bedside report received from [facility]. Pt resting quietly. HR in 110's. Pt easily arouses to name. pt had coughed up thick clear secretions. Mouth care done. Pt continued to cough up more sputum. Pillow to abdomen for coughing.   BJ=309, coverage given. Pt turned and repositioned in bed. Pt given Dilaudid 0.5 mg iv for abdominal pain. Abdominal dressing clean, dry and intact. Drains to either side of abdomen draining serosanguinous drainage. J-tube to arroyo bag with minimal dark green drainage in bag. Arroyo draining clear yellow urine. Call light in reach.

## 2021-04-24 NOTE — PROGRESS NOTES
Critical Care Daily Progress Note: 4/24/2021  Admission Date: 3/30/2021     The patient's chart is reviewed and the patient is discussed with the staff. Sara Kim is an 51-year-old female with a past medical history of RLS, uterine cancer status post ALEJANDRO, diet-controlled diabetes, breast cancer status post bilateral mastectomy/chemo, GERD, duodenal cancer status post right hemicolectomy 3 weeks ago. 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 She underwent Whipple procedure on 4/22/2021 by Dr. Macey Gallardo without immediate complication. Subjective: Thick whitish phlegm this morning. Tm 99.1, Leukocytosis higher today at 20.1K. Tachycardic yesterday and today. Fluid balance positive 3.6L yesterday after +3L the two preceding days. On TPN. UOP yesterday only 675ml. Art line removed yesterday. On 3lpm now.       Current Facility-Administered Medications   Medication Dose Route Frequency    pantoprazole (PROTONIX) 40 mg in 0.9% sodium chloride 10 mL injection  40 mg IntraVENous Q12H    metoprolol (LOPRESSOR) injection 2.5 mg  2.5 mg IntraVENous Q6H    TPN ADULT-CENTRAL - dextrose 15% amino acid 5%   IntraVENous QPM    naloxone (NARCAN) injection 0.1 mg  0.1 mg IntraVENous Multiple    enoxaparin (LOVENOX) injection 40 mg  40 mg SubCUTAneous Q24H    LORazepam (ATIVAN) injection 1 mg  1 mg IntraVENous Q6H PRN    HYDROmorphone (DILAUDID) injection 0.5-1 mg  0.5-1 mg IntraVENous Q2H PRN    lactated Ringers infusion  50 mL/hr IntraVENous CONTINUOUS    insulin regular (NOVOLIN R, HUMULIN R) injection   SubCUTAneous Q4H    ondansetron (ZOFRAN) injection 4 mg  4 mg IntraVENous AC&HS    promethazine (PHENERGAN) with saline injection 12.5 mg  12.5 mg IntraVENous Q6H PRN    lip protectant (BLISTEX) ointment 1 Each  1 Each Topical PRN    fat emulsion 20% (LIPOSYN, INTRAlipid) infusion 250 mL  250 mL IntraVENous QPM    phenol throat spray (CHLORASEPTIC) 1 Spray  1 Spray Oral PRN    NUTRITIONAL SUPPORT ELECTROLYTE PRN ORDERS   Does Not Apply PRN       Review of Systems  Unobtainable due to patient status. Objective:     Vitals:    04/24/21 0700 04/24/21 0730 04/24/21 0800 04/24/21 0830   BP: (!) 114/55 (!) 103/53 (!) 103/55 (!) 105/55   Pulse: (!) 122 (!) 116 (!) 119 (!) 121   Resp: 27 21 18 15   Temp: 99.1 °F (37.3 °C)      SpO2: 92% 91% 91% 92%   Weight:       Height:             Intake/Output Summary (Last 24 hours) at 4/24/2021 0921  Last data filed at 4/24/2021 0509  Gross per 24 hour   Intake 4779.71 ml   Output 1090 ml   Net 3689.71 ml         Physical Exam:          Constitutional:  intubated and mechanically ventilated.   EENMT:  Sclera clear, pupils equal, oral mucosa moist  Respiratory: CTA on vent  Cardiovascular:  RRR with no M,G,R;  Gastrointestinal:  soft with no tenderness; decreased bowel sounds with surgical site/drain cdi  Musculoskeletal:  warm with no cyanosis, no lower extremity edema  Skin:  no jaundice or ecchymosis  Neurologic: no gross neuro deficits     Psychiatric:  Awake and alert     LINES:    R IJ CVC    DRIPS:    TPN  LR    CXR:        Ventilator Settings  Mode FIO2 Rate Tidal Volume Pressure PEEP   Pressure support  30 %    450 ml  5 cm H2O(decreased from 10)  8 cm H20      Peak airway pressure: 13 cm H2O   Minute ventilation: 6.5 l/min     ABG:   Recent Labs     04/23/21  1350 04/23/21  0344 04/22/21 2011   PHI 7.36 7.44 7.45   PCO2I 44.1 36.0 36.3   PO2I 150* 137* 484*   HCO3I 24.9 24.5 25.1       LAB  Recent Labs     04/24/21  0727 04/24/21  0250 04/23/21  2215 04/23/21  1859 04/23/21  1513   GLUCPOC 175* 172* 192* 201* 189*     Recent Labs     04/24/21  0253 04/23/21  0340 04/22/21 2022 04/22/21  0536   WBC 20.1* 13.6* 14.5*  --    HGB 9.0* 10.7* 10.5*  --    HCT 29.0* 32.7* 33.0*  --     219 227  --    INR  --   --   --  0.9     Recent Labs 04/24/21  0253 04/23/21  1103 04/23/21  0340 04/22/21 2022 04/22/21  0537    138 138 138 136   K 4.4 4.0 3.9 3.5 3.3*   * 106 106 104 95*   CO2 25 28 25 26 39*   * 229* 250* 277* 157*   BUN 36* 33* 32* 29* 30*   CREA 0.71 0.69 0.75 0.86 0.84   MG  --  2.0 2.4 1.6*  --    PHOS 2.0*  --  2.4  --   --    CA 8.3 8.2* 8.0* 7.6* 8.8   ALB 2.1*  --  2.4*  --  2.7*     No results for input(s): LCAD, LAC in the last 72 hours. Assessment:  (Medical Decision Making)     Hospital Problems  Date Reviewed: 3/23/2021          Codes Class Noted POA    Encounter for weaning from ventilator [facility]) ICD-10-CM: Z99.11  ICD-9-CM: V46.13  4/23/2021 Unknown        * (Principal) Adenocarcinoma of duodenum ([facility] ICD-10-CM: C17.0  ICD-9-CM: 152.0  4/22/2021 Unknown        Severe protein-calorie malnutrition ([facility] ICD-10-CM: E43  ICD-9-CM: 592  4/7/2021 Yes        Colon cancer ([facility] ICD-10-CM: C18.9  ICD-9-CM: 153.9  3/30/2021 Unknown              Plan:  (Medical Decision Making)   --albumin 5% x 1 L today. Concerned hypovolemia may contribute to tachycardia and low UOP. CVP 6  --PT today. --CXR, sputum cultures, procal   --check ECG to confirm sinus tachycardia. --insulin in TPN needs to be adjusted for hyperglycemia with Goal 140-180  --code status confirmed DNR    More than 50% of the time documented was spent in face-to-face contact with the patient and in the care of the patient on the floor/unit where the patient is located.     Alma Hartmann MD

## 2021-04-24 NOTE — PROGRESS NOTES
Comprehensive Nutrition Assessment    Type and Reason for Visit: Reassess  TPN Management (Surgery)    Nutrition Recommendations/Plan:   · Parenteral Nutrition:  · Central TPN:    · Change to 15%DEX/5%AA to 88 ml/hr (2 L) with 250 ml 20% Lipids daily over 12 hrs - 1920 calories/day (100% calorie goal), 100 gm protein (100% protein goal) in ~2250ml volume/day.   · Lytes/L:   · Sodium 100 meq (100 meq NaCl), Potassium 20 meq (5 meq KCl, 15 meq KPO4), omit Mg, 4.5 meq Calcium. Solution remains max Chloride.   · Other additives: MTE, MVI, 15 units insulin/liter  · IVF:  ·  IVF per MD  · Vitamin and Mineral Supplement Therapy:  · Electrolyte management replacement protocol active.   · Labs:   · BMP daily,Phos and Mg MWF.  Continue POC Glucoses/SSI   · Daily weight        Malnutrition Assessment:  Malnutrition Status: Severe malnutrition  Context: Chronic illness  Findings of clinical characteristics of malnutrition:   Energy Intake:  7 - 75% or less est energy requirements for 1 month or longer  Weight Loss:  7.0 - Greater than 7.5% over 3 months((226# to 182# (19.5%), 226# to 201# (11%) in < 3 months)     Body Fat Loss:  Unable to assess,     Muscle Mass Loss:  Unable to assess,    Fluid Accumulation:  Unable to assess,     Strength:  Not performed     Nutrition Assessment:   Nutrition History: 4/6: Pt reports at baseline she eats 2 small meals a day of oatmeal or grits for breakfast and then a \"normal\" meal for her 2nd meal of the day. About 8 weeks ago, she was eating a [employer] meal and had N/V which she thought was food poisioning. However she continued to have N/V with all po intake, thus intake was <25% of usual. She was mainly consuming gatorade, Boost or Ensure PTA. She had weight loss from 226# to 182#. She reports addtional weight loss since her pre assessment weight on 3/24/2021.       Nutrition Background: H/O: breast cancer with bilateral mastectomy, uterine cancer s/p hysterectomy, DM, HTN, RLS, GERD and claustrophobia. 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 She has had postop N&V and been unable to tolerate po diet progression  Daily Update:  Patient is visited in her room and she was found asleep with her TPN infusing at its ordered rate. NGT was removed today. Abdominal Status (last documented): Semi-soft, Tender(abdominal incision with dry dressing to site) abdomen with Hypoactive  bowel sounds. Last BM 04/17/21(last charted BM). Pertinent Medications: Protonix, Zofran, SSI coverage  Pertinent Labs:   Lab Results   Component Value Date/Time    Sodium 140 04/24/2021 02:53 AM    Potassium 4.4 04/24/2021 02:53 AM    Chloride 110 (H) 04/24/2021 02:53 AM    CO2 25 04/24/2021 02:53 AM    Anion gap 5 (L) 04/24/2021 02:53 AM    Glucose 184 (H) 04/24/2021 02:53 AM    BUN 36 (H) 04/24/2021 02:53 AM    Creatinine 0.71 04/24/2021 02:53 AM    Calcium 8.3 04/24/2021 02:53 AM    Albumin 2.1 (L) 04/24/2021 02:53 AM    Magnesium 2.0 04/23/2021 11:03 AM    Phosphorus 2.7 04/24/2021 09:57 AM     Lab Results   Component Value Date/Time    Glucose 184 (H) 04/24/2021 02:53 AM    Glucose (POC) 159 (H) 04/24/2021 11:11 AM    Glucose (POC) 175 (H) 04/24/2021 07:27 AM    Glucose (POC) 172 (H) 04/24/2021 02:50 AM    Glucose (POC) 192 (H) 04/23/2021 10:15 PM    Glucose (POC) 201 (H) 04/23/2021 06:59 PM    Glucose (POC) 189 (H) 04/23/2021 03:13 PM      Serum potassium continues to climb. NGT now out - will reduce K content of TPN to 20 mEq/liter. Nutrition Related Findings:   Diet: 3/30 Clear, 4/2 NPO, 4/5 sips of clears, Ensure clear. 4/6: PPN started. 4/8:NGT placed. CVC placed 4/9 and converted to CPN. SBFT 4/12 with delayed gstric emptying with hypotonic bowel with no contrast in small bowel or colon. NGT out 4/14. 4/15 diet advanced to FLD and TPN decreased to 1L per MD.  1L TPN to continues on 1 liter per surgery. TPN taken back to goal of 2 L 4/19.  NGT placed prior to EGD 4/19, now to LIS. 4/23: in CCU post-op Whipple 4/22. Ext 4/23, NGT to LIS. 4/24 - NGT removed. Wound Type: Surgical incision    Current Nutrition Therapies:  DIET NPO  Current Parenteral Nutrition Orders:  · Type and Formula: 15%DEX/5%AA   · Lipids: 250ml, Daily  · Duration: Continuous  · Rate/Volume: 85 ml/hr (2L)  · Current PN Order Provides: at goal  · Goal PN Orders Provides: 1920 calories/day, 100 gm protein/day in ~2250 ml volume/day    Current Intake:   Average Meal Intake: NPO Average Supplement Intake: NPO      Anthropometric Measures:  Height: 5' 6\" (167.6 cm)  Current Body Wt: 87.3 kg (192 lb 7.4 oz)(4/24/21 CCU), Weight source: Bed scale  BMI: 31.1, Obese class 1 (BMI 30.0-34. 9)  Admission Body Weight: 192 lb 7.4 oz(3/24 pre assessment)  Ideal Body Weight (lbs) (Calculated): 130 lbs (59 kg), 148 %  Usual Body Wt: 102.5 kg (226 lb), Percent weight change: -19.5          Edema: No data recorded   Estimated Daily Nutrient Needs:  Energy (kcal/day): 1929-8051(60 -25 jessica/kg/day using 83 kg - obese, post-op Whipple) (Kcal/kg, Weight Used: Current(91.3 kg-bed scale 4/7))  Protein (g/day): 100-166 (1.2-2 gm pro/kg/day using 83 kg - obese, post-op Whipple) Weight Used: (Current)  Fluid (ml/day):   (1 ml/kcal)    Nutrition Diagnosis:   · Inadequate oral intake related to altered GI structure as evidenced by (NPO. NGT to LIS, post-op Whipple)    · Severe malnutrition related to inadequate protein-energy intake as evidenced by (s/s as above per malnutrition assessment)    Nutrition Interventions:   Food and/or Nutrient Delivery: Continue NPO, Modify parenteral nutrition     Coordination of Nutrition Care: Continue to monitor while inpatient  Plan of Care discussed with Altaf Humphreys RN    Goals:   Previous Goal Met: Goal(s) achieved  Active Goal: Tolerate TPN at revised goal.    Nutrition Monitoring and Evaluation:      Food/Nutrient Intake Outcomes: Parenteral nutrition intake/tolerance  Physical Signs/Symptoms Outcomes: Biochemical data, GI status    Discharge Planning: Too soon to determine    Adrian Leggett.  Nolberto Zaidi RD, LD on 4/24/2021 at 12:42 PM  Contact: [phone]

## 2021-04-24 NOTE — PROGRESS NOTES
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 Extubated yesterday. WBC 20.1. On TPN, Currently on O2@ 3L. OBJECTIVE:  Constitutional: Alert, cooperative, no acute distress; appears stated age   Visit Vitals  BP (!) 112/53   Pulse (!) 123   Temp (!) 100.5 °F (38.1 °C)   Resp 23   Ht 5' 6\" (1.676 m)   Wt 192 lb 7.4 oz (87.3 kg)   SpO2 95%   BMI 31.06 kg/m²     Eyes: Sclera are clear. ENMT: no external lesions gross hearing normal; no obvious neck masses, no ear or lip lesions, NG  CV: Tachy. Normal perfusion  Resp: No JVD. Breathing is  non-labored; no audible wheezing. GI: soft and non-distended, appropriately tender, Drain x 2, J tube to gravity bag    Musculoskeletal: No embolic signs or cyanosis.    Neuro: moves all 4; no focal deficits  Psychiatric: normal affect and mood    Patient Vitals for the past 24 hrs:   BP Temp Pulse Resp SpO2 Weight   04/24/21 1600 (!) 112/53 (!) 100.5 °F (38.1 °C) (!) 123 23 95 % --   04/24/21 1530 -- -- (!) 127 26 94 % --   04/24/21 1500 (!) 116/56 (!) 101.2 °F (38.4 °C) (!) 123 23 95 % --   04/24/21 1454 -- (!) 101.1 °F (38.4 °C) -- -- -- --   04/24/21 1430 (!) 118/56 -- (!) 123 27 91 % --   04/24/21 1400 (!) 113/54 -- (!) 123 18 94 % --   04/24/21 1337 -- -- -- -- 95 % --   04/24/21 1330 (!) 118/55 -- (!) 121 18 93 % --   04/24/21 1300 (!) 120/57 -- (!) 123 19 94 % --   04/24/21 1230 (!) 122/59 -- (!) 120 16 94 % --   04/24/21 1200 (!) 117/58 -- (!) 118 18 94 % --   04/24/21 1130 (!) 108/53 -- (!) 119 18 93 % --   04/24/21 1122 (!) 109/52 -- (!) 109 -- -- --   04/24/21 1119 (!) 109/52 -- (!) 119 18 91 % --   04/24/21 1100 (!) 143/65 100.3 °F (37.9 °C) (!) 129 (!) 34 (!) 87 % --   04/24/21 1035 (!) 124/56 -- (!) 124 20 92 % --   04/24/21 1030 (!) 103/56 -- (!) 122 18 92 % --   04/24/21 1005 -- -- -- -- 91 % --   04/24/21 1000 (!) 110/56 -- (!) 125 20 92 % --   04/24/21 0938 -- -- (!) 127 20 93 % --   04/24/21 0930 (!) 112/58 -- (!) 129 24 92 % --   04/24/21 0900 (!) 106/58 -- (!) 123 20 92 % --   04/24/21 0830 (!) 105/55 -- (!) 121 15 92 % --   04/24/21 0800 (!) 103/55 -- (!) 119 18 91 % --   04/24/21 0730 (!) 103/53 -- (!) 116 21 91 % --   04/24/21 0700 (!) 114/55 99.1 °F (37.3 °C) (!) 122 27 92 % --   04/24/21 0630 (!) 114/59 -- (!) 116 16 92 % --   04/24/21 0600 (!) 110/57 -- (!) 117 13 93 % --   04/24/21 0530 (!) 109/56 -- (!) 112 15 93 % --   04/24/21 0500 (!) 112/57 -- (!) 123 15 94 % --   04/24/21 0430 (!) 109/56 -- (!) 122 18 93 % --   04/24/21 0400 (!) 111/55 -- (!) 123 20 95 % --   04/24/21 0330 (!) 118/57 -- (!) 122 16 94 % --   04/24/21 0300 (!) 123/59 99.3 °F (37.4 °C) (!) 122 16 94 % --   04/24/21 0252 -- -- -- -- -- 192 lb 7.4 oz (87.3 kg)   04/24/21 0230 (!) 123/59 -- (!) 121 13 94 % --   04/24/21 0200 (!) 112/59 -- (!) 120 22 95 % --   04/24/21 0130 (!) 114/55 -- (!) 119 18 94 % --   04/24/21 0100 119/60 -- (!) 115 15 95 % --   04/24/21 0030 (!) 109/59 -- (!) 114 17 94 % --   04/23/21 2330 (!) 95/53 -- (!) 108 12 93 % --   04/23/21 2300 (!) 115/54 100.4 °F (38 °C) (!) 127 14 93 % --   04/23/21 2230 (!) 112/55 -- (!) 128 20 93 % --   04/23/21 2200 124/61 -- (!) 126 17 92 % --   04/23/21 2130 (!) 115/59 -- (!) 123 16 95 % --   04/23/21 2100 (!) 126/59 -- (!) 126 20 95 % --   04/23/21 2034 -- -- -- -- -- 192 lb 10.9 oz (87.4 kg)   04/23/21 2030 (!) 115/56 -- (!) 122 18 94 % --   04/23/21 2000 125/62 -- (!) 118 19 95 % -- 04/23/21 1931 128/60 -- (!) 116 19 95 % --   04/23/21 1915 130/62 -- (!) 116 21 96 % --   04/23/21 1900 134/63 100.2 °F (37.9 °C) (!) 115 19 95 % --   04/23/21 1845 129/60 -- (!) 111 18 96 % --   04/23/21 1830 (!) 113/55 -- (!) 106 18 96 % --   04/23/21 1815 (!) 115/57 -- (!) 103 28 94 % --   04/23/21 1800 (!) 115/55 -- 98 19 95 % --   04/23/21 1745 130/67 -- (!) 118 (!) 31 91 % --   04/23/21 1730 (!) 114/56 -- (!) 119 20 96 % --   04/23/21 1715 (!) 106/59 -- (!) 113 19 96 % --   04/23/21 1700 (!) 104/55 -- (!) 112 19 96 % --   04/23/21 1645 (!) 108/56 -- (!) 113 22 95 % --   04/23/21 1630 (!) 113/59 -- (!) 111 17 95 % --   04/23/21 1615 (!) 106/55 -- (!) 109 19 96 % --     Labs:    Recent Labs     04/24/21  0253 04/22/21  0536 04/22/21  0536   WBC 20.1*   < >  --    HGB 9.0*   < >  --       < >  --       < >  --    K 4.4   < >  --    *   < >  --    CO2 25   < >  --    BUN 36*   < >  --    CREA 0.71   < >  --    *   < >  --    PTP  --   --  12.7   INR  --   --  0.9   APTT  --   --  20.8*   TBILI 0.5   < >  --    ALT 55   < >  --    AP 53   < >  --     < > = values in this interval not displayed.        Cleo Bolivar NP

## 2021-04-24 NOTE — PROGRESS NOTES
Phosphorous replacement completed. Level rechecked. Sputum specimen sent to lab. Rt at bedside for resp treatment. Albumin infusion started per order.

## 2021-04-24 NOTE — PROGRESS NOTES
Phosphorous level 2.7. no further replacement needed. Pt sat on side of bed with PT for 5 min. Heart rate increased to 130 briefly then back to 126. Sat dropped to 88%, O2 increased to 4 liters. NGT removed per Dr Dean Benson order.

## 2021-04-24 NOTE — PROGRESS NOTES
Pt resting quietly with eyes closed. Sat 90% on O2 2 liters NC . Increased O2 to 3 liters, sat now 92%. Hr remains in 110's.

## 2021-04-25 PROBLEM — J69.0 ASPIRATION PNEUMONIA OF RIGHT UPPER LOBE (HCC): Status: ACTIVE | Noted: 2021-01-01

## 2021-04-25 PROBLEM — Z99.11 ENCOUNTER FOR WEANING FROM VENTILATOR (HCC): Status: RESOLVED | Noted: 2021-01-01 | Resolved: 2021-01-01

## 2021-04-25 PROBLEM — D50.0 ANEMIA DUE TO BLOOD LOSS: Status: ACTIVE | Noted: 2021-01-01

## 2021-04-25 PROBLEM — E86.1 HYPOVOLEMIA: Status: ACTIVE | Noted: 2021-01-01

## 2021-04-25 PROBLEM — D69.6 THROMBOCYTOPENIA (HCC): Status: ACTIVE | Noted: 2021-01-01

## 2021-04-25 NOTE — PROGRESS NOTES
PT here to get pt out of bed. Pt assisted to side of bed. While sitting on side of bed, malfunction alarm went off on bed. Alarm was very loud and ear-piercing. Pt became upset and anxious and no longer wanted to participate. Pt returned to bed. Pain medication given. New bed obtained. Pt was transferred to new bed.

## 2021-04-25 NOTE — PROGRESS NOTES
Pt easily arouses to name. Remains alert and oriented x 4. CV=083, coverage given. Mouth care done. Pt has thick beige sputum in mouth. Pt states she feels better this am than yesterday. Pt turned and repositioned in bed. Pt states pain is less today with turning. Refuses any pain medication at this time.

## 2021-04-25 NOTE — PROGRESS NOTES
Bedside and Verbal shift change report given to Carlos Enrique Smalls RN (oncoming nurse) by Sara Solorio RN (offgoing nurse). Report included the following information SBAR, Kardex, ED Summary, OR Summary, Intake/Output, MAR, Recent Results, Cardiac Rhythm ST and Alarm Parameters . 22-Feb-2021 13:27

## 2021-04-25 NOTE — PROGRESS NOTES
PLAN:  NG DC 4/24/21  Start Clears  DC LR  J tube to gravity  Start Trickle TF @10cc/hr via J tube  TPN  Lovenox  IV Abx - Zosyn  Protonix  Pain/ nausea control  IVF   Follow labs  PT following  2U PRBC's today    ASSESSMENT:  Admit Date: 3/30/2021   3 Days Post-Op  Procedure(s):  WHIPPLE PROCEDURE    Principal Problem:    Adenocarcinoma of duodenum ([address] (4/22/2021)    Active Problems:    Colon cancer ([address] (3/30/2021)      Severe protein-calorie malnutrition ([address] (4/7/2021)      Tachycardia (4/24/2021)      Aspiration pneumonia of right upper lobe ([address] (4/25/2021)      Hypovolemia (4/25/2021)      Anemia due to blood loss (4/25/2021)      Thrombocytopenia ([address] (4/25/2021)         SUBJECTIVE:  Pt sitting up in bed. WBC 20.1. On TPN, Currently on O2@ 4L via NC. Productive cough. On Zosyn. Hgb 7.3. Denies nausea/ vomiting. -flatus. Temp Max 101.2F.      OBJECTIVE:  Constitutional: Alert, cooperative, no acute distress; appears stated age   Visit Vitals  BP (!) 99/57   Pulse (!) 111   Temp 97.9 °F (36.6 °C)   Resp 18   Ht 5' 6\" (1.676 m)   Wt 198 lb 6.6 oz (90 kg)   SpO2 95%   BMI 32.02 kg/m²     Eyes: Sclera are clear. ENMT: no external lesions gross hearing normal; no obvious neck masses, no ear or lip lesions  CV: Tachy. Normal perfusion  Resp: No JVD. Breathing is  non-labored; no audible wheezing. GI: soft and non-distended, appropriately tender, Drain x 2, J tube to gravity bag    Musculoskeletal: No embolic signs or cyanosis.    Neuro: moves all 4; no focal deficits  Psychiatric: normal affect and mood    Patient Vitals for the past 24 hrs:   BP Temp Pulse Resp SpO2 Weight   04/25/21 1031 (!) 99/57 97.9 °F (36.6 °C) (!) 111 18 95 % --   04/25/21 1030 (!) 99/57 -- (!) 107 15 94 % --   04/25/21 0930 (!) 124/58 -- (!) 113 24 91 % --   04/25/21 0900 (!) 137/59 -- (!) 114 24 93 % --   04/25/21 0830 (!) 110/59 -- (!) 109 22 91 % --   04/25/21 0800 114/66 -- (!) 109 21 95 % --   04/25/21 0730 (!) 116/58 -- (!) 101 17 96 % --   04/25/21 0701 116/61 99 °F (37.2 °C) (!) 101 16 96 % --   04/25/21 0630 (!) 101/59 -- (!) 101 16 95 % --   04/25/21 0600 105/63 -- (!) 102 15 95 % --   04/25/21 0530 (!) 109/58 -- (!) 102 17 95 % --   04/25/21 0500 -- -- (!) 107 16 95 % --   04/25/21 0430 (!) 113/57 -- (!) 114 27 94 % --   04/25/21 0400 (!) 115/56 -- (!) 109 18 93 % --   04/25/21 0348 124/61 98.8 °F (37.1 °C) (!) 120 24 95 % --   04/25/21 0330 124/61 -- (!) 112 23 95 % --   04/25/21 0300 126/64 -- (!) 114 23 96 % --   04/25/21 0230 (!) 119/58 -- (!) 117 27 95 % --   04/25/21 0200 132/62 -- (!) 118 24 96 % --   04/25/21 0130 114/61 -- (!) 114 27 94 % --   04/25/21 0100 117/64 -- (!) 120 25 95 % --   04/25/21 0030 121/61 -- (!) 124 27 95 % --   04/25/21 0000 129/60 100.2 °F (37.9 °C) (!) 120 25 96 % --   04/24/21 2330 (!) 131/59 -- (!) 119 26 95 % --   04/24/21 2300 (!) 108/58 -- (!) 118 23 97 % --   04/24/21 2230 (!) 115/55 -- (!) 117 22 95 % --   04/24/21 2200 126/60 -- (!) 120 25 96 % --   04/24/21 2130 (!) 114/55 -- (!) 121 22 94 % --   04/24/21 2100 (!) 114/57 -- (!) 124 22 94 % --   04/24/21 2035 -- -- -- -- 96 % --   04/24/21 2000 (!) 141/60 99 °F (37.2 °C) (!) 123 22 93 % 198 lb 6.6 oz (90 kg)   04/24/21 1930 127/60 -- (!) 124 24 95 % --   04/24/21 1900 (!) 136/58 -- (!) 124 25 95 % --   04/24/21 1800 (!) 122/58 -- (!) 123 21 95 % --   04/24/21 1730 122/60 -- (!) 120 21 96 % --   04/24/21 1700 (!) 120/56 -- (!) 123 23 96 % --   04/24/21 1630 120/60 -- (!) 127 28 93 % --   04/24/21 1600 (!) 112/53 (!) 100.5 °F (38.1 °C) (!) 123 23 95 % --   04/24/21 1530 -- -- (!) 127 26 94 % --   04/24/21 1500 (!) 116/56 (!) 101.2 °F (38.4 °C) (!) 123 23 95 % --   04/24/21 1454 -- (!) 101.1 °F (38.4 °C) -- -- -- --   04/24/21 1430 (!) 118/56 -- (!) 123 27 91 % --   04/24/21 1400 (!) 113/54 -- (!) 123 18 94 % --   04/24/21 1337 -- -- -- -- 95 % --   04/24/21 1330 (!) 118/55 -- Will Lyle 121 18 93 % --   04/24/21 1300 (!) 120/57 -- (!) 123 19 94 % --   04/24/21 1230 (!) 122/59 -- (!) 120 16 94 % --   04/24/21 1200 (!) 117/58 -- (!) 118 18 94 % --   04/24/21 1130 (!) 108/53 -- (!) 119 18 93 % --   04/24/21 1122 (!) 109/52 -- (!) 109 -- -- --   04/24/21 1119 (!) 109/52 -- (!) 119 18 91 % --   04/24/21 1100 (!) 143/65 100.3 °F (37.9 °C) (!) 129 (!) 34 (!) 87 % --     Labs:    Recent Labs     04/25/21  0337   WBC 12.9*   HGB 7.3*   *      K 3.7   *   CO2 24   BUN 28*   CREA 0.57*   *   TBILI 0.7   CBIL 0.3   ALT 51   AP 50       Claire Balderrama, NP

## 2021-04-25 NOTE — PROGRESS NOTES
Pt coughed while taking sips of tea. Unsure if pt is aspirating. Will remove clear liquids from room.  Will update MD.

## 2021-04-25 NOTE — PROGRESS NOTES
Critical Care Daily Progress Note: 4/25/2021  Admission Date: 3/30/2021     The patient's chart is reviewed and the patient is discussed with the staff. Lexii Willett is an 51-year-old female with a past medical history of RLS, uterine cancer status post ALEJANDRO, diet-controlled diabetes, breast cancer status post bilateral mastectomy/chemo, GERD, duodenal cancer status post right hemicolectomy 3 weeks ago. 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 She underwent Whipple procedure on 4/22/2021 by Dr. Josh Posadas without immediate complication. Subjective:   Tm 101.2F . Sputum gram stain with GNR and Zosyn started yesterday. CVP up from 6 to 9 today. Hemoglobin slightly lower at 7.3. Platelets slightly lower and phos being replaced. FSBS at goal now with insulin in TPN. Still coughing up mucus. On 4lpm.  Tachycardia improving and  currently. Still feeling thirsty and tired.     Current Facility-Administered Medications   Medication Dose Route Frequency    sodium phosphate 30 mmol in 0.9% sodium chloride 250 mL infusion   IntraVENous ONCE    albuterol (PROVENTIL VENTOLIN) nebulizer solution 2.5 mg  2.5 mg Nebulization Q4H PRN    lactated ringers bolus infusion 500 mL  500 mL IntraVENous ONCE    TPN ADULT-CENTRAL - dextrose 15% amino acid 5%   IntraVENous QPM    guaiFENesin (ROBITUSSIN) 100 mg/5 mL oral liquid 400 mg  400 mg Per J Tube TID    sodium chloride 3% hypertonic nebulizer soln  4 mL Nebulization BID    piperacillin-tazobactam (ZOSYN) 3.375 g in 0.9% sodium chloride (MBP/ADV) 100 mL MBP  3.375 g IntraVENous Q8H    metoprolol (LOPRESSOR) injection 2.5 mg  2.5 mg IntraVENous Q6H PRN    pantoprazole (PROTONIX) 40 mg in 0.9% sodium chloride 10 mL injection  40 mg IntraVENous Q12H    naloxone (NARCAN) injection 0.1 mg  0.1 mg IntraVENous Multiple    enoxaparin (LOVENOX) injection 40 mg  40 mg SubCUTAneous Q24H    LORazepam (ATIVAN) injection 1 mg  1 mg IntraVENous Q6H PRN    HYDROmorphone (DILAUDID) injection 0.5-1 mg  0.5-1 mg IntraVENous Q2H PRN    lactated Ringers infusion  50 mL/hr IntraVENous CONTINUOUS    insulin regular (NOVOLIN R, HUMULIN R) injection   SubCUTAneous Q4H    ondansetron (ZOFRAN) injection 4 mg  4 mg IntraVENous AC&HS    promethazine (PHENERGAN) with saline injection 12.5 mg  12.5 mg IntraVENous Q6H PRN    lip protectant (BLISTEX) ointment 1 Each  1 Each Topical PRN    fat emulsion 20% (LIPOSYN, INTRAlipid) infusion 250 mL  250 mL IntraVENous QPM    phenol throat spray (CHLORASEPTIC) 1 Spray  1 Spray Oral PRN    NUTRITIONAL SUPPORT ELECTROLYTE PRN ORDERS   Does Not Apply PRN       Review of Systems  Unobtainable due to patient status. Objective:     Vitals:    04/25/21 0701 04/25/21 0730 04/25/21 0800 04/25/21 0830   BP: 116/61 (!) 116/58 114/66 (!) 110/59   Pulse: (!) 101 (!) 101 (!) 109 (!) 109   Resp: 16 17 21 22   Temp: 99 °F (37.2 °C)      SpO2: 96% 96% 95% 91%   Weight:       Height:             Intake/Output Summary (Last 24 hours) at 4/25/2021 0904  Last data filed at 4/25/2021 0800  Gross per 24 hour   Intake 5970.12 ml   Output 1930 ml   Net 4040.12 ml         Physical Exam:          Constitutional:  intubated and mechanically ventilated.   EENMT:  Sclera clear, pupils equal, oral mucosa moist  Respiratory: CTA on vent  Cardiovascular:  RRR with no M,G,R;  Gastrointestinal:  soft with no tenderness; decreased bowel sounds with surgical site/drain cdi  Musculoskeletal:  warm with no cyanosis, no lower extremity edema  Skin:  no jaundice or ecchymosis  Neurologic: no gross neuro deficits     Psychiatric:  Awake and alert     LINES:    R IJ CVC    DRIPS:    TPN  LR    CXR:        Ventilator Settings  Mode FIO2 Rate Tidal Volume Pressure PEEP   Pressure support  30 %    450 ml  5 cm H2O(decreased from 10)  8 cm H20      Peak airway pressure: 13 cm H2O Minute ventilation: 6.5 l/min     ABG:   Recent Labs     04/23/21  1350 04/23/21  0344 04/22/21 2011   PHI 7.36 7.44 7.45   PCO2I 44.1 36.0 36.3   PO2I 150* 137* 484*   HCO3I 24.9 24.5 25.1       LAB  Recent Labs     04/25/21  0736 04/25/21  0337 04/24/21  2315 04/24/21 2019 04/24/21  1531   GLUCPOC 157* 155* 157* 165* 155*     Recent Labs     04/25/21  0337 04/24/21  0253 04/23/21  0340   WBC 12.9* 20.1* 13.6*   HGB 7.3* 9.0* 10.7*   HCT 23.1* 29.0* 32.7*   * 175 219     Recent Labs     04/25/21  0337 04/24/21  0957 04/24/21  0253 04/23/21  1103 04/23/21  0340 04/22/21 2022 04/22/21 2022     --  140 138 138  --  138   K 3.7  --  4.4 4.0 3.9  --  3.5   *  --  110* 106 106  --  104   CO2 24  --  25 28 25  --  26   *  --  184* 229* 250*  --  277*   BUN 28*  --  36* 33* 32*  --  29*   CREA 0.57*  --  0.71 0.69 0.75  --  0.86   MG  --   --   --  2.0 2.4  --  1.6*   PHOS 1.8* 2.7 2.0*  --  2.4   < >  --    CA 8.5  --  8.3 8.2* 8.0*  --  7.6*   ALB 2.3*  --  2.1*  --  2.4*  --   --     < > = values in this interval not displayed. No results for input(s): LCAD, LAC in the last 72 hours.     MICRO  Date Source Result   4/24/2021  sputum  GNR   4/24/2021  blood  no growth   4/24/2021  blood  NG   4/24/2021  urine  NG       Assessment:  (Medical Decision Making)     Hospital Problems  Date Reviewed: 3/23/2021          Codes Class Noted POA    Aspiration pneumonia of right upper lobe [facility]) ICD-10-CM: J69.0  ICD-9-CM: 507.0  4/25/2021 Unknown        Hypovolemia ICD-10-CM: E86.1  ICD-9-CM: 276.52  4/25/2021 Unknown        Anemia due to blood loss ICD-10-CM: D50.0  ICD-9-CM: 280.0  4/25/2021 Unknown        Thrombocytopenia ([facility] ICD-10-CM: D69.6  ICD-9-CM: 287.5  4/25/2021 Unknown        Tachycardia ICD-10-CM: R00.0  ICD-9-CM: 785.0  4/24/2021 Unknown        * (Principal) Adenocarcinoma of duodenum ([facility] ICD-10-CM: C17.0  ICD-9-CM: 152.0  4/22/2021 Unknown        Severe protein-calorie malnutrition [facility]) ICD-10-CM: E43  ICD-9-CM: 857  4/7/2021 Yes        Colon cancer ([facility] ICD-10-CM: C18.9  ICD-9-CM: 153.9  3/30/2021 Unknown              Plan:  (Medical Decision Making)   --Continue day #2 Zosyn for presumed aspiration pneumonia (probably occurred preop when obstructed)  --Will give 1L bolus of LR now.  --TPN and TF per surgery. --Monitor Hgb.    --IS and chair position today. Continue PT.  --code status confirmed DNR      More than 50% of the time documented was spent in face-to-face contact with the patient and in the care of the patient on the floor/unit where the patient is located.     Bell Cox MD

## 2021-04-25 NOTE — CONSULTS
Comprehensive Nutrition Assessment    Type and Reason for Visit: Reassess, Consult  TPN Management (General Surgery)   Consult for TF Management (General Surgery)    Nutrition Recommendations/Plan:   · Parenteral Nutrition:  · Continue TPN:    · 15%DEX/5%AA at 85 ml/hr (2L) with 250 ml 20% Lipids daily - 1920 calories/day (100% calorie goal), 100 gm protein (100% protein goal) in ~2250ml volume/day.   · Lytes/L:   · Sodium 40 meq (40 meq NaCl), Potassium 30 meq (5 meq KCl, 25 meq KPO4), omit Mg, 4.5 meq Calcium.    · Other additives: MTE, MVI, 15 units insulin/liter  · Enteral Nutrition:   · Start Osmolite 1.2 @ 10 ml/hr with 20 ml water flush every 6 hours via JT (264 calories/day, 12 gm protein/day in 260 ml water/day)  · Diet:  · Start clear liquids  · Vitamin and Mineral Supplement Therapy:  · Electrolyte management replacement protocol active.   · Labs:   · BMP daily,Phos and Mg MWF.  Continue POC Glucoses/SSI   · Daily weight     Malnutrition Assessment:  Malnutrition Status: Severe malnutrition  Context: Chronic illness  Findings of clinical characteristics of malnutrition:   Energy Intake:  7 - 75% or less est energy requirements for 1 month or longer  Weight Loss:  7.0 - Greater than 7.5% over 3 months((226# to 182# (19.5%), 226# to 201# (11%) in < 3 months)     Body Fat Loss:  Unable to assess,     Muscle Mass Loss:  Unable to assess,    Fluid Accumulation:  Unable to assess,     Strength:  Not performed     Nutrition Assessment:   Nutrition History: 4/6: Pt reports at baseline she eats 2 small meals a day of oatmeal or grits for breakfast and then a \"normal\" meal for her 2nd meal of the day. About 8 weeks ago, she was eating a [employer] meal and had N/V which she thought was food poisioning. However she continued to have N/V with all po intake, thus intake was <25% of usual. She was mainly consuming gatorade, Boost or Ensure PTA. She had weight loss from 226# to 182#.  She reports addtional weight loss since her pre assessment weight on 3/24/2021. Nutrition Background: H/O: breast cancer with bilateral mastectomy, uterine cancer s/p hysterectomy, DM, HTN, RLS, GERD and claustrophobia. 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 She has had postop N&V and been unable to tolerate po diet progression  Daily Update:  Patient is visited in her room and she was found with her TPN infusing at its ordered rate. She reports feeling better today, less pain. Abdominal Status (last documented): Tender, Semi-soft abdomen with Hypoactive , Distant bowel sounds. Last BM 04/17/21. Pertinent Medications: Protonix, Zofran, SSI coverage  Pertinent Labs:   Lab Results   Component Value Date/Time    Sodium 145 04/25/2021 03:37 AM    Potassium 3.7 04/25/2021 03:37 AM    Chloride 115 (H) 04/25/2021 03:37 AM    CO2 24 04/25/2021 03:37 AM    Anion gap 6 (L) 04/25/2021 03:37 AM    Glucose 170 (H) 04/25/2021 03:37 AM    BUN 28 (H) 04/25/2021 03:37 AM    Creatinine 0.57 (L) 04/25/2021 03:37 AM    Calcium 8.5 04/25/2021 03:37 AM    Albumin 2.3 (L) 04/25/2021 03:37 AM    Magnesium 2.0 04/23/2021 11:03 AM    Phosphorus 1.8 (L) 04/25/2021 03:37 AM     Lab Results   Component Value Date/Time    Glucose 170 (H) 04/25/2021 03:37 AM    Glucose (POC) 127 (H) 04/25/2021 11:01 AM    Glucose (POC) 157 (H) 04/25/2021 07:36 AM    Glucose (POC) 155 (H) 04/25/2021 03:37 AM    Glucose (POC) 157 (H) 04/24/2021 11:15 PM    Glucose (POC) 165 (H) 04/24/2021 08:19 PM    Glucose (POC) 155 (H) 04/24/2021 03:31 PM     Good glucose control with 15 units insulin/lter in TPN. Nutrition Related Findings:   Diet: 3/30 Clear, 4/2 NPO, 4/5 sips of clears, Ensure clear. 4/6: PPN started. 4/8:NGT placed. CVC placed 4/9 and converted to CPN. SBFT 4/12 with delayed gstric emptying with hypotonic bowel with no contrast in small bowel or colon.  NGT out 4/14. 4/15 diet advanced to FLD and TPN decreased to 1L per MD.  1L TPN to continues on 1 liter per surgery. TPN taken back to goal of 2 L 4/19. NGT placed prior to EGD 4/19, now to LIS. 4/23: in CCU post-op Whipple 4/22. Ext 4/23, NGT to LIS. 4/24 - NGT removed. Wound Type: Surgical incision    Current Nutrition Therapies:  DIET NPO  Current Parenteral Nutrition Orders:  · Type and Formula: 15%DEX/5%AA   · Lipids: 250ml, Daily  · Duration: Continuous  · Rate/Volume: 85 ml/hr (2L)  · Current PN Order Provides: at goal  · Goal PN Orders Provides: 1920 calories/day, 100 gm protein/day in ~2250 ml volume/day    Current Intake:   Average Meal Intake: NPO Average Supplement Intake: NPO      Anthropometric Measures:  Height: 5' 6\" (167.6 cm)  Current Body Wt: 90 kg (198 lb 6.6 oz)(4/25/2021 CCU), Weight source: Bed scale  BMI: 32, Obese class 1 (BMI 30.0-34. 9)  Admission Body Weight: 192 lb 7.4 oz(3/24 pre assessment)  Ideal Body Weight (lbs) (Calculated): 130 lbs (59 kg), 148 %  Usual Body Wt: 102.5 kg (226 lb), Percent weight change: -19.5          Edema: No data recorded     Estimated Daily Nutrient Needs:  Energy (kcal/day): 1737-7823(76 -25 jessica/kg/day using 83 kg - obese, post-op Whipple) (Kcal/kg, Weight Used: Current(91.3 kg-bed scale 4/7))  Protein (g/day): 100-166 (1.2-2 gm pro/kg/day using 83 kg - obese, post-op Whipple) Weight Used: (Current)  Fluid (ml/day):   (1 ml/kcal)    Nutrition Diagnosis:   · Inadequate oral intake related to altered GI structure as evidenced by (post-op Whipple, start CL and trophic TF via JT)    · Severe malnutrition related to inadequate protein-energy intake as evidenced by (s/s as above per malnutrition assessment)    Nutrition Interventions:   Food and/or Nutrient Delivery: Start oral diet, Start tube feeding, Modify parenteral nutrition     Coordination of Nutrition Care: Continue to monitor while inpatient  Plan of Care discussed with Kelsey Rivera RN    Goals:   Previous Goal Met: Goal(s) achieved  Active Goal: Meet daily nutrition needs with combination of oral, entera and parenteral nutrition until able to meet needs with just oral.    Nutrition Monitoring and Evaluation:      Food/Nutrient Intake Outcomes: Diet advancement/tolerance, Enteral nutrition intake/tolerance, Parenteral nutrition intake/tolerance  Physical Signs/Symptoms Outcomes: Biochemical data, GI status    Discharge Planning: Too soon to determine    Franca Avila.  Marla Ocampo RD, LD on 4/25/2021 at 1:29 PM  Contact: [phone]

## 2021-04-25 NOTE — PROGRESS NOTES
Bedside report received from [facility]. Pt resting quietly. Vital signs stable and improved from yesterday.

## 2021-04-25 NOTE — PROGRESS NOTES
ACUTE PHYSICAL THERAPY GOALS:  (Developed with and agreed upon by patient and/or caregiver.)  STG:  (1.)Ms. Aldo Quintana will move from supine to sit and sit to supine , scoot up and down and roll side to side with MINIMAL ASSIST within 3 treatment day(s). (2.)Ms. Aldo Quintana will transfer from bed to chair and chair to bed with MINIMAL ASSIST using the least restrictive device within 3 treatment day(s). (3.)Ms. Aldo Quintana will ambulate with MINIMAL ASSIST for 50 feet with the least restrictive device within 3 treatment day(s). (4.)Ms. Aldo Quintana will perform standing static and dynamic balance activities x 15 minutes with MINIMAL ASSIST to improve safety within 3 treatment day(s). (5.)Ms. Aldo Quintana will maintain stable vital signs throughout all functional mobility within 3 treatment days.     LTG:  (1.)Ms. Aldo Quintana will move from supine to sit and sit to supine , scoot up and down and roll side to side in bed with CONTACT GUARD ASSIST within 7 treatment day(s). (2.)Ms. Aldo Quintana will transfer from bed to chair and chair to bed with CONTACT GUARD ASSIST using the least restrictive device within 7 treatment day(s). (3.)Ms. Aldo Quintana will ambulate with CONTACT GUARD ASSIST for 150 feet with the least restrictive device within 7 treatment day(s). (4.)Ms. Aldo Quintana will perform standing static and dynamic balance activities x 15 minutes with CONTACT GUARD ASSIST to improve safety within 7 treatment day(s). (5.)Ms. Aldo Quintana will ambulate and/or perform functional activities for 15 consecutive minutes with stable vital signs and no rests required to improve activity tolerance within 7 treatment days.   ________________________________________________________________________________________________    PHYSICAL THERAPY: Daily Note and PM Treatment Day # 2    Haven Liao is a [de-identified] y.o. female   PRIMARY DIAGNOSIS: Adenocarcinoma of duodenum ([facility]  Malignant neoplasm of colon, unspecified part of colon ([facility] [C18.9]  Colon cancer ([facility] [C18.9]  Procedure(s) (LRB):  WHIPPLE PROCEDURE (N/A)  3 Days Post-Op    ASSESSMENT:     REHAB RECOMMENDATIONS: CURRENT LEVEL OF FUNCTION:  (Most Recently Demonstrated)   Recommendation to date pending progress:  [address] [place] [address] vs. Short-term Rehab  Equipment:    To Be Determined Bed Mobility:   Maximal Assistance  Sit to Stand:   Moderate Assistance x 2  Transfers:   Moderate Assistance x 2  Gait/Mobility:   Unable to perform     ASSESSMENT:  Ms. Therese Primrose required maximal assist to perform log roll this date. Reported 0/10 pain in supine, however 10/10 pain quickly escalated in sitting. Max A to scoot to edge of bed and encouraged anterior trunk lean to improve sitting balance, however patient required moderate assist to maintain sitting balance this date. She performed sit to stand transfer with moderate assist and unable to take steps this date. Attempted chair transfer, however patient in too much pain and requesting to return to supine. Rolling with max A x2 for repositioning and RN aware of significant increase in pain. No real progress this session secondary to pain. May benefit from pre-medication prior to therapy session, however RN reports patient has dilaudid ordered which makes her drowsy. Haven Chris is currently functioning below her baseline and would benefit from skilled PT during acute care stay to maximize safety and independence with functional mobility. .     SUBJECTIVE:   Ms. Therese Primrose states, \"ow, ow ow. \"    SOCIAL HISTORY/ LIVING ENVIRONMENT: lives alone in large house, no AD used, IND with all ADLs, drives, retired. Family 0.25 miles away.   Home Environment: Private residence  One/Two Story Residence: One story  Living Alone: No  Support Systems: Child(lissa), Family member(s)  OBJECTIVE:     PAIN: VITAL SIGNS: LINES/DRAINS:   Pre Treatment: Pain Screen  Pain Scale 1: Numeric (0 - 10)  Pain Intensity 1: 0  Post Treatment: 10 RN aware   Toro Catheter, IV, ZACH Drain and J-tube to arroyo bag  (2 abdominal ZACH drains)  O2 Device: Nasal cannula     MOBILITY: I Mod I S SBA CGA Min Mod Max Total  NT x2 Comments:   Bed Mobility    Rolling [] [] [] [] [] [] [] [x] [] [] []    Supine to Sit [] [] [] [] [] [] [] [x] [] [] [x]    Scooting [] [] [] [] [] [] [] [x] [] [] [x]    Sit to Supine [] [] [] [] [] [] [] [x] [] [] [x]    Transfers    Sit to Stand [] [] [] [] [] [] [x] [] [] [] []    Bed to Chair [] [] [] [] [] [] [] [] [] [x] []    Stand to Sit [] [] [] [] [] [] [x] [] [] [] []    I=Independent, Mod I=Modified Independent, S=Supervision, SBA=Standby Assistance, CGA=Contact Guard Assistance,   Min=Minimal Assistance, Mod=Moderate Assistance, Max=Maximal Assistance, Total=Total Assistance, NT=Not Tested    BALANCE: Good Fair+ Fair Fair- Poor NT Comments   Sitting Static [] [] [] [x] [] []    Sitting Dynamic [] [] [] [x] [] []              Standing Static [] [] [] [x] [] []    Standing Dynamic [] [] [] [] [x] []      GAIT: I Mod I S SBA CGA Min Mod Max Total  NT x2 Comments:   Level of Assistance [] [] [] [] [] [] [] [] [] [x] []    Distance     DME N/A    Gait Quality     Weightbearing  Status N/A     I=Independent, Mod I=Modified Independent, S=Supervision, SBA=Standby Assistance, CGA=Contact Guard Assistance,   Min=Minimal Assistance, Mod=Moderate Assistance, Max=Maximal Assistance, Total=Total Assistance, NT=Not Tested    PLAN:   FREQUENCY/DURATION: PT Plan of Care: 3 times/week for duration of hospital stay or until stated goals are met, whichever comes first.  TREATMENT:     TREATMENT:   ($$ Therapeutic Activity: 38-52 mins    )  Therapeutic Activity (38 Minutes): Therapeutic activity included Rolling, Supine to Sit, Sit to Supine, Scooting, Lateral Scooting, Transfer Training, Sitting balance  and Standing balance to improve functional Mobility, Strength, ROM and Activity tolerance.     TREATMENT GRID:  N/A    AFTER TREATMENT POSITION/PRECAUTIONS:  Bed, Needs within reach, RN notified and Visitors at bedside    INTERDISCIPLINARY COLLABORATION:  RN/PCT and PT/PTA    TOTAL TREATMENT DURATION:  PT Patient Time In/Time Out  Time In: 1402  Time Out: [address], PT, DPT

## 2021-04-25 NOTE — PROGRESS NOTES
First unit of blood infusing without any difficulty. Pt has been resting quietly since receiving pain medication at 0930.

## 2021-04-26 NOTE — PROGRESS NOTES
Chart reviewed as pt remains CCU post surgery by Dr. Monserrat Quan. Now extubated NC 6L per last RT. TF/TPN per nutrition. Hopefully out of unit soon per MD. PT/OT/ST per MD. CM following for d/c POC/needs, most likely STR. PPD already placed. LOS 27 days.

## 2021-04-26 NOTE — PROGRESS NOTES
PLAN:  NG DC 4/24/21  Speech evaluation today  Start Trickle TF @10cc/hr via J tube  TPN  Lovenox  IV Abx - Zosyn  Protonix  Pain/ nausea control  Follow labs  PT following  GNR in sputum   Continue arroyo for I&O  SSI every 4 hours    ASSESSMENT:  Admit Date: 3/30/2021    Procedure(s):  WHIPPLE PROCEDURE    Principal Problem:    Adenocarcinoma of duodenum ([address] (4/22/2021)    Active Problems:    Colon cancer ([address] (3/30/2021)      Severe protein-calorie malnutrition ([address] (4/7/2021)      Tachycardia (4/24/2021)      Aspiration pneumonia of right upper lobe ([address] (4/25/2021)      Hypovolemia (4/25/2021)      Anemia due to blood loss (4/25/2021)      Thrombocytopenia ([address] (4/25/2021)         SUBJECTIVE:  04/26/21 - Speech evaluation today regarding possible aspiration with liquids. No complaints at this time. WBC 13, on Zosyn. AF, tachycardic pat drains maintained with serosanguineous drainage      OBJECTIVE:  Constitutional:  no acute distress; appears stated age   Visit Vitals  BP (!) 121/56 (BP 1 Location: Right arm)   Pulse (!) 119   Temp 99.7 °F (37.6 °C)   Resp 25   Ht 5' 6\" (1.676 m)   Wt 202 lb 9.6 oz (91.9 kg)   SpO2 94%   BMI 32.70 kg/m²     Eyes: Sclera are clear. ENMT: no external lesions gross hearing normal; no obvious neck masses, no ear or lip lesions  CV: Tachy. Normal perfusion  Resp: No JVD. Breathing is  non-labored; no audible wheezing. GI: soft and non-distended, appropriately tender, Drain x 2, J tube with tube feeds infusing, staples to midline without redness or drainage   Musculoskeletal: No embolic signs or cyanosis.    Neuro: moves all 4; no focal deficits  Psychiatric: normal affect and mood    Patient Vitals for the past 24 hrs:   BP Temp Pulse Resp SpO2 Weight   04/26/21 0700 (!) 121/56 99.7 °F (37.6 °C) (!) 119 25 94 % --   04/26/21 0630 (!) 113/59 -- (!) 116 (!) 33 95 % --   04/26/21 0530 (!) 108/51 -- (!) 112 23 95 % --   04/26/21 0500 (!) 112/59 -- (!) 124 25 96 % --   04/26/21 0430 (!) 111/58 -- (!) 125 26 96 % --   04/26/21 0400 (!) 116/59 -- (!) 127 27 96 % --   04/26/21 0330 (!) 113/59 99.2 °F (37.3 °C) (!) 122 11 96 % 202 lb 9.6 oz (91.9 kg)   04/26/21 0300 (!) 111/59 -- (!) 115 (!) 41 97 % --   04/26/21 0245 -- -- (!) 132 -- -- --   04/26/21 0230 (!) 118/59 -- (!) 133 (!) 37 94 % --   04/26/21 0223 -- -- -- -- 93 % --   04/26/21 0200 (!) 119/57 -- (!) 132 20 91 % --   04/26/21 0130 (!) 118/57 -- (!) 128 23 96 % --   04/26/21 0100 (!) 112/59 -- (!) 127 (!) 31 97 % --   04/26/21 0030 (!) 116/56 -- (!) 128 (!) 32 96 % --   04/26/21 0000 (!) 114/58 -- (!) 130 15 95 % --   04/25/21 2330 (!) 115/57 98.4 °F (36.9 °C) (!) 134 17 94 % --   04/25/21 2300 (!) 121/59 -- (!) 132 (!) 34 95 % --   04/25/21 2230 127/64 -- (!) 130 16 96 % --   04/25/21 2200 127/66 -- (!) 130 22 97 % --   04/25/21 2130 124/65 -- (!) 124 26 96 % --   04/25/21 2100 128/65 -- (!) 117 27 95 % --   04/25/21 2031 (!) 148/73 -- (!) 110 -- 95 % --   04/25/21 2017 119/69 -- (!) 134 26 100 % --   04/25/21 2014 119/69 -- (!) 134 -- -- --   04/25/21 2000 118/68 -- (!) 134 20 98 % --   04/25/21 1944 -- -- -- -- 98 % --   04/25/21 1930 119/64 98.7 °F (37.1 °C) (!) 132 21 99 % --   04/25/21 1900 118/60 -- (!) 129 18 99 % --   04/25/21 1845 (!) 143/67 98.7 °F (37.1 °C) (!) 129 20 99 % --   04/25/21 1830 (!) 143/67 -- (!) 132 19 98 % --   04/25/21 1800 131/63 -- (!) 125 16 99 % --   04/25/21 1730 130/68 -- (!) 124 18 99 % --   04/25/21 1700 (!) 127/59 -- (!) 120 18 98 % --   04/25/21 1630 (!) 123/58 -- (!) 115 19 97 % --   04/25/21 1604 113/61 98.4 °F (36.9 °C) (!) 116 18 96 % --   04/25/21 1600 119/61 -- (!) 121 23 (!) 87 % --   04/25/21 1543 123/60 98.4 °F (36.9 °C) (!) 113 21 94 % --   04/25/21 1530 123/60 98.4 °F (36.9 °C) (!) 117 22 93 % --   04/25/21 1500 (!) 108/57 -- (!) 116 24 92 % --   04/25/21 1430 113/73 -- (!) 107 (!) 31 92 % --   04/25/21 1400 111/69 -- Gaby Ernandez 113 18 94 % --   04/25/21 1330 123/68 -- (!) 113 21 94 % --   04/25/21 1325 123/68 98.4 °F (36.9 °C) (!) 111 (!) 31 96 % --   04/25/21 1300 115/62 -- (!) 114 21 93 % --   04/25/21 1230 111/67 -- (!) 110 23 93 % --   04/25/21 1200 (!) 110/56 98.2 °F (36.8 °C) (!) 102 14 96 % --   04/25/21 1130 (!) 110/57 -- (!) 104 16 95 % --   04/25/21 1100 (!) 106/59 98.6 °F (37 °C) (!) 109 21 93 % --   04/25/21 1052 95/62 -- (!) 112 20 93 % --   04/25/21 1050 95/62 98.6 °F (37 °C) (!) 115 22 93 % --   04/25/21 1031 (!) 99/57 97.9 °F (36.6 °C) (!) 111 18 95 % --   04/25/21 1030 (!) 99/57 -- (!) 107 15 94 % --   04/25/21 1000 (!) 114/53 -- (!) 108 16 93 % --   04/25/21 0930 (!) 124/58 -- (!) 113 24 91 % --   04/25/21 0900 (!) 137/59 -- (!) 114 24 93 % --   04/25/21 0830 (!) 110/59 -- (!) 109 22 91 % --     Labs:    Recent Labs     04/26/21  0316 04/25/21  0337   WBC 13.0* 12.9*   HGB 10.6* 7.3*    141*   * 145   K 3.8 3.7   * 115*   CO2 21 24   BUN 31* 28*   CREA 0.66 0.57*   * 170*   TBILI 1.5* 0.7   CBIL  --  0.3   ALT 81* 51   AP 94 50     Crystal Adan Gains, NP

## 2021-04-26 NOTE — PROGRESS NOTES
Bedside report received from [facility]. Pt is awake and alert. Pt denies any pain at present. Pt states she is ok . Speech soft. Abdominal dressing is dry and intact. Tube feeding to J-tube tolerated. HR in 100-110's. Lungs diminished. Speech consult ordered for today.

## 2021-04-26 NOTE — PROGRESS NOTES
Critical Care Daily Progress Note: 4/26/2021  Admission Date: 3/30/2021     The patient's chart is reviewed and the patient is discussed with the staff. 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 She underwent Whipple procedure on 4/22/2021 by Dr. Monserrat Quan without immediate complication. Subjective:   Tmax was 99.2. GNR in sputum. Other blood cultures, urine cultures NGTD. Remains tachycardic to 122. CVP 6, sats 96% on 6L from 3L yesterday. Was given a unit of pRBCs yesterday with good response. UOP was just over 1L and net + 1.1L. Reports minimal abdominal pain and states breathing feels better. Not much cough today.      Current Facility-Administered Medications   Medication Dose Route Frequency    magnesium sulfate 2 g/50 ml IVPB (premix or compounded)  2 g IntraVENous ONCE    albuterol (PROVENTIL VENTOLIN) nebulizer solution 2.5 mg  2.5 mg Nebulization Q4H PRN    0.9% sodium chloride infusion 250 mL  250 mL IntraVENous PRN    TPN ADULT-CENTRAL - dextrose 15% amino acid 5%   IntraVENous QPM    guaiFENesin (ROBITUSSIN) 100 mg/5 mL oral liquid 400 mg  400 mg Per J Tube TID    sodium chloride 3% hypertonic nebulizer soln  4 mL Nebulization BID    piperacillin-tazobactam (ZOSYN) 3.375 g in 0.9% sodium chloride (MBP/ADV) 100 mL MBP  3.375 g IntraVENous Q8H    metoprolol (LOPRESSOR) injection 2.5 mg  2.5 mg IntraVENous Q6H PRN    pantoprazole (PROTONIX) 40 mg in 0.9% sodium chloride 10 mL injection  40 mg IntraVENous Q12H    naloxone (NARCAN) injection 0.1 mg  0.1 mg IntraVENous Multiple    enoxaparin (LOVENOX) injection 40 mg  40 mg SubCUTAneous Q24H    LORazepam (ATIVAN) injection 1 mg  1 mg IntraVENous Q6H PRN    HYDROmorphone (DILAUDID) injection 0.5-1 mg  0.5-1 mg IntraVENous Q2H PRN    insulin regular (NOVOLIN R, HUMULIN R) injection   SubCUTAneous Q4H    ondansetron (ZOFRAN) injection 4 mg  4 mg IntraVENous AC&HS    promethazine (PHENERGAN) with saline injection 12.5 mg  12.5 mg IntraVENous Q6H PRN    lip protectant (BLISTEX) ointment 1 Each  1 Each Topical PRN    fat emulsion 20% (LIPOSYN, INTRAlipid) infusion 250 mL  250 mL IntraVENous QPM    phenol throat spray (CHLORASEPTIC) 1 Spray  1 Spray Oral PRN    NUTRITIONAL SUPPORT ELECTROLYTE PRN ORDERS   Does Not Apply PRN     Review of Systems  Unobtainable due to patient status.     Objective:     Vitals:    04/26/21 0230 04/26/21 0245 04/26/21 0300 04/26/21 0330   BP: (!) 118/59  (!) 111/59 (!) 113/59   Pulse: (!) 133 (!) 132 (!) 115 (!) 122   Resp: (!) 37  (!) 41 11   Temp:    99.2 °F (37.3 °C)   SpO2: 94%  97% 96%   Weight:    202 lb 9.6 oz (91.9 kg)   Height:           Intake/Output Summary (Last 24 hours) at 4/26/2021 0503  Last data filed at 4/26/2021 0330  Gross per 24 hour   Intake 3011.32 ml   Output 1835 ml   Net 1176.32 ml     Physical Exam:          Constitutional:  Ill appearing, awake, fatigued appearing  EENMT:  Sclera clear, pupils equal, oral mucosa moist  Respiratory: minimal rales, mostly clear  Cardiovascular:  RRR with no M,G,R;  Gastrointestinal:  soft with no tenderness; decreased bowel sounds with surgical site/drain cdi  Musculoskeletal:  warm with no cyanosis, no lower extremity edema  Skin:  no jaundice or ecchymosis  Neurologic: no gross neuro deficits     Psychiatric:  Awake, but fatigued appearing, responsive    LINES:    R IJ CVC    DRIPS:    TPN    CXR:  4/25: none new today      ABG:   Recent Labs     04/23/21  1350   PHI 7.36   PCO2I 44.1   PO2I 150*   HCO3I 24.9     LAB  Recent Labs     04/26/21  0358 04/25/21  2317 04/25/21 2011 04/25/21  1557 04/25/21  1101 GLUCPOC 163* 211* 173* 125* 127*     Recent Labs     04/26/21  0316 04/25/21  0337 04/24/21  0253   WBC 13.0* 12.9* 20.1*   HGB 10.6* 7.3* 9.0*   HCT 32.4* 23.1* 29.0*    141* 175     Recent Labs     04/26/21  0316 04/25/21  0337 04/24/21  0957 04/24/21  0253 04/23/21  1103 04/23/21  1103   * 145  --  140  --  138   K 3.8 3.7  --  4.4  --  4.0   * 115*  --  110*  --  106   CO2 21 24 --  25  --  28   * 170*  --  184*  --  229*   BUN 31* 28*  --  36*  --  33*   CREA 0.66 0.57*  --  0.71  --  0.69   MG 1.7*  --   --   --   --  2.0   PHOS 3.1 1.8* 2.7 2.0*   < >  --    CA 8.3 8.5  --  8.3  --  8.2*   ALB 1.8* 2.3*  --  2.1*  --   --     < > = values in this interval not displayed. No results for input(s): LCAD, LAC in the last 72 hours.     MICRO  Date Source Result   4/24/2021  sputum  GNR   4/24/2021  blood  no growth   4/24/2021  blood  NG   4/24/2021  urine  NG     Assessment:  (Medical Decision Making)     Hospital Problems  Date Reviewed: 3/23/2021          Codes Class Noted POA    Aspiration pneumonia of right upper lobe ([facility] ICD-10-CM: J69.0  ICD-9-CM: 507.0  4/25/2021 Unknown        Hypovolemia ICD-10-CM: E86.1  ICD-9-CM: 276.52  4/25/2021 Unknown        Anemia due to blood loss ICD-10-CM: D50.0  ICD-9-CM: 280.0  4/25/2021 Unknown        Thrombocytopenia ([facility] ICD-10-CM: D69.6  ICD-9-CM: 287.5  4/25/2021 Unknown        Tachycardia ICD-10-CM: R00.0  ICD-9-CM: 785.0  4/24/2021 Unknown        * (Principal) Adenocarcinoma of duodenum ([facility] ICD-10-CM: C17.0  ICD-9-CM: 152.0  4/22/2021 Unknown        Severe protein-calorie malnutrition ([facility] ICD-10-CM: E43  ICD-9-CM: 193  4/7/2021 Yes        Colon cancer ([facility] ICD-10-CM: C18.9  ICD-9-CM: 153.9  3/30/2021 Unknown            Plan:  (Medical Decision Making)   --Continue day #3 Zosyn for presumed aspiration pneumonia (probably occurred preop when obstructed)  --still looks somewhat volume deplete with tachycardia and CVP 6  --will give some albumin today given increased O2 yesterday and albumin <2  --TPN and TF per surgery. --Monitor Hgb.    --IS and chair position today. Continue PT.    DNR    Out of the ICU soon, but defer timing to Dr. Valeria Mejia who is primary. More than 50% of the time documented was spent in face-to-face contact with the patient and in the care of the patient on the floor/unit where the patient is located.     Raphael Petit MD

## 2021-04-26 NOTE — PROGRESS NOTES
A follow up visit was made to the patient. Emotional support, spiritual presence and   prayer were provided for the patient and family member. She was awake and alert.       Sidney Molina, [address] [facility], [facility]

## 2021-04-26 NOTE — PROGRESS NOTES
ACUTE PHYSICAL THERAPY GOALS:  (Developed with and agreed upon by patient and/or caregiver.)  STG:  (1.)Ms. Bro will move from supine to sit and sit to supine , scoot up and down and roll side to side with MINIMAL ASSIST within 3 treatment day(s).    (2.)Ms. Bro will transfer from bed to chair and chair to bed with MINIMAL ASSIST using the least restrictive device within 3 treatment day(s).    (3.)Ms. Bro will ambulate with MINIMAL ASSIST for 50 feet with the least restrictive device within 3 treatment day(s). (4.)Ms. Bro will perform standing static and dynamic balance activities x 15 minutes with MINIMAL ASSIST to improve safety within 3 treatment day(s). (5.)Ms. Bro will maintain stable vital signs throughout all functional mobility within 3 treatment days.     LTG:  (1.)Ms. Bro will move from supine to sit and sit to supine , scoot up and down and roll side to side in bed with CONTACT GUARD ASSIST within 7 treatment day(s).    (2.)Ms. Bro will transfer from bed to chair and chair to bed with CONTACT GUARD ASSIST using the least restrictive device within 7 treatment day(s).    (3.)Ms. Bro will ambulate with CONTACT GUARD ASSIST for 150 feet with the least restrictive device within 7 treatment day(s). (4.)Ms. Bro will perform standing static and dynamic balance activities x 15 minutes with CONTACT GUARD ASSIST to improve safety within 7 treatment day(s). (5.)Ms. Bro will ambulate and/or perform functional activities for 15 consecutive minutes with stable vital signs and no rests required to improve activity tolerance within 7 treatment days.     PHYSICAL THERAPY: Daily Note and PM Treatment Day # 3    Haven Martinez is a [de-identified] y.o. female   PRIMARY DIAGNOSIS: Adenocarcinoma of duodenum ([address]  Malignant neoplasm of colon, unspecified part of colon ([address] [C18.9]  Colon cancer ([address] [C18.9]  Procedure(s) (LRB):  WHIPPLE PROCEDURE (N/A)  4 Days Post-Op    ASSESSMENT:     REHAB RECOMMENDATIONS: CURRENT LEVEL OF FUNCTION:  (Most Recently Demonstrated)   Recommendation to date pending progress:  [address] [place] [address] vs. Short-term Rehab  Equipment:    To Be Determined Bed Mobility:   Moderate Assistance x 2  Sit to Stand:   Minimal Assistance x 2  Transfers:   Minimal Assistance x 2  Gait/Mobility:   Moderate Assistance x 2     ASSESSMENT:  Ms. Sammie Peña presents in supine. Upon entering, Pnt is agreeable to PT treatment. she reports pain in 710 at rest. Pnt performed supine > sit with mod Ax2, sitting EOB with fair sitting balance control. Sit > stand with min Ax2 using HHAx2. Gait 2x 3 ft with mod Ax2 and extra time, cues for step length. Gait is noted to be very slow and arduous. Stand > sit with mod Ax2, followed by positioning for comfort. At end of session pt sidelying in bed with all needs within reach, alarm activated for safety, RN notified. Overall, good progress today as pnt improved in standing tolerance. Pnt continues to present as functioning below her baseline, with deficits in mobility including transfers, gait, balance, and activity tolerance. Pt will continue to benefit from skilled therapy services to address stated deficits to promote return to highest level of function, independence, and safety. Will continue to follow. SUBJECTIVE:   Ms. Sammie Peña states, \"I need to be turned over. \"    SOCIAL HISTORY/ LIVING ENVIRONMENT:   Home Environment: Private residence  One/Two Story Residence: One story  Living Alone: No  Support Systems: Child(lissa), Family member(s)  OBJECTIVE:     PAIN: VITAL SIGNS: LINES/DRAINS:   Pre Treatment:   7  Post Treatment: 7/10   IV, ZACH Drain and PEG  O2 Device: Nasal cannula     MOBILITY: I Mod I S SBA CGA Min Mod Max Total  NT x2 Comments:   Bed Mobility    Rolling [] [] [] [] [] [] [x] [] [] [] [x]    Supine to Sit [] [] [] [] [] [] [x] [] [] [] [x]    Scooting [] [] [] [] [] [] [x] [] [] [] [x]    Sit to Supine [] [] [] [] [] [] [x] [] [] [] [x]    Transfers    Sit to Stand [] [] [] [] [] [x] [] [] [] [] [x]    Bed to Chair [] [] [] [] [] [] [] [] [] [x] []    Stand to Sit [] [] [] [] [] [x] [] [] [] [] [x]    I=Independent, Mod I=Modified Independent, S=Supervision, SBA=Standby Assistance, CGA=Contact Guard Assistance,   Min=Minimal Assistance, Mod=Moderate Assistance, Max=Maximal Assistance, Total=Total Assistance, NT=Not Tested    BALANCE: Good Fair+ Fair Fair- Poor NT Comments   Sitting Static [] [x] [] [] [] []    Sitting Dynamic [] [x] [] [] [] []              Standing Static [] [] [x] [] [] []    Standing Dynamic [] [] [] [x] [] []      GAIT: I Mod I S SBA CGA Min Mod Max Total  NT x2 Comments:   Level of Assistance [] [] [] [] [] [] [x] [] [] [] [x]    Distance 2x3'    DME HHAx2    Gait Quality Shuffled, slow, decreased step clearance    Weightbearing  Status N/A     I=Independent, Mod I=Modified Independent, S=Supervision, SBA=Standby Assistance, CGA=Contact Guard Assistance,   Min=Minimal Assistance, Mod=Moderate Assistance, Max=Maximal Assistance, Total=Total Assistance, NT=Not Tested    PLAN:   FREQUENCY/DURATION: PT Plan of Care: 3 times/week for duration of hospital stay or until stated goals are met, whichever comes first.  TREATMENT:     TREATMENT:   ($$ Therapeutic Activity: 23-37 mins    )  Co-Treatment PT/OT necessary due to patient's decreased overall endurance/tolerance levels, as well as need for high level skilled assistance to complete functional transfers/mobility and functional tasks  Therapeutic Activity (25 Minutes): Therapeutic activity included Rolling, Supine to Sit, Sit to Supine, Scooting, Lateral Scooting, Transfer Training, Ambulation on level ground, Sitting balance  and Standing balance to improve functional Mobility, Strength, ROM and Activity tolerance.     TREATMENT GRID:  N/A    AFTER TREATMENT POSITION/PRECAUTIONS:  Bed, Needs within reach and RN notified    Kristopher Francois COLLABORATION:  RN/PCT, PT/PTA and OT/MESSER    TOTAL TREATMENT DURATION:  PT Patient Time In/Time Out  Time In: 1336  Time Out: [facility]

## 2021-04-26 NOTE — PROGRESS NOTES
Comprehensive Nutrition Assessment    Type and Reason for Visit: Reassess  Tube Feeding Management (Surgery)  TPN Management (Surgery)    Nutrition Recommendations/Plan:   · Parenteral Nutrition:  · Continue TPN:    · 15%DEX/5%AA at 85 ml/hr (2L) with 250 ml 20% Lipids daily - 1920 calories/day (100% calorie goal), 100 gm protein (100% protein goal) in ~2250ml volume/day.   · Lytes/L:   · Omit Sodium, Potassium 30 meq (5 meq KCl, 25 meq KPO4), 5 meq Mg, 4.5 meq Calcium.    · Other additives: MTE, MVI, 15 units insulin/liter  · Enteral Nutrition:   · Continue Osmolite 1.2 @ 10 ml/hr with 20 ml water flush every 6 hours via JT (264 calories/day, 12 gm protein/day in 260 ml water/day)  · Diet:  · Per surgery   · Vitamin and Mineral Supplement Therapy:  · Electrolyte management replacement protocol active.   · Labs:   · BMP and Phos daily, and Mg MWF. Repeat Mg in am. Continue POC Glucoses/SSI      Malnutrition Assessment:  Malnutrition Status: Severe malnutrition  Context: Chronic illness  Findings of clinical characteristics of malnutrition:   Energy Intake:  7 - 75% or less est energy requirements for 1 month or longer  Weight Loss:  7.0 - Greater than 7.5% over 3 months((226# to 182# (19.5%), 226# to 201# (11%) in < 3 months)     Body Fat Loss:  Unable to assess,     Muscle Mass Loss:  Unable to assess,    Fluid Accumulation:  Unable to assess,     Strength:  Not performed       Nutrition Assessment:   Nutrition History: 4/6: Pt reports at baseline she eats 2 small meals a day of oatmeal or grits for breakfast and then a \"normal\" meal for her 2nd meal of the day. About 8 weeks ago, she was eating a [employer] meal and had N/V which she thought was food poisioning. However she continued to have N/V with all po intake, thus intake was <25% of usual. She was mainly consuming gatorade, Boost or Ensure PTA. She had weight loss from 226# to 182#.  She reports addtional weight loss since her pre assessment weight on 3/24/2021. Nutrition Background: H/O: breast cancer with bilateral mastectomy, uterine cancer s/p hysterectomy, DM, HTN, RLS, GERD and claustrophobia. 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 She has had postop N&V and been unable to tolerate po diet progression  Daily Update:  Patient seen in CCU. She was sleeping but awakens easily. She denies pain. She states she cannot recall last BM but her belly feels okay. She has no questions or needs. SLP assessment today and now with nectar thick liquids. Spoke with Norma Melissa NP and will continue TPN for now with plan for slow advancement of PO and/or TF. Abdominal Status (last documented): Tender, Semi-soft abdomen with Hypoactive  bowel sounds. Last BM 04/17/21. Pertinent Medications: Buminate, SSI (15 units yesterday, 9 thus far today), Zofran, Protonix, Zosyn, Phenergan, Mg replacement this am  IVF: NS @ 250 ml/hr  Pertinent Labs:   Lab Results   Component Value Date/Time    Sodium 147 (H) 04/26/2021 03:16 AM    Potassium 3.8 04/26/2021 03:16 AM    Chloride 116 (H) 04/26/2021 03:16 AM    CO2 21 04/26/2021 03:16 AM    Anion gap 10 04/26/2021 03:16 AM    Glucose 172 (H) 04/26/2021 03:16 AM    BUN 31 (H) 04/26/2021 03:16 AM    Creatinine 0.66 04/26/2021 03:16 AM    Calcium 8.3 04/26/2021 03:16 AM    Albumin 1.8 (L) 04/26/2021 03:16 AM    Magnesium 1.7 (L) 04/26/2021 03:16 AM    Phosphorus 3.1 04/26/2021 03:16 AM       Nutrition Related Findings:   Diet: 3/30 Clear, 4/2 NPO, 4/5 sips of clears, Ensure clear. 4/6: PPN started. 4/8:NGT placed. CVC placed 4/9 and converted to CPN. SBFT 4/12 with delayed gstric emptying with hypotonic bowel with no contrast in small bowel or colon. NGT out 4/14. 4/15 diet advanced to FLD and TPN decreased to 1L per MD.  1L TPN to continues on 1 liter per surgery. TPN taken back to goal of 2 L 4/19. NGT placed prior to EGD 4/19, now to LIS. 4/23: in CCU post-op Whipple 4/22.  Ext 4/23, NGT to LIS. 4/24 - NGT removed. Trophic TF initated 4/25. Wound Type: Surgical incision    Current Nutrition Therapies:  DIET TUBE FEEDING DO NOT ADVANCE RATE. Open order for details. Keep HOB elevated at least 30 degrees. DIET CLEAR LIQUID 2 Gilson/2 Mildly Thick  Current Tube Feeding (TF) Orders:   · Feeding Route: PEJ  · Formula: Osmolite 1.2  · Schedule:Continuous    · Regimen: 10 ml/hr  · Additives/Modulars: (none)  · Water Flushes: 20 Q6  · Current TF & Flush Orders Provides: 264 calories/day, 12 gm protein/day in 260 ml water/day    Current Parenteral Nutrition Orders:  · Type and Formula: 15%DEX/5%AA   · Lipids: 250ml, Daily  · Duration: Continuous  · Rate/Volume: 85 ml/hr (2L)  · Current PN Order Provides: at goal  · Goal PN Orders Provides: 1920 calories/day, 100 gm protein/day in ~2250 ml volume/day      Current Intake:   Average Meal Intake: NPO Average Supplement Intake: NPO      Anthropometric Measures:  Height: 5' 6\" (167.6 cm)  Current Body Wt: 91.9 kg (202 lb 9.6 oz)(4/26), Weight source: Bed scale  BMI: 32.7, Obese class 1 (BMI 30.0-34. 9)  Admission Body Weight: 192 lb 7.4 oz(3/24 pre assessment)  Ideal Body Weight (lbs) (Calculated): 130 lbs (59 kg), 148 %  Usual Body Wt: 102.5 kg (226 lb), Percent weight change: -19.5          Edema: No data recorded   Estimated Daily Nutrient Needs:  Energy (kcal/day): 9700-0328 (Kcal/kg(20-25), Weight Used: Current(83 kg (4/23)))  Protein (g/day): 100-125 (1.2-1.5 g/kg)- obese, s/p Whipple Weight Used: (Current)  Fluid (ml/day):   (1 ml/kcal)    Nutrition Diagnosis:   · Inadequate oral intake related to altered GI structure as evidenced by (s/p whipple, PN and trophic TF, CLD)    · Severe malnutrition related to inadequate protein-energy intake as evidenced by (s/s as above per malnutrition assessment)    Nutrition Interventions:   Food and/or Nutrient Delivery: Continue tube feeding, Modify parenteral nutrition(Diet as medically appropriate)     Coordination of Nutrition Care: Continue to monitor while inpatient  Plan of Care discussed with Wes Winter RN and Kwasi Wells NP    Goals:   Previous Goal Met: Goal(s) achieved  Active Goal: Meet daily nutrition needs with combination of oral, entera and parenteral nutrition until able to meet needs with just oral.    Nutrition Monitoring and Evaluation:      Food/Nutrient Intake Outcomes: Diet advancement/tolerance, Enteral nutrition intake/tolerance, Parenteral nutrition intake/tolerance  Physical Signs/Symptoms Outcomes: Biochemical data, GI status    Discharge Planning:     Too soon to determine    [address] [place] [address] RADHA Diop on 4/26/2021 at 12:13 PM  Contact: [phone]

## 2021-04-26 NOTE — PROGRESS NOTES
LTG: Patient will tolerate least restrictive diet without overt signs or symptoms of airway compromise. STG: Patient will tolerate clear liquid diet- NECTAR THICK- without overt signs or symptoms of airway compromise. STG: Patient will participate in ongoing trials with SLP for diet advancement once cleared for additional intake by MD.     SPEECH LANGUAGE PATHOLOGY: DYSPHAGIA- Initial Assessment    NAME/AGE/GENDER: Damien Mcgovern is a [de-identified] y.o. female  DATE: 4/26/2021  PRIMARY DIAGNOSIS: Malignant neoplasm of colon, unspecified part of colon ([facility] [address] [C18.9]  Colon cancer ([facility] [address] [C18.9]  Procedure(s) (LRB):  WHIPPLE PROCEDURE (N/A) 4 Days Post-Op  ICD-10: Treatment Diagnosis: R13.12 Dysphagia, Oropharyngeal Phase    RECOMMENDATIONS   DIET:    Clear Liquid   Nectar thick Liquids    MEDICATIONS: Crushed in puree or non-oral     ASPIRATION PRECAUTIONS  · Slow rate of intake  · Small bites/sips  · Upright at 90 degrees during meal     COMPENSATORY STRATEGIES/MODIFICATIONS  · NECTAR THICK LIQUIDS ONLY     RECOMMENDATIONS for CONTINUED SPEECH THERAPY:   YES: Anticipate need for ongoing speech therapy during this hospitalization. ASSESSMENT   Patient presents with oropharyngeal characterized by brief oral holding and piecemeal swallow with overt s/sx of airway compromise with thin liquids trials. Cough response is weak. No s/sx of airway compromise with nectar via tsp, cup, or straw sips. Additional trials deferred due to clear liquid diet order per surgery. Recommend clear liquids- NECTAR THICK only.  Speech to continue following regarding diet tolerance and po trials for potential diet advancement once cleared by MD.       EDUCATION:  · Recommendations discussed with Patient and RN  CONTINUATION OF SKILLED SERVICES/MEDICAL NECESSITY:   Patient is expected to demonstrate progress in  swallow strength, swallow timeliness, swallow function, diet tolerance and swallow safety in order to  improve swallow safety, work toward diet advancement and decrease aspiration risk.  Patient continues to require skilled intervention due to dysphagia. REHABILITATION POTENTIAL FOR STATED GOALS: Good    PLAN    FREQUENCY/DURATION: Continue to follow patient 3 times a week for duration of hospital stay to address above goals. - Recommendations for next treatment session: Next treatment session will address diet tolerance and po trials    SUBJECTIVE   Pleasant and cooperative. Denies h/o dysphagia. RN reports overt coughing with thin liquids yesterday. Oxygen Device: nasal cannula  Pain: Pain Scale 1: Numeric (0 - 10)  Pain Intensity 1: 0    History of Present Injury/Illness: Ms. Hussein Morgan  has a past medical history of Adverse effect of anesthesia, Claustrophobia, Colon cancer ([facility] [address] GERD (gastroesophageal reflux disease), History of breast cancer in female, History of diabetes mellitus, History of uterine cancer (2012), and RLS (restless legs syndrome). . She also  has a past surgical history that includes hx colonoscopy; hx declan and bso; hx bilateral mastectomy (Bilateral); hx tonsillectomy; hx cataract removal (Bilateral, 2020); ir insert non tunl cvc over 5 yrs (4/9/2021); and hx partial colectomy (03/2021). PRECAUTIONS/ALLERGIES: Augmentin [amoxicillin-pot clavulanate], Pcn [penicillins], and Phenergan [promethazine]     Problem List:  (Impairments causing functional limitations):  1. Oropharyngeal dysphagia  2. Previous Dysphagia: NONE REPORTED  Diet Prior to Evaluation: Clear liquids    Orientation:  Person  Place  Time  Situation    OBJECTIVE   Oral Motor:   · Labial: No impairment  · Dentition: Intact  · Oral Hygiene: Adequate  · Lingual: No impairment    Dysphagia evaluation:   Patient consumed trials of ice chips, thin by tsp, and nectar by tsp/cup/straw. C/o of bolus being too large despite being  presented with tsp sips of water. Brief bolus holding with piecemeal swallow.  This also occurred with nectar thick liquids. Immediate cough response on 3/3 tsp sips of water. No s/sx of airway compromise with 3 tsp nectar, 5 cup sips of nectar, and 8 straw sips. Additional trials deferred. Tool Used: Dysphagia Outcome and Severity Scale (IZZY)    Score Comments   Normal Diet  [] 7 With no strategies or extra time needed   Functional Swallow  [] 6 May have mild oral or pharyngeal delay   Mild Dysphagia  [] 5 Which may require one diet consistency restricted    Mild-Moderate Dysphagia  [] 4 With 1-2 diet consistencies restricted   Moderate Dysphagia  [] 3 With 2 or more diet consistencies restricted   Moderate-Severe Dysphagia  [] 2 With partial PO strategies (trials with ST only)   Severe Dysphagia  [] 1 With inability to tolerate any PO safely      Score:  Initial: 3 Most Recent: x (Date 04/26/21 )   Interpretation of Tool: The Dysphagia Outcome and Severity Scale (IZZY) is a simple, easy-to-use, 7-point scale developed to systematically rate the functional severity of dysphagia based on objective assessment and make recommendations for diet level, independence level, and type of nutrition. Current Medications:   No current facility-administered medications on file prior to encounter. Current Outpatient Medications on File Prior to Encounter   Medication Sig Dispense Refill    ondansetron hcl (Zofran) 4 mg tablet Take 4 mg by mouth every eight (8) hours as needed for Nausea or Vomiting.  omeprazole (PRILOSEC) 40 mg capsule Take 40 mg by mouth daily.           INTERDISCIPLINARY COLLABORATION: RN    After treatment position/precautions:  · Upright in bed  · RN notified    Total Treatment Duration:   Time In: 9094  Time Out: 1850 Alexi Langley, [address] CCC-SLP  Speech Language Pathologist  Acute Rehabilitation Services  Contact: Tirpp

## 2021-04-26 NOTE — PROGRESS NOTES
ACUTE OT GOALS:  (Developed with and agreed upon by patient and/or caregiver.)  1. Patient will complete upper body bathing and dressing with SBA and adaptive equipment as needed. 2. Patient will complete toileting with min A.   3. Patient will tolerate 25 minutes of OT treatment with 1-2 rest breaks to increase activity tolerance for ADLs. 4. Patient will complete functional transfers with mod A and adaptive equipment as needed. 5. Patient will complete functional activity while seated edge of bed with SBA and adaptive equipment as needed. 6. Patient will tolerate 15 minutes unsupported sitting balance with MOD I in preparation for ADL performance. 7. Patient will tolerate 15 minutes BUE therapeutic activities to increase use of BUE during ADL performance.      Timeframe: 7 visits         OCCUPATIONAL THERAPY ASSESSMENT: Initial Assessment and Daily Note OT Treatment Day # 1    Haven Chris is a [de-identified] y.o. female   PRIMARY DIAGNOSIS: Adenocarcinoma of duodenum ([facility] [address]  Malignant neoplasm of colon, unspecified part of colon ([facility] [address] [C18.9]  Colon cancer ([facility] [address] [C18.9]  Procedure(s) (LRB):  WHIPPLE PROCEDURE (N/A)  4 Days Post-Op  Reason for Referral:   ICD-10: Treatment Diagnosis: Generalized Muscle Weakness (M62.81)  [place]PAT[place] / [place] PART A [place] B / Product Type: Medicare /   ASSESSMENT:     REHAB RECOMMENDATIONS:   Recommendation to date pending progress:  Setting:   Inpatient Rehab Facility vs. Short-term Rehab  Equipment:    To Be Determined     PRIOR LEVEL OF FUNCTION:  (Prior to Hospitalization)  INITIAL/CURRENT LEVEL OF FUNCTION:  (Based on today's evaluation)   Bathing:   Independent  Dressing:   Independent  Feeding/Grooming:   Independent  Toileting:   Independent  Functional Mobility:   Independent Bathing:   Maximal Assistance  Dressing:   Maximal Assistance  Feeding/Grooming:   Minimal Assistance  Toileting:   Total Assistance  Functional Mobility:   Maximal Assistance x 2     ASSESSMENT:  Ms. Suzzane Seip presents with deficits in overall strength, activity tolerance, ADL performance and functional mobility. Presents in the CCU, s/p whipple surgery with Dr. Naif Elena. Multiple drains and J-tube present. Independent at baseline. Today, pt educated on use of log roll for functional bed mobility. Pt required mod A x 2 for log roll and supine to sit transfer to EOB. Good static sitting balance while seated EOB. Completed several sit to stand attempts with min to mod A x 2; pt able to achieve upright posture with verbal and visual cues. Max A x 2 for steps towards HOB. At this time, Haven Colon is functioning below baseline for ADLs and functional mobility. Pt would benefit from skilled OT services to address OT goals and and plan of care. .     SUBJECTIVE:   Ms. Suzzane Seip states, \"I'll get up for a little bit. \"    SOCIAL HISTORY/LIVING ENVIRONMENT:   Home Environment: Private residence  One/Two Story Residence: One story  Living Alone: No  Support Systems: Child(lissa), Family member(s)    OBJECTIVE:     PAIN: VITAL SIGNS: LINES/DRAINS:   Pre Treatment: Pain Screen  Pain Scale 1: Numeric (0 - 10)  Pain Intensity 1: 0  Post Treatment: 7 (RN aware)   Toro Catheter, ZACH Drain and J-tube  O2 Device: Nasal cannula     GROSS EVALUATION:  BUEs Within Functional Limits Abnormal/ Functional Abnormal/ Non-Functional (see comments) Not Tested Comments:   AROM [] [x] [] []    PROM [] [x] [] []    Strength [] [x] [] [] 3/5   Balance [] [x] [] [] Good static sitting balance; fair dynamic sitting; fair (-) to poor standing balance   Posture [] [x] [] []    Sensation [] [] [] [x]    Coordination [] [] [] [x]    Tone [] [x] [] []    Edema [] [x] [] []    Activity Tolerance [] [x] [] [] 6L 02    [] [] [] []      COGNITION/  PERCEPTION: Intact Impaired   (see comments) Comments:   Orientation [x] []    Vision [x] []    Hearing [x] []    Judgment/ Insight [x] []    Attention [x] []    Memory [x] []    Command Following [x] []    Emotional Regulation [x] []     [] []      ACTIVITIES OF DAILY LIVING: I Mod I S SBA CGA Min Mod Max Total NT Comments   BASIC ADLs:              Bathing/ Showering [] [] [] [] [] [] [] [] [] [x]    Toileting [] [] [] [] [] [] [] [] [] [x]    Dressing [] [] [] [] [] [] [] [] [] [x]    Feeding [] [] [] [] [] [] [] [] [] [x]    Grooming [] [] [] [] [] [] [] [] [] [x]    Personal Device Care [] [] [] [] [] [] [] [] [] [x]    Functional Mobility [] [] [] [] [] [] [] [x] [] [] Max A x 2   I=Independent, Mod I=Modified Independent, S=Supervision, SBA=Standby Assistance, CGA=Contact Guard Assistance,   Min=Minimal Assistance, Mod=Moderate Assistance, Max=Maximal Assistance, Total=Total Assistance, NT=Not Tested    MOBILITY: I Mod I S SBA CGA Min Mod Max Total  NT x2 Comments:   Supine to sit [] [] [] [] [] [] [x] [] [] [] [x]    Sit to supine [] [] [] [] [] [] [] [x] [] [] [x]    Sit to stand [] [] [] [] [] [x] [x] [] [] [] [x]    Bed to chair [] [] [] [] [] [] [] [] [] [x] []    I=Independent, Mod I=Modified Independent, S=Supervision, SBA=Standby Assistance, CGA=Contact Guard Assistance,   Min=Minimal Assistance, Mod=Moderate Assistance, Max=Maximal Assistance, Total=Total Assistance, NT=Not [facility] 6 Clicks   Daily Activity Inpatient Short Form        How much help from another person does the patient currently need. .. Total A Lot A Little None   1. Putting on and taking off regular lower body clothing? [] 1   [x] 2   [] 3   [] 4   2. Bathing (including washing, rinsing, drying)? [] 1   [x] 2   [] 3   [] 4   3. Toileting, which includes using toilet, bedpan or urinal?   [x] 1   [] 2   [] 3   [] 4   4. Putting on and taking off regular upper body clothing? [] 1   [] 2   [x] 3   [] 4   5. Taking care of personal grooming such as brushing teeth? [] 1   [] 2   [x] 3   [] 4   6. Eating meals?    [] 1   [] 2   [x] 3   [] 4   © 2007, Trustees of [facility] [employer], under license to [employer]. All rights reserved     Score:  Initial: 14 Most Recent: X (Date: -- )   Interpretation of Tool:  Represents activities that are increasingly more difficult (i.e. Bed mobility, Transfers, Gait). PLAN:   FREQUENCY/DURATION: OT Plan of Care: (today only) for duration of hospital stay or until stated goals are met, whichever comes first.    PROBLEM LIST:   (Skilled intervention is medically necessary to address:)  1. Decreased ADL/Functional Activities  2. Decreased Activity Tolerance  3. Decreased AROM/PROM  4. Decreased Balance  5. Decreased Coordination  6. Decreased Gait Ability  7. Decreased Strength  8. Decreased Transfer Abilities  9. Increased Pain   INTERVENTIONS PLANNED:   (Benefits and precautions of occupational therapy have been discussed with the patient.)  1. Self Care Training  2. Therapeutic Activity  3. Therapeutic Exercise/HEP  4. Neuromuscular Re-education  5. Education     TREATMENT:     EVALUATION: Low Complexity : (Untimed Charge)    TREATMENT:   ( $$ Neuromuscular Re-Education: 23-37 mins   )  Neuromuscular Re-education (23 Minutes): Neuromuscular Re-education included Balance Training, Coordination training, Postural training, Sitting balance training and Standing balance training to improve Balance, Coordination and Postural Control.     TREATMENT GRID:  N/A    AFTER TREATMENT POSITION/PRECAUTIONS:  Bed, Needs within reach and RN notified    INTERDISCIPLINARY COLLABORATION:  RN/PCT, PT/PTA and OT/MESSER    TOTAL TREATMENT DURATION:  OT Patient Time In/Time Out  Time In: 1336  Time Out: 200 [facility],

## 2021-04-27 NOTE — PROGRESS NOTES
Haven Inman  Admission Date: 3/30/2021             Daily Progress Note: 4/27/2021    The patient's chart is reviewed and the patient is discussed with the staff. Susanna Hammans is an 63-year-old female with a past medical history of RLS, uterine cancer status post ALEJANDRO, diet-controlled diabetes, breast cancer status post bilateral mastectomy/chemo, GERD, duodenal cancer status post right hemicolectomy 3 weeks ago. 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 She underwent Whipple procedure on 4/22/2021 by Dr. Denise Edward without immediate complication, found to have large uncinate process pancreatic cancer with invasion of duodenum. To ICU intubated following surgery. Extubated 4/23. Subjective:     Patient with GNR in sputum. Currently on 6L O2, stable from yesterday. On zosyn and tmax 99.6. HR trending down to low 100's from 120-130's yesterday. Received 4 doses of albumin yesterday.      Current Facility-Administered Medications   Medication Dose Route Frequency    acetaminophen (TYLENOL) solution 650 mg  650 mg Per J Tube Q6H PRN    TPN ADULT-CENTRAL - dextrose 15% amino acid 5%   IntraVENous QPM    albuterol (PROVENTIL VENTOLIN) nebulizer solution 2.5 mg  2.5 mg Nebulization Q4H PRN    0.9% sodium chloride infusion 250 mL  250 mL IntraVENous PRN    guaiFENesin (ROBITUSSIN) 100 mg/5 mL oral liquid 400 mg  400 mg Per J Tube TID    sodium chloride 3% hypertonic nebulizer soln  4 mL Nebulization BID    piperacillin-tazobactam (ZOSYN) 3.375 g in 0.9% sodium chloride (MBP/ADV) 100 mL MBP  3.375 g IntraVENous Q8H    metoprolol (LOPRESSOR) injection 2.5 mg  2.5 mg IntraVENous Q6H PRN    pantoprazole (PROTONIX) 40 mg in 0.9% sodium chloride 10 mL injection  40 mg IntraVENous Q12H    naloxone (NARCAN) injection 0.1 mg  0.1 mg IntraVENous Multiple    enoxaparin (LOVENOX) injection 40 mg  40 mg SubCUTAneous Q24H    LORazepam (ATIVAN) injection 1 mg  1 mg IntraVENous Q6H PRN    HYDROmorphone (DILAUDID) injection 0.5-1 mg  0.5-1 mg IntraVENous Q2H PRN    insulin regular (NOVOLIN R, HUMULIN R) injection   SubCUTAneous Q4H    ondansetron (ZOFRAN) injection 4 mg  4 mg IntraVENous AC&HS    promethazine (PHENERGAN) with saline injection 12.5 mg  12.5 mg IntraVENous Q6H PRN    lip protectant (BLISTEX) ointment 1 Each  1 Each Topical PRN    fat emulsion 20% (LIPOSYN, INTRAlipid) infusion 250 mL  250 mL IntraVENous QPM    phenol throat spray (CHLORASEPTIC) 1 Spray  1 Spray Oral PRN    NUTRITIONAL SUPPORT ELECTROLYTE PRN ORDERS   Does Not Apply PRN       Review of Systems  Constitutional: negative for fever, chills, sweats  Cardiovascular: negative for chest pain, palpitations, syncope, edema  Gastrointestinal: Some abd pain and nausea. negative for dysphagia, reflux, vomiting, diarrhea, or melena  Neurologic: negative for focal weakness, numbness, headache    Objective:     Vitals:    04/27/21 0600 04/27/21 0630 04/27/21 0700 04/27/21 0730   BP: (!) 95/50 (!) 98/54 (!) 102/59 (!) 113/51   Pulse: (!) 106 (!) 110 (!) 109 (!) 106   Resp: 14 13 16 13   Temp:   99.6 °F (37.6 °C)    SpO2: 100% 99% 98% 97%   Weight:       Height:         Intake and Output:   04/25 1901 - 04/27 0700  In: 5728.3 [I.V.:4852.3]  Out: 3385 [Urine:1615; Drains:1770]  No intake/output data recorded. Physical Exam:   Constitution:  the patient is well developed and in no acute distress  EENMT:  Sclera clear, pupils equal, oral mucosa moist  Respiratory: Mild rhonchi. Cardiovascular:  RRR without M,G,R  Gastrointestinal: post surgical, ttp. Minimal bowel sounds. Musculoskeletal: warm without cyanosis. There is no palpable lower leg edema.   Skin:  no jaundice or rashes, surgical wounds   Neurologic: no gross neuro deficits     Psychiatric:  alert and oriented x ppt    CHEST XRAY:   CXR Results  (Last 48 hours)    None        No new imaging    LAB  No lab exists for component: [place]   Recent Labs     04/27/21  0353 04/26/21  0316 04/25/21  0337   WBC 14.0* 13.0* 12.9*   HGB 8.6* 10.6* 7.3*   HCT 26.6* 32.4* 23.1*    182 141*     Recent Labs     04/27/21  0353 04/26/21  0316 04/25/21  0337    147* 145   K 3.5 3.8 3.7   * 116* 115*   CO2 25 21 24   * 172* 170*   BUN 30* 31* 28*   CREA 0.57* 0.66 0.57*   MG 2.3 1.7*  --    CA 8.5 8.3 8.5   PHOS 2.5 3.1 1.8*   ALB 2.3* 1.8* 2.3*     ABG:  No results found for: PH, PHI, PCO2, PCO2I, PO2, PO2I, HCO3, HCO3I, FIO2, FIO2I      MICRO    SARS-CoV-2 LAB Results  [facility] Test: No results found for: COV2NT   [facility] Test: No results found for: EDPR  [facility] Test: No components found for: [id number]       Sputum 4/24 - moderate GNR  Blood - NGTD  Urine - negative    Assessment:  (Medical Decision Making)     Hospital Problems  Date Reviewed: 3/23/2021          Codes Class Noted POA    Aspiration pneumonia of right upper lobe ([facility] ICD-10-CM: J69.0  ICD-9-CM: 507.0  4/25/2021 Unknown        Hypovolemia ICD-10-CM: E86.1  ICD-9-CM: 276.52  4/25/2021 Unknown        Anemia due to blood loss ICD-10-CM: D50.0  ICD-9-CM: 280.0  4/25/2021 Unknown        Thrombocytopenia ([facility] ICD-10-CM: D69.6  ICD-9-CM: 287.5  4/25/2021 Unknown        Tachycardia ICD-10-CM: R00.0  ICD-9-CM: 785.0  4/24/2021 Unknown        * (Principal) Adenocarcinoma of duodenum ([facility] ICD-10-CM: C17.0  ICD-9-CM: 152.0  4/22/2021 Unknown        Severe protein-calorie malnutrition ([facility] [address] ICD-10-CM: E43  ICD-9-CM: 674  4/7/2021 Yes        Colon cancer ([facility] ICD-10-CM: C18.9  ICD-9-CM: 153.9  3/30/2021 Unknown              Patient recovering from ex lap with whipple procedure 4/22 for large uncinate process pancreatic cancer with invasion of the duodenum and root of the mesentery. Plan:  (Medical Decision Making)   -follow up final pathology.  Need to clarify if this is a small bowel adenocarcinoma (listed as adenocarcinoma of duodenum on problem list) or pancreatic cancer. Does oncology need to see her. Will seek surgery team's input.   -cont zosyn day 4 for GNR in sputum. Presumed aspiration  -wean o2 some today and monitor sats.   -getting 3% saline and IS to help with atelectasis and airway clearance. -on TPN for nutrition. -prophylactic lovenox.    -protonix.   -to floor when ok with surgical team.       Jose Krishna MD

## 2021-04-27 NOTE — PROGRESS NOTES
PLAN:  Continue current care  Speech following  TF @20cc/hr via J tube  TPN will ask nutrition to change to PPN  Remove Central line related to been present for about 3 weeks,  Unable to place PICC line related to bilateral mastectomy  Lovenox  IV Abx   Protonix  Pain/ nausea control  Follow labs  PT following  GNR in sputum   Continue arroyo for I&O  SSI every 4 hours  Dulcolax supp. Added Neurontin and tylenol via J-tube for pain control. Transfer to floor. 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 No complaints at this time. WBC 13, on Zosyn. AF, tachycardic pat drains maintained with serosanguineous drainage  04/27/21 - complaining of abdominal pain, pat drains maintained with serosanguineous drainage noted. Staples without redness or drainage. WBC 14, rocephin started. AF, tachycardic. OBJECTIVE:  Constitutional:  no acute distress; appears stated age   Visit Vitals  BP (!) 120/57   Pulse 96   Temp 98.6 °F (37 °C)   Resp 16   Ht 5' 6\" (1.676 m)   Wt 202 lb 13.2 oz (92 kg)   SpO2 95%   BMI 32.74 kg/m²     Eyes: Sclera are clear. ENMT: no external lesions gross hearing normal; no obvious neck masses, no ear or lip lesions  CV: Tachy. Normal perfusion  Resp: No JVD. Breathing is  non-labored; no audible wheezing.     GI: soft and non-distended, appropriately tender, Drain x 2, J tube with tube feeds infusing, staples to midline without redness or drainage   Musculoskeletal: No embolic signs or cyanosis.    Neuro: moves all 4; no focal deficits  Psychiatric: normal affect and mood    Patient Vitals for the past 24 hrs:   BP Temp Pulse Resp SpO2 Weight   04/27/21 1230 (!) 120/57 -- 96 16 95 % --   04/27/21 1200 132/60 -- 92 22 97 % --   04/27/21 1130 131/61 -- 99 18 97 % --   04/27/21 1100 124/60 98.6 °F (37 °C) (!) 104 14 97 % --   04/27/21 1030 122/60 -- (!) 105 14 97 % --   04/27/21 1000 (!) 126/58 -- (!) 108 15 96 % --   04/27/21 0930 (!) 119/57 -- (!) 102 13 96 % --   04/27/21 0907 -- -- -- -- 96 % --   04/27/21 0900 (!) 121/59 -- (!) 104 16 97 % --   04/27/21 0830 (!) 127/56 -- (!) 112 20 97 % --   04/27/21 0800 (!) 115/55 -- (!) 107 12 97 % --   04/27/21 0730 (!) 113/51 -- (!) 106 13 97 % --   04/27/21 0700 (!) 102/59 99.6 °F (37.6 °C) (!) 109 16 98 % --   04/27/21 0630 (!) 98/54 -- (!) 110 13 99 % --   04/27/21 0600 (!) 95/50 -- (!) 106 14 100 % --   04/27/21 0530 (!) 108/51 -- (!) 102 17 100 % --   04/27/21 0500 (!) 104/58 -- (!) 113 15 100 % --   04/27/21 0430 (!) 113/56 -- (!) 109 27 99 % --   04/27/21 0400 (!) 121/54 98.8 °F (37.1 °C) (!) 107 12 98 % 202 lb 13.2 oz (92 kg)   04/27/21 0330 (!) 107/55 -- 94 12 99 % --   04/27/21 0300 (!) 96/51 -- 97 11 99 % --   04/27/21 0230 (!) 124/56 -- (!) 108 24 100 % --   04/27/21 0200 (!) 116/55 -- (!) 109 18 99 % --   04/27/21 0130 (!) 119/57 -- (!) 104 28 98 % --   04/27/21 0114 -- -- -- -- 98 % --   04/27/21 0100 (!) 109/53 -- (!) 103 14 99 % --   04/27/21 0030 (!) 118/54 -- (!) 104 29 98 % --   04/27/21 0000 124/60 98.5 °F (36.9 °C) 97 15 99 % --   04/26/21 2330 (!) 120/55 -- 99 13 99 % --   04/26/21 2300 (!) 114/55 -- 97 16 99 % --   04/26/21 2230 (!) 122/57 -- 100 23 98 % --   04/26/21 2200 (!) 116/53 -- 90 13 98 % --   04/26/21 2130 (!) 109/53 -- 93 14 97 % --   04/26/21 2102 (!) 124/58 -- (!) 101 (!) 37 96 % --   04/26/21 2039 -- -- -- -- 98 % --   04/26/21 2030 (!) 98/46 -- 93 13 97 % --   04/26/21 2001 (!) 107/51 98.2 °F (36.8 °C) 91 12 98 % --   04/26/21 1930 (!) 94/50 -- 91 14 97 % --   04/26/21 1900 (!) 104/56 -- 95 16 97 % --   04/26/21 1830 (!) 109/59 -- 94 16 96 % --   04/26/21 1800 115/76 -- (!) 109 21 94 % --   04/26/21 1730 109/62 -- (!) 102 13 97 % --   04/26/21 1700 (!) 104/59 -- (!) 101 11 97 % --   04/26/21 1630 (!) 111/58 -- 100 12 98 % --   04/26/21 1600 122/61 -- (!) 104 14 97 % --   04/26/21 1530 126/62 -- (!) 108 22 98 % --   04/26/21 1500 124/62 -- (!) 113 (!) 31 98 % --   04/26/21 1440 129/62 -- (!) 113 22 98 % --   04/26/21 1400 -- -- (!) 112 23 95 % --   04/26/21 1330 (!) 102/52 -- (!) 103 22 95 % --     Labs:    Recent Labs     04/27/21  0353 04/25/21  0337 04/25/21  0337   WBC 14.0*   < > 12.9*   HGB 8.6*   < > 7.3*      < > 141*      < > 145   K 3.5   < > 3.7   *   < > 115*   CO2 25   < > 24   BUN 30*   < > 28*   CREA 0.57*   < > 0.57*   *   < > 170*   TBILI 1.1   < > 0.7   CBIL  --   --  0.3   ALT 52   < > 51   AP 70   < > 50    < > = values in this interval not displayed.      Zaira Daniels, YURIY

## 2021-04-27 NOTE — PROGRESS NOTES
LTG: Patient will tolerate least restrictive diet without overt signs or symptoms of airway compromise. Discontinued 4/27  STG: Patient will participate in ongoing trials with SLP for diet advancement once cleared for additional intake by MD.     SPEECH LANGUAGE PATHOLOGY: DYSPHAGIA- Daily Note 1    NAME/AGE/GENDER: Wilma Correa is a [de-identified] y.o. female  DATE: 4/27/2021  PRIMARY DIAGNOSIS: Malignant neoplasm of colon, unspecified part of colon ([facility] [address] [C18.9]  Colon cancer ([facility] [address] [facility] [C18.9]  Procedure(s) (LRB):  WHIPPLE PROCEDURE (N/A) 5 Days Post-Op  ICD-10: Treatment Diagnosis: R13.12 Dysphagia, Oropharyngeal Phase    RECOMMENDATIONS   DIET:    NPO with alternative Nutrition/Hydration/Medication   Can have 2-3 teaspoon of nectar thick liquid per hour for pleasure    MEDICATIONS:  Non-oral     ASPIRATION PRECAUTIONS  · Slow rate of intake  · Small bites/sips  · Upright at 90 degrees during meal     COMPENSATORY STRATEGIES/MODIFICATIONS  · Oral care 3-4x/daily     RECOMMENDATIONS for CONTINUED SPEECH THERAPY:   YES: Anticipate need for ongoing speech therapy during this hospitalization. ASSESSMENT   Patient presents with ongoing oropharyngeal dysphagia. Mild oral holding with nectar thick liquids with double swallow upon palpation. No overt s/sx airway compromise with nectar by teaspoon; however, appears short of breath following single cups sips of nectar. Consumed only a few trials before politely declining further intake. Recommend NPO with alternate means of nutrition/hydration/medication. Can have 2-3 teaspoons of nectar thick liquids per hour for pleasure. Oral care 3-4x/daily. Will continue to follow for ongoing po trials. Anticipate need for modified barium swallow study at later date if symptoms persist; however, patient unable to participate at this time due to current diet restriction per surgery.        EDUCATION:  · Recommendations discussed with Patient and RN  CONTINUATION OF SKILLED SERVICES/MEDICAL NECESSITY:   Patient is expected to demonstrate progress in  swallow strength, swallow timeliness, swallow function, diet tolerance and swallow safety in order to  improve swallow safety, work toward diet advancement and decrease aspiration risk.  Patient continues to require skilled intervention due to dysphagia. REHABILITATION POTENTIAL FOR STATED GOALS: Good    PLAN    FREQUENCY/DURATION: Continue to follow patient 3 times a week for duration of hospital stay to address above goals. - Recommendations for next treatment session: Next treatment session will address po trials    SUBJECTIVE   Patient alert upright in bed for session. Weak vocal quality with low volume. Answering questions appropriately, but verbalization limited throughout session. Oxygen Device: nasal cannula  Pain: Pain Scale 1: Numeric (0 - 10)  Pain Intensity 1: 0  Pain Location 1: Abdomen  Pain Intervention(s) 1: Medication (see MAR); Repositioned    History of Present Injury/Illness: Ms. Alfonso Carrizales  has a past medical history of Adverse effect of anesthesia, Claustrophobia, Colon cancer ([facility] [address] GERD (gastroesophageal reflux disease), History of breast cancer in female, History of diabetes mellitus, History of uterine cancer (2012), and RLS (restless legs syndrome). . She also  has a past surgical history that includes hx colonoscopy; hx declan and bso; hx bilateral mastectomy (Bilateral); hx tonsillectomy; hx cataract removal (Bilateral, 2020); ir insert non tunl cvc over 5 yrs (4/9/2021); and hx partial colectomy (03/2021). PRECAUTIONS/ALLERGIES: Augmentin [amoxicillin-pot clavulanate], Pcn [penicillins], and Phenergan [promethazine]     Problem List:  (Impairments causing functional limitations):  1. Oropharyngeal dysphagia    Orientation:  Person  Place  Time    OBJECTIVE   Dysphagia treatment:   Patient seen for diet tolerance. Consumed trials of nectar by teaspoon and single cups sips.  Mild oral delay with double swallow upon palpation. No overt s/sx airway compromise with nectar by teaspoon across 3 trials. Patient noted to be short of breath following single sips of nectar by cup across 2 trials. Politely declined further po intake. Tool Used: Dysphagia Outcome and Severity Scale (IZZY)    Score Comments   Normal Diet  [] 7 With no strategies or extra time needed   Functional Swallow  [] 6 May have mild oral or pharyngeal delay   Mild Dysphagia  [] 5 Which may require one diet consistency restricted    Mild-Moderate Dysphagia  [] 4 With 1-2 diet consistencies restricted   Moderate Dysphagia  [] 3 With 2 or more diet consistencies restricted   Moderate-Severe Dysphagia  [] 2 With partial PO strategies (trials with ST only)   Severe Dysphagia  [] 1 With inability to tolerate any PO safely      Score:  Initial: 3 Most Recent: 2 (Date 04/27/21 )   Interpretation of Tool: The Dysphagia Outcome and Severity Scale (IZZY) is a simple, easy-to-use, 7-point scale developed to systematically rate the functional severity of dysphagia based on objective assessment and make recommendations for diet level, independence level, and type of nutrition. Current Medications:   No current facility-administered medications on file prior to encounter. Current Outpatient Medications on File Prior to Encounter   Medication Sig Dispense Refill    ondansetron hcl (Zofran) 4 mg tablet Take 4 mg by mouth every eight (8) hours as needed for Nausea or Vomiting.  omeprazole (PRILOSEC) 40 mg capsule Take 40 mg by mouth daily.           INTERDISCIPLINARY COLLABORATION: RN    After treatment position/precautions:  · Upright in bed  · RN notified    Total Treatment Duration:   Time In: 0433  Time Out: [address], [address] [facility]

## 2021-04-27 NOTE — PROGRESS NOTES
Comprehensive Nutrition Assessment    Type and Reason for Visit: Reassess  Tube Feeding Management (Surgery )  TPN Management (Surgery)    Addendum: Call from RN stating that MD wanting PPN in order to remove central line and place peripheral line. Call to pharmacy due to being after \"cut off\" time, and okay to change to PPN solution. See revised plan below:    Nutrition Recommendations/Plan:   · Parenteral Nutrition:  · Change TPN:    · 5%DEX/4.25%AA at 85 ml/hr (2L). Continue 250 ml 20% lipids. PN to provide 1180 calories/day (71% calorie goal), 85 gm protein (85% protein goal) in ~2250ml volume/day.   · Lytes/L:   · Omit Sodium, Potassium 35 meq (10 meq KCl, 25 meq KPO4), 5 meq Mg, 4.5 meq Calcium.  Osmolarity 810. · Other additives: MTE, MVI, omit insulin  · Enteral Nutrition:   · Increase Osmolite 1.2 to 20 ml/hr with 20 ml water flush every 6 hours via JT (528 calories/day, 24 gm protein/day in 440 ml water/day)  · Diet:  · Per surgery and SLP  · Vitamin and Mineral Supplement Therapy:  · Electrolyte management replacement protocol active.   · Labs:   · BMP and Phos daily, and Mg MWF. Repeat Mg in am. Continue POC Glucoses/SSI      Malnutrition Assessment:  Malnutrition Status: Severe malnutrition  Context: Chronic illness  Findings of clinical characteristics of malnutrition:   Energy Intake:  7 - 75% or less est energy requirements for 1 month or longer  Weight Loss:  7.0 - Greater than 7.5% over 3 months((226# to 182# (19.5%), 226# to 201# (11%) in < 3 months)     Body Fat Loss:  Unable to assess,     Muscle Mass Loss:  Unable to assess,    Fluid Accumulation:  Unable to assess,     Strength:  Not performed       Nutrition Assessment:   Nutrition History: 4/6: Pt reports at baseline she eats 2 small meals a day of oatmeal or grits for breakfast and then a \"normal\" meal for her 2nd meal of the day. About 8 weeks ago, she was eating a [employer] meal and had N/V which she thought was food poisioning.  However she continued to have N/V with all po intake, thus intake was <25% of usual. She was mainly consuming gatorade, Boost or Ensure PTA. She had weight loss from 226# to 182#. She reports addtional weight loss since her pre assessment weight on 3/24/2021. Nutrition Background: H/O: breast cancer with bilateral mastectomy, uterine cancer s/p hysterectomy, DM, HTN, RLS, GERD and claustrophobia. 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 She has had postop N&V and been unable to tolerate po diet progression  Daily Update:  Patient seen and discussed with RN. RN reports increasing TF to 20 ml/hr per Dr. Yuliya Ramirez. Patient states that her belly is feeling okay. She states that she passed gas yesterday but none so far today. She denies any needs other than a sip of water. RN notified. Noted SLP evaluation today and diet downgraded to sips of thickened liquids for pleasure. Abdominal Status (last documented): Semi-soft abdomen with Hypoactive  bowel sounds. Last BM 04/17/21. Pertinent Medications: SSI (15 units yesterday, 9 thus far today), Zofran, Protonix, Phenergan, Buminate (stopped), Rocephin, Dulcolax  Pertinent Labs:   Lab Results   Component Value Date/Time    Sodium 145 04/27/2021 03:53 AM    Potassium 3.5 04/27/2021 03:53 AM    Chloride 115 (H) 04/27/2021 03:53 AM    CO2 25 04/27/2021 03:53 AM    Anion gap 5 (L) 04/27/2021 03:53 AM    Glucose 173 (H) 04/27/2021 03:53 AM    BUN 30 (H) 04/27/2021 03:53 AM    Creatinine 0.57 (L) 04/27/2021 03:53 AM    Calcium 8.5 04/27/2021 03:53 AM    Albumin 2.3 (L) 04/27/2021 03:53 AM    Magnesium 2.3 04/27/2021 03:53 AM    Phosphorus 2.5 04/27/2021 03:53 AM   Labs remarkable for K trending down, Na WNL but still high normal. POC glucoses 125-218 over last 24 hrs. Nutrition Related Findings:   Diet: 3/30 Clear, 4/2 NPO, 4/5 sips of clears, Ensure clear. 4/6: PPN started. 4/8:NGT placed. CVC placed 4/9 and converted to CPN.  SBFT 4/12 with delayed gstric emptying with hypotonic bowel with no contrast in small bowel or colon. NGT out 4/14. 4/15 diet advanced to FLD and TPN decreased to 1L per MD.  1L TPN to continues on 1 liter per surgery. TPN taken back to goal of 2 L 4/19. NGT placed prior to EGD 4/19, now to LIS. 4/23: in CCU post-op Whipple 4/22. Ext 4/23, NGT to LIS. 4/24 - NGT removed. Trophic TF initated 4/25. Wound Type: Surgical incision    Current Nutrition Therapies:  DIET NPO  DIET TUBE FEEDING Advance to 20ml/hour. Open order for details. Keep HOB elevated at least 30 degrees. Current Tube Feeding (TF) Orders:   · Feeding Route: PEJ  · Formula: Osmolite 1.2  · Schedule:Continuous    · Regimen: 10 ml/hr  · Additives/Modulars: (none)  · Water Flushes: 20 Q6  · Current TF & Flush Orders Provides: 264 calories/day, 12 gm protein/day in 260 ml water/day  · Goal TF & Flush Orders Provides:      Current Parenteral Nutrition Orders:  · Type and Formula: 15%DEX/5%AA   · Lipids: 250ml, Daily  · Duration: Continuous  · Rate/Volume: 85 ml/hr (2L)  · Current PN Order Provides: at goal  · Goal PN Orders Provides: 1920 calories/day, 100 gm protein/day in ~2250 ml volume/day      Current Intake:   Average Meal Intake: (sips of clear thickened liquids) Average Supplement Intake: ( )      Anthropometric Measures:  Height: 5' 6\" (167.6 cm)  Current Body Wt: 92 kg (202 lb 13.2 oz)(4/27), Weight source: Bed scale  BMI: 32.8, Obese class 1 (BMI 30.0-34. 9)  Admission Body Weight: 192 lb 7.4 oz(3/24 pre assessment)  Ideal Body Weight (lbs) (Calculated): 130 lbs (59 kg), 148 %  Usual Body Wt: 102.5 kg (226 lb), Percent weight change: -19.5          Edema: No data recorded   Estimated Daily Nutrient Needs:  Energy (kcal/day): 9841-0119 (Kcal/kg(20-25), Weight Used: Current(83 kg (4/23)))  Protein (g/day): 100-125 (1.2-1.5 g/kg)- obese, s/p Whipple Weight Used: (Current)  Fluid (ml/day):   (1 ml/kcal)    Nutrition Diagnosis:   · Inadequate oral intake related to altered GI structure as evidenced by (s/p Whipple, PN and trophic EN, sips of clears)    · Severe malnutrition related to inadequate protein-energy intake as evidenced by (s/s as above per malnutrition assessment)    Nutrition Interventions:   Food and/or Nutrient Delivery: Modify tube feeding, Continue parenteral nutrition     Coordination of Nutrition Care: Continue to monitor while inpatient  Plan of Care discussed with JAZMINE Teixeira and YURIY House    Goals:   Previous Goal Met: Goal(s) achieved  Active Goal: Continue to meet at least 75% nutrition needs with PN until able to meet at least 50% needs with EN and/or PO    Nutrition Monitoring and Evaluation:      Food/Nutrient Intake Outcomes: Diet advancement/tolerance, Enteral nutrition intake/tolerance, Parenteral nutrition intake/tolerance  Physical Signs/Symptoms Outcomes: Biochemical data, GI status    Discharge Planning:     Too soon to determine    [address] [place], LD on 4/27/2021 at 1:05 PM  Contact: [phone]

## 2021-04-27 NOTE — PROGRESS NOTES
A follow up visit was made to the patient. Emotional support, spiritual presence and the assurance of  prayer were provided for the patient. The patient appeared to be sleepy.       Bhargavi Valadez, [address] [facility], [facility]

## 2021-04-27 NOTE — PROGRESS NOTES
ACUTE OT GOALS:  (Developed with and agreed upon by patient and/or caregiver.)  1. Patient will complete upper body bathing and dressing with SBA and adaptive equipment as needed. 2. Patient will complete toileting with min A.   3. Patient will tolerate 25 minutes of OT treatment with 1-2 rest breaks to increase activity tolerance for ADLs. 4. Patient will complete functional transfers with mod A and adaptive equipment as needed. 5. Patient will complete functional activity while seated edge of bed with SBA and adaptive equipment as needed. 6. Patient will tolerate 15 minutes unsupported sitting balance with MOD I in preparation for ADL performance. 7. Patient will tolerate 15 minutes BUE therapeutic activities to increase use of BUE during ADL performance.      Timeframe: 7 visits     OCCUPATIONAL THERAPY: Daily Note OT Treatment Day # 2    Haven Martinez is a [de-identified] y.o. female   PRIMARY DIAGNOSIS: Adenocarcinoma of duodenum ([facility] [address]  Malignant neoplasm of colon, unspecified part of colon ([facility] [address] [C18.9]  Colon cancer ([facility] [address] [C18.9]  Procedure(s) (LRB):  WHIPPLE PROCEDURE (N/A)  5 Days Post-Op  [place] / Plan[place] / Product Type: Medicare /   ASSESSMENT:     REHAB RECOMMENDATIONS: CURRENT LEVEL OF FUNCTION:  (Most Recently Demonstrated)   Recommendation to date pending progress:  [address] [place] [address] vs. Short-term Rehab  Equipment:    To Be Determined Bathing:   Moderate Assistance  Dressing:   Moderate Assistance  Feeding/Grooming:   Standby Assistance  Toileting:   Maximal Assistance  Functional Mobility:   Moderate Assistance x 2 x RW   to  ASSESSMENT:  Ms. Josefina Greenfield continues to present with deficits in overall strength, activity tolerance, ADL performance, and functional mobility. Remains in ICU with multiple drains and J-tube present s/p whipple surgery.  Mod A x 2 for log roll and supine to sit transfer to EOB; extended rest break provided EOB d/t dizziness. Washed face with set-up. Stood with mod A x 2; verbal cues to correct forward trunk flexion to full upright posture. Able to take steps with min A x 2 x RW to bedside chair; verbal cues for facilitation. Practiced standing from lower surface (I.e. recliner chair). Is progressing well towards overall goals. Limited by pain. WIll continue OT efforts as indicated. SUBJECTIVE:   Ms. Ratna Knapp states, \"I'm feeling better today. I want to try and get up. \"    SOCIAL HISTORY/LIVING ENVIRONMENT:   Home Environment: Private residence  One/Two Story Residence: One story  Living Alone: No  Support Systems: Child(lissa), Family member(s)    OBJECTIVE:     PAIN: VITAL SIGNS: LINES/DRAINS:   Pre Treatment: Pain Screen  Pain Scale 1: Numeric (0 - 10)  Pain Intensity 1: 6  Pain Onset 1: ongoing  Pain Location 1: Abdomen  Post Treatment: 7   IV, ZACH Drain and ICP monitor; j-tube  O2 Device: None (Room air)     ACTIVITIES OF DAILY LIVING: I Mod I S SBA CGA Min Mod Max Total NT Comments   BASIC ADLs:              Bathing/ Showering [] [] [] [] [] [] [] [] [] [x]    Toileting [] [] [] [] [] [] [] [] [] [x]    Dressing [] [] [] [] [] [] [] [] [] [x]    Feeding [] [] [] [] [] [] [] [] [] [x]    Grooming [] [] [x] [] [] [] [] [] [] [] Washing face EOB   Personal Device Care [] [] [] [] [] [] [] [] [] [x]    Functional Mobility [] [] [] [] [] [x] [] [] [] [] X 2 x RW   I=Independent, Mod I=Modified Independent, S=Supervision, SBA=Standby Assistance, CGA=Contact Guard Assistance,   Min=Minimal Assistance, Mod=Moderate Assistance, Max=Maximal Assistance, Total=Total Assistance, NT=Not Tested    MOBILITY: I Mod I S SBA CGA Min Mod Max Total  NT x2 Comments:   Supine to sit [] [] [] [] [] [] [x] [] [] [] [x]    Sit to supine [] [] [] [] [] [] [] [] [] [x] []    Sit to stand [] [] [] [] [] [] [x] [] [] [] [x]    Bed to chair [] [] [] [] [] [x] [] [] [] [] [x] X RW   I=Independent, Mod I=Modified Independent, S=Supervision, SBA=Standby Assistance, CGA=Contact Daljit Schwab,   Min=Minimal Assistance, Mod=Moderate Assistance, Max=Maximal Assistance, Total=Total Assistance, NT=Not Tested    BALANCE: Good Fair+ Fair Fair- Poor NT Comments   Sitting Static [] [x] [] [] [] []    Sitting Dynamic [] [] [x] [] [] []              Standing Static [] [] [x] [] [] []    Standing Dynamic [] [] [x] [] [] []      PLAN:   FREQUENCY/DURATION: OT Plan of Care: 3 times/week for duration of hospital stay or until stated goals are met, whichever comes first.    TREATMENT:   TREATMENT:   ( $$ Neuromuscular Re-Education: 23-37 mins   )  Neuromuscular Re-education (24 Minutes): Neuromuscular Re-education included Balance Training, Coordination training, Postural training, Sitting balance training and Standing balance training to improve Balance, Coordination and Postural Control.     TREATMENT GRID:  N/A    AFTER TREATMENT POSITION/PRECAUTIONS:  Chair, Needs within reach and RN notified    INTERDISCIPLINARY COLLABORATION:  RN/PCT, PT/PTA and OT/MESSER    TOTAL TREATMENT DURATION:  OT Patient Time In/Time Out  Time In: 1330  Time Out: Kailey [address], OT

## 2021-04-27 NOTE — PROGRESS NOTES
Picc line placement ordered for TPN. Pt not a candidate due to hx of bilateral mastectomies. Primary nurse notified.

## 2021-04-28 NOTE — PROGRESS NOTES
Comprehensive Nutrition Assessment    Type and Reason for Visit: Reassess  Tube Feeding Management (Surgery/Turkish)  TPN Management (Surgery/Turkish)    Nutrition Recommendations/Plan:    TF per MD. Would recommend continuing to advance to goal: Osmolite 1.2 via Jtube @ 65 ml/hr. Free water flush 25 ml/hr. Regimen would provide 1716 kcal (100% needs), 79 g pro (79% needs), 1723 ml free water (~1 ml/kcal). Protein needs could be met with Prosource TF 2 packets per day with 30 ml water flush before and after.  PN discontinued this am per MD   Oral nutrition: per SLP and MD. Advance as tolerated and medically appropriate. Malnutrition Assessment:  Malnutrition Status: Severe malnutrition  Context: Chronic illness  Findings of clinical characteristics of malnutrition:   Energy Intake:  7 - 75% or less est energy requirements for 1 month or longer  Weight Loss:  7.0 - Greater than 7.5% over 3 months((226# to 182# (19.5%), 226# to 201# (11%) in < 3 months)     Body Fat Loss:  Unable to assess,     Muscle Mass Loss:  Unable to assess,    Fluid Accumulation:  Unable to assess,     Strength:  Not performed       Nutrition Assessment:   Nutrition History: 4/6: Pt reports at baseline she eats 2 small meals a day of oatmeal or grits for breakfast and then a \"normal\" meal for her 2nd meal of the day. About 8 weeks ago, she was eating a [employer] meal and had N/V which she thought was food poisioning. However she continued to have N/V with all po intake, thus intake was <25% of usual. She was mainly consuming gatorade, Boost or Ensure PTA. She had weight loss from 226# to 182#. She reports addtional weight loss since her pre assessment weight on 3/24/2021. Nutrition Background: H/O: breast cancer with bilateral mastectomy, uterine cancer s/p hysterectomy, DM, HTN, RLS, GERD and claustrophobia. 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  She has had postop N&V and been unable to tolerate po diet progression  Daily Update:  Patient seen with RN at bedside. She wake briefly to name and says good morning and goes right back to sleep. RN reports PN discontinued and stopped this am. TF has been advanced to 30 ml/hr and free water flush infusing 20 ml Q12 hrs. Abdominal Status (last documented): Semi-soft abdomen with Active  bowel sounds. Last BM 04/28/21. Pertinent Medications: Dulcolax, Rocephin, Zofran, Phenergan, Protonix, SSI  Pertinent Labs:   Lab Results   Component Value Date/Time    Sodium 143 04/28/2021 05:02 AM    Potassium 4.3 04/28/2021 05:02 AM    Chloride 114 (H) 04/28/2021 05:02 AM    CO2 22 04/28/2021 05:02 AM    Anion gap 7 04/28/2021 05:02 AM    Glucose 202 (H) 04/28/2021 05:02 AM    BUN 31 (H) 04/28/2021 05:02 AM    Creatinine 0.66 04/28/2021 05:02 AM    Calcium 9.0 04/28/2021 05:02 AM    Albumin 2.4 (L) 04/28/2021 05:02 AM    Magnesium 2.5 (H) 04/28/2021 05:02 AM    Phosphorus 3.0 04/28/2021 05:02 AM       Nutrition Related Findings:   Diet: 3/30 Clear, 4/2 NPO, 4/5 sips of clears, Ensure clear. 4/6: PPN started. 4/8:NGT placed. CVC placed 4/9 and converted to CPN. SBFT 4/12 with delayed gstric emptying with hypotonic bowel with no contrast in small bowel or colon. NGT out 4/14. 4/15 diet advanced to FLD and TPN decreased to 1L per MD.  1L TPN to continues on 1 liter per surgery. TPN taken back to goal of 2 L 4/19. NGT placed prior to EGD 4/19, now to LIS. 4/23: in CCU post-op Whipple 4/22. Ext 4/23, NGT to LIS. 4/24 - NGT removed. Trophic TF initated 4/25. TF increased to 20 ml/hr and PN converted to PPN 4/27 due to central line removal. Wound Type: Surgical incision    Current Nutrition Therapies:  DIET NPO  DIET TUBE FEEDING Advance to 30ml/hour. Flush BID with 20ml tap water and before and after every use Open order for details. Keep HOB elevated at least 30 degrees.   Current Tube Feeding (TF) Orders:   · Feeding Route: PEJ  · Formula: Osmolite 1.2  · Schedule:Continuous    · Regimen: 30 ml/hr  · Additives/Modulars: (none)  · Water Flushes: 20 ml Q12  · Current TF & Flush Orders Provides: 792 kcal (48% needs), 37 g pro (37% needs), 581 ml free water (35% needs)    Current Intake:   Average Meal Intake: (sips of clear thickened liquids) Average Supplement Intake: ( )      Anthropometric Measures:  Height: 5' 6\" (167.6 cm)  Current Body Wt: 92 kg (202 lb 13.2 oz)(4/27), Weight source: Bed scale  BMI: 32.8, Obese class 1 (BMI 30.0-34. 9)  Admission Body Weight: 192 lb 7.4 oz(3/24 pre assessment)  Ideal Body Weight (lbs) (Calculated): 130 lbs (59 kg), 148 %  Usual Body Wt: 102.5 kg (226 lb), Percent weight change: -19.5          Edema: No data recorded   Estimated Daily Nutrient Needs:  Energy (kcal/day): 6708-5183 (Kcal/kg(20-25), Weight Used: Current(83 kg (4/23)))  Protein (g/day): 100-125 (1.2-1.5 g/kg)- obese, s/p Whipple Weight Used: (Current)  Fluid (ml/day):   (1 ml/kcal)    Nutrition Diagnosis:   · Inadequate oral intake related to altered GI function, swallowing difficulty as evidenced by (s/p whipple, EN for nutrition, sips of thickened clears)    · Severe malnutrition related to inadequate protein-energy intake as evidenced by (s/s as above per malnutrition assessment)    Nutrition Interventions:   Food and/or Nutrient Delivery: (EN per MD, PN discontinued this am)     Coordination of Nutrition Care: Continue to monitor while inpatient  Plan of Care discussed with Alejandro Montoya RN    Goals:   Previous Goal Met: Progressing toward goal(s)  Active Goal: Meet at least 75% nutrition needs with EN until PO can be resumed    Nutrition Monitoring and Evaluation:      Food/Nutrient Intake Outcomes: Diet advancement/tolerance, Enteral nutrition intake/tolerance  Physical Signs/Symptoms Outcomes: Biochemical data, GI status    Discharge Planning:     Too soon to determine    [place] [place] [facility], LD on 4/28/2021 at 10:26 AM  Contact: [phone]

## 2021-04-28 NOTE — PROGRESS NOTES
Problem: Falls - Risk of  Goal: *Absence of Falls  Description: Document Krystal Martinez Fall Risk and appropriate interventions in the flowsheet.   Outcome: Progressing Towards Goal  Note: Fall Risk Interventions:  Mobility Interventions: Communicate number of staff needed for ambulation/transfer    Mentation Interventions: Evaluate medications/consider consulting pharmacy    Medication Interventions: Evaluate medications/consider consulting pharmacy, Patient to call before getting OOB    Elimination Interventions: Call light in reach, Patient to call for help with toileting needs    History of Falls Interventions: Bed/chair exit alarm         Problem: Infection - Risk of, Urinary Catheter-Associated Urinary Tract Infection  Goal: *Absence of infection signs and symptoms  Outcome: Progressing Towards Goal     Problem: Surgical Pathway Day of Surgery  Goal: Activity/Safety  Outcome: Progressing Towards Goal     Problem: Surgical Pathway Day of Surgery  Goal: Medications  Outcome: Progressing Towards Goal     Problem: Surgical Pathway Day of Surgery  Goal: Treatments/Interventions/Procedures  Outcome: Progressing Towards Goal

## 2021-04-28 NOTE — PROGRESS NOTES
CM spoke with patient family to obtain choices. KENNETH was informed [facility] is the first choice and then if they had no beds then [facility]. CM was informed patient family member is in memory care at [facility] and patient may do better to be able to see family again. CM sent referral and notified liaison.

## 2021-04-28 NOTE — PROGRESS NOTES
PLAN:  Continue current care  Speech following  TF @30cc/hr via J tube. Flush with 20 ml tap water bid and before and after every use  Discontinue PPN after this bag complete  Unable to place PICC line related to bilateral mastectomy  Lovenox  IV Abx - ends on 5/1/21  Protonix  Pain/ nausea control  Follow labs  PT following  Continue arroyo for I&O  SSI   Dulcolax supp. Remote tele. 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 No complaints at this time. WBC 13, on Zosyn. AF, tachycardic pat drains maintained with serosanguineous drainage  04/27/21 - complaining of abdominal pain, pat drains maintained with serosanguineous drainage noted. Staples without redness or drainage. WBC 14, rocephin started. AF, tachycardic.  04/28/21- 6 Days Post-Op. States pain is better. Moaning. Not really answering questions. Pat with serous drainage. Tube feeds infusing. OBJECTIVE:  Constitutional:  no acute distress; appears stated age   Visit Vitals  /64   Pulse 93   Temp 97.5 °F (36.4 °C)   Resp 18   Ht 5' 6\" (1.676 m)   Wt 202 lb 13.2 oz (92 kg)   SpO2 92%   BMI 32.74 kg/m²     Eyes: Sclera are clear. ENMT: no external lesions gross hearing normal; no obvious neck masses, no ear or lip lesions  CV  Normal perfusion  Resp: No JVD. Breathing is  non-labored; no audible wheezing.     GI: soft and non-distended, appropriately tender, Drain x 2 with serous drainage, J tube with tube feeds infusing, staples to midline without redness or drainage   Musculoskeletal: No embolic signs or cyanosis.     Neuro: SIOBHAN- moaning this AM not following commands  Psychiatric: SIOBHAN    Patient Vitals for the past 24 hrs:   BP Temp Pulse Resp SpO2   04/28/21 0754 -- -- -- -- 92 %   04/28/21 0735 104/64 97.5 °F (36.4 °C) 93 18 94 %   04/28/21 0335 132/62 98.1 °F (36.7 °C) (!) 117 18 94 %   04/27/21 2338 130/77 98.2 °F (36.8 °C) 97 19 95 %   04/27/21 2130 (!) 117/53 -- 97 11 98 %   04/27/21 2100 (!) 120/59 -- 95 16 99 %   04/27/21 2030 (!) 111/54 -- (!) 102 18 98 %   04/27/21 2000 (!) 89/49 98.9 °F (37.2 °C) 99 12 97 %   04/27/21 1900 (!) 114/53 -- (!) 107 20 95 %   04/27/21 1830 (!) 126/56 -- (!) 109 16 95 %   04/27/21 1800 132/62 -- (!) 109 17 94 %   04/27/21 1738 (!) 104/56 -- (!) 107 12 96 %   04/27/21 1700 (!) 94/50 -- 96 11 96 %   04/27/21 1630 (!) 97/49 -- 100 12 97 %   04/27/21 1600 (!) 106/52 -- (!) 102 16 97 %   04/27/21 1530 (!) 113/58 -- (!) 105 19 98 %   04/27/21 1500 (!) 114/58 99 °F (37.2 °C) (!) 104 27 98 %   04/27/21 1430 (!) 116/57 -- (!) 107 20 96 %   04/27/21 1400 (!) 112/58 -- (!) 104 18 97 %   04/27/21 1300 (!) 109/55 -- 98 14 95 %   04/27/21 1230 (!) 120/57 -- 96 16 95 %   04/27/21 1200 132/60 -- 92 22 97 %   04/27/21 1130 131/61 -- 99 18 97 %   04/27/21 1100 124/60 98.6 °F (37 °C) (!) 104 14 97 %   04/27/21 1030 122/60 -- (!) 105 14 97 %   04/27/21 1000 (!) 126/58 -- (!) 108 15 96 %   04/27/21 0930 (!) 119/57 -- (!) 102 13 96 %   04/27/21 0907 -- -- -- -- 96 %   04/27/21 0900 (!) 121/59 -- (!) 104 16 97 %   04/27/21 0830 (!) 127/56 -- (!) 112 20 97 %     Labs:    Recent Labs     04/28/21  0502   WBC 16.0*   HGB 11.8         K 4.3   *   CO2 22   BUN 31*   CREA 0.66   *   TBILI 1.4*   *   *     Crystal Michael Mathew NP

## 2021-04-28 NOTE — PROGRESS NOTES
TRANSFER - OUT REPORT:    Verbal report given to Araseli Aguirre RN(name) on Haven Blackwell  being transferred to Room 211(unit) for routine progression of care       Report consisted of patients Situation, Background, Assessment and   Recommendations(SBAR). Information from the following report(s) SBAR, Kardex, Procedure Summary, Intake/Output, MAR, Recent Results, Cardiac Rhythm NSR/ST and Alarm Parameters  was reviewed with the receiving nurse. Lines:   Peripheral IV 04/27/21 Left;Posterior Hand (Active)   Site Assessment Clean, dry, & intact 04/27/21 2000   Phlebitis Assessment 0 04/27/21 2000   Infiltration Assessment 0 04/27/21 2000   Dressing Status Clean, dry, & intact 04/27/21 2000   Dressing Type Tape;Transparent 04/27/21 2000   Hub Color/Line Status Patent; Infusing 04/27/21 2000   Alcohol Cap Used No 04/27/21 1500        Opportunity for questions and clarification was provided.       Patient transported with:   O2 @ 2 liters  Patient-specific medications from Pharmacy  Registered Nurse

## 2021-04-28 NOTE — PROGRESS NOTES
Hourly rounding completed on pt. Pt denies any needs at this time. Will give handoff shift report to oncoming nurse.

## 2021-04-28 NOTE — PROGRESS NOTES
Haven Daniels  Admission Date: 3/30/2021             Daily Progress Note: 4/28/2021    The patient's chart is reviewed and the patient is discussed with the staff. Sara Kim is an 54-year-old female with a past medical history of RLS, uterine cancer status post ALEJANDRO, diet-controlled diabetes, breast cancer status post bilateral mastectomy/chemo, GERD, duodenal cancer status post right hemicolectomy 3 weeks ago. 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 She underwent Whipple procedure on 4/22/2021 by Dr. Macey Gallardo without immediate complication, found to have large uncinate process pancreatic cancer with invasion of duodenum. To ICU intubated following surgery. Extubated 4/23. Subjective:     Patient in room and groggy. Reports in pain, then drifting off to sleep. On 1 LPM oxygen now. No fevers.  R/c came back as klebsiella    Current Facility-Administered Medications   Medication Dose Route Frequency    gabapentin (NEURONTIN) capsule 100 mg  100 mg Per J Tube TID    cefTRIAXone (ROCEPHIN) 2 g in 0.9% sodium chloride (MBP/ADV) 50 mL MBP  2 g IntraVENous Q24H    bisacodyL (DULCOLAX) suppository 10 mg  10 mg Rectal DAILY    albuterol (PROVENTIL VENTOLIN) nebulizer solution 2.5 mg  2.5 mg Nebulization Q4H PRN    0.9% sodium chloride infusion 250 mL  250 mL IntraVENous PRN    guaiFENesin (ROBITUSSIN) 100 mg/5 mL oral liquid 400 mg  400 mg Per J Tube TID    sodium chloride 3% hypertonic nebulizer soln  4 mL Nebulization BID    metoprolol (LOPRESSOR) injection 2.5 mg  2.5 mg IntraVENous Q6H PRN    pantoprazole (PROTONIX) 40 mg in 0.9% sodium chloride 10 mL injection  40 mg IntraVENous Q12H    naloxone (NARCAN) injection 0.1 mg  0.1 mg IntraVENous Multiple    enoxaparin (LOVENOX) injection 40 mg  40 mg SubCUTAneous Q24H    LORazepam (ATIVAN) injection 1 mg  1 mg IntraVENous Q6H PRN    HYDROmorphone (DILAUDID) injection 0.5-1 mg  0.5-1 mg IntraVENous Q2H PRN    insulin regular (NOVOLIN R, HUMULIN R) injection   SubCUTAneous Q4H    ondansetron (ZOFRAN) injection 4 mg  4 mg IntraVENous AC&HS    promethazine (PHENERGAN) with saline injection 12.5 mg  12.5 mg IntraVENous Q6H PRN    lip protectant (BLISTEX) ointment 1 Each  1 Each Topical PRN    phenol throat spray (CHLORASEPTIC) 1 Spray  1 Spray Oral PRN    NUTRITIONAL SUPPORT ELECTROLYTE PRN ORDERS   Does Not Apply PRN       Review of Systems  Very limited as per above since going back to sleep right away    Objective:     Vitals:    04/27/21 2338 04/28/21 0335 04/28/21 0735 04/28/21 0754   BP: 130/77 132/62 104/64    Pulse: 97 (!) 117 93    Resp: 19 18 18    Temp: 98.2 °F (36.8 °C) 98.1 °F (36.7 °C) 97.5 °F (36.4 °C)    SpO2: 95% 94% 94% 92%   Weight:       Height:         Intake and Output:   04/26 1901 - 04/28 0700  In: 4578.5 [I.V.:3391.5]  Out: 6060 [Urine:1905; Drains:4155]  04/28 0701 - 04/28 1900  In: 120   Out: 330 [Urine:200; Drains:130]    Physical Exam:   Constitution:  the patient is well developed and in no acute distress  EENMT:  Sclera clear, pupils equal, oral mucosa moist  Respiratory: CTA b/l. On wheezing or rhonchi. Cardiovascular:  RRR without M,G,R  Gastrointestinal: post surgical, ttp. + bowel sounds. B/l drains and more in left vs right drain. Musculoskeletal: warm without cyanosis. There is no palpable lower leg edema. Skin:  no jaundice or rashes, surgical wounds   Neurologic: no gross neuro deficits     Psychiatric:  Sleepy but calm when talking then drifting back to sleep.      CHEST XRAY:   CXR Results  (Last 48 hours)    None        No new imaging    LAB  No lab exists for component: [place]   Recent Labs     04/28/21  0502 04/27/21  0353 04/26/21  0316   WBC 16.0* 14.0* 13.0*   HGB 11.8 8.6* 10.6*   HCT 38.4 26.6* 32.4*    151 182     Recent Labs     04/28/21  0502 04/27/21  0353 04/26/21  0316    145 147*   K 4.3 3.5 3.8   * 115* 116*   CO2 22 25 21   * 173* 172*   BUN 31* 30* 31*   CREA 0.66 0.57* 0.66   MG 2.5* 2.3 1.7*   CA 9.0 8.5 8.3   PHOS 3.0 2.5 3.1   ALB 2.4* 2.3* 1.8*     ABG:  No results found for: PH, PHI, PCO2, PCO2I, PO2, PO2I, HCO3, HCO3I, FIO2, FIO2I      MICRO    SARS-CoV-2 LAB Results  [facility] Test: No results found for: COV2NT   [facility] Test: No results found for: EDPR  [facility] Test: No components found for: [id number]       Sputum 4/24 - moderate GNR  Blood - NGTD  Urine - negative    Assessment:  (Medical Decision Making)     Hospital Problems  Date Reviewed: 3/23/2021          Codes Class Noted POA    Aspiration pneumonia of right upper lobe ([facility] ICD-10-CM: J69.0  ICD-9-CM: 507.0  4/25/2021 Unknown        Hypovolemia ICD-10-CM: E86.1  ICD-9-CM: 276.52  4/25/2021 Unknown        Anemia due to blood loss ICD-10-CM: D50.0  ICD-9-CM: 280.0  4/25/2021 Unknown        Thrombocytopenia ([facility] ICD-10-CM: D69.6  ICD-9-CM: 287.5  4/25/2021 Unknown        Tachycardia ICD-10-CM: R00.0  ICD-9-CM: 785.0  4/24/2021 Unknown        * (Principal) Adenocarcinoma of duodenum ([facility] ICD-10-CM: C17.0  ICD-9-CM: 152.0  4/22/2021 Unknown        Severe protein-calorie malnutrition ([facility] ICD-10-CM: E43  ICD-9-CM: 274  4/7/2021 Yes        Colon cancer ([facility] ICD-10-CM: C18.9  ICD-9-CM: 153.9  3/30/2021 Unknown              Patient recovering from ex lap with whipple procedure 4/22 for large uncinate process pancreatic cancer with invasion of the duodenum and root of the mesentery. Almost off oxygen. Plan:  (Medical Decision Making)   -follow up final pathology. Need to clarify if this is a small bowel adenocarcinoma (listed as adenocarcinoma of duodenum on problem list) or pancreatic cancer. Does oncology need to see her. Will seek surgery team's input.   -cont zosyn day 4 for Klebsiella in sputum and sensitive to zosyn.  Continue for 7 days total.. Presumed aspiration  -getting 3% saline and IS to help with atelectasis and airway clearance and consider stopping tomorrow if improved  -on NC and only on 1 lPM and almost off  -on TPN for nutrition. -prophylactic lovenox. -protonix.   -pain control   --continue remaining treatment per surgeyr Sheryle Pal, MD

## 2021-04-28 NOTE — PROGRESS NOTES
Patient transferred to [facility] from the unit s/p procedure. STR choices were not obtained while patient was in the unit. This  will obtain choices for STR.

## 2021-04-28 NOTE — PROGRESS NOTES
END OF SHIFT NOTE:    INTAKE/OUTPUT  04/27 0701 - 04/28 0700  In: 2325.6 [I.V.:1558.6]  Out: 3490 [HPQWN:2187; Drains:3220]  Voiding: NO  Catheter: YES  Drain:   Juanita Dunnings #1 04/22/21 Right Abdomen (Active)   Site Assessment Clean, dry, & intact 04/28/21 1742   Dressing Status Clean, dry, & intact 04/28/21 1742   Drainage Description Serous 04/28/21 1742   Andre Drain Airleak No 04/28/21 1742   Status Patent;Draining; To gravity 04/28/21 1742   Y Connector Used No 04/28/21 0715   Output (ml) 10 ml 04/28/21 1742       Andre Drain #2 04/22/21 Left Abdomen (Active)   Site Assessment Clean, dry, & intact 04/28/21 1742   Dressing Status Clean, dry, & intact 04/28/21 1742   Drainage Description Yellow 04/28/21 1742   Andre Drain Airleak No 04/28/21 1742   Status Patent;Draining; To gravity 04/28/21 1742   Y Connector Used No 04/28/21 0444   Output (ml) 40 ml 04/28/21 1742               Flatus: Patient does have flatus present. Stool:  1 occurrences. Characteristics:  Stool Assessment  Stool Color: Brown  Stool Appearance: Loose, Soft, Watery  Stool Amount: Large  Stool Source/Status: Rectum    Emesis: 0 occurrences. Characteristics:   Emesis Assessment  Appearance: Green  Emesis Amount: Small    VITAL SIGNS  Patient Vitals for the past 12 hrs:   Temp Pulse Resp BP SpO2   04/28/21 1510 97.4 °F (36.3 °C) (!) 105 18 110/69 95 %   04/28/21 1110 97.4 °F (36.3 °C) (!) 126 18 118/74 94 %   04/28/21 0754 -- -- -- -- 92 %   04/28/21 0735 97.5 °F (36.4 °C) 93 18 104/64 94 %       Pain Assessment  Pain Intensity 1: 0 (04/28/21 0900)  Pain Location 1: Abdomen  Pain Intervention(s) 1: Medication (see MAR)  Patient Stated Pain Goal: 0    Ambulating  No    Haydee Hollingsworth NP \"patient requesting to speak with doctor. states that she is \"done and wants to go to [facility] now. \" she is alert and oriented x4, no distress noted. VSS. afebrile.  just wanted to make you aware\"    NP stated that she will come talk with the patient in the morning. Shift report given to oncoming nurse at the bedside.     Rodriguez Mcgovern RN

## 2021-04-28 NOTE — PROGRESS NOTES
PT note:  Treatment deferred this am as the patient is sleeping soundly due to administered pain medication, unable to arouse. RN made aware. Will continue PT efforts.   Vladislav Burgess, JEM

## 2021-04-28 NOTE — PROGRESS NOTES
LTG: Patient will tolerate least restrictive diet without overt signs or symptoms of airway compromise. Discontinued 4/27  STG: Patient will participate in ongoing trials with SLP for diet advancement once cleared for additional intake by MD.     SPEECH LANGUAGE PATHOLOGY: DYSPHAGIA- Daily Note 2    NAME/AGE/GENDER: Jennifer Chaparro is a [de-identified] y.o. female  DATE: 4/28/2021  PRIMARY DIAGNOSIS: Malignant neoplasm of colon, unspecified part of colon ([facility] [C18.9]  [facility] [C18.9]  Procedure(s) (LRB):  WHIPPLE PROCEDURE (N/A) 6 Days Post-Op  ICD-10: Treatment Diagnosis: R13.12 Dysphagia, Oropharyngeal Phase    RECOMMENDATIONS   DIET:    NPO with alternative Nutrition/Hydration/Medication   Can have 2-3 teaspoon of nectar thick liquid per hour for pleasure    MEDICATIONS:  Non-oral     ASPIRATION PRECAUTIONS  · Slow rate of intake  · Small bites/sips  · Upright at 90 degrees during meal     COMPENSATORY STRATEGIES/MODIFICATIONS  · Oral care 3-4x/daily     RECOMMENDATIONS for CONTINUED SPEECH THERAPY:   YES: Anticipate need for ongoing speech therapy during this hospitalization. ASSESSMENT   Patient presents with ongoing oropharyngeal dysphagia. Patient declined PO trials at this time. Oral cavity coated with thick mucous. Patient willing to have oral care performed. Encouraged dry swallows with oral care, but patient having difficulty initiating dry swallows. Patient reported painful oral cavity - appearance of thrush. RN arrived to change IV. Recommend NPO with alternate means of nutrition/hydration/medication. Can have 2-3 teaspoons of nectar thick liquids per hour for pleasure. Oral care 3-4x/daily. Will continue to follow for ongoing po trials. Anticipate need for modified barium swallow study at later date, but not appropriate currently.       EDUCATION:  · Recommendations discussed with Patient and RN  CONTINUATION OF SKILLED SERVICES/MEDICAL NECESSITY:   Patient is expected to demonstrate progress in  swallow strength, swallow timeliness, swallow function, diet tolerance and swallow safety in order to  improve swallow safety, work toward diet advancement and decrease aspiration risk.  Patient continues to require skilled intervention due to dysphagia. REHABILITATION POTENTIAL FOR STATED GOALS: Good    PLAN    FREQUENCY/DURATION: Continue to follow patient 3 times a week for duration of hospital stay to address above goals. - Recommendations for next treatment session: Next treatment session will address po trials    SUBJECTIVE   Patient alert and moaning, sprawled on bed. She did not want HOB moved. She did not want PO trials. She was willing to have oral care performed. Oxygen Device: nasal cannula  Pain: Pain Scale 1: Visual  Pain Intensity 1: 0  Pain Location 1: Abdomen  Pain Intervention(s) 1: Medication (see MAR)    Orientation:  Person  Place  Time    OBJECTIVE   Dysphagia treatment:   Patient seen for diet tolerance, which she declined. Oral care provided with attempts to elicit dry effortful swallows to improve swallow function.      Tool Used: Dysphagia Outcome and Severity Scale (IZZY)    Score Comments   Normal Diet  [] 7 With no strategies or extra time needed   Functional Swallow  [] 6 May have mild oral or pharyngeal delay   Mild Dysphagia  [] 5 Which may require one diet consistency restricted    Mild-Moderate Dysphagia  [] 4 With 1-2 diet consistencies restricted   Moderate Dysphagia  [] 3 With 2 or more diet consistencies restricted   Moderate-Severe Dysphagia  [] 2 With partial PO strategies (trials with ST only)   Severe Dysphagia  [] 1 With inability to tolerate any PO safely      Score:  Initial: 3 Most Recent: 2 (Date 04/28/21 )   Interpretation of Tool: The Dysphagia Outcome and Severity Scale (IZZY) is a simple, easy-to-use, 7-point scale developed to systematically rate the functional severity of dysphagia based on objective assessment and make recommendations for diet level, independence level, and type of nutrition.      INTERDISCIPLINARY COLLABORATION: RN    After treatment position/precautions:  · RN at bedside    Total Treatment Duration:   Time In: 1000  Time Out: [address] [place], [place], [facility]

## 2021-04-29 NOTE — PROGRESS NOTES
Spoke to patient's daughter on the phone to give an update on patient's condition. Assured family that patient's pain was being managed and that patient slept most of the night. Family appreciative.

## 2021-04-29 NOTE — PROGRESS NOTES
Courtesy visit s/p colon resection  Family at bedside, patient stating she wants to go be with [facility]  Consulted palliative care per family request ?failure to thrive  Stopped PRN Dilaudid and started PCA Dilaudid to encourage patient to me a more active participant in her care    Updated Dr. Filomena Meyer and Stoney Raymnudo NP.      Ara Portillo NP

## 2021-04-29 NOTE — PROGRESS NOTES
Chart screened by  for discharge planning. Per physician notes patient now noted to have a bile leak in right pat drain. Patient is to discharge to [facility]. CM will continue to follow patient during hospitalization for discharge planning and needs. It is unclear a this time of a potential discharge date. Please consult  if any new issues arise.

## 2021-04-29 NOTE — PROGRESS NOTES
Problem: Falls - Risk of  Goal: *Absence of Falls  Description: Document David Yu Fall Risk and appropriate interventions in the flowsheet.   Outcome: Progressing Towards Goal  Note: Fall Risk Interventions:  Mobility Interventions: Communicate number of staff needed for ambulation/transfer, Patient to call before getting OOB    Mentation Interventions: Evaluate medications/consider consulting pharmacy    Medication Interventions: Evaluate medications/consider consulting pharmacy, Patient to call before getting OOB    Elimination Interventions: Call light in reach, Patient to call for help with toileting needs    History of Falls Interventions: Bed/chair exit alarm

## 2021-04-29 NOTE — PROGRESS NOTES
PT note:  Treatment Held today per the RN request.  Will continue to monitor through nursing.   Ada Prescott, PTA

## 2021-04-29 NOTE — PROGRESS NOTES
[facility]  Admission Date: 3/30/2021             Daily Progress Note: 4/29/2021    The patient's chart is reviewed and the patient is discussed with the staff. 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 She underwent Whipple procedure on 4/22/2021 by Dr. Valeria Mejia without immediate complication, found to have large uncinate process pancreatic cancer with invasion of duodenum. To ICU intubated following surgery. Extubated 4/23.      Subjective:     No major overnight events    Current Facility-Administered Medications   Medication Dose Route Frequency    gabapentin (NEURONTIN) capsule 100 mg  100 mg Per J Tube TID    insulin regular (NOVOLIN R, HUMULIN R) injection   SubCUTAneous Q6H    sodium chloride 3% hypertonic nebulizer soln  4 mL Nebulization BID RT    cefTRIAXone (ROCEPHIN) 2 g in 0.9% sodium chloride (MBP/ADV) 50 mL MBP  2 g IntraVENous Q24H    bisacodyL (DULCOLAX) suppository 10 mg  10 mg Rectal DAILY    albuterol (PROVENTIL VENTOLIN) nebulizer solution 2.5 mg  2.5 mg Nebulization Q4H PRN    0.9% sodium chloride infusion 250 mL  250 mL IntraVENous PRN    guaiFENesin (ROBITUSSIN) 100 mg/5 mL oral liquid 400 mg  400 mg Per J Tube TID    metoprolol (LOPRESSOR) injection 2.5 mg  2.5 mg IntraVENous Q6H PRN    pantoprazole (PROTONIX) 40 mg in 0.9% sodium chloride 10 mL injection  40 mg IntraVENous Q12H    naloxone (NARCAN) injection 0.1 mg  0.1 mg IntraVENous Multiple    enoxaparin (LOVENOX) injection 40 mg  40 mg SubCUTAneous Q24H    LORazepam (ATIVAN) injection 1 mg  1 mg IntraVENous Q6H PRN    HYDROmorphone (DILAUDID) injection 0. 5-1 mg  0.5-1 mg IntraVENous Q2H PRN    ondansetron (ZOFRAN) injection 4 mg  4 mg IntraVENous AC&HS    promethazine (PHENERGAN) with saline injection 12.5 mg  12.5 mg IntraVENous Q6H PRN    lip protectant (BLISTEX) ointment 1 Each  1 Each Topical PRN    phenol throat spray (CHLORASEPTIC) 1 Spray  1 Spray Oral PRN    NUTRITIONAL SUPPORT ELECTROLYTE PRN ORDERS   Does Not Apply PRN       Review of Systems  Very limited as per above since going back to sleep right away    Objective:     Vitals:    04/29/21 0338 04/29/21 0409 04/29/21 0725 04/29/21 0730   BP: 115/75   110/65   Pulse: (!) 123   81   Resp: 18   18   Temp: 97.5 °F (36.4 °C)   97.3 °F (36.3 °C)   SpO2: 94%  94% 96%   Weight:  216 lb 7.9 oz (98.2 kg)     Height:         Intake and Output:   04/27 1901 - 04/29 0700  In: 1375.3 [I.V.:716.3]  Out: 2050 [Urine:1400; Drains:650]  04/29 0701 - 04/29 1900  In: -   Out: 100 [Urine:100]    Physical Exam:   Constitution:  the patient is well developed and in no acute distress  EENMT:  Sclera clear, pupils equal, oral mucosa moist  Respiratory: CTA b/l. On wheezing or rhonchi. Cardiovascular:  RRR without M,G,R  Gastrointestinal: post surgical, ttp. + bowel sounds. B/l drains and more in left vs right drain. Musculoskeletal: warm without cyanosis. There is no palpable lower leg edema. Skin:  no jaundice or rashes, surgical wounds   Neurologic: no gross neuro deficits     Psychiatric:  Sleepy but calm when talking then drifting back to sleep. CHEST XRAY:   CXR Results  (Last 48 hours)               04/29/21 0517  XR CHEST SNGL V Final result    Impression:  No acute intrathoracic process. Narrative:  EXAM: XR CHEST SNGL V       HISTORY: sob. TECHNIQUE: A single frontal view of the chest was submitted. COMPARISON: 4/25/2021        FINDINGS:    The right central venous catheter has been removed. There is no significant pneumothorax on either side. The lungs are clear. There is no pleural effusion. Stable elevation of the right hemidiaphragm. No new imaging    LAB  No lab exists for component: [place]   Recent Labs     04/29/21  0458 04/28/21  0921 04/28/21  0502 04/27/21  0353   WBC 24.8* 19.0* 16.0* 14.0*   HGB 10.8* 10.8* 11.8 8.6*   HCT 33.1* 33.3* 38.4 26.6*    255 232 151     Recent Labs     04/29/21  0458 04/28/21  0502 04/27/21  0353    143 145   K 4.6 4.3 3.5   * 114* 115*   CO2 20* 22 25   * 202* 173*   BUN 51* 31* 30*   CREA 1.02* 0.66 0.57*   MG  --  2.5* 2.3   CA 8.7 9.0 8.5   PHOS 3.7 3.0 2.5   ALB 1.9* 2.4* 2.3*     ABG:  No results found for: PH, PHI, PCO2, PCO2I, PO2, PO2I, HCO3, HCO3I, FIO2, FIO2I      MICRO    SARS-CoV-2 LAB Results  [facility] Test: No results found for: COV2NT   [facility] Test: No results found for: EDPR  [facility] Test: No components found for: [id number]       Sputum 4/24 - moderate GNR  Blood - NGTD  Urine - negative    Assessment:  (Medical Decision Making)     Hospital Problems  Date Reviewed: 3/23/2021          Codes Class Noted POA    Aspiration pneumonia of right upper lobe ([facility] ICD-10-CM: J69.0  ICD-9-CM: 507.0  4/25/2021 Unknown        Hypovolemia ICD-10-CM: E86.1  ICD-9-CM: 276.52  4/25/2021 Unknown        Anemia due to blood loss ICD-10-CM: D50.0  ICD-9-CM: 280.0  4/25/2021 Unknown        Thrombocytopenia ([facility] ICD-10-CM: D69.6  ICD-9-CM: 287.5  4/25/2021 Unknown        Tachycardia ICD-10-CM: R00.0  ICD-9-CM: 785.0  4/24/2021 Unknown        * (Principal) Adenocarcinoma of duodenum ([facility] ICD-10-CM: C17.0  ICD-9-CM: 152.0  4/22/2021 Unknown        Severe protein-calorie malnutrition ([facility] ICD-10-CM: E43  ICD-9-CM: 917  4/7/2021 Yes        Colon cancer ([facility] ICD-10-CM: C18.9  ICD-9-CM: 153.9  3/30/2021 Unknown              Patient recovering from ex lap with whipple procedure 4/22 for large uncinate process pancreatic cancer with invasion of the duodenum and root of the mesentery. Almost off oxygen. Plan:  (Medical Decision Making)     -cont zosyn day 5/7 for Klebsiella in sputum and sensitive to zosyn. Presumed aspiration  -getting 3% saline and IS to help with atelectasis and airway clearance   -on NC and only on 1 lPM and almost off- wean as tolerated. -on TPN for nutrition. -prophylactic lovenox. -protonix.   -pain control   --continue remaining treatment per Kitty Pastrana MD

## 2021-04-29 NOTE — PROGRESS NOTES
PLAN:  Continue current care  Speech following  TF @65 cc/hr via J tube. With 25ml water flush per hour  Unable to place PICC line related to bilateral mastectomy  Lovenox- prophalaxis  IV Abx - changed IV antibiotics rocephin to zosyn/diflucan. Appears to have bile leak noted in right pat drain. rocephin (day 5/7)was for Klebsiella in sputum, but this is sensitive to Zosyn also. Protonix  Pain/ nausea control  Follow labs  PT following  Continue arroyo for I&O  SSI   Dulcolax supp. Remote tele. 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 No complaints at this time. WBC 13, on Zosyn. AF, tachycardic pat drains maintained with serosanguineous drainage  04/27/21 - complaining of abdominal pain, pat drains maintained with serosanguineous drainage noted. Staples without redness or drainage. WBC 14, rocephin started. AF, tachycardic.  04/28/21- 6 Days Post-Op. States pain is better. Moaning. Not really answering questions. Pat with serous drainage. Tube feeds infusing. 04/29/21   7 days post op. Just moaning this AM- will not try to communicate. WBC 24.8. Tm 100.7, tachycardic 120's overnight. Right pat drain now appears to have bile drainage.           OBJECTIVE:  Constitutional:  no acute distress; appears stated age   Visit Vitals  /65   Pulse 81   Temp 97.3 °F (36.3 °C)   Resp 18   Ht 5' 6\" (1.676 m)   Wt 216 lb 7.9 oz (98.2 kg) SpO2 96%   BMI 34.94 kg/m²     Eyes: eyes closed   ENMT: no external lesions gross hearing normal; no obvious neck masses, no ear or lip lesions  CV  Tachycardic, Normal perfusion  Resp: No JVD. Breathing is  non-labored; no audible wheezing. GI: soft and non-distended, appropriately tender, Drain x 2 right drain with yellow/dark brown color drainage noted, J tube with tube feeds infusing, staples to midline without redness or drainage   Musculoskeletal: No embolic signs or cyanosis.     Neuro: SIOBHAN- moaning this AM not following commands  Psychiatric: SIOBHAN    Patient Vitals for the past 24 hrs:   BP Temp Pulse Resp SpO2 Weight   04/29/21 0730 110/65 97.3 °F (36.3 °C) 81 18 96 % --   04/29/21 0725 -- -- -- -- 94 % --   04/29/21 0409 -- -- -- -- -- 216 lb 7.9 oz (98.2 kg)   04/29/21 0338 115/75 97.5 °F (36.4 °C) (!) 123 18 94 % --   04/29/21 0042 103/61 100.2 °F (37.9 °C) (!) 127 20 94 % --   04/28/21 2205 107/64 99.9 °F (37.7 °C) (!) 125 23 -- --   04/28/21 1923 (!) 106/57 (!) 100.7 °F (38.2 °C) (!) 124 19 92 % --   04/28/21 1510 110/69 97.4 °F (36.3 °C) (!) 105 18 95 % --   04/28/21 1110 118/74 97.4 °F (36.3 °C) (!) 126 18 94 % --     Labs:    Recent Labs     04/29/21  0458   WBC 24.8*   HGB 10.8*         K 4.6   *   CO2 20*   BUN 51*   CREA 1.02*   *   TBILI 1.1   ALT 71*   *     Rosa Isela Aguiar NP

## 2021-04-29 NOTE — PROGRESS NOTES
Date of Outreach Update:  [facility] Jay was seen and assessed. Primary RN at bedside during exam.  Pt drowsy, easily arousable. Denies any pain at this time. Pts vitals at this time 107/64, 126, 28. Sats were 89% on 1.5L NC. Increased to 4L, sats improved to 94%. Temp earlier was 100.7. Now 99.9. Breathe sounds clear, diminished. Abd soft, incision line c/d/i. Primary nurse to contact surgical NP regarding patients vitals and condition. Will follow up with RN. MEWS Score: 4 (04/28/21 2205)  Vitals:    04/28/21 1110 04/28/21 1510 04/28/21 1923 04/28/21 2205   BP: 118/74 110/69 (!) 106/57 107/64   Pulse: (!) 126 (!) 105 (!) 124 (!) 125   Resp: 18 18 19 23   Temp: 97.4 °F (36.3 °C) 97.4 °F (36.3 °C) (!) 100.7 °F (38.2 °C) 99.9 °F (37.7 °C)   SpO2: 94% 95% 92%    Weight:       Height:             Pain Assessment  Pain Intensity 1: 0 (04/28/21 0900)  Pain Location 1: Abdomen  Pain Intervention(s) 1: Medication (see MAR)  Patient Stated Pain Goal: 0      Previous Outreach assessment has been reviewed. There have been no significant clinical changes since the completion of the last dated Outreach assessment. Will continue to follow up per outreach protocol.     Signed By:   Zia Nazario RN    April 29, 2021 12:02 AM

## 2021-04-29 NOTE — PROGRESS NOTES
Hold per RN as pt is not doing well with significant pain, A&O X1 and unable to follow commands. Will f/u as schedule and pt's status allows.     Lisa Liu, OT

## 2021-04-29 NOTE — CONSULTS
Palliative Care    Patient: Scar Alvarado MRN: [id number]  SSN: [id number]    YOB: 1940  Age: [de-identified] y.o. Sex: female       Date of Request: 4/29/2021  Date of Consult:  4/29/2021  Reason for Consult:  family support and education and goals of care  Requesting Physician: Dr. Camden Wright     Assessment/Plan:     Principal Diagnosis:    Altered Mental Status R41.82    Additional Diagnoses:   · Fatigue, Lethargy  R53.83  · Counseling, Encounter for Medical Advice  Z71.9  · Encounter for Palliative Care  Z51.5    Palliative Performance Scale (PPS):       Medical Decision Making:   Reviewed and summarized labs and imaging. Met with pt at bedside. Pt minimally participated in conversation only to say \"let me go\" then fall asleep. Per my review and examination of pt she does not appear to be actively dying instead she appears to be recovering from surgery as expected. Attempted to relay this information and encourage pt to continue participating in current care. Spoke with pt iftikhar who arrived and reviewed my findings as well. Iftikhar states pt condition has changed suddenly becoming less responsive. Surgical NP noted change to PCA for pain requiring pt to push button for meds. Hopefully this will improve mentation and allow pt to participate in her care. Did not address code status as I hope to have pt more interactive and participating in her decisions. Will follow.      Will discuss findings with members of the interdisciplinary team.      Thank you for this referral.         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 Phan Beaver developed gastric outlet obstruction and was found to have an obstructed distal duodenal lesion caused by adenocarcinoma.  She underwent Whipple procedure on 4/22/2021 by Dr. Melissa Morris without immediate complication, found to have large uncinate process pancreatic cancer with invasion of duodenum. To ICU intubated following surgery. Extubated 4/23. Advance Directive: No       Code Status:  DNR            Health Care Power of : Unknown    Past Medical History:   Diagnosis Date    Adverse effect of anesthesia     pt states she woke up during colonoscopy X1. No complications with any other procedures.  Claustrophobia     Colon cancer ([address]     GERD (gastroesophageal reflux disease)     managed with Prilosec    History of breast cancer in female     bilateral mastectomy and 6 months of chemo    History of diabetes mellitus      pt no longer required to take medications or monitor BS- weight loss/ Glucose on labs 3.24.21 results: 153    History of uterine cancer 2012    resulted in complete hysterectomy    RLS (restless legs syndrome)     managed with PRN meds      Past Surgical History:   Procedure Laterality Date    HX BILATERAL MASTECTOMY Bilateral     HX CATARACT REMOVAL Bilateral 2020    with lens implants    HX COLONOSCOPY      HX PARTIAL COLECTOMY  03/2021    HX ALEJANDRO AND BSO      HX TONSILLECTOMY      IR INSERT NON TUNL CVC OVER 5 YRS  4/9/2021     Family History   Problem Relation Age of Onset    Cancer Mother     Dementia Father     No Known Problems Sister     No Known Problems Brother     Cancer Maternal Grandmother     No Known Problems Sister     Cancer Sister         Pancreatic CA    Diabetes Brother     Stroke Brother     Cancer Brother         Colon CA     Cancer Brother       Social History     Tobacco Use    Smoking status: Never Smoker    Smokeless tobacco: Never Used   Substance Use Topics    Alcohol use: Never     Frequency: Never     Prior to Admission medications    Medication Sig Start Date End Date Taking?  Authorizing Provider   HYDROcodone-acetaminophen (NORCO) 5-325 mg per tablet Take  by mouth as needed for Pain. Yes Provider, Historical   ondansetron hcl (Zofran) 4 mg tablet Take 4 mg by mouth every eight (8) hours as needed for Nausea or Vomiting. Yes Provider, Historical   omeprazole (PRILOSEC) 40 mg capsule Take 40 mg by mouth daily. Yes Provider, Historical       Allergies   Allergen Reactions    Augmentin [Amoxicillin-Pot Clavulanate] Rash and Swelling     Tolerates Zosyn    Pcn [Penicillins] Rash and Swelling     Tolerates Zosyn    Phenergan [Promethazine] Other (comments)     Delirious per patient        Review of systems negative with exception of noted above     Objective:     Visit Vitals  /65   Pulse 81   Temp 97.3 °F (36.3 °C)   Resp 18   Ht 5' 6\" (1.676 m)   Wt 216 lb 7.9 oz (98.2 kg)   SpO2 96%   BMI 34.94 kg/m²        Physical Exam:    General:  No acute distress. Eyes:  Conjunctivae/corneas clear    Nose: Nares normal. Septum midline. Neck: Supple, symmetrical, trachea midline, no JVD   Lungs:   Clear to auscultation bilaterally, unlabored   Heart:  Regular rate and rhythm, no murmur    Abdomen:   Soft. Drain in place. Hypoactive bowel sounds   Extremities: Normal, atraumatic, no cyanosis or edema   Skin: Skin color, texture, turgor normal. No rash or lesions.    Neurologic: Nonfocal   Psych: lethargic      Assessment:     Hospital Problems  Date Reviewed: 3/23/2021          Codes Class Noted POA    Aspiration pneumonia of right upper lobe [facility]) ICD-10-CM: J69.0  ICD-9-CM: 507.0  4/25/2021 Unknown        Hypovolemia ICD-10-CM: E86.1  ICD-9-CM: 276.52  4/25/2021 Unknown        Anemia due to blood loss ICD-10-CM: D50.0  ICD-9-CM: 280.0  4/25/2021 Unknown        Thrombocytopenia ([facility] ICD-10-CM: D69.6  ICD-9-CM: 287.5  4/25/2021 Unknown        Tachycardia ICD-10-CM: R00.0  ICD-9-CM: 785.0  4/24/2021 Unknown        * (Principal) Adenocarcinoma of duodenum ([facility] ICD-10-CM: C17.0  ICD-9-CM: 152.0 4/22/2021 Unknown        Severe protein-calorie malnutrition ([facility] ICD-10-CM: E43  ICD-9-CM: 201  4/7/2021 Yes        Colon cancer ([facility] ICD-10-CM: C18.9  ICD-9-CM: 153.9  3/30/2021 Unknown              Signed By: Jane Canada MD     April 29, 2021

## 2021-04-29 NOTE — PROGRESS NOTES
Patient has been lethargic today per family and day shift RN. Tachycardic in the 120s, NSR per monitor room. Vitals at 1923 100.7F temperature, , /57, respirations 19, O2 92% on 1.5L NC, humidified. ICU rover RN came to assess patient while patient was being medicated by RN at 2200 and vitals were re-taken. Temperature at 2205 was 99.9F, /64, respirations 23,  NSR per monitor room. [place] RN increased oxygen to 4L and saturation increased from 89% to 94%. Receiving dilaudid 0.5-1mg Q2 for pain, last given at [address] [place]. WBC 19.0 this morning at 0921, increased from 16.0 at 0502. Blood and urine cultures on 4/24 negative. General surgery on call NP paged, new orders for 500 ml LR bolus recieved.

## 2021-04-29 NOTE — PROGRESS NOTES
END OF SHIFT NOTE:    INTAKE/OUTPUT  04/28 0701 - 04/29 0700  In: 690 [I.V.:500]  Out: 1540 [Urine:1050; Drains:490]  Voiding: YES  Catheter: YES  Drain:   Rhiannon Florler #1 04/22/21 Right Abdomen (Active)   Site Assessment Clean, dry, & intact 04/29/21 1835   Dressing Status Clean, dry, & intact 04/29/21 1835   Drainage Description Salo Michaelin 04/29/21 1835   Andre Drain Airleak No 04/29/21 1835   Status Patent; Charged;Draining 04/29/21 1835   Y Connector Used No 04/29/21 1835   Output (ml) 45 ml 04/29/21 1835       Rhiannon Amsler #2 04/22/21 Left Abdomen (Active)   Site Assessment Clean, dry, & intact 04/29/21 1835   Dressing Status Clean, dry, & intact 04/29/21 1835   Drainage Description Serosanguinous 04/29/21 1835   Andre Drain Airleak No 04/29/21 1835   Status Patent; Charged;Draining 04/29/21 1835   Y Connector Used No 04/29/21 1835   Output (ml) 20 ml 04/29/21 1835               Flatus: Patient does have flatus present. Stool:  2 occurrences. Characteristics:  Stool Assessment  Stool Color: Brown  Stool Appearance: Loose  Stool Amount: Large  Stool Source/Status: Rectum    Emesis: 0 occurrences. Characteristics:   Emesis Assessment  Appearance: Green  Emesis Amount: Medium    VITAL SIGNS  Patient Vitals for the past 12 hrs:   Temp Pulse Resp BP SpO2   04/29/21 1530 98.9 °F (37.2 °C) (!) 113 18 110/74 94 %   04/29/21 1100 97.7 °F (36.5 °C) (!) 121 18 116/78 96 %   04/29/21 0730 97.3 °F (36.3 °C) 81 18 110/65 96 %   04/29/21 0725 -- -- -- -- 94 %       Pain Assessment  Pain Intensity 1: 0 (04/29/21 1435)  Pain Location 1: Abdomen  Pain Intervention(s) 1: Medication (see MAR)  Patient Stated Pain Goal: 0    Ambulating  NO    Shift report given to oncoming nurse at the bedside.     Jasmin Rod RN

## 2021-04-30 NOTE — ROUTINE PROCESS
Comprehensive Nutrition Assessment    Type and Reason for Visit: Reassess  Tube Feeding Management (Surgery/Bulgarian)  TPN Management (Surgery/Bulgarian)    Nutrition Recommendations/Plan:   Enteral Nutrition:  Change TF to  Glucerna 1.5 via Jtube at 55 ml/hour. Water flush 60 every 1 hours. At goal will provide 1815 kcal (100% estimated calorie needs), 100 grams protein (100% estimated protein needs) and 2238 ml free fluid (~1.2 ml/kcal) calculations based on 22 hours infusion. IVF per MD  Vitamin and Mineral Supplement Therapy:  Electrolyte management replacement protocol implemented. Labs:   EN labs: BMP daily, Mg and Phos MWF. Oral Nutrition:  Advance diet as medically appropriate/tolerated     Malnutrition Assessment:  Malnutrition Status: Severe malnutrition  Context: Chronic illness  Findings of clinical characteristics of malnutrition:   Energy Intake:  7 - 75% or less est energy requirements for 1 month or longer  Weight Loss:  7.0 - Greater than 7.5% over 3 months((226# to 182# (19.5%), 226# to 201# (11%) in < 3 months)     Body Fat Loss:  Unable to assess,     Muscle Mass Loss:  Unable to assess,    Fluid Accumulation:  Unable to assess,     Strength:  Not performed       Nutrition Assessment:   Nutrition History: 4/6: Pt reports at baseline she eats 2 small meals a day of oatmeal or grits for breakfast and then a \"normal\" meal for her 2nd meal of the day. About 8 weeks ago, she was eating a [employer] meal and had N/V which she thought was food poisioning. However she continued to have N/V with all po intake, thus intake was <25% of usual. She was mainly consuming gatorade, Boost or Ensure PTA. She had weight loss from 226# to 182#. She reports addtional weight loss since her pre assessment weight on 3/24/2021. Nutrition Background: H/O: breast cancer with bilateral mastectomy, uterine cancer s/p hysterectomy, DM, HTN, RLS, GERD and claustrophobia.  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 She has had postop N&V and been unable to tolerate po diet progression. Daily Update:  Patient seen with several family members. Explained change in TF for elevated BG. She has been cleared for CLD by surgery and SLP. She states that she is very grateful for water. She reports having BMs regularly. Abdominal Status (last documented): Tender, Passing flatus abdomen with Hypoactive  bowel sounds. Last BM 04/29/21. Pertinent Medications: Colace, Diflucan, SSI (21 units yesterday, 15 thus far today), Zofran, Protonix, Zosyn  IVF: NS @50 ml/hr  Pertinent Labs:   Lab Results   Component Value Date/Time    Sodium 146 (H) 04/30/2021 05:02 AM    Potassium 3.8 04/30/2021 05:02 AM    Chloride 117 (H) 04/30/2021 05:02 AM    CO2 19 (L) 04/30/2021 05:02 AM    Anion gap 10 04/30/2021 05:02 AM    Glucose 205 (H) 04/30/2021 05:02 AM    BUN 54 (H) 04/30/2021 05:02 AM    Creatinine 0.90 04/30/2021 05:02 AM    Calcium 8.3 04/30/2021 05:02 AM    Albumin 1.7 (L) 04/30/2021 05:02 AM    Magnesium 2.8 (H) 04/30/2021 05:02 AM    Phosphorus 3.5 04/30/2021 05:02 AM   POC glucoses 170-240    Nutrition Related Findings:   Diet: 3/30 Clear, 4/2 NPO, 4/5 sips of clears, Ensure clear. 4/6: PPN started. 4/8:NGT placed. CVC placed 4/9 and converted to CPN. SBFT 4/12 with delayed gstric emptying with hypotonic bowel with no contrast in small bowel or colon. NGT out 4/14. 4/15 diet advanced to FLD and TPN decreased to 1L per MD.  1L TPN to continues on 1 liter per surgery. TPN taken back to goal of 2 L 4/19. NGT placed prior to EGD 4/19, now to LIS. 4/23: in CCU post-op Whipple 4/22. Ext 4/23, NGT to LIS. 4/24 - NGT removed. Trophic TF initated 4/25. TF increased to 20 ml/hr and PN converted to PPN 4/27 due to central line removal. PN stopped and TF advanced to goal 4/28. Wound Type: Surgical incision    Current Nutrition Therapies:  DIET TUBE FEEDING Advance to 40ml/hour. then to goal rate. Open order for details.  Keep HOB elevated at least 30 degrees. DIET CLEAR LIQUID  Current Tube Feeding (TF) Orders:   · Feeding Route: PEJ  · Formula: Osmolite 1.2  · Schedule:Continuous    · Regimen: 65 ml/hr  · Additives/Modulars: (none)  · Water Flushes: 25 ml/hr  · Current TF & Flush Orders Provides: at goal  · Goal TF & Flush Orders Provides: 1716 kcal (100% needs), 79 g pro (79% needs), 1723 ml free water (~1 ml/kcal)      Current Intake:   Average Meal Intake: (sips of clear thickened liquids) Average Supplement Intake: ( )      Anthropometric Measures:  Height: 5' 6\" (167.6 cm)  Current Body Wt: 96.3 kg (212 lb 4.9 oz)(4/30), Weight source: Bed scale  BMI: 34.3, Obese class 1 (BMI 30.0-34. 9)  Admission Body Weight: 192 lb 7.4 oz(3/24 pre assessment)  Ideal Body Weight (lbs) (Calculated): 130 lbs (59 kg), 163.3 %  Usual Body Wt: 102.5 kg (226 lb), Percent weight change: -19.5          Edema: No data recorded   Estimated Daily Nutrient Needs:  Energy (kcal/day): 9975-0960 (Kcal/kg(20-25), Weight Used: Current(83 kg (4/23)))  Protein (g/day): 100-125 (1.2-1.5 g/kg)- obese, s/p Whipple Weight Used: (Current)  Fluid (ml/day):   (1 ml/kcal)    Nutrition Diagnosis:   · Inadequate oral intake related to altered GI function, swallowing difficulty as evidenced by (s/p Whipple, EN for primary needs)    · Severe malnutrition related to inadequate protein-energy intake as evidenced by (s/s as above per malnutrition assessment)    Nutrition Interventions:   Food and/or Nutrient Delivery: Modify tube feeding(Advance diet as able)     Coordination of Nutrition Care: Continue to monitor while inpatient  Plan of Care discussed with Britney Ramos RN and Jonnie Garcia NP via [employer] [facility]    Goals:   Previous Goal Met: Goal(s) achieved  Active Goal: Continue to meet needs with EN    Nutrition Monitoring and Evaluation:      Food/Nutrient Intake Outcomes: Diet advancement/tolerance, Enteral nutrition intake/tolerance  Physical Signs/Symptoms Outcomes: Biochemical data, GI status    Discharge Planning:     Too soon to determine    [address] [place], LD on 4/30/2021 at 4:08 PM  Contact: [phone]

## 2021-04-30 NOTE — PROGRESS NOTES
Spiritual Care Visit, follow up visit. Visited with patient at bedside. She is recovering from Whipple procedure surgery. Prayed for patient's healing and health. Visit by aLtanya Hillman, Staff .  BRANDI.Nael., Th.B., B.A.

## 2021-04-30 NOTE — PROGRESS NOTES
Date of Outreach Update:  Haven Colon was seen and assessed. Pt resting quietly. No distress noted. Previous Outreach assessment has been reviewed. There have been no significant clinical changes since the completion of the last dated Outreach assessment. Will continue to follow up per outreach protocol.     Signed By:   Jania Siddiqui    April 30, 2021 3:22 AM

## 2021-04-30 NOTE — PROGRESS NOTES
[facility] CRITICAL CARE OUTREACH NURSE PROGRESS REPORT      SUBJECTIVE: Called to assess patient secondary to outreach protocol. MEWS Score: 3 (04/29/21 1530)  Vitals:    04/29/21 0725 04/29/21 0730 04/29/21 1100 04/29/21 1530   BP:  110/65 116/78 110/74   Pulse:  81 (!) 121 (!) 113   Resp:  18 18 18   Temp:  97.3 °F (36.3 °C) 97.7 °F (36.5 °C) 98.9 °F (37.2 °C)   SpO2: 94% 96% 96% 94%   Weight:       Height:            LAB DATA:    Recent Labs     04/29/21  0458 04/28/21  0502 04/27/21  0353    143 145   K 4.6 4.3 3.5   * 114* 115*   CO2 20* 22 25   AGAP 8 7 5*   * 202* 173*   BUN 51* 31* 30*   CREA 1.02* 0.66 0.57*   GFRAA >60 >60 >60   GFRNA 55* >60 >60   CA 8.7 9.0 8.5   MG  --  2.5* 2.3   PHOS 3.7 3.0 2.5   ALB 1.9* 2.4* 2.3*   TP 5.6* 5.9* 5.2*   GLOB 3.7* 3.5 2.9   AGRAT 0.5* 0.7* 0.8*   ALT 71* 112* 52        Recent Labs     04/29/21  0458 04/28/21  0921 04/28/21  0502   WBC 24.8* 19.0* 16.0*   HGB 10.8* 10.8* 11.8   HCT 33.1* 33.3* 38.4    255 232          OBJECTIVE: On arrival to room, I found patient to be resting in bed with family at bedside. Pain Assessment  Pain Intensity 1: 0 (04/29/21 1435)  Pain Location 1: Abdomen  Pain Intervention(s) 1: Medication (see MAR)  Patient Stated Pain Goal: 0                                 ASSESSMENT:  Pt is drowsy and oriented to person and situation. Per family she has waxed and waned throughout the day. Pt on 4L [place], small amount of bile draining out of both drains, and pt c/o pain in abd. Per family and pt her pain control has been better since the PCA. Pt needs frequent reminders to use her PCA  Button. PLAN:  Will continue to monitor per protocol.

## 2021-04-30 NOTE — PROGRESS NOTES
LTG: Patient will tolerate least restrictive diet without overt signs or symptoms of airway compromise. Discontinued 4/27  STG: Patient will participate in ongoing trials with SLP for diet advancement once cleared for additional intake by MD. (goal met 4/30)  STG: Patient will tolerate clear liquid diet without overt signs/sx of aspiration 100% of the time  (goal added 4/30)  STG: Patient will tolerate thin liquid trials without overt signs/sx of aspiration. SPEECH LANGUAGE PATHOLOGY: DYSPHAGIA- Daily Note 3    NAME/AGE/GENDER: Lu Zamorano is a [de-identified] y.o. female  DATE: 4/30/2021  PRIMARY DIAGNOSIS: Malignant neoplasm of colon, unspecified part of colon ([facility] [address] [C18.9]  Colon cancer ([facility] [address] [C18.9]  Procedure(s) (LRB):  WHIPPLE PROCEDURE (N/A) 8 Days Post-Op  ICD-10: Treatment Diagnosis: R13.12 Dysphagia, Oropharyngeal Phase    RECOMMENDATIONS   DIET:    Clear liquids     MEDICATIONS:  Non-oral     ASPIRATION PRECAUTIONS  · Slow rate of intake  · Small bites/sips  · Upright at 90 degrees during meal   COMPENSATORY STRATEGIES/MODIFICATIONS  · Oral care 3-4x/daily     RECOMMENDATIONS for CONTINUED SPEECH THERAPY:   YES: Anticipate need for ongoing speech therapy during this hospitalization. ASSESSMENT   Patient has been requesting something to drink per RN. Sitting up in the bed fully alert. No overt signs/sx of aspiration with tsp trials of thin liquids. Patient able to successfully hold the cup to self-administer single sips of water with prompt initiation of the swallow and no overt signs/sx of aspiration. Requesting additional trials at the conclusion of the session. Solids deferred due to recommendations for clears only. Discussed improved acceptance and tolerance of thin liquid trials with RN and NP. Recommend initiate clear liquid diet. Will follow for diet tolerance.       EDUCATION:  · Recommendations discussed with Patient and RN  CONTINUATION OF SKILLED SERVICES/MEDICAL NECESSITY:   Patient is expected to demonstrate progress in  swallow strength, swallow timeliness, swallow function, diet tolerance and swallow safety in order to  improve swallow safety, work toward diet advancement and decrease aspiration risk.  Patient continues to require skilled intervention due to dysphagia. REHABILITATION POTENTIAL FOR STATED GOALS: Good    PLAN    FREQUENCY/DURATION: Continue to follow patient 3 times a week for duration of hospital stay to address above goals. - Recommendations for next treatment session: Next treatment session will address po trials    SUBJECTIVE   Pt able to state relationship of family members listed in her room. Oxygen Device: nasal cannula  Pain: Pain Scale 1: Numeric (0 - 10)  Pain Intensity 1: 0    Orientation:  Person  Place  Time    OBJECTIVE   Dysphagia treatment:   Tolerated tsp and cup sips of thin liquids without overt signs/sx of aspiration. More receptive to receive trials and initiating to ask for more this session. Advance to clear liquids per NP.     Tool Used: Dysphagia Outcome and Severity Scale (IZZY)    Score Comments   Normal Diet  [] 7 With no strategies or extra time needed   Functional Swallow  [] 6 May have mild oral or pharyngeal delay   Mild Dysphagia  [] 5 Which may require one diet consistency restricted    Mild-Moderate Dysphagia  [] 4 With 1-2 diet consistencies restricted   Moderate Dysphagia  [] 3 With 2 or more diet consistencies restricted   Moderate-Severe Dysphagia  [] 2 With partial PO strategies (trials with ST only)   Severe Dysphagia  [] 1 With inability to tolerate any PO safely      Score:  Initial: 3 Most Recent: 5 (Date 04/30/21 )   Interpretation of Tool: The Dysphagia Outcome and Severity Scale (IZZY) is a simple, easy-to-use, 7-point scale developed to systematically rate the functional severity of dysphagia based on objective assessment and make recommendations for diet level, independence level, and type of nutrition.      INTERDISCIPLINARY COLLABORATION: RN    After treatment position/precautions:  · RN at bedside assisted to reposition pt for trials    Total Treatment Duration:   Time In: 0820  Time Out: [address] [place], CCC-SLP

## 2021-04-30 NOTE — PROGRESS NOTES
ACUTE OT GOALS:  (Developed with and agreed upon by patient and/or caregiver.)  1. Patient will complete upper body bathing and dressing with SBA and adaptive equipment as needed. 2. Patient will complete toileting with min A.   3. Patient will tolerate 25 minutes of OT treatment with 1-2 rest breaks to increase activity tolerance for ADLs. 4. Patient will complete functional transfers with mod A and adaptive equipment as needed. 5. Patient will complete functional activity while seated edge of bed with SBA and adaptive equipment as needed. 6. Patient will tolerate 15 minutes unsupported sitting balance with MOD I in preparation for ADL performance. 7. Patient will tolerate 15 minutes BUE therapeutic activities to increase use of BUE during ADL performance.      Timeframe: 7 visits     OCCUPATIONAL THERAPY: Daily Note OT Treatment Day # 3    Haven Daniels is a [de-identified] y.o. female   PRIMARY DIAGNOSIS: Adenocarcinoma of duodenum ([facility] [address]  Malignant neoplasm of colon, unspecified part of colon ([facility] [address] [C18.9]  [facility] [C18.9]  Procedure(s) (LRB):  WHIPPLE PROCEDURE (N/A)  8 Days Post-Op  [place] / [place] PART A [place] B / Product Type: Medicare /   ASSESSMENT:     REHAB RECOMMENDATIONS: CURRENT LEVEL OF FUNCTION:  (Most Recently Demonstrated)   Recommendation to date pending progress:  Setting:   Short-term Rehab  Equipment:    To Be Determined Bathing:   Moderate Assistance  Dressing:   Moderate Assistance  Feeding/Grooming:   Set Up  Toileting:   Maximal Assistance  Functional Mobility:   Moderate Assistance x 2 x RW   to  ASSESSMENT:  Ms. Zackary Ludwig continues to present with deficits in overall strength, activity tolerance, ADL performance, and functional mobility. Pt up in chair, assisted back to bed with max A. Pt somewhat somnolent, very pleasant and followed all commands.  Pt participated in AROM exercises as detailed below to promote > BUE strength, ROM, and overall activity tolerance. Pt completed UB bathing and dressing with min A. Pt fatigues quickly with activity and requires several breaks to tolerate. Pt is progressing towards goals, continue POC.       SUBJECTIVE:   Ms. Gabriella Kaye states, \"you started with the hard one (exercise)\"    SOCIAL HISTORY/LIVING ENVIRONMENT:   Home Environment: Private residence  One/Two Story Residence: One story  Living Alone: No  Support Systems: Child(lissa), Family member(s)    OBJECTIVE:     PAIN: VITAL SIGNS: LINES/DRAINS:   Pre Treatment: Pain Screen  Pain Scale 1: Numeric (0 - 10)  Pain Intensity 1: 0  Post Treatment: 7   IV, ZACH Drain and  j-tube, PEG  O2 Device: Nasal cannula     ACTIVITIES OF DAILY LIVING: I Mod I S SBA CGA Min Mod Max Total NT Comments   BASIC ADLs:              Bathing/ Showering [] [] [] [] [] [] [] [] [] [x]    Toileting [] [] [] [] [] [] [] [] [] [x]    Dressing [] [] [] [] [] [x] [] [] [] [] UB   Feeding [] [] [] [] [] [] [] [] [] [x]    Grooming [] [] [] [] [] [] [] [] [] []    Personal Device Care [] [] [] [] [] [] [] [] [] [x]    Functional Mobility [] [] [] [] [] [] [] [] [] []    I=Independent, Mod I=Modified Independent, S=Supervision, SBA=Standby Assistance, CGA=Contact Guard Assistance,   Min=Minimal Assistance, Mod=Moderate Assistance, Max=Maximal Assistance, Total=Total Assistance, NT=Not Tested    MOBILITY: I Mod I S SBA CGA Min Mod Max Total  NT x2 Comments:   Supine to sit [] [] [] [] [] [] [] [] [] [] []    Sit to supine [] [] [] [] [] [x] [x] [] [] [] [x]    Sit to stand [] [] [] [] [] [] [x] [] [] [] [x]    Bed to chair [] [] [] [] [] [] [] [x] [] [] []    I=Independent, Mod I=Modified Independent, S=Supervision, SBA=Standby Assistance, CGA=Contact Guard Assistance,   Min=Minimal Assistance, Mod=Moderate Assistance, Max=Maximal Assistance, Total=Total Assistance, NT=Not Tested    BALANCE: Good Fair+ Fair Fair- Poor NT Comments   Sitting Static [] [x] [] [] [] []    Sitting Dynamic [] [] [x] [] [] [] Standing Static [] [] [x] [] [] []    Standing Dynamic [] [] [x] [] [] []      PLAN:   FREQUENCY/DURATION: OT Plan of Care: 3 times/week for duration of hospital stay or until stated goals are met, whichever comes first.    TREATMENT:   TREATMENT:   ($$ Self Care/Home Management: 8-22 mins$$ Therapeutic Exercises: 8-22 mins   )  Therapeutic Exercise (13 Minutes): Therapeutic exercises noted below to improve functional activity tolerance, AROM and strength. Self Care (15 Minutes): Self care including Upper Body Bathing and Upper Body Dressing to increase independence and decrease level of assistance required.     TREATMENT GRID:   Date:  4/30   Date:   Date:     Activity/Exercise Parameters Parameters Parameters   Shoulder flexion  10/2     Elbow flexion/ extension 10/2     Pronation/ supination 10/2     Fist pumps 10/2                             AFTER TREATMENT POSITION/PRECAUTIONS:  Bed, Needs within reach, RN notified and Visitors at bedside    INTERDISCIPLINARY COLLABORATION:  RN/PCT and PT/PTA    TOTAL TREATMENT DURATION:  OT Patient Time In/Time Out  Time In: 1350  Time Out: [address] [facility]

## 2021-04-30 NOTE — PROGRESS NOTES
Haven Jackson  Admission Date: 3/30/2021             Daily Progress Note: 4/30/2021    The patient's chart is reviewed and the patient is discussed with the staff. 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 She underwent Whipple procedure on 4/22/2021 by Dr. 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 To ICU intubated following surgery. Extubated 4/23. Subjective:   Sleepy this morning. Also complaining of upper abd pain.      Current Facility-Administered Medications   Medication Dose Route Frequency    0.9% sodium chloride infusion  50 mL/hr IntraVENous CONTINUOUS    piperacillin-tazobactam (ZOSYN) 3.375 g in 0.9% sodium chloride (MBP/ADV) 100 mL MBP  3.375 g IntraVENous Q8H    fluconazole (DIFLUCAN) 200mg/100 mL IVPB (premix)  200 mg IntraVENous Q24H    bisacodyL (DULCOLAX) suppository 10 mg  10 mg Rectal DAILY PRN    HYDROmorphone (DILAUDID) 30 mg / 30 mL PCA in water   IntraVENous CONTINUOUS    ondansetron (ZOFRAN) injection 4 mg  4 mg IntraVENous Q6H PRN    docusate (COLACE) 50 mg/5 mL oral liquid 100 mg  100 mg Per J Tube BID    metoprolol (LOPRESSOR) injection 2.5 mg  2.5 mg IntraVENous Q6H    insulin regular (NOVOLIN R, HUMULIN R) injection   SubCUTAneous Q6H    albuterol (PROVENTIL VENTOLIN) nebulizer solution 2.5 mg  2.5 mg Nebulization Q4H PRN    0.9% sodium chloride infusion 250 mL  250 mL IntraVENous PRN    guaiFENesin (ROBITUSSIN) 100 mg/5 mL oral liquid 400 mg  400 mg Per J Tube TID    pantoprazole (PROTONIX) 40 mg in 0.9% sodium chloride 10 mL injection  40 mg IntraVENous Q12H    naloxone (NARCAN) injection 0.1 mg  0.1 mg IntraVENous Multiple    enoxaparin (LOVENOX) injection 40 mg  40 mg SubCUTAneous Q24H    LORazepam (ATIVAN) injection 1 mg  1 mg IntraVENous Q6H PRN    promethazine (PHENERGAN) with saline injection 12.5 mg  12.5 mg IntraVENous Q6H PRN    lip protectant (BLISTEX) ointment 1 Each  1 Each Topical PRN    phenol throat spray (CHLORASEPTIC) 1 Spray  1 Spray Oral PRN    NUTRITIONAL SUPPORT ELECTROLYTE PRN ORDERS   Does Not Apply PRN       Review of Systems  Very limited as per above since going back to sleep right away    Objective:     Vitals:    04/30/21 0553 04/30/21 0727 04/30/21 0800 04/30/21 1154   BP:  120/75  (!) 100/57   Pulse: (!) 109 99 99 89   Resp:  20  20   Temp:  97.6 °F (36.4 °C)  98.6 °F (37 °C)   SpO2:  97%  94%   Weight:       Height:         Intake and Output:   04/28 1901 - 04/30 0700  In: 4582 [I.V.:1420]  Out: Angie Perish [YYUVP:7946; Drains:435]  04/30 0701 - 04/30 1900  In: 0   Out: 395 [Urine:200; Drains:105]    Physical Exam:   Constitution:  the patient is well developed and in no acute distress  EENMT:  Sclera clear, pupils equal, oral mucosa moist  Respiratory: very decreased in bases. Few crackles mid-way up  Cardiovascular:  RRR without M,G,R  Gastrointestinal: post surgical, ttp. + bowel sounds. B/l drains and more in left vs right drain. Musculoskeletal: warm without cyanosis. There is no palpable lower leg edema. Skin:  no jaundice or rashes, surgical wounds   Neurologic: no gross neuro deficits     Psychiatric:  Sleepy but calm when talking then drifting back to sleep.      CHEST XRAY:     No new imaging    LAB  No lab exists for component: [place]   Recent Labs     04/30/21  0502 04/29/21  0458 04/28/21  0921 04/28/21  0502   WBC 25.9* 24.8* 19.0* 16.0*   HGB 10.0* 10.8* 10.8* 11.8   HCT 30.3* 33.1* 33.3* 38.4    309 255 232     Recent Labs     04/30/21  0502 04/29/21  0458 04/28/21  0502   NA 146* 142 143   K 3.8 4.6 4.3   * 114* 114*   CO2 19* 20* 22   * 221* 202*   BUN 54* 51* 31*   CREA 0.90 1.02* 0.66   MG 2.8*  --  2.5*   CA 8.3 8.7 9.0   PHOS 3.5 3.7 3.0   ALB 1.7* 1.9* 2.4*     ABG:  No results found for: PH, PHI, PCO2, PCO2I, PO2, PO2I, HCO3, HCO3I, FIO2, FIO2I      MICRO  4/24 Klebsiella in sputum    SARS-CoV-2 LAB Results  [facility] Test: No results found for: COV2NT   [facility] Test: No results found for: EDPR  [facility] Test: No components found for: [id number]       Sputum 4/24 - moderate GNR  Blood - NGTD  Urine - negative    Assessment:  (Medical Decision Making)     Hospital Problems  Date Reviewed: 3/23/2021          Codes Class Noted POA    Aspiration pneumonia of right upper lobe ([facility] ICD-10-CM: J69.0  ICD-9-CM: 507.0  4/25/2021 Unknown        Hypovolemia ICD-10-CM: E86.1  ICD-9-CM: 276.52  4/25/2021 Unknown        Anemia due to blood loss ICD-10-CM: D50.0  ICD-9-CM: 280.0  4/25/2021 Unknown        Thrombocytopenia ([facility] ICD-10-CM: D69.6  ICD-9-CM: 287.5  4/25/2021 Unknown        Tachycardia ICD-10-CM: R00.0  ICD-9-CM: 785.0  4/24/2021 Unknown        * (Principal) Adenocarcinoma of duodenum ([facility] ICD-10-CM: C17.0  ICD-9-CM: 152.0  4/22/2021 Unknown        Severe protein-calorie malnutrition ([facility] ICD-10-CM: E43  ICD-9-CM: 988  4/7/2021 Yes        Colon cancer ([facility] ICD-10-CM: C18.9  ICD-9-CM: 153.9  3/30/2021 Unknown              Patient recovering from ex lap with whipple procedure 4/22 for large uncinate process pancreatic cancer with invasion of the duodenum and root of the mesentery. Almost off oxygen. Plan:  (Medical Decision Making)     -complete therapy for aspiration pneumonia.  -getting 3% saline and IS to help with atelectasis and airway clearance   -wean O2 as able.  -protonix. -pain control   -continue remaining treatment per surgery  -encourage IS.  OOB. -CXR Monday.       Juliet Valdez MD

## 2021-04-30 NOTE — PROGRESS NOTES
ACUTE PHYSICAL THERAPY GOALS:  (Developed with and agreed upon by patient and/or caregiver.)  STG:  (1.)Ms. Bro will move from supine to sit and sit to supine , scoot up and down and roll side to side with MINIMAL ASSIST within 3 treatment day(s).    (2.)Ms. Bro will transfer from bed to chair and chair to bed with MINIMAL ASSIST using the least restrictive device within 3 treatment day(s).    (3.)Ms. Bro will ambulate with MINIMAL ASSIST for 50 feet with the least restrictive device within 3 treatment day(s). (4.)Ms. Bro will perform standing static and dynamic balance activities x 15 minutes with MINIMAL ASSIST to improve safety within 3 treatment day(s). (5.)Ms. Bro will maintain stable vital signs throughout all functional mobility within 3 treatment days.     LTG:  (1.)Ms. Bro will move from supine to sit and sit to supine , scoot up and down and roll side to side in bed with CONTACT GUARD ASSIST within 7 treatment day(s).    (2.)Ms. Bro will transfer from bed to chair and chair to bed with CONTACT GUARD ASSIST using the least restrictive device within 7 treatment day(s).    (3.)Ms. Bro will ambulate with CONTACT GUARD ASSIST for 150 feet with the least restrictive device within 7 treatment day(s). (4.)Ms. Bro will perform standing static and dynamic balance activities x 15 minutes with CONTACT GUARD ASSIST to improve safety within 7 treatment day(s). (5.)Ms. Bro will ambulate and/or perform functional activities for 15 consecutive minutes with stable vital signs and no rests required to improve activity tolerance within 7 treatment days.     PHYSICAL THERAPY: Daily Note and AM Treatment Day # 5    Haven Yang is a [de-identified] y.o. female   PRIMARY DIAGNOSIS: Adenocarcinoma of duodenum ([address]  Malignant neoplasm of colon, unspecified part of colon ([address] [C18.9]  Colon cancer ([address] [C18.9]  Procedure(s) (LRB):  WHIPPLE PROCEDURE (N/A)  8 Days Post-Op    ASSESSMENT:     REHAB RECOMMENDATIONS: CURRENT LEVEL OF FUNCTION:  (Most Recently Demonstrated)   Recommendation to date pending progress:  [address] [place] [address] vs. Short-term Rehab  Equipment:    To Be Determined Bed Mobility:   Moderate Assistance x 2  Sit to Stand:   Minimal Assistance x 2  Transfers:   Minimal Assistance x 2  Gait/Mobility:   Not tested     ASSESSMENT:  Ms. Josefina Greenfield presents in supine with family and friends present. Eyes closed and voice is very soft but   Agreeable to getting to the edge of the bed then maybe to the recliner. Bed mobility is with moderate assist x 2. Sitting balance requires support as the patient has a posterior lean. Scooting to the edge of the bed with max assist.  Sit to stand with mod assist x 1-2, stand pivot transfer to the recliner with mod assist x 2 as the patient has several lines and tubes noted. Patient is able to assist with scooting back in the recliner. Did quite well. Very pleasant and cooperative. Patient was assisted back to the bed later. Good session. Progress demonstrated this treatment session. Patient is left supine in bed with OT present . Rehab will be needed at discharge as the patient was very high level functioning prior. Will continue PT efforts.               SUBJECTIVE:   Ms. Josefina Greenfield states, \"Hello\"    SOCIAL HISTORY/ LIVING ENVIRONMENT:   Home Environment: Private residence  One/Two Story Residence: One story  Living Alone: No  Support Systems: Child(lissa), Family member(s)  OBJECTIVE:     PAIN: VITAL SIGNS: LINES/DRAINS:   Pre Treatment: Pain Screen  Pain Scale 1: Numeric (0 - 10)  Pain Intensity 1: (no number given) 9  Post Treatment: 6/10   IV, ZACH Drain and PEG  O2 Device: Nasal cannula     MOBILITY: I Mod I S SBA CGA Min Mod Max Total  NT x2 Comments:   Bed Mobility    Rolling [] [] [] [] [] [] [x] [] [] [] [x]    Supine to Sit [] [] [] [] [] [] [x] [] [] [] [x]    Scooting [] [] [] [] [] [] [x] [] [] [] [x]    Sit to Supine [] [] [] [] [] [] [] [] [] [x] []    Transfers    Sit to Stand [] [] [] [] [] [] [x] [] [] [] [x]    Bed to Chair [] [] [] [] [] [] [x] [] [] [] [x]    Stand to Sit [] [] [] [] [] [] [x] [] [] [] [x]    I=Independent, Mod I=Modified Independent, S=Supervision, SBA=Standby Assistance, CGA=Contact Guard Assistance,   Min=Minimal Assistance, Mod=Moderate Assistance, Max=Maximal Assistance, Total=Total Assistance, NT=Not Tested    BALANCE: Good Fair+ Fair Fair- Poor NT Comments   Sitting Static [] [x] [] [] [] []    Sitting Dynamic [] [x] [] [] [] []              Standing Static [] [] [x] [] [] []    Standing Dynamic [] [x] [] [x] [] []      GAIT: I Mod I S SBA CGA Min Mod Max Total  NT x2 Comments:   Level of Assistance [] [] [] [] [] [] [] [] [] [x] [x]    Distance N/A    DME Rolling Walker, Gait Belt and N/A    Gait Quality N/A    Weightbearing  Status N/A     I=Independent, Mod I=Modified Independent, S=Supervision, SBA=Standby Assistance, CGA=Contact Guard Assistance,   Min=Minimal Assistance, Mod=Moderate Assistance, Max=Maximal Assistance, Total=Total Assistance, NT=Not Tested    PLAN:   FREQUENCY/DURATION: PT Plan of Care: 3 times/week for duration of hospital stay or until stated goals are met, whichever comes first.  TREATMENT:     TREATMENT:   ($$ Therapeutic Activity: 23-37 mins    )  Therapeutic Activity (23 Minutes): Therapeutic activity included Rolling, Supine to Sit, Scooting, Transfer Training, Ambulation on level ground, Sitting balance  and Standing balance to improve functional Mobility, Strength, ROM and Activity tolerance.     TREATMENT GRID:  N/A    AFTER TREATMENT POSITION/PRECAUTIONS:  Bed, RN notified, Visitors at bedside and OT at bedside    INTERDISCIPLINARY COLLABORATION:  RN/PCT, PT/PTA and OT/MESSER    TOTAL TREATMENT DURATION:  PT Patient Time In/Time Out  Time In: 1110  Time Out: 1133    Molly Mary, PTA

## 2021-04-30 NOTE — PROGRESS NOTES
Chart screened by  for discharge planning. Palliative care consulted, per note patient very sleepy, changes have been made to the PCA in hopes this will help with her mentation and they can then discuss ACP. Patient to discharge to [facility] when medically stable. CM will continue to follow patient during hospitalization for discharge planning and needs. Please consult  if any new issues arise.

## 2021-04-30 NOTE — PROGRESS NOTES
END OF SHIFT NOTE:    INTAKE/OUTPUT  04/29 0701 - 04/30 0700  In: 4012 [I.V.:920]  Out: 0072 [Urine:1000; Drains:125]  Voiding: YES  Catheter: YES  Drain:   Lazarus Bolder #1 04/22/21 Right Abdomen (Active)   Site Assessment Clean, dry, & intact 04/30/21 0108   Dressing Status Clean, dry, & intact 04/30/21 0108   Drainage Description Voncile Player 04/30/21 0108   Andre Drain Airleak No 04/30/21 0108   Status Patent; Charged;Draining 04/30/21 0108   Y Connector Used No 04/29/21 1835   Output (ml) 20 ml total for shift 04/30/21 0108       Andre Drain #2 04/22/21 Left Abdomen (Active)   Site Assessment Clean, dry, & intact 04/30/21 0108   Dressing Status Clean, dry, & intact 04/30/21 0108   Drainage Description Serosanguinous 04/30/21 0108   Andre Drain Airleak No 04/30/21 0108   Status Patent;Draining 04/30/21 0108   Y Connector Used No 04/30/21 0108   Output (ml) 40 ml total for shift 04/30/21 0108               Flatus: Patient does have flatus present. Stool:  3 occurrences. Characteristics:  Stool Assessment  Stool Color: Brown  Stool Appearance: Loose  Stool Amount: Large  Stool Source/Status: Rectum    Emesis: 0 occurrences. Characteristics:   Emesis Assessment  Appearance: Green  Emesis Amount: Medium    VITAL SIGNS  Patient Vitals for the past 12 hrs:   Temp Pulse Resp BP SpO2   04/30/21 0553 -- (!) 109 -- -- --   04/30/21 0332 97.5 °F (36.4 °C) (!) 104 18 117/74 97 %   04/30/21 0010 97 °F (36.1 °C) (!) 110 18 115/74 96 %   04/29/21 1901 99.2 °F (37.3 °C) (!) 120 18 125/76 93 %       Pain Assessment  Pain Intensity 1: 0 (04/30/21 0108)  Pain Location 1: Abdomen  Pain Intervention(s) 1: Medication (see MAR)  Patient Stated Pain Goal: 0    Ambulating  No    Shift report given to oncoming nurse at the bedside.     Ander Delarosa RN

## 2021-04-30 NOTE — PROGRESS NOTES
Palliative Care Progress Note    Patient: Miguel Pacheco MRN: [id number]  SSN: [id number]    YOB: 1940  Age: [de-identified] y.o. Sex: female       Assessment/Plan:     Chief Complaint/Interval History: pt more alert today. Denies any current pain. Notes some nausea    Principal Diagnosis:    · Dyspnea  R06.00    Additional Diagnoses:   · Frailty  R54  · Nausea/Vomiting  R11.2  · Counseling, Encounter for Medical Advice  Z71.9  · Encounter for Palliative Care  Z51.5    Palliative Performance Scale (PPS)       Medical Decision Making:   Reviewed and summarized labs and imaging. Met with pt and niece at bedside. Pt more alert today and interactive. Notes general fatigue with some dyspnea. Unclear etiology of encephalopathy but appears to be resolving. Pt with strong family support. Has phenergan for nausea. Pain controlled with pca. Tolerating liquids this am.   Will follow up on monday    Will discuss findings with members of the interdisciplinary team.      More than 50% of this 25 minute visit was spent counseling and coordination of care as outlined above. Subjective:     Review of Systems negative with the exception of as noted above     Objective:     Visit Vitals  /75   Pulse 99   Temp 97.6 °F (36.4 °C)   Resp 20   Ht 5' 6\" (1.676 m)   Wt 212 lb 4.9 oz (96.3 kg)   SpO2 97%   BMI 34.27 kg/m²       Physical Exam:    General:  Cooperative. No acute distress. Eyes:  Conjunctivae/corneas clear    Nose: Nares normal. Septum midline. Neck: Supple, symmetrical, trachea midline, no JVD   Lungs:   Clear to auscultation bilaterally, unlabored   Heart:  Regular rate and rhythm, no murmur    Abdomen:   Soft, non-tender, non-distended   Extremities: Normal, atraumatic, no cyanosis or edema   Skin: Skin color, texture, turgor normal. No rash or lesions.    Neurologic: Nonfocal   Psych: Alert and oriented     Signed By: Maykel Parks MD     April 30, 2021

## 2021-05-01 PROBLEM — R65.20 SEVERE SEPSIS (HCC): Status: ACTIVE | Noted: 2021-01-01

## 2021-05-01 PROBLEM — E87.6 HYPOKALEMIA: Status: ACTIVE | Noted: 2021-01-01

## 2021-05-01 PROBLEM — A41.9 SEVERE SEPSIS (HCC): Status: ACTIVE | Noted: 2021-01-01

## 2021-05-01 NOTE — PROGRESS NOTES
Responded to rapid response called for heart rate of 155 and decreased responsiveness. At the time of my arrival, her O2 saturation was in the 90s. She does remain minimally responsive. There were several nurses present who knows the patient well. And report is she has advanced cancer, has undergone a Whipple procedure, she is now DNR, and considering comfort care. Palliative care and intensive care are both following her. ABG was done and reviewed, shows respiratory alkalosis. We will give her a bolus of fluids to help with her heart rate. Will give Tylenol for her fever. Hopefully these 2 things together will bring her heart rate back down to a good level. I do not think her blood pressure would tolerate a beta-blocker for her heart rate. Primary nurse is contacting Dr. Hattie Quiñones for any further orders.     Homero Cormier MD

## 2021-05-01 NOTE — PROGRESS NOTES
[facility] [address] CRITICAL CARE OUTREACH NURSE PROGRESS REPORT      SUBJECTIVE: Called to assess patient secondary to outreach protocol/RR. MEWS Score: 2 (04/30/21 2332)  Vitals:    05/01/21 0538 05/01/21 0540 05/01/21 0541 05/01/21 0556   BP:       Pulse: (!) 154  (!) 159 (!) 157   Resp:       Temp:       SpO2:  (!) 87% 98%    Weight:    95.4 kg (210 lb 5.1 oz)   Height:            LAB DATA:    Recent Labs     04/30/21  0502 04/29/21  0458   * 142   K 3.8 4.6   * 114*   CO2 19* 20*   AGAP 10 8   * 221*   BUN 54* 51*   CREA 0.90 1.02*   GFRAA >60 >60   GFRNA >60 55*   CA 8.3 8.7   MG 2.8*  --    PHOS 3.5 3.7   ALB 1.7* 1.9*   TP 5.3* 5.6*   GLOB 3.6* 3.7*   AGRAT 0.5* 0.5*   ALT 49 71*        Recent Labs     04/30/21  0502 04/29/21  0458 04/28/21  0921   WBC 25.9* 24.8* 19.0*   HGB 10.0* 10.8* 10.8*   HCT 30.3* 33.1* 33.3*    309 255          OBJECTIVE: On arrival to room, I found patient to be in bed with primary RN, RT, Dr. Ramiro Montoya, House Supervisor and ICU [facility].      Pain Assessment  Pain Intensity 1: 4 (04/1940)  Pain Location 1: Abdomen  Pain Intervention(s) 1: Medication (see MAR), Encouraged PCA  Patient Stated Pain Goal: 0                                 ASSESSMENT:  Pt is very lethargic and pale. Pt responds to voice but with great effort. Per monitor room pt in ST 150bpm. BP stable. O2 upon arrival is 98% on 10L [place]. ABD is more distended. Pt also having fevers throughout the night. Orders for tylenol, NS bolus, ABD CT, cardiology cx,  and to stop TF. Family called by primary RN. Family agrees with treatments and pros and cons discussed. PLAN:  Will continue to monitor per protocol.

## 2021-05-01 NOTE — PROGRESS NOTES
Patient is being made comfortable, family at bedside. Patient family is refusing nurse and tech to check and change patient. Family states they will clean her up and change her.

## 2021-05-01 NOTE — H&P
[facility] CARDIOLOGY   Initial Cardiac Evaluation                  Primary Cardiologist: none    Primary Care Physician: Dr. Mary Chamberlain    Admitting Physician: Dr. Abner Sexton:     Patient is a [de-identified] y.o.  female who Cardiology is asked to evaluate for STAT evaluation due to elevated HR/SVT and resulting RR this AM. Pt is currently lethargic and unable to answer any questions. Information is gathered from available staff and chart review. Pt with PMHx of Breast CA (s/p chemo/rad), Uterine CA (s/p ALEJANDRO), RLS, GERD and recently dx Colon CA (s/p R hemicolectomy 3/30/21) who developed a gastric outlet obstruction found to be adenocarcinoma and pancreatic mass now s/p Whipple on 4/22/21 by Dr. Jossy Araya. Post-op Pt has progressed fair but has continued to have elevated WBCs, new R ZACH drain with poss bile leak and aspiration PNA with moderate O2 requirement on top of severe malnutrition requiring TPN but no on TF. Palliative is following and she is a DNR. Apparently during day-shift Pt was A&O x4, visited with family, was assisted to chair by PT/OT. However at shift change she was becoming confused and thoughout the night her mentation has deteriorated. At this point she only moans out to pain. Monitor room noted gradually increasing HR throughout the shift starting in the 100s and up to the 150s around 0525. RN was notified Pt in SVT. RN to room and noted Pt to be severely hypoxic with O2 sats 65% despite being on 7L HFNC. RR was called. The Hospitalist and ICU team responded. ABG was obtained on 8L HF (7.50//22.9//67//18). Cardiology was called for STAT evaluation of SVT. Surgery has ordered a STAT CTAP. Labs drawn during RR are pending. Past Medical History:   Diagnosis Date    Adverse effect of anesthesia     pt states she woke up during colonoscopy X1. No complications with any other procedures.     Claustrophobia     Colon cancer (HCC)     GERD (gastroesophageal reflux disease)     managed with Prilosec  History of breast cancer in female     bilateral mastectomy and 6 months of chemo    History of diabetes mellitus      pt no longer required to take medications or monitor BS- weight loss/ Glucose on labs 3.24.21 results: 153    History of uterine cancer 2012    resulted in complete hysterectomy    RLS (restless legs syndrome)     managed with PRN meds      Past Surgical History:   Procedure Laterality Date    HX BILATERAL MASTECTOMY Bilateral     HX CATARACT REMOVAL Bilateral 2020    with lens implants    HX COLONOSCOPY      HX PARTIAL COLECTOMY  03/2021    HX ALEJANDRO AND BSO      HX TONSILLECTOMY      IR INSERT NON TUNL CVC OVER 5 YRS  4/9/2021      Allergies   Allergen Reactions    Augmentin [Amoxicillin-Pot Clavulanate] Rash and Swelling     Tolerates Zosyn    Pcn [Penicillins] Rash and Swelling     Tolerates Zosyn    Phenergan [Promethazine] Other (comments)     Delirious per patient     Social History     Tobacco Use    Smoking status: Never Smoker    Smokeless tobacco: Never Used   Substance Use Topics    Alcohol use: Never     Frequency: Never      FH:   Family History   Problem Relation Age of Onset    Cancer Mother     Dementia Father     No Known Problems Sister     No Known Problems Brother     Cancer Maternal Grandmother     No Known Problems Sister     Cancer Sister         Pancreatic CA    Diabetes Brother     Stroke Brother     Cancer Brother         Colon CA     Cancer Brother         Review of Systems --         Objective:       Visit Vitals  BP 90/60   Pulse (!) 157   Temp (!) 102.2 °F (39 °C)   Resp 19   Ht 5' 6\" (1.676 m)   Wt 95.4 kg (210 lb 5.1 oz)   SpO2 92%   BMI 33.95 kg/m²       No intake/output data recorded.   04/29 1901 - 05/01 0700  In: 5354 [I.V.:1154]  Out: 0 [Urine:1800; Drains:720]    Physical Exam:  General: ill-appearing, elderly female in no acute distress presently, lethargic and minimally responsive  HEENT: pupils equal and round, sclera clear, does not open eyes to command  Neck: supple, no JVD, trachea midline  Heart: S1S2 with RRR -- tachy -- +murmur, no rubs or gallops  Lungs: clear but coarse with diminished bases, tachypnea, shallow effort on 7L NC, no wheezing or rales  Abd: soft, but quite TTP, noted L pat drain with serous drainage, R ZACH drain with green drainage (? bile)  Ext: warm, 1+ BLE edema, calves supple, non-tender to palpation, pulses 2+ bilaterally  Skin: warm and dry, surgical sites noted  Psychiatric: AMS, unable to participate in exam  Neurologic: lethargic, moans to pain, does not open eyes or follow commands, attempts to respond to name with tactile stimulation      ECG: ordered and pending    ECHO: ordered and pending    CXR: ordered and pending    Data Review:      Recent Results (from the past 24 hour(s))   GLUCOSE, POC    Collection Time: 04/30/21 12:18 PM   Result Value Ref Range    Glucose (POC) 214 (H) 65 - 100 mg/dL    Performed by [facility] [employer]    GLUCOSE, POC    Collection Time: 04/30/21  6:29 PM   Result Value Ref Range    Glucose (POC) 218 (H) 65 - 100 mg/dL    Performed by Roberto    GLUCOSE, POC    Collection Time: 05/01/21 12:07 AM   Result Value Ref Range    Glucose (POC) 159 (H) 65 - 100 mg/dL    Performed by [facility]    PHOSPHORUS    Collection Time: 05/01/21  5:26 AM   Result Value Ref Range    Phosphorus 2.8 2.3 - 3.7 MG/DL   CBC WITH AUTOMATED DIFF    Collection Time: 05/01/21  5:26 AM   Result Value Ref Range    WBC 21.5 (H) 4.3 - 11.1 K/uL    RBC 4.57 4.05 - 5.2 M/uL    HGB 11.7 11.7 - 15.4 g/dL    HCT 37.1 35.8 - 46.3 %    MCV 81.2 79.6 - 97.8 FL    MCH 25.6 (L) 26.1 - 32.9 PG    MCHC 31.5 31.4 - 35.0 g/dL    RDW 21.2 (H) 11.9 - 14.6 %    PLATELET 877 (H) 607 - 450 K/uL    MPV 10.8 9.4 - 12.3 FL    ABSOLUTE NRBC 0.02 0.0 - 0.2 K/uL    DF AUTOMATED      NEUTROPHILS 87 (H) 43 - 78 %    LYMPHOCYTES 5 (L) 13 - 44 %    MONOCYTES 3 (L) 4.0 - 12.0 %    EOSINOPHILS 0 (L) 0.5 - 7.8 %    BASOPHILS 0 0.0 - 2.0 %    IMMATURE GRANULOCYTES 4 0.0 - 5.0 %    ABS. NEUTROPHILS 18.8 (H) 1.7 - 8.2 K/UL    ABS. LYMPHOCYTES 1.1 0.5 - 4.6 K/UL    ABS. MONOCYTES 0.6 0.1 - 1.3 K/UL    ABS. EOSINOPHILS 0.0 0.0 - 0.8 K/UL    ABS. BASOPHILS 0.1 0.0 - 0.2 K/UL    ABS. IMM. GRANS. 0.9 (H) 0.0 - 0.5 K/UL   METABOLIC PANEL, COMPREHENSIVE    Collection Time: 05/01/21  5:26 AM   Result Value Ref Range    Sodium 149 (H) 136 - 145 mmol/L    Potassium 3.4 (L) 3.5 - 5.1 mmol/L    Chloride 118 (H) 98 - 107 mmol/L    CO2 21 21 - 32 mmol/L    Anion gap 10 7 - 16 mmol/L    Glucose 189 (H) 65 - 100 mg/dL    BUN 42 (H) 8 - 23 MG/DL    Creatinine 0.81 0.6 - 1.0 MG/DL    GFR est AA >60 >60 ml/min/1.73m2    GFR est non-AA >60 >60 ml/min/1.73m2    Calcium 7.6 (L) 8.3 - 10.4 MG/DL    Bilirubin, total 0.7 0.2 - 1.1 MG/DL    ALT (SGPT) 41 12 - 65 U/L    AST (SGOT) 18 15 - 37 U/L    Alk.  phosphatase 130 50 - 136 U/L    Protein, total 5.3 (L) 6.3 - 8.2 g/dL    Albumin 1.6 (L) 3.2 - 4.6 g/dL    Globulin 3.7 (H) 2.3 - 3.5 g/dL    A-G Ratio 0.4 (L) 1.2 - 3.5     GLUCOSE, POC    Collection Time: 05/01/21  5:30 AM   Result Value Ref Range    Glucose (POC) 178 (H) 65 - 100 mg/dL    Performed by [facility]    BLOOD GAS & LYTES, ARTERIAL    Collection Time: 05/01/21  5:37 AM   Result Value Ref Range    pH (POC) 7.50 (H) 7.35 - 7.45      pCO2 (POC) 22.9 (L) 35 - 45 MMHG    pO2 (POC) 67 (L) 75 - 100 MMHG    Sodium,  136 - 145 MMOL/L    Potassium, POC 3.6 3.5 - 5.1 MMOL/L    Calcium, ionized (POC) 1.16 1.12 - 1.32 mmol/L    Glucose,  (H) 65 - 100 MG/DL    Base deficit (POC) 3.5 mmol/L    HCO3 (POC) 18.0 (L) 22 - 26 MMOL/L    CO2, POC 18 13 - 23 MMOL/L    O2 SAT 95 %    Sample source ARTERIAL      SITE LEFT RADIAL      SYEDA'S TEST Positive      Device: NASAL CANNULA      Performed by Ashley     Respiratory comment: [id number]          Assessment/Plan:   Principal Problem:    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 Mehrdad Carreon      Severe protein-calorie malnutrition -- per primary team      Tachycardia -- Pt has been intermittently tachy but HR has worsened progressively throughout this shift, tele reviewed without obvious arrhythmias but could be 2:1 AFlutter, Pt also with multiple factors to contribute to tachycardia -- has been febrile and hypoxic with increasing O2 requirement throughout the night, ABG with respiratory alkalosis -- may need to consider PAP therapy -- will defer to Pulm and Primary team, also Pt with noted R ZACH drain likely bile leak and increased abd distension and tenderness -- CT AP is pending, check CXR, add on DDImer (??PE), needs ECHO once HR improved, EKG ordered, will hold off on giving any HR alterting meds just now, if HR is compensatory for underlying problems decreasing HR would worsen her status, BP presently stable, would treat underlying causes for elevated HR, additional recommendations pending further work-up (CXR/EKG/CTAP)      Aspiration pneumonia of right upper lobe -- on abx, defer to primary team/Pulm, WBCs remain elevated today, lactic acid pending      Hypovolemia -- receiving IVF now, monitor hydration status, UOP has been good      Anemia due to blood loss -- f/u daily labs, H/H stable today      Thrombocytopenia -- appears this has resolved      Hypokalemia -- supplement and f/u labs, goal to keep [address] ([facility]), NP-C  5/1/2021

## 2021-05-01 NOTE — PROGRESS NOTES
Date of Outreach Update:  Haven Jay was seen and assessed. MEWS Score: 5 (05/01/21 0327)  Vitals:    05/01/21 5169 05/01/21 0653 05/01/21 0654 05/01/21 0727   BP:  (!) 85/64 90/60 95/60   Pulse: (!) 168 (!) 166 (!) 157 (!) 157   Resp:    22   Temp:    98.9 °F (37.2 °C)   SpO2:  94% 92% 92%   Weight:       Height:             Pain Assessment  Pain Intensity 1: 4 (04/1940)  Pain Location 1: Abdomen  Pain Intervention(s) 1: Medication (see MAR), Encouraged PCA  Patient Stated Pain Goal: 0      Previous Outreach assessment has been reviewed. There have been no significant clinical changes since the completion of the last dated Outreach assessment. Patient minimally responsive to voice. Groans yes/no answers. Abdomen distended and semi-soft. Right pat drain with bilious drainage. HR still elevated, but improved to 140 (s.tach). Remains on 7L HFNC. Lung sounds diminished. Hypoactive bowel sounds. VS, labs, and progress notes reviewed. Lab contacted by primary RN to collect ordered lab work. Will continue to follow up per outreach protocol.     Signed By:   Reece Briscoe RN    May 1, 2021 8:55 AM

## 2021-05-01 NOTE — PROGRESS NOTES
The  was unable to draw the labs requested this morning due to very poor access. Pt has only one 20g in LFA with boluses and potassium running as well as several antibiotics that are due. Nurse is concerned that the 20g will not last too much longer. Patient has had an IJ in the past due to poor access and double mastectomy. Charge nurse in ER told floor nurse that she could get another IJ for patient with doctors order.

## 2021-05-01 NOTE — PROGRESS NOTES
RR called from room r/t minimally responsive,  in SVT and O2 65%. Immediate response from ICU, RT, Dr. Juancarlos Keys and [facility]. See RR flowsheet for further documentation. Dr. Monserrat Quan updated by primary RN Jacqueline Delarosa and further orders received.

## 2021-05-01 NOTE — PROGRESS NOTES
Patient resting but will moan in her sleep. Has periods of confusion and stated that their were lots of babies on the ship. Has requested zofran and ativan. Encouraged patient to use PCA pump and IS. Green fluid in right drain and serous output in left ZACH drain. Temp axillary was 99.6. Will continue to monitor closely.

## 2021-05-01 NOTE — PROGRESS NOTES
Haven Daniels  Admission Date: 3/30/2021             Daily Progress Note: 5/1/2021    The patient's chart is reviewed and the patient is discussed with the staff. Sara Kim is an [de-identified] female with a past medical history of RLS, uterine cancer status post ALEJANDRO, diet-controlled diabetes, breast cancer status post bilateral mastectomy/chemo, GERD, duodenal cancer status post right hemicolectomy 3 weeks ago. 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 She underwent Whipple procedure on 4/22/2021 by Dr. Macey Gallardo without immediate complication, found to have large uncinate process pancreatic cancer with invasion of duodenum. To ICU intubated following surgery. Extubated 4/23. 5/1, spiked temp 102.2, more unresponsive, slightly hypotensive, tachycardic. Rapid response called. Cardiology consulted. Subjective:   Moaning and lethargic this AM, rapid response called for fever, tachycardia and hypoxia. Fluid bolus initiated. O2 supplementation up to 7 LPM at present with O2 sat 92%. Dr Macey Gallardo notified per staff regarding earlier rapid response. Family at bedside this AM.  Verified DNR status. RN at bedside.      Current Facility-Administered Medications   Medication Dose Route Frequency    acetaminophen (TYLENOL) tablet 650 mg  650 mg Oral Q4H PRN    sodium chloride 0.9 % bolus infusion 1,000 mL  1,000 mL IntraVENous ONCE    acetaminophen (TYLENOL) suppository 650 mg  650 mg Rectal Q4H PRN    0.9% sodium chloride infusion  100 mL/hr IntraVENous CONTINUOUS    potassium chloride 20 mEq in 100 ml IVPB  20 mEq IntraVENous Q2H    0.9% sodium chloride infusion  50 mL/hr IntraVENous CONTINUOUS    piperacillin-tazobactam (ZOSYN) 3.375 g in 0.9% sodium chloride (MBP/ADV) 100 mL MBP  3.375 g IntraVENous Q8H    fluconazole (DIFLUCAN) 200mg/100 mL IVPB (premix)  200 mg IntraVENous Q24H    bisacodyL (DULCOLAX) suppository 10 mg  10 mg Rectal DAILY PRN    HYDROmorphone (DILAUDID) 30 mg / 30 mL PCA in water   IntraVENous CONTINUOUS    ondansetron (ZOFRAN) injection 4 mg  4 mg IntraVENous Q6H PRN    docusate (COLACE) 50 mg/5 mL oral liquid 100 mg  100 mg Per J Tube BID    metoprolol (LOPRESSOR) injection 2.5 mg  2.5 mg IntraVENous Q6H    insulin regular (NOVOLIN R, HUMULIN R) injection   SubCUTAneous Q6H    albuterol (PROVENTIL VENTOLIN) nebulizer solution 2.5 mg  2.5 mg Nebulization Q4H PRN    0.9% sodium chloride infusion 250 mL  250 mL IntraVENous PRN    guaiFENesin (ROBITUSSIN) 100 mg/5 mL oral liquid 400 mg  400 mg Per J Tube TID    pantoprazole (PROTONIX) 40 mg in 0.9% sodium chloride 10 mL injection  40 mg IntraVENous Q12H    naloxone (NARCAN) injection 0.1 mg  0.1 mg IntraVENous Multiple    enoxaparin (LOVENOX) injection 40 mg  40 mg SubCUTAneous Q24H    LORazepam (ATIVAN) injection 1 mg  1 mg IntraVENous Q6H PRN    promethazine (PHENERGAN) with saline injection 12.5 mg  12.5 mg IntraVENous Q6H PRN    lip protectant (BLISTEX) ointment 1 Each  1 Each Topical PRN    phenol throat spray (CHLORASEPTIC) 1 Spray  1 Spray Oral PRN    NUTRITIONAL SUPPORT ELECTROLYTE PRN ORDERS   Does Not Apply PRN       Review of Systems  Very limited as per above since going back to sleep right away    Objective:     Vitals:    05/01/21 0556 05/01/21 0637 05/01/21 0653 05/01/21 0654   BP:   (!) 85/64 90/60   Pulse: (!) 157 (!) 168 (!) 166 (!) 157   Resp:       Temp:       SpO2:   94% 92%   Weight: 210 lb 5.1 oz (95.4 kg)      Height:         Intake and Output:   04/29 1901 - 05/01 0700  In: 5354 [I.V.:1154]  Out: 2610 [Urine:1800; Drains:720]  No intake/output data recorded. Physical Exam:   Constitution:  Frail and appears ill  EENMT:  Sclera clear, pupils equal, oral mucosa moist  Respiratory: very decreased in bases  Cardiovascular:  RRR without M,G,R  Gastrointestinal: post surgical, ttp. + bowel sounds. B/l drains and more in left vs right drain. Musculoskeletal: warm without cyanosis. There is no palpable lower leg edema. Skin:  no jaundice or rashes, surgical wounds   Neurologic: no gross neuro deficits     Psychiatric:  Moaning, but answers yes/no to questions.       CHEST XRAY:   5/1/2021 4/29/2021        LAB  No lab exists for component: [place]   Recent Labs     05/01/21  0526 04/30/21  0502 04/29/21  0458 04/28/21  0921   WBC 21.5* 25.9* 24.8* 19.0*   HGB 11.7 10.0* 10.8* 10.8*   HCT 37.1 30.3* 33.1* 33.3*   * 334 309 255     Recent Labs     05/01/21 0526 04/30/21  0502 04/29/21  0458   * 146* 142   K 3.4* 3.8 4.6   * 117* 114*   CO2 21 19* 20*   * 205* 221*   BUN 42* 54* 51*   CREA 0.81 0.90 1.02*   MG  --  2.8*  --    CA 7.6* 8.3 8.7   PHOS 2.8 3.5 3.7   ALB 1.6* 1.7* 1.9*     ABG:    Lab Results   Component Value Date/Time    PHI 7.50 (H) 05/01/2021 05:37 AM    PCO2I 22.9 (L) 05/01/2021 05:37 AM    PO2I 67 (L) 05/01/2021 05:37 AM    HCO3I 18.0 (L) 05/01/2021 05:37 AM         MICRO  4/24 Klebsiella in sputum    SARS-CoV-2 LAB Results  [facility] Test: No results found for: COV2NT   [facility] Test: No results found for: EDPR  [facility] Test: No components found for: [id number]       Sputum 4/24 - moderate GNR  Blood - NGTD  Urine - negative    Assessment:  (Medical Decision Making)     Hospital Problems  Date Reviewed: 3/23/2021          Codes Class Noted POA    Hypokalemia ICD-10-CM: E87.6  ICD-9-CM: 276.8  5/1/2021 Unknown        Aspiration pneumonia of right upper lobe ([address] ICD-10-CM: J69.0  ICD-9-CM: 507.0  4/25/2021 Unknown        Hypovolemia ICD-10-CM: E86.1  ICD-9-CM: 276.52  4/25/2021 Unknown        Anemia due to blood loss ICD-10-CM: D50.0  ICD-9-CM: 280.0  4/25/2021 Unknown        Thrombocytopenia (HCC) ICD-10-CM: D69.6  ICD-9-CM: 287.5  4/25/2021 Unknown        Tachycardia ICD-10-CM: R00.0  ICD-9-CM: 785.0  4/24/2021 Unknown        * (Principal) Adenocarcinoma of duodenum [facility]) ICD-10-CM: C17.0  ICD-9-CM: 152.0  4/22/2021 Unknown        Severe protein-calorie malnutrition ([facility] [address] ICD-10-CM: N53  ICD-9-CM: 705  4/7/2021 Yes        Colon cancer ([facility] [address] ICD-10-CM: C18.9  ICD-9-CM: 153.9  3/30/2021 Unknown              Patient recovering from ex lap with whipple procedure 4/22 for large uncinate process pancreatic cancer with invasion of the duodenum and root of the mesentery. Was almost off oxygen, then developed fever, hypoxia, and mild hypotension suggesting possible sepsis early Saturday AM.  Now on 7 LPM NC at present with O2 sat 92%. Received 1 L NS bolus and now with continuous NS at 50 ml/hr. Plan:  (Medical Decision Making)   -ABG this AM showed respiratory alkalosis, received fluid bolus and now on NS continuous>>given Na+/Cl- level will change to D5 1/2 NS bolus x 2 liters then continuous at 50 ml/hr  --pan culture now  --CXR this AM with no acute changes  -complete therapy for aspiration pneumonia.  -getting 3% saline and IS to help with atelectasis and airway clearance   -wean O2 as able  -protonix  -pain control   -continue remaining treatment per surgery  -monitor VS, have improved at present with fluid resuscitation   --cardiology following for tachycardia, but suspect sinus tachycardia is secondary to sepsis with post op encephalopathy.   -she is a DNR, verified with family at bedside    Julia Jackson NP      Lungs:  CTA B, no w/r/r  Heart:  Tachy, regular. With 4/6 systolic murmur. Additional Comments:    Agree with all above measures. Patient meets criteria for sepsis likely from abdominal source. Already on zosyn and diflucan. Will add vanc to have most organisms covered though likely unnecessary. Repeat cultures now. Agree with checking lactate. Does not need NIPPV and relatively contraindicated at this point with her mental status. Hold off on all sedating medications for the time being. Agree with IVF boluses. Will give 4 doses albumin as well. Doubt PE but ok to check d dimer already ordered. D/c metoprolol. Tachycardia is likely compensatory and necessary until underlying issues are addressed. F/u CT abd. High risk of further deterioration. I have spoken with and examined the patient. I agree with the above assessment and plan as documented.     Shyam Mauricio MD

## 2021-05-01 NOTE — PROGRESS NOTES
PLAN:  Continue current care  Speech following  [id number] Leydi Lester to start Clears when cleared by Speech  TF currently on hold  Unable to place PICC line related to bilateral mastectomy  Lovenox- prophalaxis  IV Abx -  zosyn/diflucan. Appears to have bile leak noted in right pat drain. rocephin (day 5/7)was for Klebsiella in sputum, but this is sensitive to Zosyn also. Protonix  Pain/ nausea control  Follow labs  PT following  Continue arroyo for I&O  SSI   Dulcolax supp. Remote tele. DNR  CXR this am      ASSESSMENT:  Admit Date: 3/30/2021    Procedure(s):  WHIPPLE PROCEDURE    Principal Problem:    Adenocarcinoma of duodenum ([address] (4/22/2021)    Active Problems:    Colon cancer ([address] (3/30/2021)      Severe protein-calorie malnutrition ([address] (4/7/2021)      Tachycardia (4/24/2021)      Aspiration pneumonia of right upper lobe ([address] (4/25/2021)      Hypovolemia (4/25/2021)      Anemia due to blood loss (4/25/2021)      Thrombocytopenia ([address] (4/25/2021)      Hypokalemia (5/1/2021)         SUBJECTIVE:  04/26/21 - Speech evaluation today regarding possible aspiration with liquids. No complaints at this time. WBC 13, on Zosyn. AF, tachycardic pat drains maintained with serosanguineous drainage  04/27/21 - complaining of abdominal pain, pat drains maintained with serosanguineous drainage noted. Staples without redness or drainage. WBC 14, rocephin started. AF, tachycardic.  04/28/21- 6 Days Post-Op. States pain is better. Moaning. Not really answering questions. Pat with serous drainage. Tube feeds infusing. 04/29/21   7 days post op. Just moaning this AM- will not try to communicate. WBC 24.8. Tm 100.7, tachycardic 120's overnight. Right pat drain now appears to have bile drainage. 04/30/21   8 days post op. Much more talkative and interactive this morning. WBC 25.9. Tm 99.2, tachycardic 120's overnight. Right pat drain now appears to have bile drainage. Left drain serous. 05/01/21   9 days post op. Rapid Response called overnight due to Rapid heart rate - 155 and pt with decreased responsiveness. Pt noted to be hypoxic with O2 sat at 65%. Pt currently appears lethargic and moaning. O2 at 7 LPM with O2 sat 92%. Temp Max - 102.2. Fluid bolus in progress. Multiple family members at bedside. DNR status confirmed with all family members. OBJECTIVE:  Constitutional:  Ill appearance   Visit Vitals  BP 95/60   Pulse (!) 157   Temp 98.9 °F (37.2 °C)   Resp 22   Ht 5' 6\" (1.676 m)   Wt 210 lb 5.1 oz (95.4 kg)   SpO2 92%   BMI 33.95 kg/m²     Eyes: eyes clear  ENMT: no external lesions gross hearing normal; no obvious neck masses, no ear or lip lesions  CV  Tachycardic, Normal perfusion  Resp: No JVD. Breathing is  non-labored; no audible wheezing. GI: soft, appropriately tender, Drain x 2 in place. Right drain with green color drainage noted, Left drain with serous output. J tube with tube feeds on hold, staples to midline without redness or drainage   Musculoskeletal: No embolic signs or cyanosis. Neuro: Lethargic  Psychiatric: Moaning; able to answer yes/no questions.     Patient Vitals for the past 24 hrs:   BP Temp Pulse Resp SpO2 Height Weight   05/01/21 0727 95/60 98.9 °F (37.2 °C) (!) 157 22 92 % -- --   05/01/21 0654 90/60 -- (!) 157 -- 92 % -- --   05/01/21 0653 (!) 85/64 -- (!) 166 -- 94 % -- --   05/01/21 0637 -- -- (!) 168 -- -- -- --   05/01/21 0556 -- -- (!) 157 -- -- -- 210 lb 5.1 oz (95.4 kg)   05/01/21 0541 -- -- (!) 159 -- 98 % -- --   05/01/21 0540 -- -- -- -- (!) 87 % -- --   05/01/21 0538 -- -- (!) 154 -- -- -- --   05/01/21 0537 117/80 -- -- -- -- -- --   05/01/21 0534 99/67 -- (!) 154 -- -- -- --   05/01/21 0327 103/60 (!) 102.2 °F (39 °C) (!) 122 19 100 % -- --   04/30/21 2332 128/76 98.9 °F (37.2 °C) (!) 105 19 100 % -- --   04/30/21 1912 124/73 99.3 °F (37.4 °C) (!) 102 19 97 % -- --   04/30/21 1552 119/72 97.4 °F (36.3 °C) (!) 113 20 96 % -- --   04/30/21 1551 -- -- -- -- -- 5' 6\" (1.676 m) --   04/30/21 1154 (!) 100/57 98.6 °F (37 °C) 89 20 94 % -- --     Labs:    Recent Labs     05/01/21  0526   WBC 21.5*   HGB 11.7   *   *   K 3.4*   *   CO2 21   BUN 42*   CREA 0.81   *   TBILI 0.7   ALT 41        [facility],

## 2021-05-01 NOTE — PROGRESS NOTES
Informed that patient's family is wanting to make patient comfort care. Will make prn comfort meds available.      Letty Menard MD

## 2021-05-01 NOTE — PROGRESS NOTES
Dr. Alka Sanders updated by Andres Mora RN that pt HR remains elevated in 150s and BP now low at 90/60. Will hold CT at this time. Taty Neri, Cardiology NP and CT.

## 2021-05-01 NOTE — PROGRESS NOTES
Discussed pt's plan of care with pt's family members. Family understands recovery from illness is highly unlikely and wishes to focus on comfort care at this time.         Cristiana Hansen NP

## 2021-05-01 NOTE — PROGRESS NOTES
NOTED:          PATIENT MADE COMFORT            Will continue to assess how to best serve this family

## 2021-05-01 NOTE — PROGRESS NOTES
END OF SHIFT NOTE:    INTAKE/OUTPUT  04/30 0701 - 05/01 0700  In: 4795 [I.V.:234]  Out: 2050 [Urine:1300; Drains:660]  Voiding: YES  Catheter: YES  Drain:   Kymberly Gift #1 04/22/21 Right Abdomen (Active)   Site Assessment Clean, dry, & intact 04/1940   Dressing Status Clean, dry, & intact 04/1940   Drainage Description Green 05/01/21 0345   Andre Drain Airleak No 04/30/21 1820   Status Patent; Charged;Draining 04/30/21 1820   Y Connector Used No 04/30/21 1820   Output (ml) 30 ml 05/01/21 0345       Kymberly Gift #2 04/22/21 Left Abdomen (Active)   Site Assessment Clean, dry, & intact 04/1940   Dressing Status Clean, dry, & intact 04/1940   Drainage Description Serous 05/01/21 0146   Andre Drain Airleak No 04/1940   Status Patent; Charged;Draining 04/1940   Y Connector Used No 04/1940   Output (ml) 50 ml 05/01/21 0146               Flatus: Patient does have flatus present. Stool:  1 occurrences. Characteristics:  Stool Assessment  Stool Color: Brown  Stool Appearance: Loose  Stool Amount: Large  Stool Source/Status: Rectum    Emesis: 0 occurrences.     Characteristics:   Emesis Assessment  Appearance: Green  Emesis Amount: Medium    VITAL SIGNS  Patient Vitals for the past 12 hrs:   Temp Pulse Resp BP SpO2   05/01/21 0654 -- (!) 157 -- 90/60 92 %   05/01/21 0653 -- (!) 166 -- (!) 85/64 94 %   05/01/21 0637 -- (!) 168 -- -- --   05/01/21 0556 -- (!) 157 -- -- --   05/01/21 0541 -- (!) 159 -- -- 98 %   05/01/21 0540 -- -- -- -- (!) 87 %   05/01/21 0538 -- (!) 154 -- -- --   05/01/21 0537 -- -- -- 117/80 --   05/01/21 0534 -- (!) 154 -- 99/67 --   05/01/21 0327 (!) 102.2 °F (39 °C) (!) 122 19 103/60 100 %   04/30/21 2332 98.9 °F (37.2 °C) (!) 105 19 128/76 100 %       Pain Assessment  Pain Intensity 1: 4 (04/1940)  Pain Location 1: Abdomen  Pain Intervention(s) 1: Medication (see MAR), Encouraged PCA  Patient Stated Pain Goal: 0    Ambulating  No    Shift report given to oncoming nurse at the bedside.     Kyleigh Briggs RN

## 2021-05-01 NOTE — PROGRESS NOTES
Pharmacokinetic Consult to Pharmacist    Haven Bournesue Vazquez is a [de-identified] y.o. female being treated with vancomycin. Height: 5' 6\" (167.6 cm)  Weight: 95.4 kg (210 lb 5.1 oz)  Lab Results   Component Value Date/Time    BUN 42 (H) 05/01/2021 05:26 AM    Creatinine 0.81 05/01/2021 05:26 AM    WBC 21.5 (H) 05/01/2021 05:26 AM    Procalcitonin 0.28 04/25/2021 03:37 AM      Estimated Creatinine Clearance: 64.4 mL/min (based on SCr of 0.81 mg/dL). CULTURES:  pending    Day 1 of vancomycin. Goal trough is 15-20. Vancomycin dose initiated at 2000 mg x 1, then 1000 mg q 12 hours. Will continue to follow patient and order levels when clinically indicated.     Thank you,  Joshua Justin, PharmD, [id number]  Clinical Pharmacist  [phone]

## 2021-05-01 NOTE — PROGRESS NOTES
END OF SHIFT NOTE:    INTAKE/OUTPUT  04/29 0701 - 04/30 0700  In: 4012 [I.V.:920]  Out: 7298 [Urine:1000; Drains:125]  Voiding: NO  Catheter: YES  Drain:   Gwendloyn Rede #1 04/22/21 Right Abdomen (Active)   Site Assessment Clean, dry, & intact 04/30/21 1820   Dressing Status Clean, dry, & intact 04/30/21 1820   Drainage Description Green 04/30/21 1820   Andre Drain Airleak No 04/30/21 1820   Status Patent; Charged;Draining 04/30/21 1820   Y Connector Used No 04/30/21 1820   Output (ml) 25 ml 04/30/21 1820       Gwendloyn Rede #2 04/22/21 Left Abdomen (Active)   Site Assessment Clean, dry, & intact 04/30/21 1820   Dressing Status Clean, dry, & intact 04/30/21 1820   Drainage Description Serosanguinous 04/30/21 1820   Andre Drain Airleak No 04/30/21 1820   Status Patent; Charged;Draining 04/30/21 1820   Y Connector Used No 04/30/21 1820   Output (ml) 100 ml 04/30/21 1820       Flatus: Patient does have flatus present. Stool:  1 occurrences. Characteristics:  Stool Assessment  Stool Color: Brown  Stool Appearance: Loose  Stool Amount: Large  Stool Source/Status: Rectum    Emesis: 0 occurrences. Characteristics:   Emesis Assessment  Appearance: Green  Emesis Amount: Medium    VITAL SIGNS  Patient Vitals for the past 12 hrs:   Temp Pulse Resp BP SpO2   04/30/21 1552 97.4 °F (36.3 °C) (!) 113 20 119/72 96 %   04/30/21 1154 98.6 °F (37 °C) 89 20 (!) 100/57 94 %       Pain Assessment  Pain Intensity 1: 0 (04/30/21 1500)  Pain Location 1: Abdomen  Pain Intervention(s) 1: Medication (see MAR)  Patient Stated Pain Goal: 0    Ambulating  Yes; up in chair till after lunch. Shift report given to oncoming nurse at the bedside.     Luis Antonio Car RN

## 2021-05-02 PROCEDURE — 2709999900 HC NON-CHARGEABLE SUPPLY

## 2021-05-02 NOTE — PROGRESS NOTES
Pt was found unresponsive and no pulse at 2327. Family was at bedside. House supervisor and Dr. Dean Benson   Notified. Hospitalist pronounce pt and family left with pt belongings. Pt was washed and changed and lines and drains were taken out.  home collected the body.

## 2021-05-02 NOTE — PROGRESS NOTES
Notified Dr. Yuly Vinson that pt passed at [phone]. Per Dr. Yuly Vinson, ok for Hospitalist to pronounce and Dr. Yuly Vinson will do death certificate. Hospitalist agreeable and will come to floor.

## 2021-05-02 NOTE — PROGRESS NOTES
Called by RN to pronounce death. RN noted no pulse and no respirations at 2327.   I confirm no pulse and no respirations, time of death 8484  Natural causes  See discharge summary for details    Radha Ac MD

## 2021-05-05 NOTE — DISCHARGE SUMMARY
Discharge Summary     Patient ID:  Susanna Hammans  [id number]   2 y.o.  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)    Active Problems:    Colon cancer ([address] (3/30/2021)      Severe protein-calorie malnutrition ([address] (4/7/2021)      Tachycardia (4/24/2021)      Aspiration pneumonia of right upper lobe ([address] (4/25/2021)      Hypovolemia (4/25/2021)      Anemia due to blood loss (4/25/2021)      Thrombocytopenia ([address] (4/25/2021)      Hypokalemia (5/1/2021)      Severe sepsis ([address] (5/1/2021)         Consults: Pulmonary/Intensive care    Significant Diagnostic Studies: radiology: CT scan: large pancreatic/duodenal mass     Hospital Course:   Pt was admitted by Dr Jie Pepe for a large right colon cancer on 3/30/21, she had successful colon resection, however, she continued to have persistent nausea, vomiting, retrospectively, she had trouble to eat and weight loss several months prior to her colon surgery. After 3 weeks of hospital stay after colon surgery, she had repeated CT and EGD, which found an obstructive cancer in her duodenum, then surgical oncology (Me, Dr Denise Edward) was consulted. I have extensive discussion with patient and family, I felt Whipple procedure was only definitive way to treat her cancer, but carried significant risks due to her large size cancer and profound malnutrition, alternatively hospice would be recommended. She decided to proceed with surgery. Her surgery was challenging but successful to remove a very large cancer in a very difficult location. She was kept in ICU for longer time due to concern of aspiration and tachycardia.  Her GI function returned rather quickly and she tolerated tube feeding and transferred to floor in stable condition. However she conditions worsened on the floor with tachycardia and signs of sepsis, and the patient quickly decided that she was ready to go to heaven, which was supported by her family. As noted, her power of  signed the DNR at the first day after Whipple procedure when she was stable.  Decision was quickly made to put her in comfort care, and she passed away in peace a day later, at 23:27 on 5/2/2021    Disposition: death      Signed:  Dario Meehan MD  May 4, 2021  11:24 PM

## 2021-05-06 LAB
BACTERIA SPEC CULT: NORMAL
SERVICE CMNT-IMP: NORMAL

## 2022-04-04 NOTE — CONSULTS
[facility] CARDIOLOGY PROGRESS NOTE           5/1/2021 7:10 AM    Admit Date: 3/30/2021      Subjective: The patient is seen and examined in her room. Consult transcribed by Mrs Elizabeth López. I agree with her assessment and plan. .Asked to see critically ill patient for sinus tachycardia. ROS:  GEN:  Frail and confused  Cardiovascular:  As noted above  Pulmonary:  As noted above  Neuro: confused moans and groans    Objective:      Vitals:    05/01/21 0556 05/01/21 0637 05/01/21 0653 05/01/21 0654   BP:   (!) 85/64 90/60   Pulse: (!) 157 (!) 168 (!) 166 (!) 157   Resp:       Temp:       SpO2:   94% 92%   Weight: 210 lb 5.1 oz (95.4 kg)      Height:           Physical Exam:  General-frail elderly female  Neck- supple, no JVD  CV-  Rapid regular rate and rhythm with 1/6 BRUCE  Lung- clear bilaterally  Abd- soft, tender, nondistended  Ext- no edema bilaterally. Skin- warm and dry  Psychiatric: confused  Neurologic:  Alert and confused      Data Review:   Recent Labs     05/01/21  0526 04/30/21  0502   * 146*   K 3.4* 3.8   MG  --  2.8*   BUN 42* 54*   CREA 0.81 0.90   * 205*   WBC 21.5* 25.9*   HGB 11.7 10.0*   HCT 37.1 30.3*   * 334       TELEMETRY:  Appears to a sinus tachycardia    Assessment/Plan:     Principal Problem:    Adenocarcinoma of duodenum ([facility] (4/22/2021)    Active Problems:    Colon cancer ([facility] (3/30/2021)      Severe protein-calorie malnutrition ([address] (4/7/2021)      Tachycardia (4/24/2021): Appears to be a sinus tachycardia. Probabbly secondary to sepsis with post op encephalopathy. Agree try to identify underlying source and treat( iv fluids,antibiotcs,etc). Consider post op ? PE and chest and abdominal CT scan if stabilizes for transport. Will follow with you. Aspiration pneumonia of right upper lobe ([facility] (4/25/2021): Support per hospitalist.Try to keep SAO2>90%.       Hypovolemia (4/25/2021):iv fluids      Anemia due to blood loss (4/25/2021) Thrombocytopenia ([facility] (4/25/2021)                Andrae Espinoza MD  5/1/2021 7:10 AM Propranolol Pregnancy And Lactation Text: This medication is Pregnancy Category C and it isn't known if it is safe during pregnancy. It is excreted in breast milk.

## 2023-08-07 NOTE — PROGRESS NOTES
PLAN:  Continue current care  Speech following  Ok to start Clears when cleared by Speech  TF @65 cc/hr via J tube. With 25ml water flush per hour  Unable to place PICC line related to bilateral mastectomy  Lovenox- prophalaxis  IV Abx -  zosyn/diflucan. Appears to have bile leak noted in right pat drain. rocephin (day 5/7)was for Klebsiella in sputum, but this is sensitive to Zosyn also. Protonix  Pain/ nausea control  Follow labs  PT following  Continue arroyo for I&O  SSI   Dulcolax supp. Remote tele. 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 No complaints at this time. WBC 13, on Zosyn. AF, tachycardic pat drains maintained with serosanguineous drainage  04/27/21 - complaining of abdominal pain, pat drains maintained with serosanguineous drainage noted. Staples without redness or drainage. WBC 14, rocephin started. AF, tachycardic.  04/28/21- 6 Days Post-Op. States pain is better. Moaning. Not really answering questions. Pat with serous drainage. Tube feeds infusing. 04/29/21   7 days post op. Just moaning this AM- will not try to communicate. WBC 24.8. Tm 100.7, tachycardic 120's overnight. Right pat drain now appears to have bile drainage. 04/30/21   8 days post op. Much more talkative and interactive this morning. WBC 25.9. Tm 99.2, tachycardic 120's overnight. Right pat drain now appears to have bile drainage.  Left drain Patient given arrival instructions. RN reviewed procedure and plan of care. All questions answered. Patient verbalizes understanding. serous. OBJECTIVE:  Constitutional:  no acute distress; appears stated age   Visit Vitals  /75   Pulse 99   Temp 97.6 °F (36.4 °C)   Resp 20   Ht 5' 6\" (1.676 m)   Wt 212 lb 4.9 oz (96.3 kg)   SpO2 97%   BMI 34.27 kg/m²     Eyes: eyes clear  ENMT: no external lesions gross hearing normal; no obvious neck masses, no ear or lip lesions  CV  Tachycardic, Normal perfusion  Resp: No JVD. Breathing is  non-labored; no audible wheezing. GI: soft and non-distended, appropriately tender, Drain x 2 right drain with yellow/dark brown color drainage noted, J tube with tube feeds infusing, staples to midline without redness or drainage   Musculoskeletal: No embolic signs or cyanosis.     Neuro: Alert, responds appropriately  Psychiatric: Calm    Patient Vitals for the past 24 hrs:   BP Temp Pulse Resp SpO2 Weight   04/30/21 0727 120/75 97.6 °F (36.4 °C) 99 20 97 % --   04/30/21 0553 -- -- (!) 109 -- -- --   04/30/21 0552 -- -- -- -- -- 212 lb 4.9 oz (96.3 kg)   04/30/21 0332 117/74 97.5 °F (36.4 °C) (!) 104 18 97 % --   04/30/21 0010 115/74 97 °F (36.1 °C) (!) 110 18 96 % --   04/29/21 1901 125/76 99.2 °F (37.3 °C) (!) 120 18 93 % --   04/29/21 1530 110/74 98.9 °F (37.2 °C) (!) 113 18 94 % --   04/29/21 1100 116/78 97.7 °F (36.5 °C) (!) 121 18 96 % --     Labs:    Recent Labs     04/30/21  0502   WBC 25.9*   HGB 10.0*      *   K 3.8   *   CO2 19*   BUN 54*   CREA 0.90   *   TBILI 0.6   ALT 49   *     Iram Earyl NP

## 2024-07-16 NOTE — PROGRESS NOTES
ACUTE PHYSICAL THERAPY GOALS:  (Developed with and agreed upon by patient and/or caregiver.)  STG:  (1.)Ms. Bro will move from supine to sit and sit to supine , scoot up and down and roll side to side with MINIMAL ASSIST within 3 treatment day(s).    (2.)Ms. Bro will transfer from bed to chair and chair to bed with MINIMAL ASSIST using the least restrictive device within 3 treatment day(s).    (3.)Ms. Bro will ambulate with MINIMAL ASSIST for 50 feet with the least restrictive device within 3 treatment day(s). (4.)Ms. Bro will perform standing static and dynamic balance activities x 15 minutes with MINIMAL ASSIST to improve safety within 3 treatment day(s). (5.)Ms. Bro will maintain stable vital signs throughout all functional mobility within 3 treatment days.     LTG:  (1.)Ms. rBo will move from supine to sit and sit to supine , scoot up and down and roll side to side in bed with CONTACT GUARD ASSIST within 7 treatment day(s).    (2.)Ms. Bro will transfer from bed to chair and chair to bed with CONTACT GUARD ASSIST using the least restrictive device within 7 treatment day(s).    (3.)Ms. Bro will ambulate with CONTACT GUARD ASSIST for 150 feet with the least restrictive device within 7 treatment day(s). (4.)Ms. Bro will perform standing static and dynamic balance activities x 15 minutes with CONTACT GUARD ASSIST to improve safety within 7 treatment day(s). (5.)Ms. Bro will ambulate and/or perform functional activities for 15 consecutive minutes with stable vital signs and no rests required to improve activity tolerance within 7 treatment days.     PHYSICAL THERAPY: Daily Note and PM Treatment Day # 4    Haven Simpson is a [de-identified] y.o. female   PRIMARY DIAGNOSIS: Adenocarcinoma of duodenum ([address]  Malignant neoplasm of colon, unspecified part of colon ([address] [C18.9]  Colon cancer ([address] [C18.9]  Procedure(s) (LRB):  WHIPPLE PROCEDURE (N/A)  5 Days Post-Op    ASSESSMENT:     REHAB RECOMMENDATIONS: CURRENT LEVEL OF FUNCTION:  (Most Recently Demonstrated)   Recommendation to date pending progress:  [address] [place] [address] vs. Short-term Rehab  Equipment:    To Be Determined Bed Mobility:   Moderate Assistance x 2  Sit to Stand:   Minimal Assistance x 2  Transfers:   Minimal Assistance x 2  Gait/Mobility:   Moderate Assistance x 2     ASSESSMENT:  Ms. Heydi Viera presents in supine. Upon entering, Pnt is agreeable to PT treatment. Pnt performed supine > sit with mod Ax2 via log roll, sitting EOB with fair(mild posterior lean) sitting balance control. Sit > stand with min-mod Ax2 using RW is performed 3 times. Gait x 5 ft with mod Ax2, cues for step clearance and safe turning w/ RW. Gait is noted to be extremely slow w/ decreased step length and clearance. Stand > sit with min-mod Ax2, followed by positioning for comfort. At end of session pt up in bedside chair with all needs within reach, alarm activated for safety, RN notified. Overall, good progress today as pnt improved in OOB tolerance. Pnt continues to present as functioning below her baseline, with deficits in mobility including transfers, gait, balance, and activity tolerance. Pt will continue to benefit from skilled therapy services to address stated deficits to promote return to highest level of function, independence, and safety. Will continue to follow. SUBJECTIVE:   Ms. Heydi Viera states, Melita  we going to the chair? \"    SOCIAL HISTORY/ LIVING ENVIRONMENT:   Home Environment: Private residence  One/Two Story Residence: One story  Living Alone: No  Support Systems: Child(lissa), Family member(s)  OBJECTIVE:     PAIN: VITAL SIGNS: LINES/DRAINS:   Pre Treatment:   9  Post Treatment: 6/10   IV, ZACH Drain and PEG  O2 Device: None (Room air)     MOBILITY: I Mod I S SBA CGA Min Mod Max Total  NT x2 Comments:   Bed Mobility    Rolling [] [] [] [] [] [] [x] [] [] [] [x]    Supine to Sit [] [] [] [] [] [] [x] [] [] [] [x]    Scooting [] [] [] [] [] [] [x] [] [] [] [x]    Sit to Supine [] [] [] [] [] [] [] [] [] [x] []    Transfers    Sit to Stand [] [] [] [] [] [x] [x] [] [] [] [x]    Bed to Chair [] [] [] [] [] [] [x] [] [] [] [x]    Stand to Sit [] [] [] [] [] [x] [] [] [] [] [x]    I=Independent, Mod I=Modified Independent, S=Supervision, SBA=Standby Assistance, CGA=Contact Guard Assistance,   Min=Minimal Assistance, Mod=Moderate Assistance, Max=Maximal Assistance, Total=Total Assistance, NT=Not Tested    BALANCE: Good Fair+ Fair Fair- Poor NT Comments   Sitting Static [] [x] [] [] [] []    Sitting Dynamic [] [x] [] [] [] []              Standing Static [] [] [x] [] [] []    Standing Dynamic [] [] [] [x] [] []      GAIT: I Mod I S SBA CGA Min Mod Max Total  NT x2 Comments:   Level of Assistance [] [] [] [] [] [] [x] [] [] [] [x]    Distance x5'    DME Rolling Walker and Gait Belt    Gait Quality Shuffled, slow, decreased step clearance    Weightbearing  Status N/A     I=Independent, Mod I=Modified Independent, S=Supervision, SBA=Standby Assistance, CGA=Contact Guard Assistance,   Min=Minimal Assistance, Mod=Moderate Assistance, Max=Maximal Assistance, Total=Total Assistance, NT=Not Tested    PLAN:   FREQUENCY/DURATION: PT Plan of Care: 3 times/week for duration of hospital stay or until stated goals are met, whichever comes first.  TREATMENT:     TREATMENT:   ($$ Therapeutic Activity: 23-37 mins    )  Co-Treatment PT/OT necessary due to patient's decreased overall endurance/tolerance levels, as well as need for high level skilled assistance to complete functional transfers/mobility and functional tasks  Therapeutic Activity (24 Minutes): Therapeutic activity included Rolling, Supine to Sit, Scooting, Transfer Training, Ambulation on level ground, Sitting balance  and Standing balance to improve functional Mobility, Strength, ROM and Activity tolerance.     TREATMENT GRID:  N/A    AFTER TREATMENT POSITION/PRECAUTIONS:  Chair, Needs within reach and RN notified    INTERDISCIPLINARY COLLABORATION:  RN/PCT, PT/PTA and OT/MESSER    TOTAL TREATMENT DURATION:  PT Patient Time In/Time Out  Time In: [address]  Time Out: [id number] DISPLAY PLAN FREE TEXT

## 2025-05-08 NOTE — PROGRESS NOTES
END OF SHIFT NOTE:    INTAKE/OUTPUT  04/28 0701 - 04/29 0700  In: 690 [I.V.:500]  Out: 1540 [Urine:1050; Drains:490]  Voiding: YES  Catheter: YES  Drain:   Royce Fothergill #1 04/22/21 Right Abdomen (Active)   Site Assessment Clean, dry, & intact 04/29/21 0150   Dressing Status Clean, dry, & intact 04/29/21 0150   Drainage Description Marianna Lade 04/29/21 0548   Andre Drain Airleak No 04/29/21 0150   Status Patent;Draining; To gravity 04/29/21 0150   Y Connector Used No 04/29/21 0150   Output (ml) 60 ml 04/29/21 0548       Andre Drain #2 04/22/21 Left Abdomen (Active)   Site Assessment Clean, dry, & intact 04/29/21 0150   Dressing Status Clean, dry, & intact 04/29/21 0150   Drainage Description Serosanguinous 04/29/21 0150   Andre Drain Airleak No 04/29/21 0150   Status Patent;Draining; To gravity 04/29/21 0150   Y Connector Used No 04/29/21 0150   Output (ml) 40 ml 04/29/21 0548               Flatus: Patient does have flatus present. Stool:  2 occurrences. Characteristics:  Stool Assessment  Stool Color: Brown  Stool Appearance: Loose, Watery  Stool Amount: Medium  Stool Source/Status: Rectum    Emesis: 1 occurrences. Characteristics:   Emesis Assessment  Appearance: Green  Emesis Amount: Medium    VITAL SIGNS  Patient Vitals for the past 12 hrs:   Temp Pulse Resp BP SpO2   04/29/21 0338 97.5 °F (36.4 °C) (!) 123 18 115/75 94 %   04/29/21 0042 100.2 °F (37.9 °C) (!) 127 20 103/61 94 %   04/28/21 2205 99.9 °F (37.7 °C) (!) 125 23 107/64 --   04/28/21 1923 (!) 100.7 °F (38.2 °C) (!) 124 19 (!) 106/57 92 %       Pain Assessment  Pain Intensity 1: 0 (04/29/21 0618)  Pain Location 1: Abdomen  Pain Intervention(s) 1: Medication (see MAR)  Patient Stated Pain Goal: 0    Ambulating  No    Shift report given to oncoming nurse at the bedside.     Rolando Hadley RN [facility] DIVISION OF KIDNEY DISEASE AND HYPERTENSION  [phone]    RENAL FOLLOW UP NOTE- NEPHROHOSPITALIST  --------------------------------------------------------------------------------  Patient seen and examined this morning.  Awaiting LE angiogram today.    PAST HISTORY  --------------------------------------------------------------------------------  No significant changes to PMH, PSH, FHx, SHx, unless otherwise noted    ALLERGIES & MEDICATIONS  --------------------------------------------------------------------------------  Allergies    No Known Allergies    Intolerances      Standing Inpatient Medications  acetaminophen     Tablet .. 975 milliGRAM(s) Oral every 6 hours  aspirin enteric coated 81 milliGRAM(s) Oral daily  atorvastatin 80 milliGRAM(s) Oral at bedtime  cadexomer iodine 0.9% Gel 1 Application(s) Topical daily  dextrose 5%. 1000 milliLiter(s) IV Continuous <Continuous>  dextrose 5%. 1000 milliLiter(s) IV Continuous <Continuous>  dextrose 50% Injectable 25 Gram(s) IV Push once  dextrose 50% Injectable 12.5 Gram(s) IV Push once  dextrose 50% Injectable 25 Gram(s) IV Push once  fenofibrate Tablet 145 milliGRAM(s) Oral at bedtime  glucagon  Injectable 1 milliGRAM(s) IntraMuscular once  heparin   Injectable 5000 Unit(s) SubCutaneous every 8 hours  insulin glargine Injectable (LANTUS) 7 Unit(s) SubCutaneous at bedtime  insulin lispro (ADMELOG) corrective regimen sliding scale   SubCutaneous every 6 hours  insulin lispro Injectable (ADMELOG) 10 Unit(s) SubCutaneous three times a day before meals  metoprolol tartrate 50 milliGRAM(s) Oral two times a day  pentoxifylline 400 milliGRAM(s) Oral three times a day  piperacillin/tazobactam IVPB.. 3.375 Gram(s) IV Intermittent every 8 hours  sodium chloride 0.9%. 1000 milliLiter(s) IV Continuous <Continuous>  ticagrelor 90 milliGRAM(s) Oral every 12 hours    PRN Inpatient Medications  dextrose Oral Gel 15 Gram(s) Oral once PRN      FOCUSED REVIEW OF SYSTEMS  --------------------------------------------------------------------------------  denies fevers/rigors  denies CP/palpitations  denies SOB  denies oliguria/dysuria      VITALS/PHYSICAL EXAM  --------------------------------------------------------------------------------  T(C): 36.3 (05-08-25 @ 09:18), Max: 37 (05-07-25 @ 16:33)  HR: 73 (05-08-25 @ 09:18) (70 - 81)  BP: 117/70 (05-08-25 @ 09:18) (117/70 - 133/76)  RR: 18 (05-08-25 @ 09:18) (18 - 18)  SpO2: 98% (05-08-25 @ 09:18) (98% - 100%)  Wt(kg): --        05-07-25 @ 07:01  -  05-08-25 @ 07:00  --------------------------------------------------------  IN: 2170 mL / OUT: 3850 mL / NET: -1680 mL    05-08-25 @ 07:01  -  05-08-25 @ 10:25  --------------------------------------------------------  IN: 0 mL / OUT: 0 mL / NET: 0 mL      Physical Exam:  	Gen: NAD, lying in bed  	Pulm: CTA B/L no w/r/r  	CV: RRR, S1S2  	Abd: +BS, soft, nontender/nondistended  	: No suprapubic tenderness.  no craig          Extremity: R foot dressing in place with associated ankle edema.  no LLE edema  	Neuro: A&O x 3      LABS/STUDIES  --------------------------------------------------------------------------------              9.1    10.48 >-----------<  503      [05-08-25 @ 05:30]              29.8     144  |  108  |  12  ----------------------------<  111      [05-08-25 @ 05:30]  4.6   |  24  |  1.51        Ca     9.1     [05-08-25 @ 05:30]      Mg     2.2     [05-08-25 @ 05:30]      Phos  3.3     [05-08-25 @ 05:30]      PT/INR: PT 11.9 , INR 1.04       [05-08-25 @ 05:29]  PTT: 36.4       [05-08-25 @ 05:29]        Creatinine Trend:  SCr 1.51 [05-08 @ 05:30]  SCr 1.49 [05-07 @ 07:16]  SCr 1.40 [05-06 @ 07:17]  SCr 1.61 [05-05 @ 06:52]  SCr 1.44 [05-04 @ 02:25]              Urinalysis - [05-08-25 @ 05:30]      Color  / Appearance  / SG  / pH       Gluc 111 / Ketone   / Bili  / Urobili        Blood  / Protein  / Leuk Est  / Nitrite       RBC  / WBC  / Hyaline  / Gran  / Sq Epi  / Non Sq Epi  / Bacteria     Urine Creatinine 53      [05-05-25 @ 15:34]  Urine Protein 17      [05-05-25 @ 15:34]  Urine Sodium 111      [05-05-25 @ 15:34]  Urine Urea Nitrogen 269      [05-05-25 @ 15:34]  Urine Potassium 46      [05-05-25 @ 15:34]  Urine Osmolality 438      [05-05-25 @ 15:34]    Lipid: chol 118, , HDL 29, LDL --      [03-06-25 @ 06:59]

## (undated) DEVICE — SUTURE PDS + SZ 1 L96IN ABSRB VLT L65MM TP-1 1/2 CIR PDP880G

## (undated) DEVICE — ROBOTIC COLON: Brand: MEDLINE INDUSTRIES, INC.

## (undated) DEVICE — REM POLYHESIVE ADULT PATIENT RETURN ELECTRODE: Brand: VALLEYLAB

## (undated) DEVICE — BLADELESS OBTURATOR: Brand: WECK VISTA

## (undated) DEVICE — SUTURE PERMAHAND SZ 3-0 L18IN NONABSORBABLE BLK SILK BRAID A184H

## (undated) DEVICE — GOWN,BREATHABLE SLV,AURORA,LG,STRL: Brand: MEDLINE

## (undated) DEVICE — ARM DRAPE

## (undated) DEVICE — DRAPE,TOP,102X53,STERILE: Brand: MEDLINE

## (undated) DEVICE — TROCAR: Brand: KII FIOS FIRST ENTRY

## (undated) DEVICE — SYR LR LCK 1ML GRAD NSAF 30ML --

## (undated) DEVICE — GOWN,REINFORCED,POLY,SIRUS,XLNG/XXLG: Brand: MEDLINE

## (undated) DEVICE — GOWN,REINF,POLY,ECL,PP SLV,XL: Brand: MEDLINE

## (undated) DEVICE — COLUMN DRAPE

## (undated) DEVICE — PERMANENT CAUTERY HOOK: Brand: ENDOWRIST

## (undated) DEVICE — GOWN,REINFORCE,POLY,SIRUS,XLNG/XLG: Brand: MEDLINE

## (undated) DEVICE — TUBING INSUFFLATION SMK EVAC HI FLO SET PNEUMOCLEAR

## (undated) DEVICE — SUT SLK 2-0SH 30IN BLK --

## (undated) DEVICE — AMD ANTIMICROBIAL GAUZE SPONGES,12 PLY USP TYPE VII, 0.2% POLYHEXAMETHYLENE BIGUANIDE HCI (PHMB): Brand: CURITY

## (undated) DEVICE — SUTURE PERMAHAND SZ 2-0 L18IN NONABSORBABLE BLK L26MM SH C012D

## (undated) DEVICE — PVC URETHRAL CATHETER: Brand: DOVER

## (undated) DEVICE — SUTURE PROL SZ 2-0 L36IN NONABSORBABLE BLU SH L26MM 1/2 CIR 8523H

## (undated) DEVICE — RELOAD STPL L75MM OPN H3.8MM CLS 1.5MM WIRE DIA0.2MM REG

## (undated) DEVICE — SUTURE PERMAHAND SZ 2-0 L12X18IN NONABSORBABLE BLK SILK A185H

## (undated) DEVICE — SUTURE PROL SZ 5-0 L30IN NONABSORBABLE BLU L13MM RB-2 1/2 8710H

## (undated) DEVICE — DRAPE,LAP,ADOLESCENT,STERILE: Brand: MEDLINE

## (undated) DEVICE — BOOT,SUTURE,STANDARD,YELLOW-IN-BLUE: Brand: MEDLINE

## (undated) DEVICE — INTENDED FOR TISSUE SEPARATION, AND OTHER PROCEDURES THAT REQUIRE A SHARP SURGICAL BLADE TO PUNCTURE OR CUT.: Brand: BARD-PARKER SAFETY BLADES SIZE 15, STERILE

## (undated) DEVICE — VISUALIZATION SYSTEM: Brand: CLEARIFY

## (undated) DEVICE — APPLICATOR FBR TIP L6IN COT TIP WOOD SHFT SWAB 2000 PER CA

## (undated) DEVICE — CANNULA NSL ORAL AD FOR CAPNOFLEX CO2 O2 AIRLFE

## (undated) DEVICE — INTENDED FOR TISSUE SEPARATION, AND OTHER PROCEDURES THAT REQUIRE A SHARP SURGICAL BLADE TO PUNCTURE OR CUT.: Brand: BARD-PARKER ®  SAFETY SCALPED

## (undated) DEVICE — SUTURE PDS II SZ 7-0 L30IN ABSRB VLT L9.3MM BV-1 3/8 CIR Z135H

## (undated) DEVICE — SURGICAL PROCEDURE PACK BASIC ST FRANCIS

## (undated) DEVICE — REDUCER CANN ENDOWRIST 12-8MM -- DA VINCI XI - SNGL USE

## (undated) DEVICE — NEEDLE HYPO 21GA L1.5IN INTRAMUSCULAR S STL LATCH BVL UP

## (undated) DEVICE — SUTURE PERMAHAND SZ 0 L30IN NONABSORBABLE BLK SILK BRAID A306H

## (undated) DEVICE — FEEDING TUBE • RADIOPAQUE: Brand: ARGYLE

## (undated) DEVICE — FORCEPS BX L240CM JAW DIA2.8MM L CAP W/ NDL MIC MESH TOOTH

## (undated) DEVICE — SUTURE PDS II SZ 6-0 L30IN ABSRB VLT C-1 L13MM 3/8 CIR Z127H

## (undated) DEVICE — DEVICE SEAL L23CM NANO COAT MARYLAND JAW OPN DIV LIGASURE

## (undated) DEVICE — SPONGE: SPECIALTY PEANUT XR 100/CS: Brand: MEDICAL ACTION INDUSTRIES

## (undated) DEVICE — 2000CC GUARDIAN II: Brand: GUARDIAN

## (undated) DEVICE — SUTURE VCRL SZ 0 L36IN ABSRB UD L36MM CT-1 1/2 CIR J946H

## (undated) DEVICE — CONTAINER PREFIL FRMLN 40ML --

## (undated) DEVICE — TUBING, SUCTION, 1/4" X 10', STRAIGHT: Brand: MEDLINE

## (undated) DEVICE — GLOVE SURG SZ 6.5 L11.2IN THK8.6MIL LT BRN LTX FREE

## (undated) DEVICE — DRAPE IRRIG FLD WRM W44XL44IN W/ AORN STD PRTBL INTRATEMP

## (undated) DEVICE — STAPLER INT L60MM REG TISS BLU B FRM 8 FIRING 2 ROW AUTO

## (undated) DEVICE — BUTTON SWITCH PENCIL BLADE ELECTRODE, HOLSTER: Brand: EDGE

## (undated) DEVICE — BAG,DRAINAGE,4L,A/R TOWER,LL,SLIDE TAP: Brand: MEDLINE

## (undated) DEVICE — SPONGE LAP 18X18IN STRL -- 5/PK

## (undated) DEVICE — BAG DRAIN BILE TUBE T 19OZ --

## (undated) DEVICE — CONNECTOR TBNG OD5-7MM O2 END DISP

## (undated) DEVICE — ALLEVYN GENTLE BORDER 7IN X 7INSILICONE GEL ADHESIVE HYDROCELLULAR FOAM DRESSING: Brand: ALLEVYN GENTLE BORDER 7IN X 7IN

## (undated) DEVICE — DRAIN SURG 19FR 100% SIL RADPQ RND CHN FULL FLUT

## (undated) DEVICE — SUTURE PDS II SZ 5-0 L30IN ABSRB VLT C-1 L13MM 3/8 CIR Z126H

## (undated) DEVICE — JELLY LUBRICATING 10GM PREFIL SYR LUBE

## (undated) DEVICE — ELECTRODE NDL 2.8IN COAT VALLEYLAB

## (undated) DEVICE — SOLUTION IRRIG 1000ML H2O STRL BLT

## (undated) DEVICE — BLOCK BITE AD 60FR W/ VELC STRP ADDRESSES MOST PT AND

## (undated) DEVICE — ELECTRO LUBE IS A SINGLE PATIENT USE DEVICE THAT IS INTENDED TO BE USED ON ELECTROSURGICAL ELECTRODES TO REDUCE STICKING.: Brand: KEY SURGICAL ELECTRO LUBE

## (undated) DEVICE — SUTURE PERMAHAND SZ 2-0 L18IN NONABSORBABLE BLK L26MM PS 1588H

## (undated) DEVICE — STAPLER INT L75MM CUT LN L73MM STPL LN L77MM BLU B FRM 8

## (undated) DEVICE — STAPLER 45 RELOAD BLUE: Brand: ENDOWRIST

## (undated) DEVICE — SUTURE PERMAHAND SZ 3-0 L18IN NONABSORBABLE BLK L26MM SH C013D

## (undated) DEVICE — APPLIER LIG CLP M L11IN TI STR RNG HNDL FOR 20 CLP DISP

## (undated) DEVICE — LEGGINGS, PAIR, 31X48, STERILE: Brand: MEDLINE

## (undated) DEVICE — BLADE ELECTRODE: Brand: EDGE

## (undated) DEVICE — SEAL UNIV 5-8MM DISP BX/10 -- DA VINCI XI - SNGL USE

## (undated) DEVICE — BIPOLAR CAUTERY CORD

## (undated) DEVICE — PAD,NON-ADHERENT,3X8,STERILE,LF,1/PK: Brand: MEDLINE

## (undated) DEVICE — TOTAL TRAY, DB, 100% SILI FOLEY, 16FR 10: Brand: MEDLINE

## (undated) DEVICE — SEAL

## (undated) DEVICE — LAPSAC SURGICAL TISSUE POUCH: Brand: LAPSAC

## (undated) DEVICE — 2, DISPOSABLE SUCTION/IRRIGATOR WITHOUT DISPOSABLE TIP: Brand: STRYKEFLOW

## (undated) DEVICE — INTENDED TO BE USED TO OCCLUDE, RETRACT AND IDENTIFY ARTERIES, VEINS, TENDONS AND NERVES IN SURGICAL PROCEDURES: Brand: STERION®  VESSEL LOOP

## (undated) DEVICE — RELOAD STPL H1.5X3.6XL60MM REG TISS BLU B FRM AUTO RET PIN

## (undated) DEVICE — KENDALL RADIOLUCENT FOAM MONITORING ELECTRODE RECTANGULAR SHAPE: Brand: KENDALL

## (undated) DEVICE — BLADE ASSEMB CLP HAIR FINE --

## (undated) DEVICE — 3M™ TEGADERM™ TRANSPARENT FILM DRESSING FRAME STYLE, 1624W, 2-3/8 IN X 2-3/4 IN (6 CM X 7 CM), 100/CT 4CT/CASE: Brand: 3M™ TEGADERM™

## (undated) DEVICE — SUTURE PROL SZ 5-0 L36IN NONABSORBABLE BLU L13MM C-1 3/8 8720H

## (undated) DEVICE — DRAPE TWL SURG 16X26IN BLU ORB04] ALLCARE INC]

## (undated) DEVICE — GARMENT,MEDLINE,DVT,INT,CALF,MED, GEN2: Brand: MEDLINE

## (undated) DEVICE — COVER,TABLE,44X76,STERILE: Brand: MEDLINE

## (undated) DEVICE — Device

## (undated) DEVICE — BASIC SINGLE BASIN-LF: Brand: MEDLINE INDUSTRIES, INC.

## (undated) DEVICE — DISPOSABLE GRASPER CARTRIDGE: Brand: DIRECT DRIVE REPOSABLE GRASPERS

## (undated) DEVICE — SEALANT TISS 4 CC FIBRIN VISTASEAL

## (undated) DEVICE — DRAPE,LAP,CHOLE,W/TROUGHS,STERILE: Brand: MEDLINE

## (undated) DEVICE — STANDARD SURGICAL GOWN, L: Brand: CONVERTORS

## (undated) DEVICE — MONOPOLAR CAUTERY CORD

## (undated) DEVICE — SHEARS HARM INSRT CRV 8MM -- DA VINCI XI - SNGL USE

## (undated) DEVICE — SYR 10ML LUER LOK 1/5ML GRAD --

## (undated) DEVICE — CARDINAL HEALTH FLEXIBLE LIGHT HANDLE COVER: Brand: CARDINAL HEALTH

## (undated) DEVICE — PREP SKN CHLRAPRP APL 26ML STR --